# Patient Record
Sex: MALE | Race: BLACK OR AFRICAN AMERICAN | Employment: OTHER | URBAN - METROPOLITAN AREA
[De-identification: names, ages, dates, MRNs, and addresses within clinical notes are randomized per-mention and may not be internally consistent; named-entity substitution may affect disease eponyms.]

---

## 2017-01-03 ENCOUNTER — OFFICE VISIT (OUTPATIENT)
Dept: CARDIOTHORACIC SURGERY | Age: 62
End: 2017-01-03

## 2017-01-03 VITALS
WEIGHT: 168 LBS | DIASTOLIC BLOOD PRESSURE: 70 MMHG | HEIGHT: 67 IN | BODY MASS INDEX: 26.37 KG/M2 | SYSTOLIC BLOOD PRESSURE: 120 MMHG

## 2017-01-03 DIAGNOSIS — Z09 POSTOP CHECK: Primary | ICD-10-CM

## 2017-01-03 PROCEDURE — 99024 POSTOP FOLLOW-UP VISIT: CPT | Performed by: SURGERY

## 2017-01-11 PROBLEM — Z74.09 LIMITED MOBILITY: Status: ACTIVE | Noted: 2017-01-11

## 2017-01-17 ENCOUNTER — TELEPHONE (OUTPATIENT)
Dept: FAMILY MEDICINE CLINIC | Age: 62
End: 2017-01-17

## 2017-02-23 ENCOUNTER — HOSPITAL ENCOUNTER (OUTPATIENT)
Dept: SURGERY | Age: 62
Discharge: HOME | End: 2017-02-23
Attending: INTERNAL MEDICINE | Admitting: INTERNAL MEDICINE

## 2017-02-23 VITALS
SYSTOLIC BLOOD PRESSURE: 156 MMHG | WEIGHT: 178 LBS | RESPIRATION RATE: 12 BRPM | DIASTOLIC BLOOD PRESSURE: 63 MMHG | BODY MASS INDEX: 27.94 KG/M2 | HEIGHT: 67 IN | OXYGEN SATURATION: 98 % | HEART RATE: 84 BPM | TEMPERATURE: 98.6 F

## 2017-02-23 LAB
GLUCOSE BLD-MCNC: 104 MG/DL (ref 70–99)
PERFORMED ON: ABNORMAL

## 2017-02-23 RX ORDER — LIDOCAINE HYDROCHLORIDE 10 MG/ML
0.1 INJECTION, SOLUTION EPIDURAL; INFILTRATION; INTRACAUDAL; PERINEURAL ONCE
Status: DISCONTINUED | OUTPATIENT
Start: 2017-02-23 | End: 2017-02-24 | Stop reason: HOSPADM

## 2017-02-23 RX ORDER — SODIUM CHLORIDE, SODIUM LACTATE, POTASSIUM CHLORIDE, CALCIUM CHLORIDE 600; 310; 30; 20 MG/100ML; MG/100ML; MG/100ML; MG/100ML
INJECTION, SOLUTION INTRAVENOUS CONTINUOUS
Status: DISCONTINUED | OUTPATIENT
Start: 2017-02-23 | End: 2017-02-24 | Stop reason: HOSPADM

## 2017-02-23 RX ADMIN — SODIUM CHLORIDE, SODIUM LACTATE, POTASSIUM CHLORIDE, CALCIUM CHLORIDE: 600; 310; 30; 20 INJECTION, SOLUTION INTRAVENOUS at 09:55

## 2017-02-23 ASSESSMENT — PAIN DESCRIPTION - LOCATION: LOCATION: ABDOMEN

## 2017-02-23 ASSESSMENT — PAIN SCALES - GENERAL
PAINLEVEL_OUTOF10: 0
PAINLEVEL_OUTOF10: 0

## 2017-02-23 ASSESSMENT — PAIN - FUNCTIONAL ASSESSMENT: PAIN_FUNCTIONAL_ASSESSMENT: 0-10

## 2017-02-28 ENCOUNTER — OFFICE VISIT (OUTPATIENT)
Dept: CARDIOLOGY CLINIC | Age: 62
End: 2017-02-28

## 2017-02-28 VITALS
SYSTOLIC BLOOD PRESSURE: 134 MMHG | OXYGEN SATURATION: 99 % | DIASTOLIC BLOOD PRESSURE: 70 MMHG | BODY MASS INDEX: 26.53 KG/M2 | HEIGHT: 67 IN | HEART RATE: 66 BPM | WEIGHT: 169 LBS

## 2017-02-28 DIAGNOSIS — I10 ESSENTIAL HYPERTENSION: ICD-10-CM

## 2017-02-28 DIAGNOSIS — I20.8 CARDIAC SYNDROME X (HCC): ICD-10-CM

## 2017-02-28 DIAGNOSIS — I21.4 NSTEMI (NON-ST ELEVATED MYOCARDIAL INFARCTION) (HCC): ICD-10-CM

## 2017-02-28 DIAGNOSIS — I65.23 CAROTID STENOSIS, ASYMPTOMATIC, BILATERAL: ICD-10-CM

## 2017-02-28 DIAGNOSIS — E78.49 OTHER HYPERLIPIDEMIA: ICD-10-CM

## 2017-02-28 DIAGNOSIS — I73.9 PAD (PERIPHERAL ARTERY DISEASE) (HCC): ICD-10-CM

## 2017-02-28 DIAGNOSIS — I25.10 CORONARY ARTERY DISEASE INVOLVING NATIVE HEART WITHOUT ANGINA PECTORIS, UNSPECIFIED VESSEL OR LESION TYPE: Primary | ICD-10-CM

## 2017-02-28 DIAGNOSIS — Z72.0 TOBACCO ABUSE: ICD-10-CM

## 2017-02-28 PROCEDURE — 99213 OFFICE O/P EST LOW 20 MIN: CPT | Performed by: INTERNAL MEDICINE

## 2017-03-07 ENCOUNTER — TELEPHONE (OUTPATIENT)
Dept: CARDIOLOGY CLINIC | Age: 62
End: 2017-03-07

## 2017-03-16 ENCOUNTER — HOSPITAL ENCOUNTER (OUTPATIENT)
Dept: CARDIOLOGY | Facility: CLINIC | Age: 62
Discharge: HOME | End: 2017-03-16
Attending: INTERNAL MEDICINE | Admitting: INTERNAL MEDICINE

## 2017-03-17 ENCOUNTER — TELEPHONE (OUTPATIENT)
Dept: CARDIOLOGY CLINIC | Age: 62
End: 2017-03-17

## 2017-04-04 ENCOUNTER — NURSE ONLY (OUTPATIENT)
Dept: URGENT CARE | Age: 62
End: 2017-04-04

## 2017-04-04 ENCOUNTER — OFFICE VISIT (OUTPATIENT)
Dept: FAMILY MEDICINE CLINIC | Age: 62
End: 2017-04-04

## 2017-04-04 VITALS
DIASTOLIC BLOOD PRESSURE: 60 MMHG | OXYGEN SATURATION: 98 % | WEIGHT: 165 LBS | SYSTOLIC BLOOD PRESSURE: 122 MMHG | BODY MASS INDEX: 25.84 KG/M2 | HEART RATE: 65 BPM

## 2017-04-04 DIAGNOSIS — G89.29 CHRONIC MIDLINE LOW BACK PAIN WITHOUT SCIATICA: ICD-10-CM

## 2017-04-04 DIAGNOSIS — M54.50 CHRONIC MIDLINE LOW BACK PAIN WITHOUT SCIATICA: Primary | ICD-10-CM

## 2017-04-04 DIAGNOSIS — M25.562 CHRONIC PAIN OF BOTH KNEES: Primary | ICD-10-CM

## 2017-04-04 DIAGNOSIS — M25.562 CHRONIC PAIN OF BOTH KNEES: ICD-10-CM

## 2017-04-04 DIAGNOSIS — M25.561 CHRONIC PAIN OF BOTH KNEES: ICD-10-CM

## 2017-04-04 DIAGNOSIS — G89.29 CHRONIC PAIN OF BOTH KNEES: Primary | ICD-10-CM

## 2017-04-04 DIAGNOSIS — G89.29 CHRONIC PAIN OF BOTH KNEES: ICD-10-CM

## 2017-04-04 DIAGNOSIS — G89.29 CHRONIC MIDLINE LOW BACK PAIN WITHOUT SCIATICA: Primary | ICD-10-CM

## 2017-04-04 DIAGNOSIS — M54.50 CHRONIC MIDLINE LOW BACK PAIN WITHOUT SCIATICA: ICD-10-CM

## 2017-04-04 DIAGNOSIS — M25.561 CHRONIC PAIN OF BOTH KNEES: Primary | ICD-10-CM

## 2017-04-04 PROCEDURE — 73562 X-RAY EXAM OF KNEE 3: CPT | Performed by: RADIOLOGY

## 2017-04-04 PROCEDURE — 99213 OFFICE O/P EST LOW 20 MIN: CPT | Performed by: FAMILY MEDICINE

## 2017-04-10 DIAGNOSIS — I10 ESSENTIAL HYPERTENSION: ICD-10-CM

## 2017-04-10 RX ORDER — METOPROLOL SUCCINATE 50 MG/1
TABLET, EXTENDED RELEASE ORAL
Qty: 30 TABLET | Refills: 3 | Status: SHIPPED | OUTPATIENT
Start: 2017-04-10 | End: 2017-08-26 | Stop reason: SDUPTHER

## 2017-04-11 DIAGNOSIS — Z86.73 HISTORY OF STROKE: ICD-10-CM

## 2017-04-12 RX ORDER — ATORVASTATIN CALCIUM 40 MG/1
TABLET, FILM COATED ORAL
Qty: 30 TABLET | Refills: 3 | Status: SHIPPED | OUTPATIENT
Start: 2017-04-12 | End: 2017-09-09 | Stop reason: SDUPTHER

## 2017-06-01 DIAGNOSIS — F03.90 DEMENTIA WITHOUT BEHAVIORAL DISTURBANCE, UNSPECIFIED DEMENTIA TYPE: ICD-10-CM

## 2017-06-01 RX ORDER — MEMANTINE HYDROCHLORIDE 10 MG/1
10 TABLET ORAL 2 TIMES DAILY
Qty: 60 TABLET | Refills: 3 | OUTPATIENT
Start: 2017-06-01 | End: 2017-06-26

## 2017-06-22 ENCOUNTER — TELEPHONE (OUTPATIENT)
Dept: FAMILY MEDICINE CLINIC | Age: 62
End: 2017-06-22

## 2017-06-22 DIAGNOSIS — R07.9 CHEST PAIN, UNSPECIFIED TYPE: ICD-10-CM

## 2017-06-22 RX ORDER — OMEPRAZOLE 20 MG/1
20 CAPSULE, DELAYED RELEASE ORAL DAILY
Qty: 30 CAPSULE | Refills: 2 | Status: SHIPPED | OUTPATIENT
Start: 2017-06-22

## 2017-06-22 RX ORDER — TAMSULOSIN HYDROCHLORIDE 0.4 MG/1
0.4 CAPSULE ORAL DAILY
Qty: 30 CAPSULE | Refills: 5 | Status: SHIPPED | OUTPATIENT
Start: 2017-06-22 | End: 2017-12-26 | Stop reason: SDUPTHER

## 2017-06-22 RX ORDER — HYDROCODONE BITARTRATE AND ACETAMINOPHEN 5; 325 MG/1; MG/1
1 TABLET ORAL EVERY 8 HOURS PRN
Qty: 8 TABLET | Refills: 0 | OUTPATIENT
Start: 2017-06-22

## 2017-07-03 RX ORDER — ISOSORBIDE MONONITRATE 30 MG/1
30 TABLET, EXTENDED RELEASE ORAL DAILY
Qty: 30 TABLET | Refills: 3 | Status: SHIPPED | OUTPATIENT
Start: 2017-07-03 | End: 2017-11-01 | Stop reason: SDUPTHER

## 2017-07-25 DIAGNOSIS — F03.90 DEMENTIA WITHOUT BEHAVIORAL DISTURBANCE, UNSPECIFIED DEMENTIA TYPE: ICD-10-CM

## 2017-07-25 RX ORDER — MEMANTINE HYDROCHLORIDE 10 MG/1
TABLET ORAL
Qty: 60 TABLET | Refills: 0 | Status: SHIPPED | OUTPATIENT
Start: 2017-07-25 | End: 2017-08-27 | Stop reason: SDUPTHER

## 2017-08-23 ENCOUNTER — OFFICE VISIT (OUTPATIENT)
Dept: FAMILY MEDICINE CLINIC | Age: 62
End: 2017-08-23

## 2017-08-23 VITALS
WEIGHT: 162 LBS | SYSTOLIC BLOOD PRESSURE: 116 MMHG | DIASTOLIC BLOOD PRESSURE: 62 MMHG | HEART RATE: 94 BPM | BODY MASS INDEX: 25.37 KG/M2 | OXYGEN SATURATION: 98 %

## 2017-08-23 DIAGNOSIS — R06.02 SHORTNESS OF BREATH: ICD-10-CM

## 2017-08-23 DIAGNOSIS — I10 ESSENTIAL HYPERTENSION: ICD-10-CM

## 2017-08-23 DIAGNOSIS — E11.9 DIET-CONTROLLED DIABETES MELLITUS (HCC): Primary | ICD-10-CM

## 2017-08-23 DIAGNOSIS — E78.5 HYPERLIPIDEMIA, UNSPECIFIED: ICD-10-CM

## 2017-08-23 DIAGNOSIS — Z23 NEED FOR STREPTOCOCCUS PNEUMONIAE AND INFLUENZA VACCINATION: ICD-10-CM

## 2017-08-23 LAB
A/G RATIO: 1.5 (ref 1.1–2.2)
ALBUMIN SERPL-MCNC: 4.5 G/DL (ref 3.4–5)
ALP BLD-CCNC: 140 U/L (ref 40–129)
ALT SERPL-CCNC: 27 U/L (ref 10–40)
ANION GAP SERPL CALCULATED.3IONS-SCNC: 19 MMOL/L (ref 3–16)
AST SERPL-CCNC: 22 U/L (ref 15–37)
BILIRUB SERPL-MCNC: 0.4 MG/DL (ref 0–1)
BUN BLDV-MCNC: 20 MG/DL (ref 7–20)
CALCIUM SERPL-MCNC: 9.2 MG/DL (ref 8.3–10.6)
CHLORIDE BLD-SCNC: 93 MMOL/L (ref 99–110)
CO2: 31 MMOL/L (ref 21–32)
CREAT SERPL-MCNC: 4.1 MG/DL (ref 0.8–1.3)
GFR AFRICAN AMERICAN: 18
GFR NON-AFRICAN AMERICAN: 15
GLOBULIN: 3 G/DL
GLUCOSE BLD-MCNC: 215 MG/DL (ref 70–99)
HBA1C MFR BLD: 5.8 %
POTASSIUM SERPL-SCNC: 4.3 MMOL/L (ref 3.5–5.1)
SODIUM BLD-SCNC: 143 MMOL/L (ref 136–145)
TOTAL PROTEIN: 7.5 G/DL (ref 6.4–8.2)

## 2017-08-23 PROCEDURE — G0009 ADMIN PNEUMOCOCCAL VACCINE: HCPCS | Performed by: FAMILY MEDICINE

## 2017-08-23 PROCEDURE — 83036 HEMOGLOBIN GLYCOSYLATED A1C: CPT | Performed by: FAMILY MEDICINE

## 2017-08-23 PROCEDURE — 99214 OFFICE O/P EST MOD 30 MIN: CPT | Performed by: FAMILY MEDICINE

## 2017-08-23 PROCEDURE — 90670 PCV13 VACCINE IM: CPT | Performed by: FAMILY MEDICINE

## 2017-08-23 PROCEDURE — 82044 UR ALBUMIN SEMIQUANTITATIVE: CPT | Performed by: FAMILY MEDICINE

## 2017-08-23 RX ORDER — ALBUTEROL SULFATE 90 UG/1
2 AEROSOL, METERED RESPIRATORY (INHALATION) EVERY 6 HOURS PRN
Qty: 1 INHALER | Refills: 3 | Status: SHIPPED | OUTPATIENT
Start: 2017-08-23 | End: 2018-02-16 | Stop reason: ALTCHOICE

## 2017-08-23 RX ORDER — ENALAPRIL MALEATE 10 MG/1
10 TABLET ORAL DAILY
COMMUNITY
End: 2017-10-10 | Stop reason: SDUPTHER

## 2017-08-23 RX ORDER — HYDRALAZINE HYDROCHLORIDE 25 MG/1
25 TABLET, FILM COATED ORAL 3 TIMES DAILY
COMMUNITY
End: 2017-08-23 | Stop reason: CLARIF

## 2017-08-23 RX ORDER — HYDRALAZINE HYDROCHLORIDE 50 MG/1
TABLET, FILM COATED ORAL
Refills: 0 | COMMUNITY
Start: 2017-07-03 | End: 2017-10-10 | Stop reason: SDUPTHER

## 2017-08-23 ASSESSMENT — ENCOUNTER SYMPTOMS: SHORTNESS OF BREATH: 1

## 2017-08-24 ENCOUNTER — TELEPHONE (OUTPATIENT)
Dept: FAMILY MEDICINE CLINIC | Age: 62
End: 2017-08-24

## 2017-08-24 LAB
CREATININE URINE POCT: 100
MICROALBUMIN/CREAT 24H UR: 150 MG/G{CREAT}
MICROALBUMIN/CREAT UR-RTO: >300

## 2017-08-26 DIAGNOSIS — I10 ESSENTIAL HYPERTENSION: ICD-10-CM

## 2017-08-27 DIAGNOSIS — F03.90 DEMENTIA WITHOUT BEHAVIORAL DISTURBANCE, UNSPECIFIED DEMENTIA TYPE: ICD-10-CM

## 2017-08-28 RX ORDER — METOPROLOL SUCCINATE 50 MG/1
TABLET, EXTENDED RELEASE ORAL
Qty: 30 TABLET | Refills: 3 | Status: SHIPPED | OUTPATIENT
Start: 2017-08-28 | End: 2018-01-18 | Stop reason: SDUPTHER

## 2017-08-28 RX ORDER — MEMANTINE HYDROCHLORIDE 10 MG/1
TABLET ORAL
Qty: 60 TABLET | Refills: 0 | Status: SHIPPED | OUTPATIENT
Start: 2017-08-28 | End: 2017-09-24 | Stop reason: SDUPTHER

## 2017-09-05 ENCOUNTER — TELEPHONE (OUTPATIENT)
Dept: FAMILY MEDICINE CLINIC | Age: 62
End: 2017-09-05

## 2017-09-08 ENCOUNTER — OFFICE VISIT (OUTPATIENT)
Dept: FAMILY MEDICINE CLINIC | Age: 62
End: 2017-09-08

## 2017-09-08 VITALS
BODY MASS INDEX: 25.16 KG/M2 | HEIGHT: 68 IN | DIASTOLIC BLOOD PRESSURE: 60 MMHG | HEART RATE: 77 BPM | WEIGHT: 166 LBS | OXYGEN SATURATION: 99 % | SYSTOLIC BLOOD PRESSURE: 120 MMHG

## 2017-09-08 DIAGNOSIS — Z23 NEED FOR INFLUENZA VACCINATION: ICD-10-CM

## 2017-09-08 DIAGNOSIS — R06.02 SHORTNESS OF BREATH: Primary | ICD-10-CM

## 2017-09-08 PROCEDURE — 99213 OFFICE O/P EST LOW 20 MIN: CPT | Performed by: FAMILY MEDICINE

## 2017-09-08 ASSESSMENT — ENCOUNTER SYMPTOMS: SHORTNESS OF BREATH: 1

## 2017-09-09 DIAGNOSIS — Z86.73 HISTORY OF STROKE: ICD-10-CM

## 2017-09-11 RX ORDER — ATORVASTATIN CALCIUM 40 MG/1
TABLET, FILM COATED ORAL
Qty: 30 TABLET | Refills: 3 | Status: SHIPPED | OUTPATIENT
Start: 2017-09-11 | End: 2018-03-05 | Stop reason: SDUPTHER

## 2017-09-24 DIAGNOSIS — F03.90 DEMENTIA WITHOUT BEHAVIORAL DISTURBANCE, UNSPECIFIED DEMENTIA TYPE: ICD-10-CM

## 2017-09-25 RX ORDER — MEMANTINE HYDROCHLORIDE 10 MG/1
TABLET ORAL
Qty: 60 TABLET | Refills: 0 | Status: SHIPPED | OUTPATIENT
Start: 2017-09-25 | End: 2017-10-27 | Stop reason: SDUPTHER

## 2017-10-10 ENCOUNTER — TELEPHONE (OUTPATIENT)
Dept: FAMILY MEDICINE CLINIC | Age: 62
End: 2017-10-10

## 2017-10-10 RX ORDER — ENALAPRIL MALEATE 10 MG/1
10 TABLET ORAL DAILY
Qty: 30 TABLET | Refills: 3 | Status: SHIPPED | OUTPATIENT
Start: 2017-10-10 | End: 2018-02-08 | Stop reason: SDUPTHER

## 2017-10-10 RX ORDER — HYDRALAZINE HYDROCHLORIDE 50 MG/1
TABLET, FILM COATED ORAL
Qty: 90 TABLET | Refills: 3 | Status: SHIPPED | OUTPATIENT
Start: 2017-10-10 | End: 2018-07-10 | Stop reason: SDUPTHER

## 2017-10-10 NOTE — TELEPHONE ENCOUNTER
Pt requesting a script for hydrALAZINE (APRESOLINE) 50 MG tablet    enalapril (VASOTEC) 10 MG tablet    PT last ov 9/8/17  Future 3/9/18      [facility]

## 2017-10-27 DIAGNOSIS — F03.90 DEMENTIA WITHOUT BEHAVIORAL DISTURBANCE, UNSPECIFIED DEMENTIA TYPE: ICD-10-CM

## 2017-10-27 RX ORDER — MEMANTINE HYDROCHLORIDE 10 MG/1
TABLET ORAL
Qty: 60 TABLET | Refills: 3 | Status: SHIPPED | OUTPATIENT
Start: 2017-10-27 | End: 2018-03-16 | Stop reason: SDUPTHER

## 2017-11-02 RX ORDER — ISOSORBIDE MONONITRATE 30 MG/1
30 TABLET, EXTENDED RELEASE ORAL DAILY
Qty: 90 TABLET | Refills: 0 | Status: SHIPPED | OUTPATIENT
Start: 2017-11-02 | End: 2018-02-12 | Stop reason: SDUPTHER

## 2017-12-26 RX ORDER — TAMSULOSIN HYDROCHLORIDE 0.4 MG/1
0.4 CAPSULE ORAL DAILY
Qty: 30 CAPSULE | Refills: 5 | Status: SHIPPED | OUTPATIENT
Start: 2017-12-26 | End: 2018-06-04 | Stop reason: SDUPTHER

## 2018-01-18 DIAGNOSIS — I10 ESSENTIAL HYPERTENSION: ICD-10-CM

## 2018-01-18 RX ORDER — METOPROLOL SUCCINATE 50 MG/1
TABLET, EXTENDED RELEASE ORAL
Qty: 30 TABLET | Refills: 3 | Status: SHIPPED | OUTPATIENT
Start: 2018-01-18 | End: 2018-05-09 | Stop reason: SDUPTHER

## 2018-02-08 RX ORDER — ENALAPRIL MALEATE 10 MG/1
10 TABLET ORAL DAILY
Qty: 30 TABLET | Refills: 3 | Status: SHIPPED | OUTPATIENT
Start: 2018-02-08 | End: 2018-06-04 | Stop reason: SDUPTHER

## 2018-02-12 RX ORDER — ISOSORBIDE MONONITRATE 30 MG/1
30 TABLET, EXTENDED RELEASE ORAL DAILY
Qty: 90 TABLET | Refills: 0 | Status: SHIPPED | OUTPATIENT
Start: 2018-02-12 | End: 2018-05-18 | Stop reason: SDUPTHER

## 2018-02-16 ENCOUNTER — OFFICE VISIT (OUTPATIENT)
Dept: FAMILY MEDICINE CLINIC | Age: 63
End: 2018-02-16

## 2018-02-16 VITALS
SYSTOLIC BLOOD PRESSURE: 112 MMHG | HEIGHT: 68 IN | OXYGEN SATURATION: 99 % | DIASTOLIC BLOOD PRESSURE: 62 MMHG | HEART RATE: 80 BPM | BODY MASS INDEX: 25.01 KG/M2 | WEIGHT: 165 LBS

## 2018-02-16 DIAGNOSIS — Z23 NEED FOR PROPHYLACTIC VACCINATION AGAINST STREPTOCOCCUS PNEUMONIAE (PNEUMOCOCCUS): ICD-10-CM

## 2018-02-16 DIAGNOSIS — R73.03 PREDIABETES: ICD-10-CM

## 2018-02-16 DIAGNOSIS — Z00.00 ROUTINE GENERAL MEDICAL EXAMINATION AT A HEALTH CARE FACILITY: Primary | ICD-10-CM

## 2018-02-16 LAB
CHOLESTEROL, TOTAL: 113 MG/DL (ref 0–199)
HBA1C MFR BLD: 5.5 %
HDLC SERPL-MCNC: 24 MG/DL (ref 40–60)
LDL CHOLESTEROL CALCULATED: 52 MG/DL
TRIGL SERPL-MCNC: 185 MG/DL (ref 0–150)
VLDLC SERPL CALC-MCNC: 37 MG/DL

## 2018-02-16 PROCEDURE — G0009 ADMIN PNEUMOCOCCAL VACCINE: HCPCS | Performed by: FAMILY MEDICINE

## 2018-02-16 PROCEDURE — 83036 HEMOGLOBIN GLYCOSYLATED A1C: CPT | Performed by: FAMILY MEDICINE

## 2018-02-16 PROCEDURE — G8598 ASA/ANTIPLAT THER USED: HCPCS | Performed by: FAMILY MEDICINE

## 2018-02-16 PROCEDURE — 90732 PPSV23 VACC 2 YRS+ SUBQ/IM: CPT | Performed by: FAMILY MEDICINE

## 2018-02-16 PROCEDURE — G0438 PPPS, INITIAL VISIT: HCPCS | Performed by: FAMILY MEDICINE

## 2018-02-16 RX ORDER — CYCLOBENZAPRINE HCL 10 MG
10 TABLET ORAL 3 TIMES DAILY PRN
COMMUNITY
End: 2019-08-19 | Stop reason: SDUPTHER

## 2018-02-16 ASSESSMENT — LIFESTYLE VARIABLES: HOW OFTEN DO YOU HAVE A DRINK CONTAINING ALCOHOL: 0

## 2018-02-16 ASSESSMENT — ANXIETY QUESTIONNAIRES: GAD7 TOTAL SCORE: 0

## 2018-02-16 ASSESSMENT — PATIENT HEALTH QUESTIONNAIRE - PHQ9: SUM OF ALL RESPONSES TO PHQ QUESTIONS 1-9: 0

## 2018-02-16 NOTE — PROGRESS NOTES
Medicare Annual Wellness Visit  Name: Cathleen Runner Date: 2018   MRN: [id number] Sex: Male   Age: 58 y.o. Ethnicity: Non-/Non    : 1955 Race: Black      Pedro Mayer is here for Medicare AWV    Screenings for behavioral, psychosocial and functional/safety risks, and cognitive dysfunction are all negative except as indicated below. These results, as well as other patient data from the [address] form, are documented in Flowsheets linked to this Encounter. Allergies   Allergen Reactions    Pcn [Penicillins]        Prior to Visit Medications    Medication Sig Taking? Authorizing Provider   cyclobenzaprine (FLEXERIL) 10 MG tablet Take 10 mg by mouth 3 times daily as needed for Muscle spasms Yes Historical Provider, MD   enalapril (VASOTEC) 10 MG tablet TAKE 1 TABLET BY MOUTH DAILY Yes Reymundo Choe DO   metoprolol succinate (TOPROL XL) 50 MG extended release tablet TAKE 1 TABLET BY MOUTH DAILY Yes Reymundo Choe DO   atorvastatin (LIPITOR) 40 MG tablet TAKE 1 TABLET BY MOUTH DAILY AT BEDTIME Yes Reymundo Choe DO   Misc. Devices (WALL GRAB BAR) MISC Use when taking shower Yes Jesus Sterling MD   Misc. Devices (SUCTION GRAB BAR) MISC Tub seat with back.  Yes Reymundo Choe DO   nitroGLYCERIN (NITROSTAT) 0.4 MG SL tablet Place 1 tablet under the tongue every 5 minutes as needed for Chest pain Yes Natasha Giraldo MD   isosorbide mononitrate (IMDUR) 30 MG extended release tablet TAKE 1 TABLET BY MOUTH DAILY  Natasha Giraldo MD   tamsulosin (FLOMAX) 0.4 MG capsule TAKE 1 CAPSULE BY MOUTH DAILY  Reymundo Choe DO   memantine (NAMENDA) 10 MG tablet TAKE 1 TABLET BY MOUTH 2 TIMES DAILY  Reymundo Choe DO   hydrALAZINE (APRESOLINE) 50 MG tablet TAKE 1 TABLET BY MOUTH 3 TIMES A DAY WITH FOOD  Reymundo Choe DO   omeprazole (PRILOSEC) 20 MG delayed release capsule Take 1 capsule by mouth daily  Manny Mandel DO   Meals on Wheels MISC by Does not apply route Patient Patient Instructions/AVS.    A1c was 5.5. He is not a diabetic anymore.

## 2018-03-16 DIAGNOSIS — F03.90 DEMENTIA WITHOUT BEHAVIORAL DISTURBANCE, UNSPECIFIED DEMENTIA TYPE: ICD-10-CM

## 2018-03-16 RX ORDER — MEMANTINE HYDROCHLORIDE 10 MG/1
TABLET ORAL
Qty: 60 TABLET | Refills: 3 | Status: SHIPPED | OUTPATIENT
Start: 2018-03-16 | End: 2018-09-17 | Stop reason: SDUPTHER

## 2018-05-09 DIAGNOSIS — I10 ESSENTIAL HYPERTENSION: ICD-10-CM

## 2018-05-09 RX ORDER — METOPROLOL SUCCINATE 50 MG/1
TABLET, EXTENDED RELEASE ORAL
Qty: 30 TABLET | Refills: 3 | Status: SHIPPED | OUTPATIENT
Start: 2018-05-09 | End: 2018-09-17 | Stop reason: SDUPTHER

## 2018-05-18 RX ORDER — ISOSORBIDE MONONITRATE 30 MG/1
30 TABLET, EXTENDED RELEASE ORAL DAILY
Qty: 90 TABLET | Refills: 0 | Status: SHIPPED | OUTPATIENT
Start: 2018-05-18 | End: 2018-08-17 | Stop reason: SDUPTHER

## 2018-05-29 RX ORDER — ISOSORBIDE MONONITRATE 30 MG/1
30 TABLET, EXTENDED RELEASE ORAL DAILY
Qty: 90 TABLET | Refills: 0 | Status: SHIPPED | OUTPATIENT
Start: 2018-05-29 | End: 2018-08-21 | Stop reason: SDUPTHER

## 2018-06-04 RX ORDER — ENALAPRIL MALEATE 10 MG/1
10 TABLET ORAL DAILY
Qty: 30 TABLET | Refills: 3 | Status: SHIPPED | OUTPATIENT
Start: 2018-06-04 | End: 2018-12-01 | Stop reason: SDUPTHER

## 2018-06-04 RX ORDER — TAMSULOSIN HYDROCHLORIDE 0.4 MG/1
0.4 CAPSULE ORAL DAILY
Qty: 30 CAPSULE | Refills: 5 | Status: SHIPPED | OUTPATIENT
Start: 2018-06-04 | End: 2018-12-16 | Stop reason: SDUPTHER

## 2018-06-12 ENCOUNTER — OFFICE VISIT (OUTPATIENT)
Dept: CARDIOLOGY CLINIC | Age: 63
End: 2018-06-12

## 2018-06-12 VITALS
SYSTOLIC BLOOD PRESSURE: 121 MMHG | HEIGHT: 67 IN | BODY MASS INDEX: 25.36 KG/M2 | DIASTOLIC BLOOD PRESSURE: 76 MMHG | OXYGEN SATURATION: 99 % | HEART RATE: 71 BPM | WEIGHT: 161.6 LBS

## 2018-06-12 DIAGNOSIS — I73.9 PAD (PERIPHERAL ARTERY DISEASE) (HCC): ICD-10-CM

## 2018-06-12 DIAGNOSIS — I25.10 CORONARY ARTERY DISEASE INVOLVING NATIVE HEART WITHOUT ANGINA PECTORIS, UNSPECIFIED VESSEL OR LESION TYPE: Primary | ICD-10-CM

## 2018-06-12 DIAGNOSIS — I25.89 CARDIAC MICROVASCULAR DISEASE: ICD-10-CM

## 2018-06-12 DIAGNOSIS — E78.2 MIXED HYPERLIPIDEMIA: ICD-10-CM

## 2018-06-12 DIAGNOSIS — I10 ESSENTIAL HYPERTENSION: ICD-10-CM

## 2018-06-12 PROCEDURE — 3017F COLORECTAL CA SCREEN DOC REV: CPT | Performed by: INTERNAL MEDICINE

## 2018-06-12 PROCEDURE — G8598 ASA/ANTIPLAT THER USED: HCPCS | Performed by: INTERNAL MEDICINE

## 2018-06-12 PROCEDURE — G8427 DOCREV CUR MEDS BY ELIG CLIN: HCPCS | Performed by: INTERNAL MEDICINE

## 2018-06-12 PROCEDURE — 99213 OFFICE O/P EST LOW 20 MIN: CPT | Performed by: INTERNAL MEDICINE

## 2018-06-12 PROCEDURE — G8419 CALC BMI OUT NRM PARAM NOF/U: HCPCS | Performed by: INTERNAL MEDICINE

## 2018-06-12 PROCEDURE — 1036F TOBACCO NON-USER: CPT | Performed by: INTERNAL MEDICINE

## 2018-06-12 RX ORDER — ASPIRIN 81 MG/1
81 TABLET, CHEWABLE ORAL DAILY
COMMUNITY

## 2018-08-17 ENCOUNTER — OFFICE VISIT (OUTPATIENT)
Dept: FAMILY MEDICINE CLINIC | Age: 63
End: 2018-08-17

## 2018-08-17 VITALS
SYSTOLIC BLOOD PRESSURE: 126 MMHG | WEIGHT: 161 LBS | HEART RATE: 64 BPM | DIASTOLIC BLOOD PRESSURE: 78 MMHG | OXYGEN SATURATION: 98 % | BODY MASS INDEX: 25.22 KG/M2

## 2018-08-17 DIAGNOSIS — R19.7 DIARRHEA, UNSPECIFIED TYPE: Primary | ICD-10-CM

## 2018-08-17 PROCEDURE — G8427 DOCREV CUR MEDS BY ELIG CLIN: HCPCS | Performed by: FAMILY MEDICINE

## 2018-08-17 PROCEDURE — G8598 ASA/ANTIPLAT THER USED: HCPCS | Performed by: FAMILY MEDICINE

## 2018-08-17 PROCEDURE — 3017F COLORECTAL CA SCREEN DOC REV: CPT | Performed by: FAMILY MEDICINE

## 2018-08-17 PROCEDURE — 99213 OFFICE O/P EST LOW 20 MIN: CPT | Performed by: FAMILY MEDICINE

## 2018-08-17 PROCEDURE — G8419 CALC BMI OUT NRM PARAM NOF/U: HCPCS | Performed by: FAMILY MEDICINE

## 2018-08-17 PROCEDURE — 36415 COLL VENOUS BLD VENIPUNCTURE: CPT | Performed by: FAMILY MEDICINE

## 2018-08-17 PROCEDURE — 1036F TOBACCO NON-USER: CPT | Performed by: FAMILY MEDICINE

## 2018-08-17 ASSESSMENT — ENCOUNTER SYMPTOMS: DIARRHEA: 1

## 2018-08-17 NOTE — PROGRESS NOTES
Muscle spasms      omeprazole (PRILOSEC) 20 MG delayed release capsule Take 1 capsule by mouth daily 30 capsule 2    Misc. Devices (WALL GRAB BAR) MISC Use when taking shower 1 each 0    Misc. Devices (SUCTION GRAB BAR) MISC Tub seat with back. 1 each 0    Meals on Wheels MISC by Does not apply route Patient needs diabetic meals five days per week. 1 each 0    nitroGLYCERIN (NITROSTAT) 0.4 MG SL tablet Place 1 tablet under the tongue every 5 minutes as needed for Chest pain 25 tablet 3     No current facility-administered medications for this visit. Assessment:    1. Diarrhea, unspecified type        Plan:    1. Diarrhea, unspecified type  Unclear etiology but unlikely infectious. Check stool studies below to r/o infectious etiology. Check labs to monitor potassium. Try Imodium if labs normal. May consider Bentyl on next visit. - C DIFF TOXIN/ANTIGEN; Future  - GI Bacterial Pathogens By PCR; Future  - CBC Auto Differential  - Comprehensive Metabolic Panel  - PANCREATIC ELASTASE, FECAL; Future      Return in about 1 month (around 9/17/2018) for diarrhea, , Diabetes, Hypertension, Hyperlipidemia.

## 2018-08-18 LAB
A/G RATIO: 1.8 (ref 1.1–2.2)
ALBUMIN SERPL-MCNC: 4.4 G/DL (ref 3.4–5)
ALP BLD-CCNC: 129 U/L (ref 40–129)
ALT SERPL-CCNC: 30 U/L (ref 10–40)
ANION GAP SERPL CALCULATED.3IONS-SCNC: 16 MMOL/L (ref 3–16)
AST SERPL-CCNC: 27 U/L (ref 15–37)
BASOPHILS ABSOLUTE: 0.1 K/UL (ref 0–0.2)
BASOPHILS RELATIVE PERCENT: 1.9 %
BILIRUB SERPL-MCNC: 0.4 MG/DL (ref 0–1)
BUN BLDV-MCNC: 32 MG/DL (ref 7–20)
CALCIUM SERPL-MCNC: 8.4 MG/DL (ref 8.3–10.6)
CHLORIDE BLD-SCNC: 94 MMOL/L (ref 99–110)
CO2: 34 MMOL/L (ref 21–32)
CREAT SERPL-MCNC: 5 MG/DL (ref 0.8–1.3)
EOSINOPHILS ABSOLUTE: 0.1 K/UL (ref 0–0.6)
EOSINOPHILS RELATIVE PERCENT: 2.1 %
GFR AFRICAN AMERICAN: 14
GFR NON-AFRICAN AMERICAN: 12
GLOBULIN: 2.5 G/DL
GLUCOSE BLD-MCNC: 92 MG/DL (ref 70–99)
HCT VFR BLD CALC: 37.9 % (ref 40.5–52.5)
HEMOGLOBIN: 12.1 G/DL (ref 13.5–17.5)
LYMPHOCYTES ABSOLUTE: 0.9 K/UL (ref 1–5.1)
LYMPHOCYTES RELATIVE PERCENT: 22.4 %
MCH RBC QN AUTO: 27.8 PG (ref 26–34)
MCHC RBC AUTO-ENTMCNC: 31.9 G/DL (ref 31–36)
MCV RBC AUTO: 87.3 FL (ref 80–100)
MONOCYTES ABSOLUTE: 0.3 K/UL (ref 0–1.3)
MONOCYTES RELATIVE PERCENT: 8.5 %
NEUTROPHILS ABSOLUTE: 2.6 K/UL (ref 1.7–7.7)
NEUTROPHILS RELATIVE PERCENT: 65.1 %
PDW BLD-RTO: 16.8 % (ref 12.4–15.4)
PLATELET # BLD: 124 K/UL (ref 135–450)
PMV BLD AUTO: 10.1 FL (ref 5–10.5)
POTASSIUM SERPL-SCNC: 4.8 MMOL/L (ref 3.5–5.1)
RBC # BLD: 4.34 M/UL (ref 4.2–5.9)
SODIUM BLD-SCNC: 144 MMOL/L (ref 136–145)
TOTAL PROTEIN: 6.9 G/DL (ref 6.4–8.2)
WBC # BLD: 4.1 K/UL (ref 4–11)

## 2018-08-20 ENCOUNTER — HOSPITAL ENCOUNTER (OUTPATIENT)
Age: 63
Setting detail: SPECIMEN
Discharge: HOME | End: 2018-08-20
Payer: COMMERCIAL

## 2018-08-20 DIAGNOSIS — R19.7 DIARRHEA, UNSPECIFIED TYPE: ICD-10-CM

## 2018-08-20 LAB — C DIFFICILE TOXIN, EIA: NORMAL

## 2018-08-20 PROCEDURE — 87449 NOS EACH ORGANISM AG IA: CPT

## 2018-08-20 PROCEDURE — 87505 NFCT AGENT DETECTION GI: CPT

## 2018-08-20 PROCEDURE — 87324 CLOSTRIDIUM AG IA: CPT

## 2018-08-20 PROCEDURE — 83520 IMMUNOASSAY QUANT NOS NONAB: CPT

## 2018-08-20 PROCEDURE — 87046 STOOL CULTR AEROBIC BACT EA: CPT

## 2018-08-21 LAB — GI BACTERIAL PATHOGENS BY PCR: NORMAL

## 2018-08-21 RX ORDER — ISOSORBIDE MONONITRATE 30 MG/1
30 TABLET, EXTENDED RELEASE ORAL DAILY
Qty: 90 TABLET | Refills: 3 | Status: SHIPPED | OUTPATIENT
Start: 2018-08-21 | End: 2019-08-14 | Stop reason: ALTCHOICE

## 2018-08-23 LAB — PANCREATIC ELASTASE, FECAL: 125 UG/G

## 2018-09-14 ENCOUNTER — OFFICE VISIT (OUTPATIENT)
Dept: FAMILY MEDICINE CLINIC | Age: 63
End: 2018-09-14

## 2018-09-14 VITALS
OXYGEN SATURATION: 98 % | WEIGHT: 158 LBS | HEART RATE: 76 BPM | BODY MASS INDEX: 24.75 KG/M2 | DIASTOLIC BLOOD PRESSURE: 82 MMHG | SYSTOLIC BLOOD PRESSURE: 118 MMHG

## 2018-09-14 DIAGNOSIS — R19.7 DIARRHEA, UNSPECIFIED TYPE: ICD-10-CM

## 2018-09-14 DIAGNOSIS — I10 ESSENTIAL HYPERTENSION: ICD-10-CM

## 2018-09-14 DIAGNOSIS — Z23 NEED FOR INFLUENZA VACCINATION: ICD-10-CM

## 2018-09-14 DIAGNOSIS — E11.9 DIET-CONTROLLED DIABETES MELLITUS (HCC): Primary | ICD-10-CM

## 2018-09-14 DIAGNOSIS — D69.6 THROMBOCYTOPENIA (HCC): ICD-10-CM

## 2018-09-14 DIAGNOSIS — E78.2 MIXED HYPERLIPIDEMIA: ICD-10-CM

## 2018-09-14 LAB
BASOPHILS ABSOLUTE: 0.1 K/UL (ref 0–0.2)
BASOPHILS RELATIVE PERCENT: 1.1 %
EOSINOPHILS ABSOLUTE: 0.2 K/UL (ref 0–0.6)
EOSINOPHILS RELATIVE PERCENT: 2.2 %
HBA1C MFR BLD: 6.2 %
HCT VFR BLD CALC: 35.7 % (ref 40.5–52.5)
HEMOGLOBIN: 11.5 G/DL (ref 13.5–17.5)
LYMPHOCYTES ABSOLUTE: 1.3 K/UL (ref 1–5.1)
LYMPHOCYTES RELATIVE PERCENT: 18.5 %
MCH RBC QN AUTO: 27.8 PG (ref 26–34)
MCHC RBC AUTO-ENTMCNC: 32.2 G/DL (ref 31–36)
MCV RBC AUTO: 86.3 FL (ref 80–100)
MONOCYTES ABSOLUTE: 0.5 K/UL (ref 0–1.3)
MONOCYTES RELATIVE PERCENT: 7.3 %
NEUTROPHILS ABSOLUTE: 5.1 K/UL (ref 1.7–7.7)
NEUTROPHILS RELATIVE PERCENT: 70.9 %
PDW BLD-RTO: 16 % (ref 12.4–15.4)
PLATELET # BLD: 209 K/UL (ref 135–450)
PMV BLD AUTO: 9.4 FL (ref 5–10.5)
RBC # BLD: 4.14 M/UL (ref 4.2–5.9)
WBC # BLD: 7.2 K/UL (ref 4–11)

## 2018-09-14 PROCEDURE — G8598 ASA/ANTIPLAT THER USED: HCPCS | Performed by: FAMILY MEDICINE

## 2018-09-14 PROCEDURE — 3017F COLORECTAL CA SCREEN DOC REV: CPT | Performed by: FAMILY MEDICINE

## 2018-09-14 PROCEDURE — 99214 OFFICE O/P EST MOD 30 MIN: CPT | Performed by: FAMILY MEDICINE

## 2018-09-14 PROCEDURE — G8427 DOCREV CUR MEDS BY ELIG CLIN: HCPCS | Performed by: FAMILY MEDICINE

## 2018-09-14 PROCEDURE — 83036 HEMOGLOBIN GLYCOSYLATED A1C: CPT | Performed by: FAMILY MEDICINE

## 2018-09-14 PROCEDURE — 1036F TOBACCO NON-USER: CPT | Performed by: FAMILY MEDICINE

## 2018-09-14 PROCEDURE — G0008 ADMIN INFLUENZA VIRUS VAC: HCPCS | Performed by: FAMILY MEDICINE

## 2018-09-14 PROCEDURE — 3044F HG A1C LEVEL LT 7.0%: CPT | Performed by: FAMILY MEDICINE

## 2018-09-14 PROCEDURE — 2022F DILAT RTA XM EVC RTNOPTHY: CPT | Performed by: FAMILY MEDICINE

## 2018-09-14 PROCEDURE — 36415 COLL VENOUS BLD VENIPUNCTURE: CPT | Performed by: FAMILY MEDICINE

## 2018-09-14 PROCEDURE — 90686 IIV4 VACC NO PRSV 0.5 ML IM: CPT | Performed by: FAMILY MEDICINE

## 2018-09-14 PROCEDURE — G8420 CALC BMI NORM PARAMETERS: HCPCS | Performed by: FAMILY MEDICINE

## 2018-09-14 RX ORDER — NITROGLYCERIN 0.4 MG/1
0.4 TABLET SUBLINGUAL EVERY 5 MIN PRN
Qty: 25 TABLET | Refills: 3 | Status: SHIPPED | OUTPATIENT
Start: 2018-09-14

## 2018-09-14 ASSESSMENT — ENCOUNTER SYMPTOMS: DIARRHEA: 1

## 2018-09-14 NOTE — PROGRESS NOTES
back. 1 each 0    Meals on Wheels MISC by Does not apply route Patient needs diabetic meals five days per week. 1 each 0     No current facility-administered medications for this visit. Assessment:    1. Diet-controlled diabetes mellitus ([facility]    2. Essential hypertension    3. Mixed hyperlipidemia    4. Thrombocytopenia ([facility]    5. Diarrhea, unspecified type    6. Need for influenza vaccination        Plan:    1. Diet-controlled diabetes mellitus ([facility] [address]  Stable. A1c 6.2.    2. Essential hypertension  Stable. Continue current medications. 3. Mixed hyperlipidemia  Stable. Continue current medications. 4. Thrombocytopenia ([facility] [address]  Recheck. Continue aspirin given history of stroke. Avoid NSAID's.   - CBC Auto Differential    5. Diarrhea, unspecified type  Almost resolved. Infectious stool studies normal. Potassium was normal. Continue Imodium prn.     6. Need for influenza vaccination  - INFLUENZA, QUADV, 3 YRS AND OLDER, IM, PF, PREFILL SYR OR SDV, 0.5ML (FLUZONE QUADV, PF)      No Follow-up on file. Patient to return to clinic if symptoms worsen or fail to improve.

## 2018-09-17 DIAGNOSIS — I10 ESSENTIAL HYPERTENSION: ICD-10-CM

## 2018-09-17 DIAGNOSIS — F03.90 DEMENTIA WITHOUT BEHAVIORAL DISTURBANCE, UNSPECIFIED DEMENTIA TYPE: ICD-10-CM

## 2018-09-17 RX ORDER — MEMANTINE HYDROCHLORIDE 10 MG/1
TABLET ORAL
Qty: 60 TABLET | Refills: 3 | Status: SHIPPED | OUTPATIENT
Start: 2018-09-17 | End: 2018-11-13 | Stop reason: SDUPTHER

## 2018-09-17 RX ORDER — METOPROLOL SUCCINATE 50 MG/1
TABLET, EXTENDED RELEASE ORAL
Qty: 30 TABLET | Refills: 3 | Status: SHIPPED | OUTPATIENT
Start: 2018-09-17 | End: 2018-11-13 | Stop reason: SDUPTHER

## 2018-09-24 ENCOUNTER — TELEPHONE (OUTPATIENT)
Dept: FAMILY MEDICINE CLINIC | Age: 63
End: 2018-09-24

## 2018-09-24 DIAGNOSIS — Z86.73 HISTORY OF STROKE: Primary | ICD-10-CM

## 2018-11-13 DIAGNOSIS — F03.90 DEMENTIA WITHOUT BEHAVIORAL DISTURBANCE, UNSPECIFIED DEMENTIA TYPE: ICD-10-CM

## 2018-11-13 DIAGNOSIS — I10 ESSENTIAL HYPERTENSION: ICD-10-CM

## 2018-11-13 RX ORDER — METOPROLOL SUCCINATE 50 MG/1
TABLET, EXTENDED RELEASE ORAL
Qty: 30 TABLET | Refills: 0 | Status: SHIPPED | OUTPATIENT
Start: 2018-11-13 | End: 2019-01-09 | Stop reason: SDUPTHER

## 2018-11-13 RX ORDER — MEMANTINE HYDROCHLORIDE 10 MG/1
TABLET ORAL
Qty: 60 TABLET | Refills: 0 | Status: SHIPPED | OUTPATIENT
Start: 2018-11-13 | End: 2019-03-12 | Stop reason: SDUPTHER

## 2018-12-03 RX ORDER — ENALAPRIL MALEATE 10 MG/1
10 TABLET ORAL DAILY
Qty: 30 TABLET | Refills: 3 | Status: SHIPPED | OUTPATIENT
Start: 2018-12-03 | End: 2019-03-08 | Stop reason: SDUPTHER

## 2018-12-03 NOTE — TELEPHONE ENCOUNTER
Last Seen: 9/14/2018  Next Appointment: Visit date not found    Requested Prescriptions     Pending Prescriptions Disp Refills    enalapril (VASOTEC) 10 MG tablet [Pharmacy Med Name: ENALAPRIL MALEATE 10 MG TAB] 30 tablet 3     Sig: TAKE 1 TABLET BY MOUTH DAILY

## 2018-12-17 RX ORDER — TAMSULOSIN HYDROCHLORIDE 0.4 MG/1
0.4 CAPSULE ORAL DAILY
Qty: 30 CAPSULE | Refills: 5 | Status: SHIPPED | OUTPATIENT
Start: 2018-12-17 | End: 2019-07-26 | Stop reason: SDUPTHER

## 2018-12-17 NOTE — TELEPHONE ENCOUNTER
Last Seen: 9/14/2018  Next Appointment: Visit date not found    Requested Prescriptions     Pending Prescriptions Disp Refills    tamsulosin (FLOMAX) 0.4 MG capsule [Pharmacy Med Name: TAMSULOSIN HCL 0.4 MG CAPSULE] 30 capsule 5     Sig: TAKE 1 CAPSULE BY MOUTH DAILY

## 2019-02-18 ENCOUNTER — OFFICE VISIT (OUTPATIENT)
Dept: FAMILY MEDICINE CLINIC | Age: 64
End: 2019-02-18
Payer: COMMERCIAL

## 2019-02-18 VITALS
SYSTOLIC BLOOD PRESSURE: 122 MMHG | WEIGHT: 157 LBS | HEART RATE: 62 BPM | DIASTOLIC BLOOD PRESSURE: 64 MMHG | OXYGEN SATURATION: 98 % | BODY MASS INDEX: 24.59 KG/M2

## 2019-02-18 DIAGNOSIS — I10 ESSENTIAL HYPERTENSION: ICD-10-CM

## 2019-02-18 DIAGNOSIS — Z00.00 ROUTINE GENERAL MEDICAL EXAMINATION AT A HEALTH CARE FACILITY: Primary | ICD-10-CM

## 2019-02-18 DIAGNOSIS — E11.9 DIET-CONTROLLED DIABETES MELLITUS (HCC): ICD-10-CM

## 2019-02-18 DIAGNOSIS — E78.2 MIXED HYPERLIPIDEMIA: ICD-10-CM

## 2019-02-18 DIAGNOSIS — Z86.73 HISTORY OF STROKE: ICD-10-CM

## 2019-02-18 PROCEDURE — 2022F DILAT RTA XM EVC RTNOPTHY: CPT | Performed by: FAMILY MEDICINE

## 2019-02-18 PROCEDURE — G8420 CALC BMI NORM PARAMETERS: HCPCS | Performed by: FAMILY MEDICINE

## 2019-02-18 PROCEDURE — 1036F TOBACCO NON-USER: CPT | Performed by: FAMILY MEDICINE

## 2019-02-18 PROCEDURE — 99214 OFFICE O/P EST MOD 30 MIN: CPT | Performed by: FAMILY MEDICINE

## 2019-02-18 PROCEDURE — G8598 ASA/ANTIPLAT THER USED: HCPCS | Performed by: FAMILY MEDICINE

## 2019-02-18 PROCEDURE — G8427 DOCREV CUR MEDS BY ELIG CLIN: HCPCS | Performed by: FAMILY MEDICINE

## 2019-02-18 PROCEDURE — 3046F HEMOGLOBIN A1C LEVEL >9.0%: CPT | Performed by: FAMILY MEDICINE

## 2019-02-18 PROCEDURE — G0438 PPPS, INITIAL VISIT: HCPCS | Performed by: FAMILY MEDICINE

## 2019-02-18 PROCEDURE — 3017F COLORECTAL CA SCREEN DOC REV: CPT | Performed by: FAMILY MEDICINE

## 2019-02-18 PROCEDURE — G8482 FLU IMMUNIZE ORDER/ADMIN: HCPCS | Performed by: FAMILY MEDICINE

## 2019-02-18 RX ORDER — ATORVASTATIN CALCIUM 40 MG/1
TABLET, FILM COATED ORAL
Qty: 30 TABLET | Refills: 11 | Status: SHIPPED | OUTPATIENT
Start: 2019-02-18 | End: 2020-02-24

## 2019-02-18 RX ORDER — MULTIVIT-MIN/IRON/FOLIC ACID/K 18-600-40
CAPSULE ORAL DAILY
COMMUNITY

## 2019-02-18 ASSESSMENT — ANXIETY QUESTIONNAIRES: GAD7 TOTAL SCORE: 2

## 2019-02-18 ASSESSMENT — PATIENT HEALTH QUESTIONNAIRE - PHQ9: SUM OF ALL RESPONSES TO PHQ QUESTIONS 1-9: 3

## 2019-02-18 ASSESSMENT — LIFESTYLE VARIABLES: HOW OFTEN DO YOU HAVE A DRINK CONTAINING ALCOHOL: 0

## 2019-03-08 RX ORDER — ENALAPRIL MALEATE 10 MG/1
10 TABLET ORAL DAILY
Qty: 30 TABLET | Refills: 3 | Status: SHIPPED | OUTPATIENT
Start: 2019-03-08 | End: 2019-09-05 | Stop reason: SDUPTHER

## 2019-04-17 RX ORDER — HYDRALAZINE HYDROCHLORIDE 50 MG/1
TABLET, FILM COATED ORAL
Qty: 90 TABLET | Refills: 5 | Status: SHIPPED | OUTPATIENT
Start: 2019-04-17 | End: 2019-12-02 | Stop reason: SDUPTHER

## 2019-04-17 NOTE — TELEPHONE ENCOUNTER
.  Last office visit 2/18/19    Last written 7/10/18 #90 5 refills    Next office visit scheduled 8/19/19    Requested Prescriptions     Pending Prescriptions Disp Refills    hydrALAZINE (APRESOLINE) 50 MG tablet [Pharmacy Med Name: HYDRALAZINE 50 MG TABLET] 90 tablet 5     Sig: TAKE 1 TABLET BY MOUTH 3 TIMES A DAY WITH FOOD

## 2019-07-22 ENCOUNTER — TELEPHONE (OUTPATIENT)
Dept: FAMILY MEDICINE CLINIC | Age: 64
End: 2019-07-22

## 2019-07-26 RX ORDER — TAMSULOSIN HYDROCHLORIDE 0.4 MG/1
0.4 CAPSULE ORAL DAILY
Qty: 90 CAPSULE | Refills: 1 | Status: SHIPPED | OUTPATIENT
Start: 2019-07-26 | End: 2020-01-20

## 2019-07-26 NOTE — TELEPHONE ENCOUNTER
.  Last office visit 2/18/19    Last written 12/17/18 #30 5 refills    Next office visit scheduled 8/19/19    Requested Prescriptions     Pending Prescriptions Disp Refills    tamsulosin (FLOMAX) 0.4 MG capsule [Pharmacy Med Name: TAMSULOSIN HCL 0.4 MG CAPSULE] 90 capsule 1     Sig: TAKE 1 CAPSULE BY MOUTH DAILY

## 2019-07-30 DIAGNOSIS — I10 ESSENTIAL HYPERTENSION: ICD-10-CM

## 2019-07-30 RX ORDER — METOPROLOL SUCCINATE 50 MG/1
TABLET, EXTENDED RELEASE ORAL
Qty: 90 TABLET | Refills: 1 | Status: SHIPPED | OUTPATIENT
Start: 2019-07-30 | End: 2020-01-24

## 2019-08-14 ENCOUNTER — OFFICE VISIT (OUTPATIENT)
Dept: CARDIOLOGY CLINIC | Age: 64
End: 2019-08-14
Payer: MEDICARE

## 2019-08-14 VITALS
OXYGEN SATURATION: 99 % | HEIGHT: 67 IN | BODY MASS INDEX: 24.33 KG/M2 | DIASTOLIC BLOOD PRESSURE: 60 MMHG | WEIGHT: 155 LBS | HEART RATE: 71 BPM | SYSTOLIC BLOOD PRESSURE: 110 MMHG

## 2019-08-14 DIAGNOSIS — I10 ESSENTIAL HYPERTENSION: ICD-10-CM

## 2019-08-14 DIAGNOSIS — I25.89 CARDIAC MICROVASCULAR DISEASE: ICD-10-CM

## 2019-08-14 DIAGNOSIS — E78.2 MIXED HYPERLIPIDEMIA: ICD-10-CM

## 2019-08-14 DIAGNOSIS — I73.9 PAD (PERIPHERAL ARTERY DISEASE) (HCC): ICD-10-CM

## 2019-08-14 DIAGNOSIS — R01.1 MURMUR, CARDIAC: Primary | ICD-10-CM

## 2019-08-14 DIAGNOSIS — I25.10 CORONARY ARTERY DISEASE INVOLVING NATIVE CORONARY ARTERY OF NATIVE HEART WITHOUT ANGINA PECTORIS: ICD-10-CM

## 2019-08-14 PROCEDURE — 1036F TOBACCO NON-USER: CPT | Performed by: INTERNAL MEDICINE

## 2019-08-14 PROCEDURE — G8427 DOCREV CUR MEDS BY ELIG CLIN: HCPCS | Performed by: INTERNAL MEDICINE

## 2019-08-14 PROCEDURE — 3017F COLORECTAL CA SCREEN DOC REV: CPT | Performed by: INTERNAL MEDICINE

## 2019-08-14 PROCEDURE — G8598 ASA/ANTIPLAT THER USED: HCPCS | Performed by: INTERNAL MEDICINE

## 2019-08-14 PROCEDURE — G8420 CALC BMI NORM PARAMETERS: HCPCS | Performed by: INTERNAL MEDICINE

## 2019-08-14 PROCEDURE — 99214 OFFICE O/P EST MOD 30 MIN: CPT | Performed by: INTERNAL MEDICINE

## 2019-08-14 NOTE — LETTER
[facility]   Cardiovascular Evaluation    PATIENT: Sagar Singh  DATE: 2019  MRN: [id number]  CSN: [id number]  : 1955      Primary Care Doctor: Angi Avilez DO  Reason for evaluation:   1 Year Follow Up (no cardiac complaints)      Subjective:   History of present illness on initial date of evaluation:   Sagar Singh is a 59 y.o. patient who presents for cardiac follow up of CAD, and hypertension. Patient recently presented to the hospital on 2016 with chest pain. Underwent cardiac cath on 2016 which showed no CAD, microvascular disease. Imdur was added to his medication regimen. Echo on  showed an EF of 55% and normal LVEF. He had a stroke in  and another in . The last stroke occurred in [place], [place]. Records not yet available. Family member reports latest stroke was within in the last 5 years. Today he reports feeling good. He has been off the isosorbide with no problems. He is taking his other medications with no problem. Patient denies chest pain, sob, palpitations, dizziness or syncope. Patient Active Problem List   Diagnosis    Essential hypertension    Abscess    History of stroke    Chest pain    ESRD (end stage renal disease) on dialysis ([address]    CAD (coronary artery disease)    NSTEMI (non-ST elevated myocardial infarction) ([address]    PAD (peripheral artery disease) ([address]    S/P cardiac catheterization    Hypotension    Symptomatic anemia    Complication of vascular access for dialysis    Limited mobility         Past Medical History:   has a past medical history of CAD (coronary artery disease), Chronic kidney disease, CVA (cerebral vascular accident) ([address] Diabetes ([address] Hemodialysis patient ([address] Hyperlipidemia, and Hypertension. Surgical History:   has a past surgical history that includes Cardiac catheterization and Dialysis fistula creation.      Social History:

## 2019-08-14 NOTE — PATIENT INSTRUCTIONS
Plan:  1. Continue current medications, no changes  2. Echocardiogram to view size and strength of heart  3. We will call you with the results  4.  Follow up in 1 year

## 2019-08-19 ENCOUNTER — OFFICE VISIT (OUTPATIENT)
Dept: FAMILY MEDICINE CLINIC | Age: 64
End: 2019-08-19
Payer: MEDICARE

## 2019-08-19 VITALS — DIASTOLIC BLOOD PRESSURE: 78 MMHG | HEART RATE: 68 BPM | OXYGEN SATURATION: 98 % | SYSTOLIC BLOOD PRESSURE: 136 MMHG

## 2019-08-19 DIAGNOSIS — Z86.73 HISTORY OF STROKE: ICD-10-CM

## 2019-08-19 DIAGNOSIS — E78.2 MIXED HYPERLIPIDEMIA: ICD-10-CM

## 2019-08-19 DIAGNOSIS — R73.03 PRE-DIABETES: Primary | ICD-10-CM

## 2019-08-19 DIAGNOSIS — I10 ESSENTIAL HYPERTENSION: ICD-10-CM

## 2019-08-19 LAB
CREATININE URINE POCT: 200
HBA1C MFR BLD: 5 %
MICROALBUMIN/CREAT 24H UR: 150 MG/G{CREAT}
MICROALBUMIN/CREAT UR-RTO: >300

## 2019-08-19 PROCEDURE — G8598 ASA/ANTIPLAT THER USED: HCPCS | Performed by: FAMILY MEDICINE

## 2019-08-19 PROCEDURE — 3017F COLORECTAL CA SCREEN DOC REV: CPT | Performed by: FAMILY MEDICINE

## 2019-08-19 PROCEDURE — 83036 HEMOGLOBIN GLYCOSYLATED A1C: CPT | Performed by: FAMILY MEDICINE

## 2019-08-19 PROCEDURE — 82044 UR ALBUMIN SEMIQUANTITATIVE: CPT | Performed by: FAMILY MEDICINE

## 2019-08-19 PROCEDURE — 99214 OFFICE O/P EST MOD 30 MIN: CPT | Performed by: FAMILY MEDICINE

## 2019-08-19 PROCEDURE — 1036F TOBACCO NON-USER: CPT | Performed by: FAMILY MEDICINE

## 2019-08-19 PROCEDURE — G8420 CALC BMI NORM PARAMETERS: HCPCS | Performed by: FAMILY MEDICINE

## 2019-08-19 PROCEDURE — G8427 DOCREV CUR MEDS BY ELIG CLIN: HCPCS | Performed by: FAMILY MEDICINE

## 2019-08-19 RX ORDER — CYCLOBENZAPRINE HCL 10 MG
10 TABLET ORAL 3 TIMES DAILY PRN
Qty: 30 TABLET | Refills: 0 | Status: SHIPPED | OUTPATIENT
Start: 2019-08-19 | End: 2019-08-23

## 2019-08-19 RX ORDER — ENALAPRIL MALEATE 10 MG/1
10 TABLET ORAL DAILY
Qty: 30 TABLET | Refills: 3 | Status: CANCELLED | OUTPATIENT
Start: 2019-08-19

## 2019-08-23 ENCOUNTER — HOSPITAL ENCOUNTER (EMERGENCY)
Age: 64
Discharge: HOME | End: 2019-08-23
Attending: EMERGENCY MEDICINE
Payer: MEDICARE

## 2019-08-23 VITALS
BODY MASS INDEX: 24.28 KG/M2 | RESPIRATION RATE: 16 BRPM | SYSTOLIC BLOOD PRESSURE: 180 MMHG | WEIGHT: 155 LBS | TEMPERATURE: 97.8 F | HEART RATE: 60 BPM | DIASTOLIC BLOOD PRESSURE: 66 MMHG | OXYGEN SATURATION: 100 %

## 2019-08-23 DIAGNOSIS — M54.31 BILATERAL SCIATICA: Primary | ICD-10-CM

## 2019-08-23 DIAGNOSIS — M54.32 BILATERAL SCIATICA: Primary | ICD-10-CM

## 2019-08-23 DIAGNOSIS — M62.838 SPASM OF MUSCLE: ICD-10-CM

## 2019-08-23 PROCEDURE — 6370000000 HC RX 637 (ALT 250 FOR IP): Performed by: EMERGENCY MEDICINE

## 2019-08-23 PROCEDURE — 96372 THER/PROPH/DIAG INJ SC/IM: CPT

## 2019-08-23 PROCEDURE — 6360000002 HC RX W HCPCS: Performed by: EMERGENCY MEDICINE

## 2019-08-23 PROCEDURE — 99283 EMERGENCY DEPT VISIT LOW MDM: CPT

## 2019-08-23 RX ORDER — CYCLOBENZAPRINE HCL 10 MG
10 TABLET ORAL 3 TIMES DAILY PRN
Qty: 21 TABLET | Refills: 0 | Status: ON HOLD | OUTPATIENT
Start: 2019-08-23 | End: 2019-08-28 | Stop reason: HOSPADM

## 2019-08-23 RX ORDER — DEXAMETHASONE SODIUM PHOSPHATE 4 MG/ML
10 INJECTION, SOLUTION INTRA-ARTICULAR; INTRALESIONAL; INTRAMUSCULAR; INTRAVENOUS; SOFT TISSUE ONCE
Status: COMPLETED | OUTPATIENT
Start: 2019-08-23 | End: 2019-08-23

## 2019-08-23 RX ORDER — CYCLOBENZAPRINE HCL 10 MG
10 TABLET ORAL ONCE
Status: COMPLETED | OUTPATIENT
Start: 2019-08-23 | End: 2019-08-23

## 2019-08-23 RX ORDER — LIDOCAINE 4 G/G
1 PATCH TOPICAL DAILY
Status: DISCONTINUED | OUTPATIENT
Start: 2019-08-23 | End: 2019-08-23

## 2019-08-23 RX ORDER — OXYCODONE HYDROCHLORIDE AND ACETAMINOPHEN 5; 325 MG/1; MG/1
1 TABLET ORAL ONCE
Status: COMPLETED | OUTPATIENT
Start: 2019-08-23 | End: 2019-08-23

## 2019-08-23 RX ORDER — ACETAMINOPHEN 325 MG/1
650 TABLET ORAL ONCE
Status: DISCONTINUED | OUTPATIENT
Start: 2019-08-23 | End: 2019-08-23

## 2019-08-23 RX ORDER — LIDOCAINE 4 G/G
1 PATCH TOPICAL ONCE
Status: DISCONTINUED | OUTPATIENT
Start: 2019-08-23 | End: 2019-08-23 | Stop reason: HOSPADM

## 2019-08-23 RX ADMIN — DEXAMETHASONE SODIUM PHOSPHATE 10 MG: 4 INJECTION, SOLUTION INTRAMUSCULAR; INTRAVENOUS at 05:10

## 2019-08-23 RX ADMIN — OXYCODONE HYDROCHLORIDE AND ACETAMINOPHEN 1 TABLET: 5; 325 TABLET ORAL at 05:10

## 2019-08-23 RX ADMIN — CYCLOBENZAPRINE HYDROCHLORIDE 10 MG: 10 TABLET, FILM COATED ORAL at 05:10

## 2019-08-23 ASSESSMENT — ENCOUNTER SYMPTOMS
NAUSEA: 0
ABDOMINAL PAIN: 0
SHORTNESS OF BREATH: 0
BACK PAIN: 1
VOMITING: 0

## 2019-08-23 ASSESSMENT — PAIN SCALES - GENERAL
PAINLEVEL_OUTOF10: 7
PAINLEVEL_OUTOF10: 3
PAINLEVEL_OUTOF10: 0

## 2019-08-23 NOTE — ED PROVIDER NOTES
[facility]  ED  eMERGENCYdEPARTMENT eNCOUnter      Pt Name: Benigno Aguilera  MRN: [id number]  [place] 1955  Date of evaluation: 8/23/2019  Mary Still MD    [address] [place] [address]       Chief Complaint   Patient presents with    Back Pain     lower back pain that pt reports as chronic in nature, pt reports radiation into bilateral legs; pain worse in back since Sunday          HISTORY OF PRESENT ILLNESS   (Location/Symptom, Timing/Onset,Context/Setting, Quality, Duration, Modifying Factors, Severity)  Note limiting factors. Benigno Aguilera is a 59 y.o. male who presents to the emergency department for pain. Patient and wife report that he has chronic lower back pain history of sciatica for several years and will intermittently have a flareup. Patient reports this is his flareup he has been doing well and it just started to hurt more tonight. Describes sharp shooting pains to start lower lumbar and radiates down both legs. Has history of strokes and therefore has deficits however denies any new numbness tingling, bowel or urinary incontinence/retention, saddle anesthesia, falls, recent injuries, chest pain, shortness of breath, fever, nausea, vomiting or abdominal pain. HPI    Nursing Notes were reviewed. REVIEW OF SYSTEMS    (2-9 systems for level 4, 10 or more for level 5)     Review of Systems   Constitutional: Negative for chills, fatigue and fever. Respiratory: Negative for shortness of breath. Cardiovascular: Negative for chest pain. Gastrointestinal: Negative for abdominal pain, nausea and vomiting. Musculoskeletal: Positive for back pain. Except as noted above the remainder of the review of systems was reviewedand negative.        PAST MEDICAL HISTORY     Past Medical History:   Diagnosis Date    CAD (coronary artery disease)     Chronic kidney disease     monday wednesday friday    CVA (cerebral vascular accident) ([facility] [address] [facility]     3    Diabetes ([facility] [address] [facility]     pedis pulses are 2+ on the right side, and 2+ on the left side. Pulmonary/Chest: Effort normal. No respiratory distress. Musculoskeletal: Normal range of motion. Back:    Neurological: He is alert and oriented to person, place, and time. Skin: He is not diaphoretic. No pallor. Psychiatric: He has a normal mood and affect. His behavior is normal.   Nursing note and vitals reviewed. DIAGNOSTIC RESULTS     EKG: All EKG's are interpreted by the Emergency Department Physicianwho either signs or Co-signs this chart in the absence of a cardiologist.      RADIOLOGY:   Non-plain film images such as CT, Ultrasound and MRI are read by the radiologist. Plain radiographic images are visualized and preliminarily interpreted by the emergency physician with the below findings:      Interpretation per the Radiologist below, if available at the time of this note:    No orders to display         ED BEDSIDE ULTRASOUND:   Performed by ED Physician - none    LABS:  Labs Reviewed - No data to display    All other labs were within normal range ornot returned as of this dictation. EMERGENCY DEPARTMENT COURSE and DIFFERENTIAL DIAGNOSIS/MDM:   Vitals:    Vitals:    08/23/19 0458 08/23/19 0548   BP: (!) 188/108 (!) 180/66   Pulse: 64 60   Resp: 18 16   Temp: 97.8 °F (36.6 °C)    TempSrc: Oral    SpO2: 100% 100%   Weight: 155 lb (70.3 kg)          MDM    ED COURSE/MDM    -Melanie Craft is a 59 y.o. male with a history of hypertension, end-stage renal disease, CAD, PAD presents to ED for acute exacerbation of lower back pain. Denies numbness, tingling, urinary bowel abnormalities, recent falls.  -Positive straight leg tests to the right side at 30 degrees. -Based on my physical exam and patient's presentation appears to be an acute exacerbation of sciatica. States they ran out of Flexeril.  -Was given dexamethasone IM, Percocet and Flexeril in ED.   Assessment patient reports improvement in symptoms.  -Her upon arrival

## 2019-08-26 ENCOUNTER — APPOINTMENT (OUTPATIENT)
Dept: CT IMAGING | Age: 64
End: 2019-08-26
Payer: MEDICARE

## 2019-08-26 ENCOUNTER — HOSPITAL ENCOUNTER (OUTPATIENT)
Age: 64
Setting detail: OBSERVATION
Discharge: HOME | End: 2019-08-28
Attending: EMERGENCY MEDICINE | Admitting: INTERNAL MEDICINE
Payer: MEDICARE

## 2019-08-26 ENCOUNTER — APPOINTMENT (OUTPATIENT)
Dept: GENERAL RADIOLOGY | Age: 64
End: 2019-08-26
Payer: MEDICARE

## 2019-08-26 DIAGNOSIS — M54.31 SCIATICA OF RIGHT SIDE: Primary | ICD-10-CM

## 2019-08-26 PROBLEM — R41.0 DELIRIUM: Status: ACTIVE | Noted: 2019-08-26

## 2019-08-26 PROBLEM — F01.50 VASCULAR DEMENTIA WITHOUT BEHAVIORAL DISTURBANCE (HCC): Status: ACTIVE | Noted: 2019-08-26

## 2019-08-26 PROBLEM — D69.6 THROMBOCYTOPENIA (HCC): Status: ACTIVE | Noted: 2019-08-26

## 2019-08-26 PROBLEM — F01.50 VASCULAR DEMENTIA WITHOUT BEHAVIORAL DISTURBANCE (HCC): Chronic | Status: ACTIVE | Noted: 2019-08-26

## 2019-08-26 LAB
AMMONIA: 11 UMOL/L (ref 16–60)
AMORPHOUS: ABNORMAL /HPF
ANION GAP SERPL CALCULATED.3IONS-SCNC: 10 MMOL/L (ref 3–16)
BASOPHILS ABSOLUTE: 0 K/UL (ref 0–0.2)
BASOPHILS RELATIVE PERCENT: 0.8 %
BILIRUBIN URINE: NEGATIVE
BLOOD, URINE: ABNORMAL
BUN BLDV-MCNC: 25 MG/DL (ref 7–20)
CALCIUM SERPL-MCNC: 8.9 MG/DL (ref 8.3–10.6)
CHLORIDE BLD-SCNC: 95 MMOL/L (ref 99–110)
CLARITY: CLEAR
CO2: 33 MMOL/L (ref 21–32)
COLOR: YELLOW
CREAT SERPL-MCNC: 5.3 MG/DL (ref 0.8–1.3)
EKG ATRIAL RATE: 59 BPM
EKG DIAGNOSIS: NORMAL
EKG P AXIS: 53 DEGREES
EKG P-R INTERVAL: 158 MS
EKG Q-T INTERVAL: 456 MS
EKG QRS DURATION: 76 MS
EKG QTC CALCULATION (BAZETT): 451 MS
EKG R AXIS: 8 DEGREES
EKG T AXIS: 72 DEGREES
EKG VENTRICULAR RATE: 59 BPM
EOSINOPHILS ABSOLUTE: 0.1 K/UL (ref 0–0.6)
EOSINOPHILS RELATIVE PERCENT: 1.4 %
EPITHELIAL CELLS, UA: ABNORMAL /HPF
GFR AFRICAN AMERICAN: 13
GFR NON-AFRICAN AMERICAN: 11
GLUCOSE BLD-MCNC: 162 MG/DL (ref 70–99)
GLUCOSE URINE: NEGATIVE MG/DL
HCT VFR BLD CALC: 35.4 % (ref 40.5–52.5)
HEMOGLOBIN: 11.4 G/DL (ref 13.5–17.5)
KETONES, URINE: NEGATIVE MG/DL
LEUKOCYTE ESTERASE, URINE: NEGATIVE
LYMPHOCYTES ABSOLUTE: 0.5 K/UL (ref 1–5.1)
LYMPHOCYTES RELATIVE PERCENT: 10.9 %
MCH RBC QN AUTO: 27.7 PG (ref 26–34)
MCHC RBC AUTO-ENTMCNC: 32.3 G/DL (ref 31–36)
MCV RBC AUTO: 85.8 FL (ref 80–100)
MICROSCOPIC EXAMINATION: YES
MONOCYTES ABSOLUTE: 0.3 K/UL (ref 0–1.3)
MONOCYTES RELATIVE PERCENT: 6.5 %
NEUTROPHILS ABSOLUTE: 3.7 K/UL (ref 1.7–7.7)
NEUTROPHILS RELATIVE PERCENT: 80.4 %
NITRITE, URINE: NEGATIVE
PDW BLD-RTO: 15.2 % (ref 12.4–15.4)
PH UA: 8.5 (ref 5–8)
PLATELET # BLD: 119 K/UL (ref 135–450)
PLATELET SLIDE REVIEW: ABNORMAL
PMV BLD AUTO: 9.8 FL (ref 5–10.5)
POTASSIUM REFLEX MAGNESIUM: 5 MMOL/L (ref 3.5–5.1)
PROTEIN UA: 100 MG/DL
RBC # BLD: 4.12 M/UL (ref 4.2–5.9)
RBC UA: ABNORMAL /HPF (ref 0–2)
SLIDE REVIEW: ABNORMAL
SODIUM BLD-SCNC: 138 MMOL/L (ref 136–145)
SPECIFIC GRAVITY UA: 1.01 (ref 1–1.03)
TROPONIN: 0.04 NG/ML
TROPONIN: 0.04 NG/ML
URINE REFLEX TO CULTURE: ABNORMAL
URINE TYPE: ABNORMAL
UROBILINOGEN, URINE: 0.2 E.U./DL
WBC # BLD: 4.6 K/UL (ref 4–11)
WBC UA: ABNORMAL /HPF (ref 0–5)

## 2019-08-26 PROCEDURE — G0378 HOSPITAL OBSERVATION PER HR: HCPCS

## 2019-08-26 PROCEDURE — 6360000002 HC RX W HCPCS: Performed by: NURSE PRACTITIONER

## 2019-08-26 PROCEDURE — 93010 ELECTROCARDIOGRAM REPORT: CPT | Performed by: INTERNAL MEDICINE

## 2019-08-26 PROCEDURE — 84484 ASSAY OF TROPONIN QUANT: CPT

## 2019-08-26 PROCEDURE — 82140 ASSAY OF AMMONIA: CPT

## 2019-08-26 PROCEDURE — 96372 THER/PROPH/DIAG INJ SC/IM: CPT

## 2019-08-26 PROCEDURE — 85025 COMPLETE CBC W/AUTO DIFF WBC: CPT

## 2019-08-26 PROCEDURE — 6370000000 HC RX 637 (ALT 250 FOR IP): Performed by: NURSE PRACTITIONER

## 2019-08-26 PROCEDURE — 6370000000 HC RX 637 (ALT 250 FOR IP): Performed by: EMERGENCY MEDICINE

## 2019-08-26 PROCEDURE — 80048 BASIC METABOLIC PNL TOTAL CA: CPT

## 2019-08-26 PROCEDURE — 70450 CT HEAD/BRAIN W/O DYE: CPT

## 2019-08-26 PROCEDURE — 72100 X-RAY EXAM L-S SPINE 2/3 VWS: CPT

## 2019-08-26 PROCEDURE — 99285 EMERGENCY DEPT VISIT HI MDM: CPT

## 2019-08-26 PROCEDURE — 81001 URINALYSIS AUTO W/SCOPE: CPT

## 2019-08-26 PROCEDURE — 93005 ELECTROCARDIOGRAM TRACING: CPT | Performed by: EMERGENCY MEDICINE

## 2019-08-26 PROCEDURE — 6360000002 HC RX W HCPCS: Performed by: EMERGENCY MEDICINE

## 2019-08-26 RX ORDER — ENALAPRIL MALEATE 5 MG/1
10 TABLET ORAL DAILY
Status: DISCONTINUED | OUTPATIENT
Start: 2019-08-27 | End: 2019-08-28 | Stop reason: HOSPADM

## 2019-08-26 RX ORDER — HYDRALAZINE HYDROCHLORIDE 25 MG/1
50 TABLET, FILM COATED ORAL EVERY 8 HOURS SCHEDULED
Status: DISCONTINUED | OUTPATIENT
Start: 2019-08-26 | End: 2019-08-28 | Stop reason: HOSPADM

## 2019-08-26 RX ORDER — LIDOCAINE 4 G/G
1 PATCH TOPICAL DAILY
Status: DISCONTINUED | OUTPATIENT
Start: 2019-08-26 | End: 2019-08-28 | Stop reason: HOSPADM

## 2019-08-26 RX ORDER — ASPIRIN 81 MG/1
81 TABLET, CHEWABLE ORAL DAILY
Status: DISCONTINUED | OUTPATIENT
Start: 2019-08-27 | End: 2019-08-28 | Stop reason: HOSPADM

## 2019-08-26 RX ORDER — SODIUM CHLORIDE 0.9 % (FLUSH) 0.9 %
10 SYRINGE (ML) INJECTION PRN
Status: DISCONTINUED | OUTPATIENT
Start: 2019-08-26 | End: 2019-08-28 | Stop reason: HOSPADM

## 2019-08-26 RX ORDER — HALOPERIDOL 5 MG/ML
0.5 INJECTION INTRAMUSCULAR EVERY 30 MIN PRN
Status: ACTIVE | OUTPATIENT
Start: 2019-08-26 | End: 2019-08-27

## 2019-08-26 RX ORDER — NITROGLYCERIN 0.4 MG/1
0.4 TABLET SUBLINGUAL EVERY 5 MIN PRN
Status: DISCONTINUED | OUTPATIENT
Start: 2019-08-26 | End: 2019-08-28 | Stop reason: HOSPADM

## 2019-08-26 RX ORDER — MEMANTINE HYDROCHLORIDE 5 MG/1
10 TABLET ORAL 2 TIMES DAILY
Status: DISCONTINUED | OUTPATIENT
Start: 2019-08-26 | End: 2019-08-28 | Stop reason: HOSPADM

## 2019-08-26 RX ORDER — SODIUM CHLORIDE 0.9 % (FLUSH) 0.9 %
10 SYRINGE (ML) INJECTION EVERY 12 HOURS SCHEDULED
Status: DISCONTINUED | OUTPATIENT
Start: 2019-08-26 | End: 2019-08-28 | Stop reason: HOSPADM

## 2019-08-26 RX ORDER — ATORVASTATIN CALCIUM 40 MG/1
40 TABLET, FILM COATED ORAL NIGHTLY
Status: DISCONTINUED | OUTPATIENT
Start: 2019-08-26 | End: 2019-08-28 | Stop reason: HOSPADM

## 2019-08-26 RX ORDER — PANTOPRAZOLE SODIUM 40 MG/1
40 TABLET, DELAYED RELEASE ORAL
Status: DISCONTINUED | OUTPATIENT
Start: 2019-08-27 | End: 2019-08-28 | Stop reason: HOSPADM

## 2019-08-26 RX ORDER — KETOROLAC TROMETHAMINE 30 MG/ML
15 INJECTION, SOLUTION INTRAMUSCULAR; INTRAVENOUS ONCE
Status: COMPLETED | OUTPATIENT
Start: 2019-08-26 | End: 2019-08-26

## 2019-08-26 RX ORDER — ACETAMINOPHEN 500 MG
1000 TABLET ORAL EVERY 8 HOURS
Status: DISCONTINUED | OUTPATIENT
Start: 2019-08-26 | End: 2019-08-28 | Stop reason: HOSPADM

## 2019-08-26 RX ORDER — ONDANSETRON 2 MG/ML
4 INJECTION INTRAMUSCULAR; INTRAVENOUS EVERY 6 HOURS PRN
Status: DISCONTINUED | OUTPATIENT
Start: 2019-08-26 | End: 2019-08-28 | Stop reason: HOSPADM

## 2019-08-26 RX ORDER — TAMSULOSIN HYDROCHLORIDE 0.4 MG/1
0.4 CAPSULE ORAL DAILY
Status: DISCONTINUED | OUTPATIENT
Start: 2019-08-27 | End: 2019-08-28 | Stop reason: HOSPADM

## 2019-08-26 RX ORDER — METOPROLOL SUCCINATE 50 MG/1
50 TABLET, EXTENDED RELEASE ORAL DAILY
Status: DISCONTINUED | OUTPATIENT
Start: 2019-08-27 | End: 2019-08-28 | Stop reason: HOSPADM

## 2019-08-26 RX ORDER — HEPARIN SODIUM 5000 [USP'U]/ML
5000 INJECTION, SOLUTION INTRAVENOUS; SUBCUTANEOUS EVERY 8 HOURS SCHEDULED
Status: DISCONTINUED | OUTPATIENT
Start: 2019-08-26 | End: 2019-08-28 | Stop reason: HOSPADM

## 2019-08-26 RX ADMIN — MEMANTINE 10 MG: 5 TABLET ORAL at 22:39

## 2019-08-26 RX ADMIN — HEPARIN SODIUM 5000 UNITS: 5000 INJECTION INTRAVENOUS; SUBCUTANEOUS at 22:48

## 2019-08-26 RX ADMIN — Medication 2 MG: at 22:39

## 2019-08-26 RX ADMIN — KETOROLAC TROMETHAMINE 15 MG: 30 INJECTION, SOLUTION INTRAMUSCULAR at 18:01

## 2019-08-26 RX ADMIN — HYDRALAZINE HYDROCHLORIDE 50 MG: 25 TABLET, FILM COATED ORAL at 22:40

## 2019-08-26 RX ADMIN — ATORVASTATIN CALCIUM 40 MG: 40 TABLET, FILM COATED ORAL at 22:40

## 2019-08-26 ASSESSMENT — PAIN SCALES - GENERAL
PAINLEVEL_OUTOF10: 0
PAINLEVEL_OUTOF10: 8
PAINLEVEL_OUTOF10: 8
PAINLEVEL_OUTOF10: 0
PAINLEVEL_OUTOF10: 0

## 2019-08-26 ASSESSMENT — ENCOUNTER SYMPTOMS
NAUSEA: 0
ABDOMINAL PAIN: 0
PHOTOPHOBIA: 0
VOMITING: 0
DIARRHEA: 0
COUGH: 0
WHEEZING: 0
RHINORRHEA: 0
SHORTNESS OF BREATH: 0
BACK PAIN: 1

## 2019-08-26 NOTE — H&P
positives as noted in the HPI. All other systems reviewed and negative. PHYSICAL EXAM PERFORMED:    BP (!) 168/82   Pulse 68   Temp 98.1 °F (36.7 °C) (Oral)   Resp 18   Ht 5' 7\" (1.702 m)   Wt 155 lb (70.3 kg)   SpO2 95%   BMI 24.28 kg/m²     General appearance:  No apparent distress, appears stated age and cooperative. HEENT:  Normal cephalic, atraumatic without obvious deformity. Pupils equal, round, and reactive to light. Extra ocular muscles intact. Conjunctivae/corneas clear. Neck: Supple, with full range of motion. No jugular venous distention. Trachea midline. Respiratory:  Normal respiratory effort. dim to auscultation, bilaterally without Rales/Wheezes/Rhonchi. Cardiovascular:  Regular rate and rhythm with normal S1/S2 with murmur. Right forearm fistula, +thrill/bruit  Abdomen: Soft, non-tender, non-distended with normal bowel sounds. Musculoskeletal:  No clubbing, cyanosis or edema bilaterally. Full range of motion without deformity. Amputated right first toe. 4cm dark, indurated area-from a skin graft, \"long time\". Left sided weakness secondary to old CVA  Skin: Skin color, texture, turgor normal.    Neurologic:  Neurovascularly intact without any focal sensory/motor deficits. Cranial nerves: II-XII intact, grossly non-focal.  Psychiatric:  Alert and mostly oriented, difficulty with time, thought content appropriate, normal insight  Capillary Refill: Brisk,< 3 seconds   Peripheral Pulses: +2 palpable, equal bilaterally       Labs:     Recent Labs     08/26/19  1433   WBC 4.6   HGB 11.4*   HCT 35.4*   *     Recent Labs     08/26/19  1433      K 5.0   CL 95*   CO2 33*   BUN 25*   CREATININE 5.3*   CALCIUM 8.9     No results for input(s): AST, ALT, BILIDIR, BILITOT, ALKPHOS in the last 72 hours. No results for input(s): INR in the last 72 hours.   Recent Labs     08/26/19  1433 08/26/19  1707   TROPONINI 0.04* 0.04*       Urinalysis:      Lab Results   Component Value Date

## 2019-08-26 NOTE — ED PROVIDER NOTES
450 K/uL    MPV 9.6 5.0 - 10.5 fL    Neutrophils % 73.4 %    Lymphocytes % 15.5 %    Monocytes % 8.4 %    Eosinophils % 1.3 %    Basophils % 1.4 %    Neutrophils Absolute 3.2 1.7 - 7.7 K/uL    Lymphocytes Absolute 0.7 (L) 1.0 - 5.1 K/uL    Monocytes Absolute 0.4 0.0 - 1.3 K/uL    Eosinophils Absolute 0.1 0.0 - 0.6 K/uL    Basophils Absolute 0.1 0.0 - 0.2 K/uL   Basic metabolic panel   Result Value Ref Range    Sodium 143 136 - 145 mmol/L    Potassium 5.1 3.5 - 5.1 mmol/L    Chloride 101 99 - 110 mmol/L    CO2 32 21 - 32 mmol/L    Anion Gap 10 3 - 16    Glucose 113 (H) 70 - 99 mg/dL    BUN 37 (H) 7 - 20 mg/dL    CREATININE 6.6 (HH) 0.8 - 1.3 mg/dL    GFR Non-African American 9 (A) >60    GFR  10 (A) >60    Calcium 8.6 8.3 - 10.6 mg/dL   CBC   Result Value Ref Range    WBC 4.9 4.0 - 11.0 K/uL    RBC 3.88 (L) 4.20 - 5.90 M/uL    Hemoglobin 10.8 (L) 13.5 - 17.5 g/dL    Hematocrit 33.7 (L) 40.5 - 52.5 %    MCV 86.8 80.0 - 100.0 fL    MCH 27.9 26.0 - 34.0 pg    MCHC 32.1 31.0 - 36.0 g/dL    RDW 15.0 12.4 - 15.4 %    Platelets 891 (L) 762 - 450 K/uL    MPV 9.6 5.0 - 10.5 fL   Renal function panel   Result Value Ref Range    Sodium 139 136 - 145 mmol/L    Potassium 5.2 (H) 3.5 - 5.1 mmol/L    Chloride 99 99 - 110 mmol/L    CO2 27 21 - 32 mmol/L    Anion Gap 13 3 - 16    Glucose 107 (H) 70 - 99 mg/dL    BUN 52 (H) 7 - 20 mg/dL    CREATININE 7.2 (HH) 0.8 - 1.3 mg/dL    GFR Non-African American 8 (A) >60    GFR  9 (A) >60    Calcium 8.3 8.3 - 10.6 mg/dL    Phosphorus 3.5 2.5 - 4.9 mg/dL    Alb 3.5 3.4 - 5.0 g/dL   EKG 12 Lead   Result Value Ref Range    Ventricular Rate 59 BPM    Atrial Rate 59 BPM    P-R Interval 158 ms    QRS Duration 76 ms    Q-T Interval 456 ms    QTc Calculation (Bazett) 451 ms    P Axis 53 degrees    R Axis 8 degrees    T Axis 72 degrees    Diagnosis       Sinus bradycardiaOtherwise normal ECGWhen compared with ECG of 04-DEC-2016 20:31,No significant change was

## 2019-08-27 LAB
ANION GAP SERPL CALCULATED.3IONS-SCNC: 10 MMOL/L (ref 3–16)
BASOPHILS ABSOLUTE: 0.1 K/UL (ref 0–0.2)
BASOPHILS RELATIVE PERCENT: 1.4 %
BUN BLDV-MCNC: 37 MG/DL (ref 7–20)
CALCIUM SERPL-MCNC: 8.6 MG/DL (ref 8.3–10.6)
CHLORIDE BLD-SCNC: 101 MMOL/L (ref 99–110)
CO2: 32 MMOL/L (ref 21–32)
CREAT SERPL-MCNC: 6.6 MG/DL (ref 0.8–1.3)
EKG ATRIAL RATE: 58 BPM
EKG DIAGNOSIS: NORMAL
EKG P AXIS: 67 DEGREES
EKG P-R INTERVAL: 164 MS
EKG Q-T INTERVAL: 464 MS
EKG QRS DURATION: 72 MS
EKG QTC CALCULATION (BAZETT): 455 MS
EKG R AXIS: 3 DEGREES
EKG T AXIS: 67 DEGREES
EKG VENTRICULAR RATE: 58 BPM
EOSINOPHILS ABSOLUTE: 0.1 K/UL (ref 0–0.6)
EOSINOPHILS RELATIVE PERCENT: 1.3 %
GFR AFRICAN AMERICAN: 10
GFR NON-AFRICAN AMERICAN: 9
GLUCOSE BLD-MCNC: 113 MG/DL (ref 70–99)
HCT VFR BLD CALC: 33.7 % (ref 40.5–52.5)
HEMOGLOBIN: 11.1 G/DL (ref 13.5–17.5)
LYMPHOCYTES ABSOLUTE: 0.7 K/UL (ref 1–5.1)
LYMPHOCYTES RELATIVE PERCENT: 15.5 %
MCH RBC QN AUTO: 28.2 PG (ref 26–34)
MCHC RBC AUTO-ENTMCNC: 32.9 G/DL (ref 31–36)
MCV RBC AUTO: 85.8 FL (ref 80–100)
MONOCYTES ABSOLUTE: 0.4 K/UL (ref 0–1.3)
MONOCYTES RELATIVE PERCENT: 8.4 %
NEUTROPHILS ABSOLUTE: 3.2 K/UL (ref 1.7–7.7)
NEUTROPHILS RELATIVE PERCENT: 73.4 %
PDW BLD-RTO: 15 % (ref 12.4–15.4)
PLATELET # BLD: 122 K/UL (ref 135–450)
PMV BLD AUTO: 9.6 FL (ref 5–10.5)
POTASSIUM SERPL-SCNC: 5.1 MMOL/L (ref 3.5–5.1)
RBC # BLD: 3.92 M/UL (ref 4.2–5.9)
SODIUM BLD-SCNC: 143 MMOL/L (ref 136–145)
TSH SERPL DL<=0.05 MIU/L-ACNC: 2.51 UIU/ML (ref 0.27–4.2)
WBC # BLD: 4.3 K/UL (ref 4–11)

## 2019-08-27 PROCEDURE — 97110 THERAPEUTIC EXERCISES: CPT

## 2019-08-27 PROCEDURE — G0378 HOSPITAL OBSERVATION PER HR: HCPCS

## 2019-08-27 PROCEDURE — 96372 THER/PROPH/DIAG INJ SC/IM: CPT

## 2019-08-27 PROCEDURE — 6370000000 HC RX 637 (ALT 250 FOR IP): Performed by: EMERGENCY MEDICINE

## 2019-08-27 PROCEDURE — 36415 COLL VENOUS BLD VENIPUNCTURE: CPT

## 2019-08-27 PROCEDURE — 6370000000 HC RX 637 (ALT 250 FOR IP): Performed by: NURSE PRACTITIONER

## 2019-08-27 PROCEDURE — 84443 ASSAY THYROID STIM HORMONE: CPT

## 2019-08-27 PROCEDURE — 80048 BASIC METABOLIC PNL TOTAL CA: CPT

## 2019-08-27 PROCEDURE — 6360000002 HC RX W HCPCS: Performed by: NURSE PRACTITIONER

## 2019-08-27 PROCEDURE — 97116 GAIT TRAINING THERAPY: CPT

## 2019-08-27 PROCEDURE — 97162 PT EVAL MOD COMPLEX 30 MIN: CPT

## 2019-08-27 PROCEDURE — 93010 ELECTROCARDIOGRAM REPORT: CPT | Performed by: INTERNAL MEDICINE

## 2019-08-27 PROCEDURE — 97166 OT EVAL MOD COMPLEX 45 MIN: CPT

## 2019-08-27 PROCEDURE — 85025 COMPLETE CBC W/AUTO DIFF WBC: CPT

## 2019-08-27 RX ORDER — CALCIUM POLYCARBOPHIL 625 MG 625 MG/1
625 TABLET ORAL DAILY
Status: DISCONTINUED | OUTPATIENT
Start: 2019-08-27 | End: 2019-08-28 | Stop reason: HOSPADM

## 2019-08-27 RX ADMIN — VITAMIN D, TAB 1000IU (100/BT) 2000 UNITS: 25 TAB at 08:25

## 2019-08-27 RX ADMIN — HEPARIN SODIUM 5000 UNITS: 5000 INJECTION INTRAVENOUS; SUBCUTANEOUS at 06:30

## 2019-08-27 RX ADMIN — METOPROLOL SUCCINATE 50 MG: 50 TABLET, FILM COATED, EXTENDED RELEASE ORAL at 08:26

## 2019-08-27 RX ADMIN — CALCIUM POLYCARBOPHIL 625 MG TABLET 625 MG: at 08:26

## 2019-08-27 RX ADMIN — ATORVASTATIN CALCIUM 40 MG: 40 TABLET, FILM COATED ORAL at 20:32

## 2019-08-27 RX ADMIN — TAMSULOSIN HYDROCHLORIDE 0.4 MG: 0.4 CAPSULE ORAL at 08:26

## 2019-08-27 RX ADMIN — HYDRALAZINE HYDROCHLORIDE 50 MG: 25 TABLET, FILM COATED ORAL at 13:25

## 2019-08-27 RX ADMIN — PANTOPRAZOLE SODIUM 40 MG: 40 TABLET, DELAYED RELEASE ORAL at 06:30

## 2019-08-27 RX ADMIN — Medication 2 MG: at 17:42

## 2019-08-27 RX ADMIN — HEPARIN SODIUM 5000 UNITS: 5000 INJECTION INTRAVENOUS; SUBCUTANEOUS at 21:31

## 2019-08-27 RX ADMIN — HYDRALAZINE HYDROCHLORIDE 50 MG: 25 TABLET, FILM COATED ORAL at 21:31

## 2019-08-27 RX ADMIN — HEPARIN SODIUM 5000 UNITS: 5000 INJECTION INTRAVENOUS; SUBCUTANEOUS at 13:25

## 2019-08-27 RX ADMIN — ACETAMINOPHEN 1000 MG: 500 TABLET ORAL at 20:32

## 2019-08-27 RX ADMIN — ASPIRIN 81 MG 81 MG: 81 TABLET ORAL at 08:26

## 2019-08-27 RX ADMIN — HYDRALAZINE HYDROCHLORIDE 50 MG: 25 TABLET, FILM COATED ORAL at 06:30

## 2019-08-27 RX ADMIN — ENALAPRIL MALEATE 10 MG: 5 TABLET ORAL at 08:26

## 2019-08-27 RX ADMIN — MEMANTINE 10 MG: 5 TABLET ORAL at 08:26

## 2019-08-27 RX ADMIN — ACETAMINOPHEN 1000 MG: 500 TABLET ORAL at 13:25

## 2019-08-27 RX ADMIN — MEMANTINE 10 MG: 5 TABLET ORAL at 20:32

## 2019-08-27 RX ADMIN — ACETAMINOPHEN 1000 MG: 500 TABLET ORAL at 06:30

## 2019-08-27 ASSESSMENT — PAIN SCALES - GENERAL
PAINLEVEL_OUTOF10: 7
PAINLEVEL_OUTOF10: 0
PAINLEVEL_OUTOF10: 0
PAINLEVEL_OUTOF10: 5
PAINLEVEL_OUTOF10: 0

## 2019-08-27 NOTE — PROGRESS NOTES
Pain  General Comment  Comments: RN cleared pt for OT Eval; pt & daughter agreeable to OT  Patient Currently in Pain: Denies  Pain Assessment  Pain Assessment: 0-10  Pain Level: 0    Social/Functional History  Social/Functional History  Lives With: Spouse  Type of Home: Apartment  Home Layout: One level  Home Access: Elevator  Bathroom Shower/Tub: Tub/Shower unit  Bathroom Toilet: Handicap height  Bathroom Equipment: Grab bars in shower, Shower chair, Grab bars around toilet  Home Equipment: Cane, Lift chair, Alert Button, [employer]  Receives Help From: Family, Home health(HHA for shower 3 days /week)  ADL Assistance: Needs assistance  Bath: Moderate assistance  Dressing: Moderate assistance  Toileting: Needs assistance  Homemaking Responsibilities: No  Ambulation Assistance: Needs assistance(cane)  Transfer Assistance: Needs assistance(shower )  Active : No  Occupation: On disability  Type of occupation:   Leisure & Hobbies: \"rest & relax\"       Objective        Orientation  Overall Orientation Status: Impaired  Orientation Level: Oriented to person;Disoriented to time;Oriented to place;Oriented to situation  Observation/Palpation  Posture: Fair  Balance  Sitting Balance: Supervision  Standing Balance: Minimal assistance(to mod assist with cane)  Standing Balance  Time: 2 minutes x 2  Activity: increased difficulty with walking,   Functional Mobility  Functional - Mobility Device: Cane  Activity: To/from bathroom  Assist Level: Moderate assistance  Toilet Transfers  Toilet - Technique: Ambulating(min-->mod assist with cane)  Equipment Used: Grab bars  Toilet Transfer: Minimal assistance  ADL  LE Dressing: Maximum assistance(to doff socks & bozena socks & shoes)    RUE Tone: Normotonic    LUE Tone: Hypertonic  Coordination  Movements Are Fluid And Coordinated: No  Coordination and Movement description: Fine motor impairments;Gross motor impairments; Left UE     Bed mobility  Sit to Supine: Unable to assess(Left up in chair upon exiting, pt & daughter agreeable)  Transfers  Sit to stand: Minimal assistance  Stand to sit: Minimal assistance     Cognition  Overall Cognitive Status: Exceptions  Arousal/Alertness: Appropriate responses to stimuli  Following Commands:  Follows one step commands with increased time  Attention Span: Attends with cues to redirect  Memory: Decreased recall of precautions;Decreased short term memory  Safety Judgement: Decreased awareness of need for assistance;Decreased awareness of need for safety  Insights: Decreased awareness of deficits  Initiation: Requires cues for some  Sequencing: Requires cues for some       Type of ROM/Therapeutic Exercise: AROM BUE seated in chair:  Hand flex/ext: x 10   Reps  Elbow flex/ext:  x 10   Reps  Forearm sup/pron:  x  10  Reps  Shld flex/ext:  x  10  Reps      LUE General AROM: 3/4 AROM throughout  RUE AROM : WFL    Gross LUE Strength: (3-/5)  Gross RUE Strength: WFL     Plan   Plan  Times per week: 3-5x/ week   Current Treatment Recommendations: Strengthening, Balance Training, Functional Mobility Training, Safety Education & Training, Self-Care / ADL      [facility] Score        -[facility] Inpatient Daily Activity Raw Score: 13 (08/27/19 0913)  AM-[facility]AC Inpatient ADL T-Scale Score : 32.03 (08/27/19 0913)  ADL Inpatient CMS 0-100% Score: 63.03 (08/27/19 0913)  ADL Inpatient CMS G-Code Modifier : CL (08/27/19 0913)    Goals  Short term goals  Time Frame for Short term goals: 1 week  Short term goal 1: min assist consistently for bathroom mobility with LRAD (Least Restrictive Assistive Device) by 9-03-19  Short term goal 2: min assist standing ADL's   Short term goal 3: min assist with UE self care  Short term goal 4: mod assist with LE dressing  Short term goal 5: min assist for toileting hygiene  Patient Goals   Patient goals : go home when able       Therapy Time   Individual Concurrent Group Co-treatment   Time In 2464         Time Out 0908         Minutes 18

## 2019-08-27 NOTE — CONSULTS
stool  Musculoskeletal:  back pain, muscle weakness, gait problems,       joint pain or swelling. Genitourinary:  dysuria, poor urine flow, flank pain, blood in urine  Neurologic:  vertigo, TIA'S, syncope, seizures, focal weakness  Psychosocial:  insomnia, anxiety, or depression. Additional positive findings:                          All other remaining systems are negative.         PMH/PSH/SH/Family History:     Past Medical History:   Diagnosis Date    CAD (coronary artery disease)     Chronic kidney disease     monday wednesday friday    [facility]     3    Diabetes ([facility]     Hemodialysis patient ([facility]     Hyperlipidemia     Hypertension        Past Surgical History:   Procedure Laterality Date    CARDIAC CATHETERIZATION      DIALYSIS FISTULA CREATION         Social History     Socioeconomic History    Marital status:      Spouse name: Not on file    Number of children: Not on file    Years of education: Not on file    Highest education level: Not on file   Occupational History    Not on file   Social Needs    Financial resource strain: Not on file    Food insecurity:     Worry: Not on file     Inability: Not on file    Transportation needs:     Medical: Not on file     Non-medical: Not on file   Tobacco Use    Smoking status: Former Smoker    Smokeless tobacco: Never Used   Substance and Sexual Activity    Alcohol use: No     Alcohol/week: 0.0 standard drinks    Drug use: No    Sexual activity: Not Currently   Lifestyle    Physical activity:     Days per week: Not on file     Minutes per session: Not on file    Stress: Not on file   Relationships    Social connections:     Talks on phone: Not on file     Gets together: Not on file     Attends Anabaptism service: Not on file     Active member of club or organization: Not on file     Attends meetings of clubs or organizations: Not on file     Relationship status: Not on file    Intimate partner violence:

## 2019-08-27 NOTE — DISCHARGE INSTR - COC
of stroke Z86.73    Chest pain R07.9    ESRD (end stage renal disease) on dialysis ([facility]) N18.6, Z99.2    CAD (coronary artery disease) I25.10    NSTEMI (non-ST elevated myocardial infarction) ([facility]) I21.4    PAD (peripheral artery disease) ([facility]) I73.9    S/P cardiac catheterization Z98.890    Hypotension I95.9    Symptomatic anemia E67.7    Complication of vascular access for dialysis T82. 9XXA    Limited mobility Z74.09    Vascular dementia without behavioral disturbance F01.50    Thrombocytopenia ([facility]) D69.6    Delirium R41.0       Isolation/Infection:   Isolation          No Isolation            Nurse Assessment:  Last Vital Signs: /65   Pulse 58   Temp 97.6 °F (36.4 °C) (Oral)   Resp 16   Ht 5' 7\" (1.702 m)   Wt 155 lb (70.3 kg)   SpO2 99%   BMI 24.28 kg/m²     Last documented pain score (0-10 scale): Pain Level: 0  Last Weight:   Wt Readings from Last 1 Encounters:   08/26/19 155 lb (70.3 kg)     Mental Status:  disoriented    IV Access:  - Dialysis Catheter  - site  right, insertion date: AV FISTULA    Nursing Mobility/ADLs:  Walking   Dependent  Transfer  Assisted  Bathing  Dependent  Dressing  Assisted  Toileting  Dependent  Feeding  Assisted  Med Admin  Assisted  Med Delivery   none    Wound Care Documentation and Therapy:  Incision 12/05/16 Arm Right (Active)   Number of days: 995        Elimination:  Continence:   · Bowel: Yes  · Bladder: No  Urinary Catheter: None   Colostomy/Ileostomy/Ileal Conduit: No       Date of Last BM: 8/26/2019    Intake/Output Summary (Last 24 hours) at 8/27/2019 1530  Last data filed at 8/27/2019 1329  Gross per 24 hour   Intake 1002 ml   Output 400 ml   Net 602 ml     I/O last 3 completed shifts:   In: 200 [P.O.:1002]  Out: 400 [Urine:400]    Safety Concerns:     History of Falls (last 30 days) and demenita    Impairments/Disabilities:      difficulty walking/transporting    Nutrition Therapy:  Current Nutrition Therapy:   - Oral Diet:  Renal and Low

## 2019-08-27 NOTE — CARE COORDINATION
[facility]    Received referral for homecare services. Will follow patient for homecare services at discharge. Should Pt DC over weekend, fax face sheet, order, CONNER, and H&P to [facility].          Clayton Wagner LPN  [facility] Care Transition Nurse  [phone]

## 2019-08-27 NOTE — PROGRESS NOTES
unless otherwise specified  Short term goal 1: pt to move supine to and from sit modified indep  Short term goal 2: pt to transfer sit to and from stand SBA  Short term goal 3: pt to ambulate 80 ft with cane and SBA  Short term goal 4: pt to participate in LE Ex 10-12 reps to improve strength, endurance and mobility by 8/30  Patient Goals   Patient goals : \"to walk better and not fall\"     Therapy Time   Individual Concurrent Group Co-treatment   Time In 0825         Time Out 0900         Minutes 35         Timed Code Treatment Minutes: 25 Minutes     If pt discharges prior to next PT session this note will serve as discharge summary.   Milagro Cheng, [id number]

## 2019-08-28 VITALS
OXYGEN SATURATION: 98 % | HEIGHT: 67 IN | WEIGHT: 145.94 LBS | RESPIRATION RATE: 16 BRPM | TEMPERATURE: 97.9 F | HEART RATE: 94 BPM | DIASTOLIC BLOOD PRESSURE: 73 MMHG | SYSTOLIC BLOOD PRESSURE: 147 MMHG | BODY MASS INDEX: 22.91 KG/M2

## 2019-08-28 PROBLEM — R41.0 CONFUSION: Status: ACTIVE | Noted: 2019-08-28

## 2019-08-28 LAB
ALBUMIN SERPL-MCNC: 3.5 G/DL (ref 3.4–5)
ANION GAP SERPL CALCULATED.3IONS-SCNC: 13 MMOL/L (ref 3–16)
BUN BLDV-MCNC: 52 MG/DL (ref 7–20)
CALCIUM SERPL-MCNC: 8.3 MG/DL (ref 8.3–10.6)
CHLORIDE BLD-SCNC: 99 MMOL/L (ref 99–110)
CO2: 27 MMOL/L (ref 21–32)
CREAT SERPL-MCNC: 7.2 MG/DL (ref 0.8–1.3)
GFR AFRICAN AMERICAN: 9
GFR NON-AFRICAN AMERICAN: 8
GLUCOSE BLD-MCNC: 107 MG/DL (ref 70–99)
HCT VFR BLD CALC: 33.7 % (ref 40.5–52.5)
HEMOGLOBIN: 10.8 G/DL (ref 13.5–17.5)
MCH RBC QN AUTO: 27.9 PG (ref 26–34)
MCHC RBC AUTO-ENTMCNC: 32.1 G/DL (ref 31–36)
MCV RBC AUTO: 86.8 FL (ref 80–100)
PDW BLD-RTO: 15 % (ref 12.4–15.4)
PHOSPHORUS: 3.5 MG/DL (ref 2.5–4.9)
PLATELET # BLD: 125 K/UL (ref 135–450)
PMV BLD AUTO: 9.6 FL (ref 5–10.5)
POTASSIUM SERPL-SCNC: 5.2 MMOL/L (ref 3.5–5.1)
RBC # BLD: 3.88 M/UL (ref 4.2–5.9)
SODIUM BLD-SCNC: 139 MMOL/L (ref 136–145)
WBC # BLD: 4.9 K/UL (ref 4–11)

## 2019-08-28 PROCEDURE — 80069 RENAL FUNCTION PANEL: CPT

## 2019-08-28 PROCEDURE — 6370000000 HC RX 637 (ALT 250 FOR IP): Performed by: NURSE PRACTITIONER

## 2019-08-28 PROCEDURE — 6370000000 HC RX 637 (ALT 250 FOR IP): Performed by: EMERGENCY MEDICINE

## 2019-08-28 PROCEDURE — 6360000002 HC RX W HCPCS: Performed by: NURSE PRACTITIONER

## 2019-08-28 PROCEDURE — 85027 COMPLETE CBC AUTOMATED: CPT

## 2019-08-28 PROCEDURE — G0378 HOSPITAL OBSERVATION PER HR: HCPCS

## 2019-08-28 PROCEDURE — 36415 COLL VENOUS BLD VENIPUNCTURE: CPT

## 2019-08-28 PROCEDURE — 96372 THER/PROPH/DIAG INJ SC/IM: CPT

## 2019-08-28 PROCEDURE — 90935 HEMODIALYSIS ONE EVALUATION: CPT

## 2019-08-28 RX ADMIN — ENALAPRIL MALEATE 10 MG: 5 TABLET ORAL at 13:10

## 2019-08-28 RX ADMIN — VITAMIN D, TAB 1000IU (100/BT) 2000 UNITS: 25 TAB at 13:11

## 2019-08-28 RX ADMIN — METOPROLOL SUCCINATE 50 MG: 50 TABLET, FILM COATED, EXTENDED RELEASE ORAL at 13:09

## 2019-08-28 RX ADMIN — ACETAMINOPHEN 1000 MG: 500 TABLET ORAL at 05:30

## 2019-08-28 RX ADMIN — PANTOPRAZOLE SODIUM 40 MG: 40 TABLET, DELAYED RELEASE ORAL at 05:30

## 2019-08-28 RX ADMIN — MEMANTINE 10 MG: 5 TABLET ORAL at 13:11

## 2019-08-28 RX ADMIN — ASPIRIN 81 MG 81 MG: 81 TABLET ORAL at 13:09

## 2019-08-28 RX ADMIN — HYDRALAZINE HYDROCHLORIDE 50 MG: 25 TABLET, FILM COATED ORAL at 05:30

## 2019-08-28 RX ADMIN — CALCIUM POLYCARBOPHIL 625 MG TABLET 625 MG: at 13:10

## 2019-08-28 RX ADMIN — ACETAMINOPHEN 1000 MG: 500 TABLET ORAL at 13:09

## 2019-08-28 RX ADMIN — HEPARIN SODIUM 5000 UNITS: 5000 INJECTION INTRAVENOUS; SUBCUTANEOUS at 05:30

## 2019-08-28 RX ADMIN — TAMSULOSIN HYDROCHLORIDE 0.4 MG: 0.4 CAPSULE ORAL at 13:10

## 2019-08-28 ASSESSMENT — PAIN SCALES - GENERAL
PAINLEVEL_OUTOF10: 0

## 2019-08-28 NOTE — CARE COORDINATION
Per primary nurse, Jayce Garcia, patient's family member has concerns taking pt home d/t 5 steps in and out of apartment (then elevator is there to get to second floor). When CM entered room, Cary from [facility] home [facility] was present  -verifying information and explaining home care services. Lengthy conversation had with patient's family, Elizabeth Meredith from [facility] and this . Unfortunately, pt does not qualify for SNF per therapy recs and has not had a recent 3 midnight stay for Medicare to pay for short SNF stay. Family expressed concern over inconsistences in [place] transport services - ie: \"some will help me get him up the steps, some won't. Some barely speak english. I have asked them to CALL me before just dropping him outside the apartment\". Family has aide services in place on Tues and Thurse, but no additional help to assist pt in and out of house on dialysis days. This CM and Cary from [facility] encouraged family to contact transport company to express concerns. Family voiced understanding and states she would. Family also voiced understanding of Medicare guidelines and pt not qualifying for SNF. All questions answered to the best of CM and [facility] liaison ability. Family denies further needs at this time. Likely to discharge home with [facility] today after dialysis.     Rory Landers RN CM

## 2019-08-28 NOTE — PROGRESS NOTES
40 mg Oral Nightly    vitamin D  2,000 Units Oral Daily    enalapril  10 mg Oral Daily    hydrALAZINE  50 mg Oral 3 times per day    memantine  10 mg Oral BID    metoprolol succinate  50 mg Oral Daily    pantoprazole  40 mg Oral QAM AC    tamsulosin  0.4 mg Oral Daily    acetaminophen  1,000 mg Oral Q8H    melatonin ER  2 mg Oral QPM    sodium chloride flush  10 mL Intravenous 2 times per day    heparin (porcine)  5,000 Units Subcutaneous 3 times per day     Continuous Infusions:  PRN Meds:.nitroGLYCERIN, sodium chloride flush, magnesium hydroxide, ondansetron       Vitals :     Vitals:    08/28/19 1309   BP: (!) 147/73   Pulse: 94   Resp:    Temp:    SpO2:        I & O :       Intake/Output Summary (Last 24 hours) at 8/28/2019 1535  Last data filed at 8/28/2019 1205  Gross per 24 hour   Intake 912 ml   Output 1175 ml   Net -263 ml        Physical Examination :     General appearance: Anxious- no, distressed- no, in good spirits- no  Lethargic,minimally verbal- orientated to place, person, not time  HEENT: Lips- normal, teeth- ok , oral mucosa- moist  Neck : Mass- no, appears symmetrical, JVD- not visible  Respiratory: Respiratory effort-  normal, wheeze- no, crackles - no  Cardiovascular:  Ausculation- No M/R/G, Edema no  Abdomen: visible mass- no, distention- no, scar- no, tenderness- no                            hepatosplenomegaly-  no  Musculoskeletal:  clubbing no,cyanosis- no , digital ischemia- no                           muscle strength- grossly normal , tone - grossly normal  Skin: rashes- no , ulcers- no, induration- no, tightening - no  Psychiatric:  Judgement and insight- normal           AAO X 3     LABS:     Recent Labs     08/26/19  1433 08/27/19  0640 08/28/19  0805   WBC 4.6 4.3 4.9   HGB 11.4* 11.1* 10.8*   HCT 35.4* 33.7* 33.7*   * 122* 125*     Recent Labs     08/26/19  1433 08/27/19  0640 08/28/19  0805    143 139   K 5.0 5.1 5.2*   CL 95* 101 99   CO2 33* 32 27   BUN

## 2019-08-28 NOTE — DISCHARGE SUMMARY
Soft, non-tender, non-distended with normal bowel sounds. Musculoskeletal:  No clubbing, cyanosis or edema bilaterally. Full range of motion without deformity. Skin: Skin color, texture, turgor normal.  No rashes or lesions. Neurologic:  Neurovascularly intact without any focal sensory/motor deficits. Cranial nerves: II-XII intact, grossly non-focal.  Psychiatric:  Alert and oriented, thought content appropriate, normal insight  Capillary Refill: Brisk,< 3 seconds   Peripheral Pulses: +2 palpable, equal bilaterally       Labs: For convenience and continuity at follow-up the following most recent labs are provided:      CBC:    Lab Results   Component Value Date    WBC 4.9 08/28/2019    HGB 10.8 08/28/2019    HCT 33.7 08/28/2019     08/28/2019       Renal:    Lab Results   Component Value Date     08/28/2019    K 5.2 08/28/2019    K 5.0 08/26/2019    CL 99 08/28/2019    CO2 27 08/28/2019    BUN 52 08/28/2019    CREATININE 7.2 08/28/2019    CALCIUM 8.3 08/28/2019    PHOS 3.5 08/28/2019         Significant Diagnostic Studies    Radiology:   XR LUMBAR SPINE (2-3 VIEWS)   Final Result   1. Multilevel degenerative disc disease and facet joint arthropathy   2. No acute lumbar spine abnormality         CT Head WO Contrast   Final Result   No acute intracranial abnormality. Consults:     IP CONSULT TO HOSPITALIST  IP CONSULT TO NEPHROLOGY    Disposition:  home     Condition at Discharge: Stable    Discharge Instructions/Follow-up:  PCP in 2 weeks    Code Status:  Full Code     Activity: activity as tolerated    Diet: renal diet      Discharge Medications:     Current Discharge Medication List           Details   metoprolol succinate (TOPROL XL) 50 MG extended release tablet TAKE 1 TABLET BY MOUTH EVERY DAY  Qty: 90 tablet, Refills: 1    Comments: DX Code Needed  .   Associated Diagnoses: Essential hypertension      tamsulosin (FLOMAX) 0.4 MG capsule TAKE 1 CAPSULE BY MOUTH DAILY  Qty: 90

## 2019-08-29 ENCOUNTER — TELEPHONE (OUTPATIENT)
Dept: FAMILY MEDICINE CLINIC | Age: 64
End: 2019-08-29

## 2019-08-30 ENCOUNTER — HOSPITAL ENCOUNTER (OUTPATIENT)
Dept: CARDIOLOGY | Age: 64
Discharge: HOME | End: 2019-08-30
Payer: MEDICARE

## 2019-08-30 DIAGNOSIS — R01.1 MURMUR, CARDIAC: ICD-10-CM

## 2019-08-30 LAB
LV EF: 55 %
LVEF MODALITY: NORMAL

## 2019-08-30 PROCEDURE — 93306 TTE W/DOPPLER COMPLETE: CPT

## 2019-09-03 ENCOUNTER — TELEPHONE (OUTPATIENT)
Dept: FAMILY MEDICINE CLINIC | Age: 64
End: 2019-09-03

## 2019-09-05 ENCOUNTER — TELEPHONE (OUTPATIENT)
Dept: FAMILY MEDICINE CLINIC | Age: 64
End: 2019-09-05

## 2019-09-05 ENCOUNTER — OFFICE VISIT (OUTPATIENT)
Dept: FAMILY MEDICINE CLINIC | Age: 64
End: 2019-09-05
Payer: MEDICARE

## 2019-09-05 VITALS — HEART RATE: 86 BPM | DIASTOLIC BLOOD PRESSURE: 72 MMHG | SYSTOLIC BLOOD PRESSURE: 128 MMHG | OXYGEN SATURATION: 100 %

## 2019-09-05 DIAGNOSIS — F01.50 VASCULAR DEMENTIA WITHOUT BEHAVIORAL DISTURBANCE (HCC): ICD-10-CM

## 2019-09-05 DIAGNOSIS — Z86.73 HISTORY OF STROKE: ICD-10-CM

## 2019-09-05 DIAGNOSIS — N18.6 ESRD (END STAGE RENAL DISEASE) ON DIALYSIS (HCC): ICD-10-CM

## 2019-09-05 DIAGNOSIS — N18.5 CHRONIC KIDNEY DISEASE, STAGE V (HCC): ICD-10-CM

## 2019-09-05 DIAGNOSIS — R41.0 DELIRIUM: Primary | ICD-10-CM

## 2019-09-05 DIAGNOSIS — D69.6 THROMBOCYTOPENIA (HCC): ICD-10-CM

## 2019-09-05 DIAGNOSIS — Z99.2 ESRD (END STAGE RENAL DISEASE) ON DIALYSIS (HCC): ICD-10-CM

## 2019-09-05 DIAGNOSIS — E87.5 HYPERKALEMIA: ICD-10-CM

## 2019-09-05 PROCEDURE — 99495 TRANSJ CARE MGMT MOD F2F 14D: CPT | Performed by: FAMILY MEDICINE

## 2019-09-05 PROCEDURE — 1111F DSCHRG MED/CURRENT MED MERGE: CPT | Performed by: FAMILY MEDICINE

## 2019-09-05 RX ORDER — WHEELCHAIR
EACH MISCELLANEOUS
Qty: 1 EACH | Refills: 0 | Status: SHIPPED | OUTPATIENT
Start: 2019-09-05 | End: 2019-11-07

## 2019-09-05 RX ORDER — ENALAPRIL MALEATE 10 MG/1
10 TABLET ORAL DAILY
Qty: 30 TABLET | Refills: 5 | Status: SHIPPED | OUTPATIENT
Start: 2019-09-05

## 2019-09-05 RX ORDER — CUSHION
EACH MISCELLANEOUS
Qty: 1 EACH | Refills: 0 | Status: SHIPPED | OUTPATIENT
Start: 2019-09-05 | End: 2019-11-07

## 2019-09-05 NOTE — PROGRESS NOTES
per week. memantine (NAMENDA) 10 MG tablet  TAKE 1 TABLET BY MOUTH TWICE A DAY             metoprolol succinate (TOPROL XL) 50 MG extended release tablet  TAKE 1 TABLET BY MOUTH EVERY DAY             Misc. Devices (SUCTION GRAB BAR) MISC  Tub seat with back. Misc. Devices (WALL GRAB BAR) MISC  Use when taking shower             Misc. Devices [facility]) MISC  Use as directed. Misc. Devices [facility]) MISC  Use as directed. nitroGLYCERIN (NITROSTAT) 0.4 MG SL tablet  Place 1 tablet under the tongue every 5 minutes as needed for Chest pain             omeprazole (PRILOSEC) 20 MG delayed release capsule  Take 1 capsule by mouth daily             tamsulosin (FLOMAX) 0.4 MG capsule  TAKE 1 CAPSULE BY MOUTH DAILY                   Medications marked \"taking\" at this time  Outpatient Medications Marked as Taking for the 9/5/19 encounter (Office Visit) with Colon Money, DO   Medication Sig Dispense Refill    enalapril (VASOTEC) 10 MG tablet Take 1 tablet by mouth daily 30 tablet 5    ISOSORBIDE PO Take 30 mg by mouth daily      Misc. Devices [facility]) MISC Use as directed. 1 each 0    Misc. Devices [facility]) MISC Use as directed.  1 each 0    metoprolol succinate (TOPROL XL) 50 MG extended release tablet TAKE 1 TABLET BY MOUTH EVERY DAY 90 tablet 1    tamsulosin (FLOMAX) 0.4 MG capsule TAKE 1 CAPSULE BY MOUTH DAILY 90 capsule 1    memantine (NAMENDA) 10 MG tablet TAKE 1 TABLET BY MOUTH TWICE A DAY 60 tablet 5    hydrALAZINE (APRESOLINE) 50 MG tablet TAKE 1 TABLET BY MOUTH 3 TIMES A DAY WITH FOOD 90 tablet 5    Cholecalciferol (VITAMIN D) 2000 units CAPS capsule Take by mouth daily      atorvastatin (LIPITOR) 40 MG tablet TAKE 1 TABLET BY MOUTH DAILY AT BEDTIME 30 tablet 11    nitroGLYCERIN (NITROSTAT) 0.4 MG SL tablet Place 1 tablet under the tongue every 5 minutes as needed for Chest pain 25 tablet 3    aspirin 81 MG chewable tablet Take

## 2019-09-13 ENCOUNTER — TELEPHONE (OUTPATIENT)
Dept: CARDIOLOGY CLINIC | Age: 64
End: 2019-09-13

## 2019-09-16 RX ORDER — ISOSORBIDE MONONITRATE 30 MG/1
30 TABLET, EXTENDED RELEASE ORAL DAILY
Qty: 90 TABLET | Refills: 3 | Status: SHIPPED | OUTPATIENT
Start: 2019-09-16 | End: 2019-11-07

## 2019-09-19 ENCOUNTER — TELEPHONE (OUTPATIENT)
Dept: CARDIOLOGY CLINIC | Age: 64
End: 2019-09-19

## 2019-10-01 ENCOUNTER — TELEPHONE (OUTPATIENT)
Dept: FAMILY MEDICINE CLINIC | Age: 64
End: 2019-10-01

## 2019-11-07 ENCOUNTER — INITIAL CONSULT (OUTPATIENT)
Dept: NEUROLOGY | Age: 64
End: 2019-11-07
Payer: MEDICARE

## 2019-11-07 VITALS
HEIGHT: 67 IN | DIASTOLIC BLOOD PRESSURE: 64 MMHG | OXYGEN SATURATION: 98 % | HEART RATE: 66 BPM | BODY MASS INDEX: 23.07 KG/M2 | WEIGHT: 147 LBS | SYSTOLIC BLOOD PRESSURE: 176 MMHG

## 2019-11-07 DIAGNOSIS — F01.50 VASCULAR DEMENTIA OF ACUTE ONSET, WITHOUT BEHAVIORAL DISTURBANCE (HCC): Primary | ICD-10-CM

## 2019-11-07 DIAGNOSIS — Z99.2 ESRD (END STAGE RENAL DISEASE) ON DIALYSIS (HCC): ICD-10-CM

## 2019-11-07 DIAGNOSIS — Z86.73 REMOTE HISTORY OF STROKE: ICD-10-CM

## 2019-11-07 DIAGNOSIS — N18.6 ESRD (END STAGE RENAL DISEASE) ON DIALYSIS (HCC): ICD-10-CM

## 2019-11-07 DIAGNOSIS — I65.23 BILATERAL CAROTID ARTERY STENOSIS: ICD-10-CM

## 2019-11-07 DIAGNOSIS — I10 HTN (HYPERTENSION), BENIGN: ICD-10-CM

## 2019-11-07 DIAGNOSIS — E78.5 DYSLIPIDEMIA: ICD-10-CM

## 2019-11-07 PROCEDURE — G8427 DOCREV CUR MEDS BY ELIG CLIN: HCPCS | Performed by: PSYCHIATRY & NEUROLOGY

## 2019-11-07 PROCEDURE — G8484 FLU IMMUNIZE NO ADMIN: HCPCS | Performed by: PSYCHIATRY & NEUROLOGY

## 2019-11-07 PROCEDURE — 3017F COLORECTAL CA SCREEN DOC REV: CPT | Performed by: PSYCHIATRY & NEUROLOGY

## 2019-11-07 PROCEDURE — G8420 CALC BMI NORM PARAMETERS: HCPCS | Performed by: PSYCHIATRY & NEUROLOGY

## 2019-11-07 PROCEDURE — 99204 OFFICE O/P NEW MOD 45 MIN: CPT | Performed by: PSYCHIATRY & NEUROLOGY

## 2019-11-07 PROCEDURE — 1036F TOBACCO NON-USER: CPT | Performed by: PSYCHIATRY & NEUROLOGY

## 2019-11-07 PROCEDURE — G8598 ASA/ANTIPLAT THER USED: HCPCS | Performed by: PSYCHIATRY & NEUROLOGY

## 2019-11-14 ENCOUNTER — HOSPITAL ENCOUNTER (OUTPATIENT)
Age: 64
Discharge: HOME | End: 2019-11-14
Payer: MEDICARE

## 2019-11-14 ENCOUNTER — HOSPITAL ENCOUNTER (OUTPATIENT)
Dept: VASCULAR LAB | Age: 64
Discharge: HOME | End: 2019-11-14
Payer: MEDICARE

## 2019-11-14 DIAGNOSIS — Z86.73 REMOTE HISTORY OF STROKE: ICD-10-CM

## 2019-11-14 DIAGNOSIS — I65.23 BILATERAL CAROTID ARTERY STENOSIS: ICD-10-CM

## 2019-11-14 DIAGNOSIS — F01.50 VASCULAR DEMENTIA OF ACUTE ONSET, WITHOUT BEHAVIORAL DISTURBANCE (HCC): ICD-10-CM

## 2019-11-14 LAB
FOLATE: 8.24 NG/ML (ref 4.78–24.2)
VITAMIN B-12: 622 PG/ML (ref 211–911)

## 2019-11-14 PROCEDURE — 82746 ASSAY OF FOLIC ACID SERUM: CPT

## 2019-11-14 PROCEDURE — 36415 COLL VENOUS BLD VENIPUNCTURE: CPT

## 2019-11-14 PROCEDURE — 82607 VITAMIN B-12: CPT

## 2019-11-14 PROCEDURE — 93880 EXTRACRANIAL BILAT STUDY: CPT

## 2019-12-02 RX ORDER — HYDRALAZINE HYDROCHLORIDE 50 MG/1
TABLET, FILM COATED ORAL
Qty: 270 TABLET | Refills: 1 | Status: SHIPPED | OUTPATIENT
Start: 2019-12-02

## 2019-12-04 ENCOUNTER — TELEPHONE (OUTPATIENT)
Dept: ADMINISTRATIVE | Age: 64
End: 2019-12-04

## 2019-12-30 ENCOUNTER — OFFICE VISIT (OUTPATIENT)
Dept: FAMILY MEDICINE CLINIC | Age: 64
End: 2019-12-30
Payer: MEDICARE

## 2019-12-30 VITALS — OXYGEN SATURATION: 91 % | HEART RATE: 75 BPM | DIASTOLIC BLOOD PRESSURE: 82 MMHG | SYSTOLIC BLOOD PRESSURE: 130 MMHG

## 2019-12-30 DIAGNOSIS — R41.0 CONFUSION: ICD-10-CM

## 2019-12-30 DIAGNOSIS — R30.0 DYSURIA: Primary | ICD-10-CM

## 2019-12-30 LAB
A/G RATIO: 1.6 (ref 1.1–2.2)
ALBUMIN SERPL-MCNC: 4.1 G/DL (ref 3.4–5)
ALP BLD-CCNC: 101 U/L (ref 40–129)
ALT SERPL-CCNC: 16 U/L (ref 10–40)
ANION GAP SERPL CALCULATED.3IONS-SCNC: 17 MMOL/L (ref 3–16)
AST SERPL-CCNC: 23 U/L (ref 15–37)
BASOPHILS ABSOLUTE: 0.1 K/UL (ref 0–0.2)
BASOPHILS RELATIVE PERCENT: 0.7 %
BILIRUB SERPL-MCNC: 0.5 MG/DL (ref 0–1)
BILIRUBIN, POC: ABNORMAL
BLOOD URINE, POC: ABNORMAL
BUN BLDV-MCNC: 31 MG/DL (ref 7–20)
CALCIUM SERPL-MCNC: 9.4 MG/DL (ref 8.3–10.6)
CHLORIDE BLD-SCNC: 95 MMOL/L (ref 99–110)
CLARITY, POC: CLEAR
CO2: 30 MMOL/L (ref 21–32)
COLOR, POC: YELLOW
CREAT SERPL-MCNC: 6.1 MG/DL (ref 0.8–1.3)
EOSINOPHILS ABSOLUTE: 0.1 K/UL (ref 0–0.6)
EOSINOPHILS RELATIVE PERCENT: 1 %
GFR AFRICAN AMERICAN: 11
GFR NON-AFRICAN AMERICAN: 9
GLOBULIN: 2.5 G/DL
GLUCOSE BLD-MCNC: 152 MG/DL (ref 70–99)
GLUCOSE URINE, POC: ABNORMAL
HCT VFR BLD CALC: 35 % (ref 40.5–52.5)
HEMOGLOBIN: 11.3 G/DL (ref 13.5–17.5)
KETONES, POC: 15
LEUKOCYTE EST, POC: ABNORMAL
LYMPHOCYTES ABSOLUTE: 0.7 K/UL (ref 1–5.1)
LYMPHOCYTES RELATIVE PERCENT: 7.3 %
MCH RBC QN AUTO: 28.7 PG (ref 26–34)
MCHC RBC AUTO-ENTMCNC: 32.2 G/DL (ref 31–36)
MCV RBC AUTO: 89.3 FL (ref 80–100)
MONOCYTES ABSOLUTE: 0.5 K/UL (ref 0–1.3)
MONOCYTES RELATIVE PERCENT: 5.3 %
NEUTROPHILS ABSOLUTE: 8.6 K/UL (ref 1.7–7.7)
NEUTROPHILS RELATIVE PERCENT: 85.7 %
NITRITE, POC: ABNORMAL
PDW BLD-RTO: 15.3 % (ref 12.4–15.4)
PH, POC: 6.5
PLATELET # BLD: 139 K/UL (ref 135–450)
PMV BLD AUTO: 10.2 FL (ref 5–10.5)
POTASSIUM SERPL-SCNC: 4.7 MMOL/L (ref 3.5–5.1)
PROTEIN, POC: 300
RBC # BLD: 3.92 M/UL (ref 4.2–5.9)
SODIUM BLD-SCNC: 142 MMOL/L (ref 136–145)
SPECIFIC GRAVITY, POC: 1.02
TOTAL PROTEIN: 6.6 G/DL (ref 6.4–8.2)
UROBILINOGEN, POC: 1
WBC # BLD: 10.1 K/UL (ref 4–11)

## 2019-12-30 PROCEDURE — G8420 CALC BMI NORM PARAMETERS: HCPCS | Performed by: FAMILY MEDICINE

## 2019-12-30 PROCEDURE — G8598 ASA/ANTIPLAT THER USED: HCPCS | Performed by: FAMILY MEDICINE

## 2019-12-30 PROCEDURE — 81002 URINALYSIS NONAUTO W/O SCOPE: CPT | Performed by: FAMILY MEDICINE

## 2019-12-30 PROCEDURE — 1036F TOBACCO NON-USER: CPT | Performed by: FAMILY MEDICINE

## 2019-12-30 PROCEDURE — G8484 FLU IMMUNIZE NO ADMIN: HCPCS | Performed by: FAMILY MEDICINE

## 2019-12-30 PROCEDURE — 3017F COLORECTAL CA SCREEN DOC REV: CPT | Performed by: FAMILY MEDICINE

## 2019-12-30 PROCEDURE — G8427 DOCREV CUR MEDS BY ELIG CLIN: HCPCS | Performed by: FAMILY MEDICINE

## 2019-12-30 PROCEDURE — 99214 OFFICE O/P EST MOD 30 MIN: CPT | Performed by: FAMILY MEDICINE

## 2019-12-30 RX ORDER — SULFAMETHOXAZOLE AND TRIMETHOPRIM 400; 80 MG/1; MG/1
1 TABLET ORAL DAILY
Qty: 5 TABLET | Refills: 0 | Status: SHIPPED | OUTPATIENT
Start: 2019-12-30 | End: 2020-01-02 | Stop reason: SDUPTHER

## 2019-12-31 ENCOUNTER — TELEPHONE (OUTPATIENT)
Dept: FAMILY MEDICINE CLINIC | Age: 64
End: 2019-12-31

## 2020-01-02 LAB
ORGANISM: ABNORMAL
URINE CULTURE, ROUTINE: ABNORMAL

## 2020-01-02 RX ORDER — SULFAMETHOXAZOLE AND TRIMETHOPRIM 400; 80 MG/1; MG/1
1 TABLET ORAL DAILY
Qty: 3 TABLET | Refills: 0 | Status: SHIPPED | OUTPATIENT
Start: 2020-01-02 | End: 2020-01-05

## 2020-01-09 ENCOUNTER — OFFICE VISIT (OUTPATIENT)
Dept: FAMILY MEDICINE CLINIC | Age: 65
End: 2020-01-09
Payer: MEDICARE

## 2020-01-09 VITALS
OXYGEN SATURATION: 98 % | WEIGHT: 145 LBS | SYSTOLIC BLOOD PRESSURE: 136 MMHG | BODY MASS INDEX: 22.71 KG/M2 | HEART RATE: 56 BPM | DIASTOLIC BLOOD PRESSURE: 78 MMHG

## 2020-01-09 LAB
BILIRUBIN, POC: ABNORMAL
BLOOD URINE, POC: ABNORMAL
CLARITY, POC: CLEAR
COLOR, POC: YELLOW
GLUCOSE URINE, POC: ABNORMAL
KETONES, POC: ABNORMAL
LEUKOCYTE EST, POC: ABNORMAL
NITRITE, POC: ABNORMAL
PH, POC: 8.5
PROTEIN, POC: 100
SPECIFIC GRAVITY, POC: 1.01
UROBILINOGEN, POC: 1

## 2020-01-09 PROCEDURE — 99213 OFFICE O/P EST LOW 20 MIN: CPT | Performed by: FAMILY MEDICINE

## 2020-01-09 PROCEDURE — 1036F TOBACCO NON-USER: CPT | Performed by: FAMILY MEDICINE

## 2020-01-09 PROCEDURE — 3017F COLORECTAL CA SCREEN DOC REV: CPT | Performed by: FAMILY MEDICINE

## 2020-01-09 PROCEDURE — G8427 DOCREV CUR MEDS BY ELIG CLIN: HCPCS | Performed by: FAMILY MEDICINE

## 2020-01-09 PROCEDURE — G8420 CALC BMI NORM PARAMETERS: HCPCS | Performed by: FAMILY MEDICINE

## 2020-01-09 PROCEDURE — G8482 FLU IMMUNIZE ORDER/ADMIN: HCPCS | Performed by: FAMILY MEDICINE

## 2020-01-09 PROCEDURE — G0008 ADMIN INFLUENZA VIRUS VAC: HCPCS | Performed by: FAMILY MEDICINE

## 2020-01-09 PROCEDURE — 81002 URINALYSIS NONAUTO W/O SCOPE: CPT | Performed by: FAMILY MEDICINE

## 2020-01-09 PROCEDURE — 90686 IIV4 VACC NO PRSV 0.5 ML IM: CPT | Performed by: FAMILY MEDICINE

## 2020-01-09 RX ORDER — CIPROFLOXACIN 500 MG/1
500 TABLET, FILM COATED ORAL DAILY
Qty: 3 TABLET | Refills: 0 | Status: SHIPPED | OUTPATIENT
Start: 2020-01-09 | End: 2020-02-13

## 2020-01-09 ASSESSMENT — PATIENT HEALTH QUESTIONNAIRE - PHQ9
SUM OF ALL RESPONSES TO PHQ QUESTIONS 1-9: 1
2. FEELING DOWN, DEPRESSED OR HOPELESS: 1
SUM OF ALL RESPONSES TO PHQ9 QUESTIONS 1 & 2: 1
1. LITTLE INTEREST OR PLEASURE IN DOING THINGS: 0
SUM OF ALL RESPONSES TO PHQ QUESTIONS 1-9: 1

## 2020-01-09 NOTE — PROGRESS NOTES
Susana Llamas is a 59 y.o. male    Chief Complaint   Patient presents with    Urinary Tract Infection     1 week check       HPI:    HPI    Patient is here for follow-up of a UTI and confusion. He was diagnosed with a Klebsiella pneumonia last week. He took the Bactrim and is reporting improvement in symptoms although they have not completely resolved. He still feels a little confused. He does have a little urinary frequency but it is improved. No nausea or vomiting. ROS:    Review of Systems   Psychiatric/Behavioral: Negative for confusion. /78   Pulse 56   Wt 145 lb (65.8 kg)   SpO2 98%   BMI 22.71 kg/m²     Physical Exam:    Physical Exam  Constitutional:       General: He is not in acute distress. Appearance: Normal appearance. He is not ill-appearing. Neurological:      Mental Status: He is alert. Psychiatric:         Mood and Affect: Mood normal.         Behavior: Behavior normal.         Thought Content:  Thought content normal.         Current Outpatient Medications   Medication Sig Dispense Refill    ciprofloxacin (CIPRO) 500 MG tablet Take 1 tablet by mouth daily After dialysis days 3 tablet 0    hydrALAZINE (APRESOLINE) 50 MG tablet TAKE 1 TABLET BY MOUTH 3 TIMES A DAY WITH FOOD 270 tablet 1    enalapril (VASOTEC) 10 MG tablet Take 1 tablet by mouth daily 30 tablet 5    metoprolol succinate (TOPROL XL) 50 MG extended release tablet TAKE 1 TABLET BY MOUTH EVERY DAY 90 tablet 1    tamsulosin (FLOMAX) 0.4 MG capsule TAKE 1 CAPSULE BY MOUTH DAILY 90 capsule 1    memantine (NAMENDA) 10 MG tablet TAKE 1 TABLET BY MOUTH TWICE A DAY 60 tablet 5    Cholecalciferol (VITAMIN D) 2000 units CAPS capsule Take by mouth daily      atorvastatin (LIPITOR) 40 MG tablet TAKE 1 TABLET BY MOUTH DAILY AT BEDTIME 30 tablet 11    nitroGLYCERIN (NITROSTAT) 0.4 MG SL tablet Place 1 tablet under the tongue every 5 minutes as needed for Chest pain 25 tablet 3    aspirin 81 MG chewable tablet Take 81 mg by mouth daily      omeprazole (PRILOSEC) 20 MG delayed release capsule Take 1 capsule by mouth daily 30 capsule 2    Meals on Wheels MISC by Does not apply route Patient needs diabetic meals five days per week. 1 each 0     No current facility-administered medications for this visit. Assessment:    1. Urinary tract infection with hematuria, site unspecified    2. Dysuria    3. Confusion    4. Need for influenza vaccination        Plan:    1. Urinary tract infection with hematuria, site unspecified  There is a small amount of leukocytes present in the urine. I think he needs a couple more days of an antibiotic. I will go with Cipro this time. He was instructed to use it after dialysis. - ciprofloxacin (CIPRO) 500 MG tablet; Take 1 tablet by mouth daily After dialysis days  Dispense: 3 tablet; Refill: 0    2. Dysuria  - POCT Urinalysis no Micro    3. Confusion  Resolved. Related to UTI. 4. Need for influenza vaccination  - INFLUENZA, QUADV, 3 YRS AND OLDER, IM PF, PREFILL SYR OR SDV, 0.5ML (AFLURIA QUADV, PF)    Patient to return to clinic if symptoms worsen or fail to improve.

## 2020-01-20 RX ORDER — TAMSULOSIN HYDROCHLORIDE 0.4 MG/1
0.4 CAPSULE ORAL DAILY
Qty: 30 CAPSULE | Refills: 5 | Status: SHIPPED | OUTPATIENT
Start: 2020-01-20

## 2020-01-20 NOTE — TELEPHONE ENCOUNTER
.  Last office visit 1/9/2020     Last written 7/26/19 #90 1 refill    Next office visit scheduled 2/24/2020    Requested Prescriptions     Pending Prescriptions Disp Refills    tamsulosin (FLOMAX) 0.4 MG capsule [Pharmacy Med Name: TAMSULOSIN HCL 0.4 MG CAPSULE] 30 capsule 5     Sig: TAKE 1 CAPSULE BY MOUTH DAILY

## 2020-01-21 ENCOUNTER — CARE COORDINATION (OUTPATIENT)
Dept: CARE COORDINATION | Age: 65
End: 2020-01-21

## 2020-01-21 ENCOUNTER — TELEPHONE (OUTPATIENT)
Dept: FAMILY MEDICINE CLINIC | Age: 65
End: 2020-01-21

## 2020-01-21 NOTE — CARE COORDINATION
PCP had received a request from pt's wife for an order for the patient to be admitted to [facility]. PCP asked for ACM to outreach to wife and clarify what she is needing. Outreached to pt's wife Cl Nelson. She is wanting David Gallagher to go to [facility] for long term care. Instructed [place] that an order from the PCP is usually not needed for LTC, but will outreach to [facility] to see what information they are needing. Understanding voiced by Cl Omar and she request that ACM let her know what they say. [address] [facility] and left a message for Mulu Saleh, the facility admissions coordinator. Notified Monica that I left message for Mulu Saleh and will let her know when I get a return call.

## 2020-01-21 NOTE — CARE COORDINATION
Pt's last three office visits, current medication list, demographics, and letter from Dr Roxana Giles faxed to [facility] at [phone].     Received through secure e-mail the below fax confirmation:    ----- Original Job Details -----  Product Name: [employer] P [id number]  User: Guest  Date: Jan/21/2020 5:08:03 PM (-0500 GMT) Scanned Pages: 18    ----- Destination -----  [id number] Success    ----- Additional Details -----  Job    Date/Time                      Type  Line                           Identification  Duration  Pages  Result     [id number]  Jan/21/2020 5:03:01 PM         Send  Analog                         [id number]     04:59     18     Success

## 2020-01-21 NOTE — CARE COORDINATION
Received a call back from Anamaria Ayers, admissions coordinator at [facility]. He states that they need H andP within the last 6 months, current medication list, and a physician order. Asked what was needed on the order and he states it can be a letter or hand written at the bottom of the h and p, but needs to be signed by provider. Asked what the order needs to say and he states it needs to say: Please admit Mr Kezia Rust to [facility]. Will let PCP know.

## 2020-01-21 NOTE — TELEPHONE ENCOUNTER
Patient's wife requesting order to admit patient to [place]. Please see attached letter.

## 2020-01-24 RX ORDER — METOPROLOL SUCCINATE 50 MG/1
TABLET, EXTENDED RELEASE ORAL
Qty: 30 TABLET | Refills: 5 | Status: SHIPPED | OUTPATIENT
Start: 2020-01-24

## 2020-01-24 NOTE — TELEPHONE ENCOUNTER
.  Last office visit 1/9/2020     Last written 7/30/19 #90 1 refill    Next office visit scheduled 2/24/2020    Requested Prescriptions     Pending Prescriptions Disp Refills    metoprolol succinate (TOPROL XL) 50 MG extended release tablet [Pharmacy Med Name: METOPROLOL SUCC ER 50 MG TAB] 30 tablet 5     Sig: TAKE 1 TABLET BY MOUTH EVERY DAY

## 2020-02-13 ENCOUNTER — OFFICE VISIT (OUTPATIENT)
Dept: NEUROLOGY | Age: 65
End: 2020-02-13
Payer: MEDICARE

## 2020-02-13 VITALS
HEIGHT: 67 IN | HEART RATE: 68 BPM | WEIGHT: 147 LBS | SYSTOLIC BLOOD PRESSURE: 157 MMHG | BODY MASS INDEX: 23.07 KG/M2 | DIASTOLIC BLOOD PRESSURE: 73 MMHG | OXYGEN SATURATION: 98 %

## 2020-02-13 PROCEDURE — G8427 DOCREV CUR MEDS BY ELIG CLIN: HCPCS | Performed by: PSYCHIATRY & NEUROLOGY

## 2020-02-13 PROCEDURE — G8420 CALC BMI NORM PARAMETERS: HCPCS | Performed by: PSYCHIATRY & NEUROLOGY

## 2020-02-13 PROCEDURE — 1036F TOBACCO NON-USER: CPT | Performed by: PSYCHIATRY & NEUROLOGY

## 2020-02-13 PROCEDURE — 99213 OFFICE O/P EST LOW 20 MIN: CPT | Performed by: PSYCHIATRY & NEUROLOGY

## 2020-02-13 PROCEDURE — G8482 FLU IMMUNIZE ORDER/ADMIN: HCPCS | Performed by: PSYCHIATRY & NEUROLOGY

## 2020-02-13 PROCEDURE — 3017F COLORECTAL CA SCREEN DOC REV: CPT | Performed by: PSYCHIATRY & NEUROLOGY

## 2020-02-13 NOTE — PROGRESS NOTES
The patient came today for follow up regarding: Test results and vascular dementia    The patient came today with his wife. Since his last visit, he had his carotid Doppler which showed no severe ICA stenosis. Blood test showed normal B12. He just moved into a new nursing home about 2 weeks ago. So far he is adjusting to the change. He continues to have daily episode of short-term memory loss. He needs prompt frequently and daily. Degree can be moderate to severe. Can be worse with multitasking. He has difficulty remembering names or days of the week. Wife report no hallucination or psychosis. She fell once in the last 3 months but no significant LOC or injury. He walks with his cane. The same left-sided weakness from remote stroke. He takes aspirin daily. Blood pressure is controlled medication. He denies any dysphagia or dysarthria. No significant insomnia or parasomnia. No severe depression or suicidal ideation or thoughts. No other new symptoms today. Other view of system was unremarkable. Family History   Problem Relation Age of Onset    Heart Disease Mother     Diabetes Mother     Dementia Mother     Diabetes Father     Heart Disease Father     Stroke Brother        Past Medical History:   Diagnosis Date    CAD (coronary artery disease)     Chronic kidney disease     monday wednesday friday    [facility]     3    Diabetes [facility]     Hemodialysis patient ([facility]     Hyperlipidemia     Hypertension      Prior to Visit Medications    Medication Sig Taking?  Authorizing Provider   metoprolol succinate (TOPROL XL) 50 MG extended release tablet TAKE 1 TABLET BY MOUTH EVERY DAY Yes Reymundo Choe DO   tamsulosin (FLOMAX) 0.4 MG capsule TAKE 1 CAPSULE BY MOUTH DAILY Yes Reymundo Choe DO   hydrALAZINE (APRESOLINE) 50 MG tablet TAKE 1 TABLET BY MOUTH 3 TIMES A DAY WITH FOOD Yes Reymundo Choe DO   enalapril (VASOTEC) 10 MG tablet Take 1 tablet by mouth daily Yes Reymundo Choe DO   memantine (NAMENDA) 10 MG tablet TAKE 1 TABLET BY MOUTH TWICE A DAY Yes Reymundo Choe DO   Cholecalciferol (VITAMIN D) 2000 units CAPS capsule Take by mouth daily Yes Historical Provider, MD   atorvastatin (LIPITOR) 40 MG tablet TAKE 1 TABLET BY MOUTH DAILY AT BEDTIME Yes Reymundo Choe DO   nitroGLYCERIN (NITROSTAT) 0.4 MG SL tablet Place 1 tablet under the tongue every 5 minutes as needed for Chest pain Yes Reymundo Choe DO   aspirin 81 MG chewable tablet Take 81 mg by mouth daily Yes Historical Provider, MD   omeprazole (PRILOSEC) 20 MG delayed release capsule Take 1 capsule by mouth daily Yes [address], DO   Meals on Wheels MISC by Does not apply route Patient needs diabetic meals five days per week. Yes [address] [place] [address] Drive, DO     Allergies   Allergen Reactions    Pcn [Penicillins]      Social History     Tobacco Use    Smoking status: Former Smoker    Smokeless tobacco: Never Used   Substance Use Topics    Alcohol use: No     Alcohol/week: 0.0 standard drinks     Past Surgical History:   Procedure Laterality Date    CARDIAC CATHETERIZATION      DIALYSIS FISTULA CREATION             Exam:   Constitutional:   Vitals:    02/13/20 1135   BP: (!) 173/80   Pulse: 68   SpO2: 98%   Weight: 147 lb (66.7 kg)   Height: 5' 7\" (1.702 m)       General appearance:  Normal development and appear in no acute distress. Eye: No icterus. Neck: supple  Cardiovascular:   No lower leg edema with good pulsation. Mental Status:   AAO times two  Fluent speech  Good attention  Intact remote memory  Intact poor fund of knowledge  No change  Cranial Nerves:   II: Pupils: equal, round, reactive to light  III,IV,VI: Extra Ocular Movements are intact.  No nystagmus  V: Facial sensation is intact   VII: Facial strength and movements: intact and symmetric  VIII: Hearing: Intact  IX: Palate elevation is symmetric  XI: Shoulder shrug is intact  XII: Tongue movements are normal  Musculoskeletal:

## 2020-02-24 RX ORDER — ATORVASTATIN CALCIUM 40 MG/1
TABLET, FILM COATED ORAL
Qty: 90 TABLET | Refills: 3 | Status: SHIPPED | OUTPATIENT
Start: 2020-02-24

## 2021-08-02 ENCOUNTER — APPOINTMENT (OUTPATIENT)
Dept: GENERAL RADIOLOGY | Age: 66
End: 2021-08-02
Payer: MEDICARE

## 2021-08-02 ENCOUNTER — APPOINTMENT (OUTPATIENT)
Dept: CT IMAGING | Age: 66
End: 2021-08-02
Payer: MEDICARE

## 2021-08-02 ENCOUNTER — HOSPITAL ENCOUNTER (EMERGENCY)
Age: 66
Discharge: HOME | End: 2021-08-03
Attending: EMERGENCY MEDICINE
Payer: MEDICARE

## 2021-08-02 DIAGNOSIS — R41.0 CONFUSION: Primary | ICD-10-CM

## 2021-08-02 LAB
A/G RATIO: 1.2 (ref 1.1–2.2)
ALBUMIN SERPL-MCNC: 4.4 G/DL (ref 3.4–5)
ALP BLD-CCNC: 176 U/L (ref 40–129)
ALT SERPL-CCNC: 21 U/L (ref 10–40)
ANION GAP SERPL CALCULATED.3IONS-SCNC: 9 MMOL/L (ref 3–16)
AST SERPL-CCNC: 47 U/L (ref 15–37)
BASOPHILS ABSOLUTE: 0.1 K/UL (ref 0–0.2)
BASOPHILS RELATIVE PERCENT: 1.1 %
BILIRUB SERPL-MCNC: 0.5 MG/DL (ref 0–1)
BUN BLDV-MCNC: 29 MG/DL (ref 7–20)
CALCIUM SERPL-MCNC: 8.9 MG/DL (ref 8.3–10.6)
CHLORIDE BLD-SCNC: 96 MMOL/L (ref 99–110)
CO2: 32 MMOL/L (ref 21–32)
CREAT SERPL-MCNC: 5.5 MG/DL (ref 0.8–1.3)
EOSINOPHILS ABSOLUTE: 0.1 K/UL (ref 0–0.6)
EOSINOPHILS RELATIVE PERCENT: 2.4 %
GFR AFRICAN AMERICAN: 13
GFR NON-AFRICAN AMERICAN: 10
GLOBULIN: 3.6 G/DL
GLUCOSE BLD-MCNC: 123 MG/DL (ref 70–99)
HCT VFR BLD CALC: 36.9 % (ref 40.5–52.5)
HEMOGLOBIN: 12.2 G/DL (ref 13.5–17.5)
LYMPHOCYTES ABSOLUTE: 1.1 K/UL (ref 1–5.1)
LYMPHOCYTES RELATIVE PERCENT: 18.3 %
MCH RBC QN AUTO: 29.3 PG (ref 26–34)
MCHC RBC AUTO-ENTMCNC: 33.1 G/DL (ref 31–36)
MCV RBC AUTO: 88.6 FL (ref 80–100)
MONOCYTES ABSOLUTE: 0.5 K/UL (ref 0–1.3)
MONOCYTES RELATIVE PERCENT: 7.5 %
NEUTROPHILS ABSOLUTE: 4.3 K/UL (ref 1.7–7.7)
NEUTROPHILS RELATIVE PERCENT: 70.7 %
PDW BLD-RTO: 16.3 % (ref 12.4–15.4)
PLATELET # BLD: 147 K/UL (ref 135–450)
PMV BLD AUTO: 9.3 FL (ref 5–10.5)
POTASSIUM REFLEX MAGNESIUM: 7.2 MMOL/L (ref 3.5–5.1)
RBC # BLD: 4.16 M/UL (ref 4.2–5.9)
SODIUM BLD-SCNC: 137 MMOL/L (ref 136–145)
TOTAL PROTEIN: 8 G/DL (ref 6.4–8.2)
WBC # BLD: 6.1 K/UL (ref 4–11)

## 2021-08-02 PROCEDURE — 36415 COLL VENOUS BLD VENIPUNCTURE: CPT

## 2021-08-02 PROCEDURE — 84132 ASSAY OF SERUM POTASSIUM: CPT

## 2021-08-02 PROCEDURE — 85025 COMPLETE CBC W/AUTO DIFF WBC: CPT

## 2021-08-02 PROCEDURE — 71045 X-RAY EXAM CHEST 1 VIEW: CPT

## 2021-08-02 PROCEDURE — 93005 ELECTROCARDIOGRAM TRACING: CPT | Performed by: STUDENT IN AN ORGANIZED HEALTH CARE EDUCATION/TRAINING PROGRAM

## 2021-08-02 PROCEDURE — 80053 COMPREHEN METABOLIC PANEL: CPT

## 2021-08-02 PROCEDURE — 70450 CT HEAD/BRAIN W/O DYE: CPT

## 2021-08-02 PROCEDURE — 72170 X-RAY EXAM OF PELVIS: CPT

## 2021-08-02 PROCEDURE — 99284 EMERGENCY DEPT VISIT MOD MDM: CPT

## 2021-08-03 VITALS
HEART RATE: 91 BPM | BODY MASS INDEX: 29.03 KG/M2 | RESPIRATION RATE: 18 BRPM | WEIGHT: 185 LBS | OXYGEN SATURATION: 99 % | TEMPERATURE: 98.7 F | SYSTOLIC BLOOD PRESSURE: 147 MMHG | DIASTOLIC BLOOD PRESSURE: 70 MMHG | HEIGHT: 67 IN

## 2021-08-03 LAB
EKG ATRIAL RATE: 67 BPM
EKG DIAGNOSIS: NORMAL
EKG P AXIS: 55 DEGREES
EKG P-R INTERVAL: 152 MS
EKG Q-T INTERVAL: 428 MS
EKG QRS DURATION: 70 MS
EKG QTC CALCULATION (BAZETT): 452 MS
EKG R AXIS: -7 DEGREES
EKG T AXIS: 52 DEGREES
EKG VENTRICULAR RATE: 67 BPM
POTASSIUM SERPL-SCNC: 5.5 MMOL/L (ref 3.5–5.1)

## 2021-08-03 ASSESSMENT — ENCOUNTER SYMPTOMS
DIARRHEA: 0
CHEST TIGHTNESS: 0
COUGH: 0
ABDOMINAL DISTENTION: 0
ABDOMINAL PAIN: 0
VOMITING: 0
BACK PAIN: 0
SHORTNESS OF BREATH: 0
SORE THROAT: 0
CONSTIPATION: 0
NAUSEA: 0

## 2021-08-03 NOTE — ED PROVIDER NOTES
ED Attending Attestation Note     Date of evaluation: 8/2/2021    This patient was seen by the resident. I have seen and examined the patient, agree with the workup, evaluation, management and diagnosis. The care plan has been discussed. I have reviewed the ECG and concur with the resident's interpretation. I was present for any procedures performed in the resident's  note and have made edits to the note where appropriate. My assessment reveals 77 y.o. male with history of ESRD on hemodialysis, CAD, PAD, stroke, vascular dementia presenting to the emergency department today for potential altered mental status. His care facility notes that he seems slightly more confused than baseline and is mentioning a fall. They are uncertain if he is falling. On my examination he appears well, is pleasantly interactive, notes some generalized soreness but has no localized tenderness. No C, T, or L-spine tenderness. Pelvis stable hips without tenderness to logroll of legs. No other bony tenderness or deformities. No evidence of head trauma, pupils equally round and reactive to light, extraocular movements intact. No abdominal tenderness.          Janneth Miramontes MD  08/02/21 [id number]

## 2021-08-03 NOTE — ED TRIAGE NOTES
[facility] sent the patient in for AMS. Angelica is AXOX3. Not alert t  date, year or season. He knows where he is, he knows who he is, he knows Araceli Steele is president. Doesn't know year, month or season.   Patient pleasant and denies problems or complaints

## 2021-08-03 NOTE — ED PROVIDER NOTES
98.7 °F (37.1 °C), Pulse: 91, Resp: 18, SpO2: 99 %   Physical Exam  Vitals reviewed. Constitutional:       General: He is not in acute distress. Appearance: Normal appearance. He is not ill-appearing or toxic-appearing. HENT:      Head: Normocephalic and atraumatic. Mouth/Throat:      Mouth: Mucous membranes are moist.   Eyes:      Extraocular Movements: Extraocular movements intact. Pupils: Pupils are equal, round, and reactive to light. Cardiovascular:      Rate and Rhythm: Normal rate and regular rhythm. Pulses: Normal pulses. Heart sounds: Normal heart sounds. Pulmonary:      Effort: Pulmonary effort is normal.      Breath sounds: Normal breath sounds. Abdominal:      Palpations: Abdomen is soft. Tenderness: There is no abdominal tenderness. Musculoskeletal:      Cervical back: Normal range of motion. No rigidity. Skin:     General: Skin is warm and dry. Neurological:      General: No focal deficit present. Mental Status: He is alert and oriented to person, place, and time. Cranial Nerves: No cranial nerve deficit. Sensory: No sensory deficit. Motor: No weakness. Coordination: Coordination normal.      Gait: Gait normal.      Deep Tendon Reflexes: Reflexes normal.   Psychiatric:         Cognition and Memory: Memory is impaired. DiagnosticResults     EKG   Interpreted in conjunction with emergencydepartment physician Janneth Miramontes MD  Rhythm: normal sinus   Rate: normal  Axis: normal  Ectopy: none  Conduction: normal  ST Segments: normal  T Waves:normal  Q Waves: none  Clinical Impression: no acute changes  Comparison:  Unchanged from ECG dated 8/26/19    RADIOLOGY:  XR CHEST PORTABLE   Final Result      No acute pulmonary disease. XR PELVIS (1-2 VIEWS)   Final Result   Impression:    No acute osseous injury. Moderate to severe bilateral hip osteoarthritis. CT Head WO Contrast   Final Result      1.   No acute intracranial process. 2.  Chronic infarcts in the left occipital lobe, right frontal lobe, bilateral cerebrum, right basal ganglia, left thalamus. LABS:   Results for orders placed or performed during the hospital encounter of 08/02/21   CBC Auto Differential   Result Value Ref Range    WBC 6.1 4.0 - 11.0 K/uL    RBC 4.16 (L) 4.20 - 5.90 M/uL    Hemoglobin 12.2 (L) 13.5 - 17.5 g/dL    Hematocrit 36.9 (L) 40.5 - 52.5 %    MCV 88.6 80.0 - 100.0 fL    MCH 29.3 26.0 - 34.0 pg    MCHC 33.1 31.0 - 36.0 g/dL    RDW 16.3 (H) 12.4 - 15.4 %    Platelets 172 613 - 070 K/uL    MPV 9.3 5.0 - 10.5 fL    Neutrophils % 70.7 %    Lymphocytes % 18.3 %    Monocytes % 7.5 %    Eosinophils % 2.4 %    Basophils % 1.1 %    Neutrophils Absolute 4.3 1.7 - 7.7 K/uL    Lymphocytes Absolute 1.1 1.0 - 5.1 K/uL    Monocytes Absolute 0.5 0.0 - 1.3 K/uL    Eosinophils Absolute 0.1 0.0 - 0.6 K/uL    Basophils Absolute 0.1 0.0 - 0.2 K/uL   Comprehensive Metabolic Panel w/ Reflex to MG   Result Value Ref Range    Sodium 137 136 - 145 mmol/L    Potassium reflex Magnesium 7.2 (HH) 3.5 - 5.1 mmol/L    Chloride 96 (L) 99 - 110 mmol/L    CO2 32 21 - 32 mmol/L    Anion Gap 9 3 - 16    Glucose 123 (H) 70 - 99 mg/dL    BUN 29 (H) 7 - 20 mg/dL    CREATININE 5.5 (HH) 0.8 - 1.3 mg/dL    GFR Non-African American 10 (A) >60    GFR  13 (A) >60    Calcium 8.9 8.3 - 10.6 mg/dL    Total Protein 8.0 6.4 - 8.2 g/dL    Albumin 4.4 3.4 - 5.0 g/dL    Albumin/Globulin Ratio 1.2 1.1 - 2.2    Total Bilirubin 0.5 0.0 - 1.0 mg/dL    Alkaline Phosphatase 176 (H) 40 - 129 U/L    ALT 21 10 - 40 U/L    AST 47 (H) 15 - 37 U/L    Globulin 3.6 g/dL   Potassium (Lab)   Result Value Ref Range    Potassium 5.5 (H) 3.5 - 5.1 mmol/L       RECENT VITALS:  BP: (!) 147/70, Temp: 98.7 °F (37.1 °C), Pulse: 91,Resp: 18, SpO2: 99 %     ED Course     Nursing Notes, Past Medical Hx, Past Surgical Hx, Social Hx, Allergies, and Family Hx were reviewed.     The patient was Impression     1.  Confusion        Disposition     PATIENT REFERRED TO:  Jm Sarmiento, DO  [address]  [address] [address],[address]    Call today  to discuss your ER visit, and arrange a follow-up appointment      DISCHARGE MEDICATIONS:  Discharge Medication List as of 8/3/2021  1:31 AM          DISPOSITION         Romario Kern MD  Resident  08/03/21 [id number]

## 2021-08-04 ENCOUNTER — TELEPHONE (OUTPATIENT)
Dept: FAMILY MEDICINE CLINIC | Age: 66
End: 2021-08-04

## 2021-08-04 NOTE — TELEPHONE ENCOUNTER
Called patient to check on him from his ER visit. States he is doing better. No appt needed with PCP since he stays at a facility already.

## 2021-10-15 ENCOUNTER — CLINICAL DOCUMENTATION (OUTPATIENT)
Dept: OTHER | Age: 66
End: 2021-10-15

## 2022-09-12 ENCOUNTER — HOSPITAL ENCOUNTER (EMERGENCY)
Age: 67
Discharge: HOME | End: 2022-09-12
Attending: EMERGENCY MEDICINE
Payer: COMMERCIAL

## 2022-09-12 VITALS
RESPIRATION RATE: 17 BRPM | WEIGHT: 177.2 LBS | HEART RATE: 87 BPM | SYSTOLIC BLOOD PRESSURE: 148 MMHG | TEMPERATURE: 97.7 F | HEIGHT: 67 IN | OXYGEN SATURATION: 100 % | BODY MASS INDEX: 27.81 KG/M2 | DIASTOLIC BLOOD PRESSURE: 77 MMHG

## 2022-09-12 DIAGNOSIS — N18.6 ESRD (END STAGE RENAL DISEASE) ON DIALYSIS (HCC): Primary | ICD-10-CM

## 2022-09-12 DIAGNOSIS — F01.50 VASCULAR DEMENTIA WITHOUT BEHAVIORAL DISTURBANCE (HCC): ICD-10-CM

## 2022-09-12 DIAGNOSIS — Z99.2 ESRD (END STAGE RENAL DISEASE) ON DIALYSIS (HCC): Primary | ICD-10-CM

## 2022-09-12 LAB
GLUCOSE BLD-MCNC: 94 MG/DL (ref 70–99)
PERFORMED ON: NORMAL

## 2022-09-12 PROCEDURE — 51798 US URINE CAPACITY MEASURE: CPT

## 2022-09-12 PROCEDURE — 99284 EMERGENCY DEPT VISIT MOD MDM: CPT

## 2022-09-12 RX ORDER — MIDODRINE HYDROCHLORIDE 5 MG/1
5 TABLET ORAL 2 TIMES DAILY
COMMUNITY

## 2022-09-12 RX ORDER — MIRTAZAPINE 15 MG/1
7.5 TABLET, FILM COATED ORAL NIGHTLY
COMMUNITY

## 2022-09-12 RX ORDER — ONDANSETRON 4 MG/1
4 TABLET, FILM COATED ORAL EVERY 8 HOURS PRN
COMMUNITY

## 2022-09-12 RX ORDER — ISOSORBIDE DINITRATE 20 MG/1
30 TABLET ORAL DAILY
COMMUNITY

## 2022-09-12 ASSESSMENT — PAIN SCALES - GENERAL: PAINLEVEL_OUTOF10: 9

## 2022-09-12 ASSESSMENT — ENCOUNTER SYMPTOMS
SHORTNESS OF BREATH: 0
EYES NEGATIVE: 1
COUGH: 0
CONSTIPATION: 0
VOMITING: 0
DIARRHEA: 0
ABDOMINAL PAIN: 0
NAUSEA: 0

## 2022-09-12 ASSESSMENT — PAIN DESCRIPTION - LOCATION: LOCATION: PENIS

## 2022-09-12 ASSESSMENT — PAIN DESCRIPTION - FREQUENCY: FREQUENCY: CONTINUOUS

## 2022-09-12 ASSESSMENT — PAIN DESCRIPTION - PAIN TYPE: TYPE: CHRONIC PAIN;ACUTE PAIN

## 2022-09-12 ASSESSMENT — PAIN DESCRIPTION - DESCRIPTORS: DESCRIPTORS: ACHING

## 2022-09-12 ASSESSMENT — PAIN - FUNCTIONAL ASSESSMENT: PAIN_FUNCTIONAL_ASSESSMENT: 0-10

## 2022-09-12 NOTE — ED PROVIDER NOTES
[address] [facility] ENCOUNTER          PHYSICIAN ASSISTANT NOTE       Date of evaluation: 9/12/2022    Chief Complaint     Urinary Retention (Pt reports having urinary retention while at dialysis. States he has had this before but doesn't know why it happened. Currently pain 9/10. Received 1hr and 22min of dialysis treatment today)    History of Present Illness     Aquiles Regan is a 79 y.o. male who presents to the emergency department with a chief complaint of urinary retention. The patient has an extensive past medical history including CAD, CVA x3, type 2 diabetes who is CKD (dialysis Monday Wednesday Friday), who according to dialysis center completed 1 hour and 22 minutes of dialysis and was sent here for evaluation. I was unable to get in touch with the dialysis center. The patient at this point in time states that his arms and legs are hurting but that this is normal for him. He denies any other complaints at this time such as pain in his penis for which he told the nurses previously that he had or abdominal pain. He denies any shortness of breath or chest pain. History is very limited secondary to patient's advanced dementia. Review of Systems     Review of Systems   Constitutional:  Negative for chills, diaphoresis and fever. HENT: Negative. Eyes: Negative. Respiratory:  Negative for cough and shortness of breath. Cardiovascular:  Negative for chest pain and palpitations. Gastrointestinal:  Negative for abdominal pain, constipation, diarrhea, nausea and vomiting. Genitourinary:  Negative for frequency and urgency. Musculoskeletal:  Positive for arthralgias. Negative for neck pain and neck stiffness. Skin: Negative. Neurological:  Negative for dizziness and headaches. Hematological:  Negative for adenopathy. Psychiatric/Behavioral: Negative. Negative for confusion. The patient is not nervous/anxious.     All other systems reviewed and are negative. Past Medical, Surgical, Family, and Social History     He has a past medical history of CAD (coronary artery disease), Chronic kidney disease, CVA (cerebral vascular accident) ([facility] Diabetes ([facility] Hemodialysis patient ([facility] Hyperlipidemia, and Hypertension. He has a past surgical history that includes Cardiac catheterization and Dialysis fistula creation. His family history includes Dementia in his mother; Diabetes in his father and mother; Heart Disease in his father and mother; Stroke in his brother. He reports that he has quit smoking. He has never used smokeless tobacco. He reports that he does not drink alcohol and does not use drugs. Medications     Previous Medications    ASPIRIN 81 MG CHEWABLE TABLET    Take 81 mg by mouth daily    ATORVASTATIN (LIPITOR) 40 MG TABLET    TAKE 1 TABLET BY MOUTH EVERYDAY AT BEDTIME    CHOLECALCIFEROL (VITAMIN D) 2000 UNITS CAPS CAPSULE    Take by mouth daily    ENALAPRIL (VASOTEC) 10 MG TABLET    Take 1 tablet by mouth daily    HYDRALAZINE (APRESOLINE) 50 MG TABLET    TAKE 1 TABLET BY MOUTH 3 TIMES A DAY WITH FOOD    ISOSORBIDE DINITRATE (ISORDIL) 20 MG TABLET    Take 30 mg by mouth Daily    MEALS ON WHEELS MISC    by Does not apply route Patient needs diabetic meals five days per week.     MEMANTINE (NAMENDA) 10 MG TABLET    TAKE 1 TABLET BY MOUTH TWICE A DAY    METOPROLOL SUCCINATE (TOPROL XL) 50 MG EXTENDED RELEASE TABLET    TAKE 1 TABLET BY MOUTH EVERY DAY    MIDODRINE (PROAMATINE) 5 MG TABLET    Take 5 mg by mouth in the morning and at bedtime    MIRTAZAPINE (REMERON) 15 MG TABLET    Take 7.5 mg by mouth nightly    NITROGLYCERIN (NITROSTAT) 0.4 MG SL TABLET    Place 1 tablet under the tongue every 5 minutes as needed for Chest pain    OMEPRAZOLE (PRILOSEC) 20 MG DELAYED RELEASE CAPSULE    Take 1 capsule by mouth daily    ONDANSETRON (ZOFRAN) 4 MG TABLET    Take 4 mg by mouth every 8 hours as needed for Nausea or Vomiting    TAMSULOSIN (FLOMAX) 0.4 MG CAPSULE    TAKE 1 CAPSULE BY MOUTH DAILY       Allergies     He is allergic to pcn [penicillins]. Physical Exam     INITIAL VITALS: BP: (!) 146/64, Temp: 97.7 °F (36.5 °C), Heart Rate: 78, Resp: 18, SpO2: 100 %  Physical Exam  Vitals and nursing note reviewed. Exam conducted with a chaperone present (staff RN). Constitutional:       General: He is not in acute distress. Appearance: Normal appearance. He is well-developed. He is not diaphoretic. HENT:      Head: Normocephalic and atraumatic. Eyes:      Conjunctiva/sclera: Conjunctivae normal.      Pupils: Pupils are equal, round, and reactive to light. Cardiovascular:      Rate and Rhythm: Normal rate and regular rhythm. Heart sounds: Normal heart sounds. No murmur heard. Pulmonary:      Effort: Pulmonary effort is normal. No respiratory distress. Breath sounds: Normal breath sounds. No wheezing, rhonchi or rales. Chest:      Chest wall: No tenderness. Abdominal:      General: Bowel sounds are normal. There is no distension. Palpations: Abdomen is soft. Tenderness: There is no abdominal tenderness. There is no right CVA tenderness, left CVA tenderness, guarding or rebound. Genitourinary:     Pubic Area: No rash. Penis: Circumcised. No tenderness, discharge or swelling. Testes: Normal.         Right: Tenderness or swelling not present. Left: Tenderness or swelling not present. Musculoskeletal:         General: No tenderness. Normal range of motion. Cervical back: Normal range of motion and neck supple. Comments: No focal point tenderness. AV fistula in place without any erythema, warmth. There is audible bruit and palpable thrill. Lymphadenopathy:      Cervical: No cervical adenopathy. Skin:     General: Skin is warm and dry. Capillary Refill: Capillary refill takes less than 2 seconds. Coloration: Skin is not pale. Findings: No rash.    Neurological:      General: No focal deficit present. Mental Status: He is alert and oriented to person, place, and time. GCS: GCS eye subscore is 4. GCS verbal subscore is 5. GCS motor subscore is 6. Cranial Nerves: No cranial nerve deficit. Sensory: No sensory deficit. Motor: No weakness. Coordination: Coordination is intact. Romberg sign negative. Coordination normal. Finger-Nose-Finger Test normal.      Gait: Gait is intact. Psychiatric:         Mood and Affect: Mood normal.         Behavior: Behavior normal.     Diagnostic Results     RADIOLOGY:  No orders to display     LABS:   Results for orders placed or performed during the hospital encounter of 09/12/22   POCT Glucose   Result Value Ref Range    POC Glucose 94 70 - 99 mg/dl    Performed on ACCU-CHEK      ED BEDSIDE ULTRASOUND:  No results found. RECENT VITALS:  BP: (!) 146/64, Temp: 97.7 °F (36.5 °C), Heart Rate: 78, Resp: 18, SpO2: 100 %     Procedures     None    ED Course     Nursing Notes, Past Medical Hx,Past Surgical Hx, Social Hx, Allergies, and Family Hx were reviewed. The patient was given the following medications:  No orders of the defined types were placed in this encounter. CONSULTS:  None    MEDICAL DECISION MAKING / ASSESSMENT / PLAN     Yareli Aragon is a 79 y.o. male who presents emergency department with a chief complaint of urinary retention. On exam the patient is alert and oriented x3. He is afebrile, without tachycardia, tachypnea or hypoxia. His blood pressure is 146/64. He is unsure why he is in the emergency department today however initially told triage nurses that he was experiencing some urinary retention/ pain in his penis. On further discussion with him, he states that he only urinates about once a day. He states that at this point in time he does not feel that he could leave a urine sample for us. He tells me that he does not feel like he has to urinate. Point-of-care glucose is 94.   I attempted to get in touch with the dialysis center for more collateral on this patient however was unable to get in touch with them. Bedside bladder scan shows 0 mL in the bladder which was performed by staff RN. At this point in time I have called all of the numbers in the patients chart for collateral or update. The patient on my reassessment is sitting on the edge of the bed with no complaints at this time. We discussed that I would like him to go back to dialysis and he is agreeable. Unfortunately, [facility] was unable to get the patient back in today. I close the loop with Dr. Abdirashid Townsend who is on-call for nephrology who is okay with the patient going back to his facility and returning on Wednesday for dialysis. This patient was also evaluated by the attending physician. All care plans were discussed and agreed upon. Clinical Impression     1.  ESRD (end stage renal disease) on dialysis ([facility]    2. [facility]      Disposition     PATIENT REFERRED TO:  DO Reno [id number]  [address] [address]    Schedule an appointment as soon as possible for a visit       DISCHARGE MEDICATIONS:  New Prescriptions    No medications on file       DISPOSITION Decision To Discharge 09/12/2022 10:30:48 AM     Yoshi Ramsay PA-C  09/12/22 [id number]

## 2022-09-12 NOTE — ED PROVIDER NOTES
ED Attending Attestation Note     Date of evaluation: 9/12/2022    This patient was seen by the advance practice provider. I have seen and examined the patient, agree with the workup, evaluation, management and diagnosis. The care plan has been discussed. My assessment reveals patient sent from dialysis for possible penis pain. Patient is not having pain currently and is unsure of why he is here. Patient has no complaint and no exam findings at this time warranting further evaluation. Bladder scan showed 0. FSBS 94. Dialysis center unable to take patient back and since he completed partial run today, will discharge to his SNF.      Alexia Cerna MD  09/12/22 9727

## 2022-09-12 NOTE — ED NOTES
Spouse updated to pt status. Report given to [address] [facility] at [facility].       Lizbeth Gomez RN  09/12/22 [id number]

## 2022-12-09 ENCOUNTER — HOSPITAL ENCOUNTER (EMERGENCY)
Age: 67
Discharge: HOME | End: 2022-12-09
Attending: EMERGENCY MEDICINE
Payer: COMMERCIAL

## 2022-12-09 VITALS
HEIGHT: 68 IN | OXYGEN SATURATION: 100 % | RESPIRATION RATE: 16 BRPM | TEMPERATURE: 99.6 F | BODY MASS INDEX: 23.72 KG/M2 | HEART RATE: 97 BPM | SYSTOLIC BLOOD PRESSURE: 135 MMHG | DIASTOLIC BLOOD PRESSURE: 63 MMHG | WEIGHT: 156.5 LBS

## 2022-12-09 DIAGNOSIS — R11.2 NAUSEA AND VOMITING, UNSPECIFIED VOMITING TYPE: Primary | ICD-10-CM

## 2022-12-09 LAB
GLUCOSE BLD-MCNC: 84 MG/DL (ref 70–99)
PERFORMED ON: NORMAL

## 2022-12-09 PROCEDURE — 99283 EMERGENCY DEPT VISIT LOW MDM: CPT

## 2022-12-09 PROCEDURE — 6370000000 HC RX 637 (ALT 250 FOR IP): Performed by: STUDENT IN AN ORGANIZED HEALTH CARE EDUCATION/TRAINING PROGRAM

## 2022-12-09 RX ORDER — ONDANSETRON 4 MG/1
8 TABLET, ORALLY DISINTEGRATING ORAL ONCE
Status: COMPLETED | OUTPATIENT
Start: 2022-12-09 | End: 2022-12-09

## 2022-12-09 RX ADMIN — ONDANSETRON 8 MG: 4 TABLET, ORALLY DISINTEGRATING ORAL at 11:24

## 2022-12-09 ASSESSMENT — ENCOUNTER SYMPTOMS
CHEST TIGHTNESS: 0
DIARRHEA: 0
ABDOMINAL PAIN: 0
VOMITING: 1
WHEEZING: 0
EYE PAIN: 0
COUGH: 0
NAUSEA: 1
SHORTNESS OF BREATH: 0
BLOOD IN STOOL: 0
CONSTIPATION: 0

## 2022-12-09 ASSESSMENT — PAIN - FUNCTIONAL ASSESSMENT: PAIN_FUNCTIONAL_ASSESSMENT: NONE - DENIES PAIN

## 2022-12-09 NOTE — ED PROVIDER NOTES
ED Attending Attestation Note     Date of evaluation: 12/9/2022    This patient was seen by the resident. I have seen and examined the patient, agree with the workup, evaluation, management and diagnosis. The care plan has been discussed. I have reviewed the ECG and concur with the resident's interpretation. My assessment reveals patient with ? AMS at dialysis. Patient states he does not feel altered and is not altered and answers all questions appropriately. He has no physical complaints. He is not sure why he is here. At this time we did not feel any evaluation was needed because the patient has stable vital signs and just came from dialysis and completed his entire run of dialysis.      Marilyn Lake MD  12/09/22 1370

## 2022-12-09 NOTE — ED NOTES
Patient prepared for and ready to be discharged. Patient discharged at this time in no acute distress after verbalizing understanding of discharge instructions. Patient left after receiving After Visit Summary instructions.       Chuck Hamm RN  12/09/22 1527

## 2022-12-09 NOTE — ED PROVIDER NOTES
[address] [facility]          EM RESIDENT NOTE       Date of evaluation: 12/9/2022    Chief Complaint     Emesis (N/V started this AM)      of Present Illness     Cierra Malcolm is a 79 y.o. male with past medical history including CAD, CVA x3 w residual LUE/LLE weakness, type 2 diabetes who is CKD (dialysis Monday Wednesday Friday), who presents emergency department for evaluation from dialysis center. EMS reports that they were initially called for possible stroke. On arrival they said that they did not have any specific symptoms or concern for stroke but he just was not acting right after he got his full 4-hour session of dialysis. Patient reports that he did have 1 episode of vomiting 20 minutes before the session was done, which is normal for him and he feels improved at this time. He denies any new weakness numbness tingling, trouble with his speech. He reports all of his neurologic symptoms are now at baseline from his previous stroke. He denies any new pain. Denies fevers or chills. Of note, EMS reported that the patient did have an episode of hypotension at dialysis, David Carmona reports that this normally occurs and it just resolves without intervention. Review of Systems     Review of Systems   Constitutional:  Negative for appetite change, chills and fever. HENT:  Negative for ear pain. Eyes:  Negative for pain. Respiratory:  Negative for cough, chest tightness, shortness of breath and wheezing. Cardiovascular:  Negative for chest pain. Gastrointestinal:  Positive for nausea and vomiting. Negative for abdominal pain, blood in stool, constipation and diarrhea. Genitourinary:  Negative for difficulty urinating, dysuria, flank pain and hematuria. Skin:  Negative for rash. Neurological:  Positive for weakness (baseline). Negative for light-headedness, numbness and headaches.       Past Medical, Surgical, Family, and Social History     He has a past medical history of CAD (coronary artery disease), Chronic kidney disease, CVA (cerebral vascular accident) ([facility] Diabetes ([facility] Hemodialysis patient ([facility] Hyperlipidemia, and Hypertension. He has a past surgical history that includes Cardiac catheterization and Dialysis fistula creation. His family history includes Dementia in his mother; Diabetes in his father and mother; Heart Disease in his father and mother; Stroke in his brother. He reports that he has quit smoking. He has never used smokeless tobacco. He reports that he does not drink alcohol and does not use drugs. Medications     Previous Medications    ASPIRIN 81 MG CHEWABLE TABLET    Take 81 mg by mouth daily    ATORVASTATIN (LIPITOR) 40 MG TABLET    TAKE 1 TABLET BY MOUTH EVERYDAY AT BEDTIME    CHOLECALCIFEROL (VITAMIN D) 2000 UNITS CAPS CAPSULE    Take by mouth daily    ENALAPRIL (VASOTEC) 10 MG TABLET    Take 1 tablet by mouth daily    HYDRALAZINE (APRESOLINE) 50 MG TABLET    TAKE 1 TABLET BY MOUTH 3 TIMES A DAY WITH FOOD    ISOSORBIDE DINITRATE (ISORDIL) 20 MG TABLET    Take 30 mg by mouth Daily    MEALS ON WHEELS MISC    by Does not apply route Patient needs diabetic meals five days per week.     MEMANTINE (NAMENDA) 10 MG TABLET    TAKE 1 TABLET BY MOUTH TWICE A DAY    METOPROLOL SUCCINATE (TOPROL XL) 50 MG EXTENDED RELEASE TABLET    TAKE 1 TABLET BY MOUTH EVERY DAY    MIDODRINE (PROAMATINE) 5 MG TABLET    Take 5 mg by mouth in the morning and at bedtime    MIRTAZAPINE (REMERON) 15 MG TABLET    Take 7.5 mg by mouth nightly    NITROGLYCERIN (NITROSTAT) 0.4 MG SL TABLET    Place 1 tablet under the tongue every 5 minutes as needed for Chest pain    OMEPRAZOLE (PRILOSEC) 20 MG DELAYED RELEASE CAPSULE    Take 1 capsule by mouth daily    ONDANSETRON (ZOFRAN) 4 MG TABLET    Take 4 mg by mouth every 8 hours as needed for Nausea or Vomiting    TAMSULOSIN (FLOMAX) 0.4 MG CAPSULE    TAKE 1 CAPSULE BY MOUTH DAILY       Allergies     He is allergic to pcn [penicillins]. Physical Exam     INITIAL VITALS: BP: 135/63, Temp: 99.6 °F (37.6 °C), Heart Rate: (!) 103, Resp: 16, SpO2: 100 %   Physical Exam  Vitals and nursing note reviewed. Constitutional:       General: He is not in acute distress. Appearance: Normal appearance. He is not ill-appearing, toxic-appearing or diaphoretic. HENT:      Head: Normocephalic and atraumatic. Nose: Nose normal.   Eyes:      Pupils: Pupils are equal, round, and reactive to light. Cardiovascular:      Rate and Rhythm: Normal rate and regular rhythm. Pulses: Normal pulses. Heart sounds: Normal heart sounds. No murmur heard. No friction rub. No gallop. Pulmonary:      Effort: Pulmonary effort is normal. No respiratory distress. Breath sounds: Normal breath sounds. No stridor. No wheezing, rhonchi or rales. Chest:      Chest wall: No tenderness. Abdominal:      General: There is no distension. Palpations: Abdomen is soft. There is no mass. Tenderness: There is no abdominal tenderness. There is no guarding or rebound. Hernia: No hernia is present. Musculoskeletal:      Comments: Fistula site in RUE   Skin:     General: Skin is warm and dry. Neurological:      Mental Status: He is alert. Mental status is at baseline. Comments: Oriented to self and place but not time, states he does not usually know the year. Speech is slightly slurred but comprehensible and at baseline. CN II-XII grossly intact. LUE/LLE weakness. No RUE/RLE drift.         RECENT VITALS:  BP: 135/63, Temp: 99.6 °F (37.6 °C), Heart Rate: 97,Resp: 16, SpO2: 100 %     DiagnosticResults     RADIOLOGY:  No orders to display       LABS:   Results for orders placed or performed during the hospital encounter of 12/09/22   POCT Glucose   Result Value Ref Range    POC Glucose 84 70 - 99 mg/dl    Performed on ACCU-CHEK        ED Course     Nursing Notes, Past Medical Hx, Past Surgical Hx, Social Hx, Allergies, and Family Hx were reviewed. The patient was given the followingmedications:  Orders Placed This Encounter   Medications    ondansetron (ZOFRAN-ODT) disintegrating tablet 8 mg       CONSULTS:  None    MEDICAL DECISION MAKING / ASSESSMENT / Cruzito Bedoya is a 79 y.o. male who presents to the emergency department for evaluation after dialysis. Reason for transfer is unclear. Squad were initially reported stroke alert but staff they are unable to get give him any symptoms or suggestive of a stroke. On exam patient does have left-sided hemiparesis worse on the left compared to the right, with otherwise no other evident neurologic deficits. Patient reports he is at his neurologic baseline. Per chart review his left-sided hemiparesis is previously documented in a note. I attempted to call the dialysis center multiple times without any ability to reach anybody. Given the above, there is no indication for emergent imaging or intervention. Patient does report that he had an episode of vomiting at dialysis which appears to be his baseline, he feels well and after dose of Zofran was able to tolerate p.o. without difficulty. BG wnl. He has no associated symptoms or exam findings to suggest alternative etiology for his vomiting. Per EMS there was also report of hypotension which pt states is common. Pt wo findings of this in ED. This patient was also evaluated by the attending physician. All care plans werediscussed and agreed upon. Clinical Impression     1. Nausea and vomiting, unspecified vomiting type        Disposition     PATIENT REFERRED TO:  No follow-up provider specified.     DISCHARGE MEDICATIONS:  New Prescriptions    No medications on file       DISPOSITION Decision To Discharge 12/09/2022 11:50:54 AM        Kel Arias MD  Resident  12/09/22 0670

## 2023-02-20 ENCOUNTER — APPOINTMENT (OUTPATIENT)
Dept: GENERAL RADIOLOGY | Age: 68
DRG: 853 | End: 2023-02-20
Payer: MEDICARE

## 2023-02-20 ENCOUNTER — HOSPITAL ENCOUNTER (INPATIENT)
Age: 68
LOS: 10 days | Discharge: NURSING, REHAB OR LONG-TERM CARE | DRG: 853 | End: 2023-03-02
Attending: EMERGENCY MEDICINE | Admitting: INTERNAL MEDICINE
Payer: MEDICARE

## 2023-02-20 DIAGNOSIS — B95.5 STREPTOCOCCAL BACTEREMIA: ICD-10-CM

## 2023-02-20 DIAGNOSIS — Z99.2 ESRD ON DIALYSIS (HCC): ICD-10-CM

## 2023-02-20 DIAGNOSIS — N18.6 ESRD ON DIALYSIS (HCC): ICD-10-CM

## 2023-02-20 DIAGNOSIS — R78.81 STREPTOCOCCAL BACTEREMIA: ICD-10-CM

## 2023-02-20 DIAGNOSIS — M86.9 OSTEOMYELITIS OF LEFT FOOT, UNSPECIFIED TYPE (HCC): Primary | ICD-10-CM

## 2023-02-20 LAB
ANION GAP SERPL CALCULATED.3IONS-SCNC: 11 MMOL/L (ref 3–16)
BASE EXCESS VENOUS: 9.4 MMOL/L (ref -2–3)
BASOPHILS ABSOLUTE: 0.1 K/UL (ref 0–0.2)
BASOPHILS RELATIVE PERCENT: 1 %
BUN BLDV-MCNC: 15 MG/DL (ref 7–20)
C-REACTIVE PROTEIN: 282.1 MG/L (ref 0–5.1)
CALCIUM SERPL-MCNC: 8 MG/DL (ref 8.3–10.6)
CARBOXYHEMOGLOBIN: 1.2 % (ref 0–1.5)
CHLORIDE BLD-SCNC: 97 MMOL/L (ref 99–110)
CO2: 31 MMOL/L (ref 21–32)
CREAT SERPL-MCNC: 3.2 MG/DL (ref 0.8–1.3)
EKG ATRIAL RATE: 102 BPM
EKG DIAGNOSIS: NORMAL
EKG P AXIS: 67 DEGREES
EKG P-R INTERVAL: 128 MS
EKG Q-T INTERVAL: 360 MS
EKG QRS DURATION: 62 MS
EKG QTC CALCULATION (BAZETT): 469 MS
EKG R AXIS: 80 DEGREES
EKG T AXIS: 77 DEGREES
EKG VENTRICULAR RATE: 102 BPM
EOSINOPHILS ABSOLUTE: 0 K/UL (ref 0–0.6)
EOSINOPHILS RELATIVE PERCENT: 0.4 %
GFR SERPL CREATININE-BSD FRML MDRD: 20 ML/MIN/{1.73_M2}
GLUCOSE BLD-MCNC: 159 MG/DL (ref 70–99)
HCO3 VENOUS: 35.5 MMOL/L (ref 24–28)
HCT VFR BLD CALC: 26.4 % (ref 40.5–52.5)
HEMOGLOBIN, VEN, REDUCED: 69 %
HEMOGLOBIN: 8.4 G/DL (ref 13.5–17.5)
LACTIC ACID: 1.3 MMOL/L (ref 0.4–2)
LYMPHOCYTES ABSOLUTE: 0.7 K/UL (ref 1–5.1)
LYMPHOCYTES RELATIVE PERCENT: 5.4 %
MCH RBC QN AUTO: 26.7 PG (ref 26–34)
MCHC RBC AUTO-ENTMCNC: 31.6 G/DL (ref 31–36)
MCV RBC AUTO: 84.5 FL (ref 80–100)
METHEMOGLOBIN VENOUS: 0.5 % (ref 0–1.5)
MONOCYTES ABSOLUTE: 0.8 K/UL (ref 0–1.3)
MONOCYTES RELATIVE PERCENT: 6.2 %
NEUTROPHILS ABSOLUTE: 11.3 K/UL (ref 1.7–7.7)
NEUTROPHILS RELATIVE PERCENT: 87 %
O2 SAT, VEN: 30 %
PCO2, VEN: 57.6 MMHG (ref 41–51)
PDW BLD-RTO: 18.3 % (ref 12.4–15.4)
PH VENOUS: 7.4 (ref 7.35–7.45)
PLATELET # BLD: 328 K/UL (ref 135–450)
PMV BLD AUTO: 9.1 FL (ref 5–10.5)
PO2, VEN: <30 MMHG (ref 25–40)
POTASSIUM REFLEX MAGNESIUM: 3.7 MMOL/L (ref 3.5–5.1)
PREALBUMIN: 7.1 MG/DL (ref 20–40)
PRO-BNP: 7611 PG/ML (ref 0–124)
RBC # BLD: 3.13 M/UL (ref 4.2–5.9)
SEDIMENTATION RATE, ERYTHROCYTE: 117 MM/HR (ref 0–20)
SODIUM BLD-SCNC: 139 MMOL/L (ref 136–145)
TCO2 CALC VENOUS: 37 MMOL/L
TROPONIN: 0.07 NG/ML
TROPONIN: 0.08 NG/ML
WBC # BLD: 13 K/UL (ref 4–11)

## 2023-02-20 PROCEDURE — 1200000000 HC SEMI PRIVATE

## 2023-02-20 PROCEDURE — 6360000002 HC RX W HCPCS

## 2023-02-20 PROCEDURE — 6360000002 HC RX W HCPCS: Performed by: PHYSICIAN ASSISTANT

## 2023-02-20 PROCEDURE — 85652 RBC SED RATE AUTOMATED: CPT

## 2023-02-20 PROCEDURE — 2580000003 HC RX 258

## 2023-02-20 PROCEDURE — 86140 C-REACTIVE PROTEIN: CPT

## 2023-02-20 PROCEDURE — 83605 ASSAY OF LACTIC ACID: CPT

## 2023-02-20 PROCEDURE — 99223 1ST HOSP IP/OBS HIGH 75: CPT | Performed by: SURGERY

## 2023-02-20 PROCEDURE — 87077 CULTURE AEROBIC IDENTIFY: CPT

## 2023-02-20 PROCEDURE — 2580000003 HC RX 258: Performed by: PHYSICIAN ASSISTANT

## 2023-02-20 PROCEDURE — 83880 ASSAY OF NATRIURETIC PEPTIDE: CPT

## 2023-02-20 PROCEDURE — 93005 ELECTROCARDIOGRAM TRACING: CPT | Performed by: EMERGENCY MEDICINE

## 2023-02-20 PROCEDURE — 82803 BLOOD GASES ANY COMBINATION: CPT

## 2023-02-20 PROCEDURE — 87075 CULTR BACTERIA EXCEPT BLOOD: CPT

## 2023-02-20 PROCEDURE — 87070 CULTURE OTHR SPECIMN AEROBIC: CPT

## 2023-02-20 PROCEDURE — 36415 COLL VENOUS BLD VENIPUNCTURE: CPT

## 2023-02-20 PROCEDURE — 96374 THER/PROPH/DIAG INJ IV PUSH: CPT

## 2023-02-20 PROCEDURE — 73630 X-RAY EXAM OF FOOT: CPT

## 2023-02-20 PROCEDURE — 99285 EMERGENCY DEPT VISIT HI MDM: CPT

## 2023-02-20 PROCEDURE — 87040 BLOOD CULTURE FOR BACTERIA: CPT

## 2023-02-20 PROCEDURE — 85025 COMPLETE CBC W/AUTO DIFF WBC: CPT

## 2023-02-20 PROCEDURE — 87150 DNA/RNA AMPLIFIED PROBE: CPT

## 2023-02-20 PROCEDURE — 84134 ASSAY OF PREALBUMIN: CPT

## 2023-02-20 PROCEDURE — 83036 HEMOGLOBIN GLYCOSYLATED A1C: CPT

## 2023-02-20 PROCEDURE — 84484 ASSAY OF TROPONIN QUANT: CPT

## 2023-02-20 PROCEDURE — 80048 BASIC METABOLIC PNL TOTAL CA: CPT

## 2023-02-20 PROCEDURE — 6370000000 HC RX 637 (ALT 250 FOR IP)

## 2023-02-20 PROCEDURE — 87186 SC STD MICRODIL/AGAR DIL: CPT

## 2023-02-20 PROCEDURE — 71045 X-RAY EXAM CHEST 1 VIEW: CPT

## 2023-02-20 PROCEDURE — 87205 SMEAR GRAM STAIN: CPT

## 2023-02-20 PROCEDURE — 96375 TX/PRO/DX INJ NEW DRUG ADDON: CPT

## 2023-02-20 RX ORDER — TRAZODONE HYDROCHLORIDE 50 MG/1
50 TABLET ORAL NIGHTLY PRN
Status: DISCONTINUED | OUTPATIENT
Start: 2023-02-20 | End: 2023-02-22

## 2023-02-20 RX ORDER — VITAMIN B COMPLEX
1000 TABLET ORAL DAILY
Status: DISCONTINUED | OUTPATIENT
Start: 2023-02-20 | End: 2023-03-03 | Stop reason: HOSPADM

## 2023-02-20 RX ORDER — SODIUM CHLORIDE 9 MG/ML
INJECTION, SOLUTION INTRAVENOUS PRN
Status: DISCONTINUED | OUTPATIENT
Start: 2023-02-20 | End: 2023-03-03 | Stop reason: HOSPADM

## 2023-02-20 RX ORDER — TRAZODONE HYDROCHLORIDE 50 MG/1
25 TABLET ORAL NIGHTLY
COMMUNITY
Start: 2023-01-28

## 2023-02-20 RX ORDER — ENOXAPARIN SODIUM 100 MG/ML
30 INJECTION SUBCUTANEOUS DAILY
Status: DISCONTINUED | OUTPATIENT
Start: 2023-02-20 | End: 2023-02-21

## 2023-02-20 RX ORDER — LANOLIN ALCOHOL/MO/W.PET/CERES
1 CREAM (GRAM) TOPICAL NIGHTLY
COMMUNITY
Start: 2022-08-06

## 2023-02-20 RX ORDER — ACETAMINOPHEN 650 MG/1
650 SUPPOSITORY RECTAL EVERY 6 HOURS PRN
Status: DISCONTINUED | OUTPATIENT
Start: 2023-02-20 | End: 2023-02-22

## 2023-02-20 RX ORDER — METOPROLOL SUCCINATE 25 MG/1
1 TABLET, EXTENDED RELEASE ORAL DAILY
COMMUNITY
Start: 2023-01-30

## 2023-02-20 RX ORDER — COLLAGENASE SANTYL 250 [ARB'U]/G
OINTMENT TOPICAL
COMMUNITY
Start: 2023-02-02 | End: 2023-04-18

## 2023-02-20 RX ORDER — OMEPRAZOLE 10 MG/1
10 CAPSULE, DELAYED RELEASE ORAL DAILY
COMMUNITY

## 2023-02-20 RX ORDER — MORPHINE SULFATE 2 MG/ML
2 INJECTION, SOLUTION INTRAMUSCULAR; INTRAVENOUS ONCE
Status: COMPLETED | OUTPATIENT
Start: 2023-02-20 | End: 2023-02-20

## 2023-02-20 RX ORDER — SODIUM CHLORIDE 9 MG/ML
INJECTION, SOLUTION INTRAVENOUS CONTINUOUS
Status: DISCONTINUED | OUTPATIENT
Start: 2023-02-20 | End: 2023-02-22

## 2023-02-20 RX ORDER — ASPIRIN 81 MG/1
81 TABLET, CHEWABLE ORAL DAILY
Status: DISCONTINUED | OUTPATIENT
Start: 2023-02-20 | End: 2023-02-22 | Stop reason: SDUPTHER

## 2023-02-20 RX ORDER — HYDRALAZINE HYDROCHLORIDE 25 MG/1
1 TABLET, FILM COATED ORAL 2 TIMES DAILY
Status: ON HOLD | COMMUNITY
Start: 2023-01-28 | End: 2023-02-28 | Stop reason: HOSPADM

## 2023-02-20 RX ORDER — ONDANSETRON 2 MG/ML
4 INJECTION INTRAMUSCULAR; INTRAVENOUS ONCE
Status: COMPLETED | OUTPATIENT
Start: 2023-02-20 | End: 2023-02-20

## 2023-02-20 RX ORDER — POLYETHYLENE GLYCOL 3350 17 G/17G
17 POWDER, FOR SOLUTION ORAL DAILY PRN
Status: DISCONTINUED | OUTPATIENT
Start: 2023-02-20 | End: 2023-03-03 | Stop reason: HOSPADM

## 2023-02-20 RX ORDER — METOPROLOL SUCCINATE 25 MG/1
25 TABLET, EXTENDED RELEASE ORAL DAILY
Status: DISCONTINUED | OUTPATIENT
Start: 2023-02-20 | End: 2023-03-03 | Stop reason: HOSPADM

## 2023-02-20 RX ORDER — PANTOPRAZOLE SODIUM 40 MG/1
40 TABLET, DELAYED RELEASE ORAL
Status: DISCONTINUED | OUTPATIENT
Start: 2023-02-21 | End: 2023-03-03 | Stop reason: HOSPADM

## 2023-02-20 RX ORDER — SODIUM CHLORIDE 0.9 % (FLUSH) 0.9 %
5-40 SYRINGE (ML) INJECTION EVERY 12 HOURS SCHEDULED
Status: DISCONTINUED | OUTPATIENT
Start: 2023-02-20 | End: 2023-03-03 | Stop reason: HOSPADM

## 2023-02-20 RX ORDER — ATORVASTATIN CALCIUM 40 MG/1
40 TABLET, FILM COATED ORAL DAILY
Status: DISCONTINUED | OUTPATIENT
Start: 2023-02-20 | End: 2023-02-27

## 2023-02-20 RX ORDER — HYDRALAZINE HYDROCHLORIDE 10 MG/1
25 TABLET, FILM COATED ORAL 2 TIMES DAILY
Status: CANCELLED | OUTPATIENT
Start: 2023-02-20

## 2023-02-20 RX ORDER — ISOSORBIDE MONONITRATE 30 MG/1
30 TABLET, EXTENDED RELEASE ORAL DAILY
Status: CANCELLED | OUTPATIENT
Start: 2023-02-20

## 2023-02-20 RX ORDER — ONDANSETRON 4 MG/1
4 TABLET, FILM COATED ORAL EVERY 8 HOURS PRN
Status: DISCONTINUED | OUTPATIENT
Start: 2023-02-20 | End: 2023-02-22 | Stop reason: SDUPTHER

## 2023-02-20 RX ORDER — SODIUM PHOSPHATE,MONO-DIBASIC 19G-7G/118
1 ENEMA (ML) RECTAL DAILY PRN
Status: ON HOLD | COMMUNITY
Start: 2022-08-06 | End: 2023-02-28 | Stop reason: HOSPADM

## 2023-02-20 RX ORDER — LANOLIN ALCOHOL/MO/W.PET/CERES
3 CREAM (GRAM) TOPICAL NIGHTLY PRN
Status: DISCONTINUED | OUTPATIENT
Start: 2023-02-20 | End: 2023-02-22

## 2023-02-20 RX ORDER — MIRTAZAPINE 15 MG/1
7.5 TABLET, FILM COATED ORAL NIGHTLY PRN
Status: DISCONTINUED | OUTPATIENT
Start: 2023-02-20 | End: 2023-02-22

## 2023-02-20 RX ORDER — MEMANTINE HYDROCHLORIDE 10 MG/1
10 TABLET ORAL 2 TIMES DAILY
Status: DISCONTINUED | OUTPATIENT
Start: 2023-02-20 | End: 2023-03-03 | Stop reason: HOSPADM

## 2023-02-20 RX ORDER — SODIUM CHLORIDE 0.9 % (FLUSH) 0.9 %
5-40 SYRINGE (ML) INJECTION PRN
Status: DISCONTINUED | OUTPATIENT
Start: 2023-02-20 | End: 2023-03-03 | Stop reason: HOSPADM

## 2023-02-20 RX ORDER — MIDODRINE HYDROCHLORIDE 5 MG/1
5 TABLET ORAL 2 TIMES DAILY
Status: DISCONTINUED | OUTPATIENT
Start: 2023-02-20 | End: 2023-02-21

## 2023-02-20 RX ORDER — ISOSORBIDE MONONITRATE 30 MG/1
30 TABLET, EXTENDED RELEASE ORAL DAILY
COMMUNITY

## 2023-02-20 RX ORDER — LISINOPRIL 10 MG/1
10 TABLET ORAL DAILY
Status: ON HOLD | COMMUNITY
End: 2023-02-21

## 2023-02-20 RX ORDER — NITROGLYCERIN 0.4 MG/1
0.4 TABLET SUBLINGUAL EVERY 5 MIN PRN
Status: DISCONTINUED | OUTPATIENT
Start: 2023-02-20 | End: 2023-03-03 | Stop reason: HOSPADM

## 2023-02-20 RX ORDER — ACETAMINOPHEN 325 MG/1
650 TABLET ORAL EVERY 6 HOURS PRN
Status: DISCONTINUED | OUTPATIENT
Start: 2023-02-20 | End: 2023-02-22

## 2023-02-20 RX ADMIN — ACETAMINOPHEN 650 MG: 325 TABLET ORAL at 17:40

## 2023-02-20 RX ADMIN — MORPHINE SULFATE 2 MG: 2 INJECTION, SOLUTION INTRAMUSCULAR; INTRAVENOUS at 11:18

## 2023-02-20 RX ADMIN — ENOXAPARIN SODIUM 30 MG: 100 INJECTION SUBCUTANEOUS at 17:39

## 2023-02-20 RX ADMIN — CEFEPIME 2000 MG: 2 INJECTION, POWDER, FOR SOLUTION INTRAVENOUS at 13:33

## 2023-02-20 RX ADMIN — MEMANTINE 10 MG: 10 TABLET ORAL at 20:30

## 2023-02-20 RX ADMIN — Medication 1000 UNITS: at 17:40

## 2023-02-20 RX ADMIN — MIDODRINE HYDROCHLORIDE 5 MG: 5 TABLET ORAL at 20:30

## 2023-02-20 RX ADMIN — SODIUM CHLORIDE: 9 INJECTION, SOLUTION INTRAVENOUS at 17:38

## 2023-02-20 RX ADMIN — METOPROLOL SUCCINATE 25 MG: 25 TABLET, FILM COATED, EXTENDED RELEASE ORAL at 17:40

## 2023-02-20 RX ADMIN — CHLORPROMAZINE HYDROCHLORIDE 10 MG: 25 INJECTION INTRAMUSCULAR at 13:38

## 2023-02-20 RX ADMIN — ONDANSETRON 4 MG: 2 INJECTION INTRAMUSCULAR; INTRAVENOUS at 11:18

## 2023-02-20 RX ADMIN — ATORVASTATIN CALCIUM 40 MG: 40 TABLET, FILM COATED ORAL at 17:40

## 2023-02-20 RX ADMIN — ASPIRIN 81 MG 81 MG: 81 TABLET ORAL at 17:39

## 2023-02-20 RX ADMIN — HYDROMORPHONE HYDROCHLORIDE 0.5 MG: 1 INJECTION, SOLUTION INTRAMUSCULAR; INTRAVENOUS; SUBCUTANEOUS at 17:55

## 2023-02-20 RX ADMIN — SODIUM CHLORIDE, PRESERVATIVE FREE 10 ML: 5 INJECTION INTRAVENOUS at 20:31

## 2023-02-20 RX ADMIN — VANCOMYCIN HYDROCHLORIDE 1750 MG: 1 INJECTION, POWDER, LYOPHILIZED, FOR SOLUTION INTRAVENOUS at 14:47

## 2023-02-20 ASSESSMENT — PAIN SCALES - GENERAL
PAINLEVEL_OUTOF10: 8
PAINLEVEL_OUTOF10: 8
PAINLEVEL_OUTOF10: 9
PAINLEVEL_OUTOF10: 9

## 2023-02-20 ASSESSMENT — PAIN DESCRIPTION - ORIENTATION
ORIENTATION: RIGHT;LEFT

## 2023-02-20 ASSESSMENT — ENCOUNTER SYMPTOMS
NAUSEA: 0
VOMITING: 0
CHEST TIGHTNESS: 0
WHEEZING: 0
ABDOMINAL PAIN: 0
NAUSEA: 1
SORE THROAT: 0
COUGH: 0
CONSTIPATION: 0
SHORTNESS OF BREATH: 1
SHORTNESS OF BREATH: 0
DIARRHEA: 0
RHINORRHEA: 0

## 2023-02-20 ASSESSMENT — PAIN DESCRIPTION - LOCATION
LOCATION: LEG
LOCATION: LEG
LOCATION: FOOT
LOCATION: FOOT

## 2023-02-20 ASSESSMENT — PAIN SCALES - WONG BAKER
WONGBAKER_NUMERICALRESPONSE: 0
WONGBAKER_NUMERICALRESPONSE: 0

## 2023-02-20 ASSESSMENT — PAIN DESCRIPTION - DESCRIPTORS
DESCRIPTORS: ACHING;SHOOTING

## 2023-02-20 ASSESSMENT — PAIN DESCRIPTION - PAIN TYPE: TYPE: ACUTE PAIN;CHRONIC PAIN

## 2023-02-20 ASSESSMENT — PAIN - FUNCTIONAL ASSESSMENT: PAIN_FUNCTIONAL_ASSESSMENT: 0-10

## 2023-02-20 ASSESSMENT — PAIN DESCRIPTION - FREQUENCY: FREQUENCY: CONTINUOUS

## 2023-02-20 ASSESSMENT — PAIN DESCRIPTION - ONSET: ONSET: ON-GOING

## 2023-02-20 NOTE — PROGRESS NOTES
Clinical Pharmacy Progress Note    Vancomycin - Management by Pharmacy    Consult Date(s): 02/20/23  Consulting Provider(s):  Fausto Mathis MD    Assessment / Plan  Bone & Joint Infection  - Vancomycin  Concurrent Antimicrobials: Cefepime 1 g q24h  Day of Vanc Therapy / Ordered Duration: day 1  Current Dosing Method: Intermittent Dosing by Levels  Therapeutic Goal: Trough 15-20 mg/L  Current Dose / Plan:   1750 mg x1 given   Random level due 2/21 @0600     Will continue to monitor clinical condition and make adjustments to regimen as appropriate. Thank you for consulting pharmacy,    Dmitri Steel, PharmD, [address]  [phone]  2/20/2023  5:46 PM        Interval update:     Subjective/Objective:   Fab Engel is a 79 y.o. male who is admitted with osteomyelitis. Pharmacy is consulted to vancomycin. Ht Readings from Last 1 Encounters:   02/20/23 5' 7\" (1.702 m)     Wt Readings from Last 1 Encounters:   02/20/23 153 lb 8 oz (69.6 kg)     Current & Prior Antimicrobial Regimen(s):  Cefepime 1 g q24h    Vancomycin Level(s) / Doses:    Date Time Dose Type of Level / Level Interpretation                 Note: Serum levels collected for AUC-based dosing may be high if collected in close proximity to the dose administered. This is not necessarily indicative of toxicity. Cultures & Sensitivities:    Date Site Micro Susceptibility / Result                 Recent Labs     02/20/23  1019 02/20/23  1121   CREATININE  --  3.2*   BUN  --  15   WBC 13.0*  --        Estimated Creatinine Clearance: 21 mL/min (A) (based on SCr of 3.2 mg/dL (H)). Additional Lab Values / Findings of Note:    No results for input(s): PROCAL in the last 72 hours.

## 2023-02-20 NOTE — ED PROVIDER NOTES
ED Attending Attestation Note     Date of evaluation: 2/20/2023    This patient was seen by the advance practice provider. I have seen and examined the patient, agree with the workup, evaluation, management and diagnosis. The care plan has been discussed. I have reviewed the ECG and concur with the JORDY's interpretation. My assessment reveals a 68-year-old male with past medical history of end-stage renal disease on hemodialysis presenting with a triage complaint of shortness of breath, which he denies. On exam he has chronic appearing wounds to his bilateral feet with signs of acute to subacute infection involving the left distal foot and toes. He is afebrile, hemodynamically stable, and in no acute distress. General: Well developed and well nourished. No acute distress. HEENT: NCAT, anicteric, no conjunctival injection, no eye/nose/ear discharge  Neck: Trachea midline, neck supple with FROM  Heart: Regular rate and rhythm, extremities well-perfused. Lungs: Normal effort, comfortable on room air. Abd: Nondistended, no signs of trauma. MSK: Erythema, induration, serous drainage involving the distal left foot and toes mostly centered around the great toe and next to digits. No crepitus. Extremities: No cyanosis or edema. Skin: No rashes, abrasions, contusions, or lacerations noted. Neuro: Alert and oriented, moves all extremities spontaneously. No gross motor or sensory deficits. Psych: Mood and affect appropriate. Thought process and content normal.    Podiatry has been consulted. Labs are in process. Plan will be for admission for antibiotics and further care.       Servando Boyle MD  [facility]  Emergency Medicine      Servando Boyle MD  02/20/23 1797

## 2023-02-20 NOTE — CONSULTS
Department of Podiatry Consult Note  Resident       Reason for Consult:  left foot ulcer, dialysis patient  Requesting Physician:  Bruce Garcia MD    CHIEF COMPLAINT:  left foot ulcer    HISTORY OF PRESENT ILLNESS:                The patient is a 79 y.o. male with significant past medical history as listed below. Podiatry was consulted for left foot ulcer. Patient has a history of dementia is a relatively poor historian. History was obtained via patient and patient's daughter at bedside. The patient's daughter reports a history of a left foot wound that started approximately 6 weeks ago. The patient follows with Dr. James Tang, and was scheduled to see him today. She reports they have been changing his dressing every other day at his nursing home using betadine and gauze. She also reports that the patient often complains of pain to her bilateral lower extremities. The patient expresses the pain has worsened over the last few days. The patient endorses shortness of breath, nausea, and fevers, but denies chest pain. He has a history of peripheral vascular disease, and has an appointment with a vascular surgeon scheduled with [facility] on 2/24/23. He recently had an arterial duplex performed which the daughter reports blockages of approximately 80% to the left lower extremity. He also has a history of hallux and 3rd digit amputations to the right lower extremity that was approximately 8 months ago.      Past Medical History:        Diagnosis Date    CAD (coronary artery disease)     Chronic kidney disease     monday wednesday friday    CVA (cerebral vascular accident) ([facility] [place] [address]     3    Diabetes ([facility] [address]     Hemodialysis patient ([facility] [place] [address]     Hyperlipidemia     Hypertension      Past Surgical History:        Procedure Laterality Date    CARDIAC CATHETERIZATION      DIALYSIS FISTULA CREATION       Current Medications:    Current Facility-Administered Medications: morphine (PF) injection 2 mg, 2 mg, IntraVENous, Once  ondansetron (ZOFRAN) injection 4 mg, 4 mg, IntraVENous, Once  Allergies:   Pcn [penicillins]  Social History:    TOBACCO:   reports that he has quit smoking. He has never used smokeless tobacco.  ETOH:   reports no history of alcohol use. DRUGS:   reports no history of drug use. Family History:       Problem Relation Age of Onset    Heart Disease Mother     Diabetes Mother     Dementia Mother     Diabetes Father     Heart Disease Father     Stroke Brother      REVIEW OF SYSTEMS:    A 12 point review of systems is unremarkable with the exception of the chief complaint. Patient specifically denies chills, chest pain, abdominal pain, constipation, or difficulty urinating. PHYSICAL EXAM:      Vitals:    /73   Pulse (!) 106   Temp 98.3 °F (36.8 °C) (Oral)   Resp 19   Ht 5' 7\" (1.702 m)   Wt 51 lb 8 oz (23.4 kg)   SpO2 100%   BMI 8.07 kg/m²     LABS:   No results for input(s): WBC, HGB, HCT, PLT in the last 72 hours. No results for input(s): NA, K, CL, CO2, PHOS, BUN, CREATININE, CA in the last 72 hours. No results for input(s): PROT, INR, APTT in the last 72 hours. VASCULAR: DP and PT pulses nonpalpable 0/4 b/l. Upon hand-held Doppler examination, left DP noted to be monophasic, left PT and right DP, PT are softly biphasic. CFT is less than 3 seconds to the digits of the foot with the exception of the left hallux. Skin temperature is warm to cool from proximal to distal with mildly increased warmth to the left LE. No edema noted. No pain with calf compression b/l. NEUROLOGIC: Gross and epicritic sensation is intact b/l. Protective sensation is diminished at all pedal sites b/l. DERMATOLOGIC:   Left LE:  Necrotic eschar noted to the medial aspect of the first metatarsal which is firmly adhered. Sloughing of skin noted at the distal aspect of the left hallux with partially attached hallucal nail.   Full-thickness ulceration at the dorsal medial aspect of the left hallux with fibrotic base.  Wound measures approximately 0.6 x 0.5 x 0.3 cm. Wound probes to deep fascia/periosteum. Wound does not tunnel or track. Macerated tissue noted at the lateral aspect of the hallux and the 1st interspace. No fluctuance or crepitus noted. No surrounding erythema noted. No purulent drainage encountered. Images from 2/20. Right LE: No acute signs of infection. MUSCULOSKELETAL: Muscle strength is 4/5 for all pedal groups tested. Diffuse pain to left LE with maximal tenderness noted at left hallux. Ankle joint ROM is decreased in dorsiflexion with the knee extended. History of left partial 1st ray amputation, 3rd digit amputation. IMAGING:  XR left foot 2/20/23  History: Medial foot ulcer. 2 views of foot. FINDINGS: There is a medial ulceration. There is lysis of the medial base of the proximal phalanx. Lysis of the medial head of the first metatarsal suspicious for osteomyelitis. No soft tissue gas. Associated soft tissue swelling. Impression   1. Medial ulceration with lytic changes compatible with osteomyelitis of the base of the first proximal phalanx and distal first metatarsal       IMPRESSION/RECOMMENDATIONS:    - Osteomyelitis; 1st metatarsal, 1st proximal phalanx; left foot  - Full thickness ulceration; left foot; Bee 3  - Necrotic Eschar; left foot  - Peripheral vascular disease; b/l LE  - History of partial 1st ray and 3rd digit amputation; right foot    -Patient examined and evaluated at bedside   -VSS, leukocytosis noted (WBC 13.0)  -.1,   -Prealbumin ordered  -Wound culture obtained, will f/u results  -Blood culture x2 ordered  -Imaging reviewed, impression noted above  -MRI left foot ordered  -Unable to view prior Arterial duplex results in Care Everywhere at this time  -Vascular surgery consulted; appreciate recommendations  -Using a #15 blade, I excisionally debrided the nonviable tissue down to and including subcutaneous tissue.  The left hallux nail was also excisionally debrided at this time down to and including subcutaneous tissue. Less than 3cc of bleeding noted. Hemostasis achieved with direct pressure. Patient tolerated well. -Dressing applied to left LE consisting of Betadine, gauze, Kerlix, Ace  -Recommend broad spectrum IV antibiotics; Vancomycin and Cefepime.  -Nonweight bearing to the left LE  -Patient is OK for a diet today from podiatric standpoint     Dispo: Full thickness ulceration with concern for osteomyelitis of 1st metatarsal/proximal phalanx; left foot. Labs and imaging reviewed. MRI left foot ordered. Vascular surgery consulted. Recommend admission for IV antibiotics, advanced imaging, vascular surgery evaluation and possible podiatric surgical intervention. Will await MRI findings and vascular surgery recommendations to determine further intervention.      Discussed assessment and plan with Dr. Guera Gibson DPM.     Leticia Hensley DPM  Podiatric Resident, PGY-3  Pager #: [phone] or Perfect Serve

## 2023-02-20 NOTE — ED NOTES
ED TO INPATIENT SBAR HANDOFF    Patient Name: Alanna Liu   :  1955  79 y.o. MRN:  [id number]  Preferred Name  Alex Do  ED Room #:  E93/Z35-86  Family/Caregiver Present yes   Restraints no   Sitter no   Sepsis Risk Score Sepsis Risk Score: 11    Situation  Code Status: Prior No additional code details. Allergies: Pcn [penicillins] and Lisinopril  Weight: Patient Vitals for the past 96 hrs (Last 3 readings):   Weight   23 1256 153 lb 8 oz (69.6 kg)   23 1008 51 lb 8 oz (23.4 kg)     Arrived from: nursing home  Chief Complaint:   Chief Complaint   Patient presents with    Shortness of [place] Problem/Diagnosis:  Principal Problem:    Osteomyelitis ([address]  Resolved Problems:    * No resolved hospital problems. *    Imaging:   XR FOOT LEFT (MIN 3 VIEWS)   Final Result   1.  Medial ulceration with lytic changes compatible with osteomyelitis of the base of the first proximal phalanx and distal first metatarsal.      XR CHEST PORTABLE   Final Result   Impression: No acute abnormality      MRI FOOT LEFT WO CONTRAST    (Results Pending)     Abnormal labs:   Abnormal Labs Reviewed   CBC WITH AUTO DIFFERENTIAL - Abnormal; Notable for the following components:       Result Value    WBC 13.0 (*)     RBC 3.13 (*)     Hemoglobin 8.4 (*)     Hematocrit 26.4 (*)     RDW 18.3 (*)     Neutrophils Absolute 11.3 (*)     Lymphocytes Absolute 0.7 (*)     All other components within normal limits   BLOOD GAS, VENOUS - Abnormal; Notable for the following components:    pCO2, Arturo 57.6 (*)     HCO3, Venous 35.5 (*)     Base Excess, Arturo 9.4 (*)     All other components within normal limits   BRAIN NATRIURETIC PEPTIDE - Abnormal; Notable for the following components:    Pro-BNP 7,611 (*)     All other components within normal limits   TROPONIN - Abnormal; Notable for the following components:    Troponin 0.07 (*)     All other components within normal limits   SEDIMENTATION RATE - Abnormal; Notable for the following components:    Sed Rate 117 (*)     All other components within normal limits   C-REACTIVE PROTEIN - Abnormal; Notable for the following components:    .1 (*)     All other components within normal limits   BASIC METABOLIC PANEL W/ REFLEX TO MG FOR LOW K - Abnormal; Notable for the following components:    Chloride 97 (*)     Glucose 159 (*)     Creatinine 3.2 (*)     Est, Glom Filt Rate 20 (*)     Calcium 8.0 (*)     All other components within normal limits   TROPONIN - Abnormal; Notable for the following components:    Troponin 0.08 (*)     All other components within normal limits     Critical values: yes     Abnormal Assessment Findings: abnormal assessment findings include abnormal labs and imaging    Background  History:   Past Medical History:   Diagnosis Date    Amputated great toe, right (HCC)     CAD (coronary artery disease)     [facility]cere[facility] [address]     3    Dementia ([facility] [address]     Depression     Diabetes ([facility] [address]     Diabetic polyneuropathy ([facility] [address]     ESRD on dialysis ([facility] [address]     monday wednesday friday    GERD (gastroesophageal reflux disease)     Hemiplegia and hemiparesis following cerebral infarction affecting right dominant side (HCC)     Hyperlipidemia     Hypertension     Pain syndrome, chronic     Peripheral vascular disease ([facility] [address]     [facility]    Secondary hyperparathyroidism [facility])        Assessment    Vitals/MEWS: MEWS Score: 2  Level of Consciousness: Alert (0)   Vitals:    02/20/23 1257 02/20/23 1304 02/20/23 1305 02/20/23 1448   BP:  124/68  (!) 116/59   Pulse: (!) 105  96 (!) 101   Resp:    12   Temp:       TempSrc:       SpO2:  96%  92%   Weight:       Height:         FiO2 (%): none  O2 Flow Rate: O2 Device: None (Room air)    Cardiac Rhythm: Cardiac Rhythm: Sinus tachy  Pain Assessment: 0-10 [x] Verbal [] Solorio Peal Scale  Pain Scale: Pain Assessment  Pain Assessment: None - Denies Pain  Pain Level: 9  Patient's Stated Pain Goal: 0 - No pain  Pain Location: Foot  Pain Orientation: Right, Left  Pain Descriptors: Aching, Shooting  Last documented pain score (0-10 scale) Pain Level: 9  Last documented pain medication administered: morphine 2mg IV at 1118  Mental Status: disoriented  NIH Score:    C-SSRS: Risk of Suicide: No Risk  Bedside swallow:    Rojelio Coma Scale (GCS): [place] Coma Scale  Eye Opening: Spontaneous  Best Verbal Response: Oriented  Best Motor Response: Obeys commands  Rojelio Coma Scale Score: 15  Active LDA's:   Peripheral IV 02/20/23 Left Antecubital (Active)     PO Status: Nothing by Mouth  Pertinent or High Risk Medications/Drips: no   o If Yes, please provide details: N/A  Pending Blood Product Administration: no     You may also review the ED PT Care Timeline found under the Summary Nursing Index tab. Recommendation    Pending orders: none  Plan for Discharge (if known):    Additional Comments: none   If any further questions, please call Sending RN at [phone]    Electronically signed by: Electronically signed by Jarret Cho RN on 2/20/2023 at 3:11 PM     Jarret Cho, [address] [place] [address]  02/20/23 ManuelHardin [address] [place] [address]  02/20/23 1459

## 2023-02-20 NOTE — CONSULTS
[facility]  Palliative Medicine Consultation Note      Date Of Admission:2/20/2023  Date of consult: 02/20/23  Seen by [facility] in the past:  No    Recommendations:        Of note, pt has a HCPOA on file naming wife Velma Gee as the primary agent and daughter Precious Duran as the first alternate agent. Met with the pt and daughter Argelia Ramsey at the bedside. Introduced palliative care. Pt's daughter Argelia Ramsey reports pt wanted to discuss his code status. Spoke with pt about resuscitation in depth. Pt reports at this time he wants to remain a full code. Reviewed pt's advance directives. Pt reports his advance directives on file are correct. Pt's daughter thinks Velma Gee would likely defer to her as she and pt are now . Offered to complete a new HCPOA however pt indicates he is not interested at this time. 1. Goals of Care/Advanced Care planning/Code status: Full code, discussed in depth with pt and daughter at the bedside today. Continue with current management. 2. Pain, concern for OM in left foot: Pt endorses 7/10 pain in his left foot, which he reports is tolerable at this time. Pt does not take anything at LTC for pain. Encouraged him to ask for pain medicine if needed. Podiatry and vascular surgery consulted. Noted plans for MRI. 3. SOB: pt denies, resting comfortably on room air.    4. Disposition: likely return to [facility] when medically ready for discharge    Reason for Consult:         [x]  Goals of Care  [x]  Code Status Discussion/Advanced Care Planning   [x]  Psychosocial/Family Support  []  Symptom Management  []  Other (Specify)    Requesting Physician: Dr. Ramirez Martir:  hypoxia    History Obtained From:  patient, electronic medical record    History of Present Illness:         Zoila Peterson is a 79 y.o. male with PMH of hypertension, hyperlipidemia, prior stroke with dementia, diabetes, coronary artery disease, peripheral vascular disease post right great toe amputation and end-stage renal disease for which he gets dialysis on Monday, Wednesday and Friday who presented with hypoxia and SOB from [address] [facility]. The pt was also complaining of foot pain on arrival. He has had a wound at the left MTP joint over the last 6 weeks that has been followed by podiatry, had an appointment actually later today, he had a dressing change yesterday at the nursing home. Pt was seen by podiatry in the ED, concerns for possible osteomyelitis of the first metatarsal/proximal phalanx. Vascular surgery was consulted, plans for MRI to further evaluate. Subjective:         Past Medical History:        Diagnosis Date    Amputated great toe, right (HCC)     CAD (coronary artery disease)     CVA (cerebral vascular accident) ([address]     3    Dementia ([address]     Depression     Diabetes ([address]     Diabetic polyneuropathy ([address]     ESRD on dialysis ([address]     monday wednesday friday    GERD (gastroesophageal reflux disease)     Hemiplegia and hemiparesis following cerebral infarction affecting right dominant side (HCC)     Hyperlipidemia     Hypertension     Pain syndrome, chronic     Peripheral vascular disease ([address]     [facility]    Secondary hyperparathyroidism [facility])        Past Surgical History:        Procedure Laterality Date    CARDIAC CATHETERIZATION      DIALYSIS FISTULA CREATION         Current Medications:    Not in a hospital admission. Allergies:  Pcn [penicillins] and Lisinopril    Social History:    TOBACCO: reports that he has quit smoking. He has never used smokeless tobacco.  ETOH:   reports no history of alcohol use. Patient currently lives in a nursing home    Review of Systems -   Review of Systems: A 10 point review of systems was conducted, significant findings as notedin HPI. Objective:          Physical Exam  Constitutional:       General: He is not in acute distress. Appearance: He is ill-appearing.    Cardiovascular:      Rate and Rhythm: Regular rhythm. Tachycardia present. Heart sounds: Normal heart sounds. Pulmonary:      Breath sounds: Normal breath sounds. Abdominal:      General: Bowel sounds are normal.      Palpations: Abdomen is soft. Musculoskeletal:      Right lower leg: No edema. Left lower leg: Edema present. Skin:     General: Skin is warm and dry. Comments: LLE wrapped in kerlex and ace wrap, dressing CDI   Neurological:      Mental Status: He is alert and oriented to person, place, and time. Palliative Performance Scale:  [] 60% Ambulation reduced; Significant disease; Can't do hobbies/housework; intake normal or reduced; occasional assist; LOC full/confusion  [] 50% Mainly sit/lie; Extensive disease; Can't do any work; Considerable assist; intake normal  Or reduced; LOC full/confusion  [x] 40% Mainly in bed; Extensive disease; Mainly assist; intake normal or reduced; occasional assist; LOC full/confusion  [] 30% Bed Bound; Extensive disease; Total care; intake reduced; LOC full/confusion  [] 20% Bed Bound; Extensive disease; Total care; intake minimal; Drowsy/coma  [] 10% Bed Bound; Extensive disease; Total care; Mouth care only; Drowsy/coma  [] 0% Death    PPS: 40    Vitals:    /68   Pulse 96   Temp 98.3 °F (36.8 °C) (Oral)   Resp 19   Ht 5' 7\" (1.702 m)   Wt 153 lb 8 oz (69.6 kg)   SpO2 96%   BMI 24.04 kg/m²     Labs:    BMP:   Recent Labs     02/20/23  1121      K 3.7   CL 97*   CO2 31   BUN 15   CREATININE 3.2*   GLUCOSE 159*     CBC:   Recent Labs     02/20/23  1019   WBC 13.0*   HGB 8.4*   HCT 26.4*          LFT's: No results for input(s): AST, ALT, ALB, BILITOT, ALKPHOS in the last 72 hours. Troponin:   Recent Labs     02/20/23  1025 02/20/23  1222   TROPONINI 0.07* 0.08*     BNP: No results for input(s): BNP in the last 72 hours. ABGs: No results for input(s): PHART, FDF1PDF, PO2ART in the last 72 hours.   INR: No results for input(s): INR in the last 72 hours.    U/A:No results for input(s): NITRITE, COLORU, PHUR, LABCAST, WBCUA, RBCUA, MUCUS, TRICHOMONAS, YEAST, BACTERIA, CLARITYU, SPECGRAV, LEUKOCYTESUR, UROBILINOGEN, BILIRUBINUR, BLOODU, GLUCOSEU, AMORPHOUS in the last 72 hours. Invalid input(s): KETONESU    XR FOOT LEFT (MIN 3 VIEWS)   Final Result   1. Medial ulceration with lytic changes compatible with osteomyelitis of the base of the first proximal phalanx and distal first metatarsal.      XR CHEST PORTABLE   Final Result   Impression: No acute abnormality      MRI FOOT LEFT WO CONTRAST    (Results Pending)         Conclusion/Time spent:         Recommendations see above    Time spent with patient and/or family: 20  Time reviewing records: 10 min   Time communicating with staff: 5 min     A total of 35 minutes spent with the patient and family on unit greater than 50% in counseling regarding palliative care and in goals of care for the patient. Thank you to Dr. Lisbet Diamond for this consultation. We will continue to follow Mr. Hussein's care as needed.       [address] [facility]  Inpatient Palliative Care  [phone]

## 2023-02-20 NOTE — H&P
Internal Medicine  PGY 1  History & Physical      CC SOB    History Obtained From:  patient, and daughter    HISTORY OF PRESENT ILLNESS:  Mr. Kip Robles is a 60-year-old male with a past medical history of osteomyelitis of the right foot status post mutation of right great toe and third toe, ESRD on hemodialysis Monday Wednesday Friday, coronary artery disease, PAD, HTN, HLD, vascular dementia secondary to strokes in 1999 and 2001 that presented with shortness of breath during dialysis. Patient is poor historian due to dementia, per the daughter patient was at dialysis and desaturating to the 80s shortness of breath. Upon admission patient was satting well and no longer has shortness of breath. Patient also complains of left foot pain patient has chronic wound on the left great toe that has been seen by Dr. Shawnee Hansen for the past 6 weeks with frequent dressing changes. Last dressing change was yesterday at his living facility, patient lives at [facility] where he moved last week per the daughter. His follow-up patient had an appointment with Dr. Lulu Guan, his podiatrist today. Patient also follows with vascular surgery at [facility].  Patient had an appointment scheduled on February 24 due to recent arterial duplex showing blockages of 80% of the left lower extremity. Patient was oriented to self and knows that Kenyon Duckworth is president. Patient is not oriented to place or time. Per daughter patient usually knows where he is. Per a hospital note from 2021 patient was not oriented to date year or season at that time. In the ED patient's electrolytes were within normal limits. ESR CRP and troponins were all elevated in the setting of ESRD. Creatinine was below baseline from 5-3. White blood cell elevated at 13. Anemia with hemoglobin at 8 from 12/1/2021. Chest x-ray did not show any acute abnormalities.    X-ray left foot shows medial ulceration with lytic changes compatible for osteomyelitis of the base of the first proximal phalanx and distal first metatarsal.      Past Medical History:        Diagnosis Date    Amputated great toe, right (HCC)     CAD (coronary artery disease)     CVA (cerebral vascular accident) ([facility]     3    Dementia ([facility]     Depression     Diabetes ([facility] [address]     Diabetic polyneuropathy ([facility] [address]     ESRD on dialysis ([facility] [address]     monday wednesday friday    GERD (gastroesophageal reflux disease)     Hemiplegia and hemiparesis following cerebral infarction affecting right dominant side (HCC)     Hyperlipidemia     Hypertension     Pain syndrome, chronic     Peripheral vascular disease ([facility] [address]     [facility]    Secondary hyperparathyroidism [facility])        Past Surgical History:        Procedure Laterality Date    CARDIAC CATHETERIZATION      DIALYSIS FISTULA CREATION         Medications Priorto Admission:    Not in a hospital admission. Allergies:  Pcn [penicillins] and Lisinopril    Social History:   TOBACCO:   reports that he has quit smoking. He has never used smokeless tobacco.  ETOH:   reports no history of alcohol use. DRUGS : No  Patient to [facility] her symptoms last week and per daughter    Family History:       Problem Relation Age of Onset    Heart Disease Mother     Diabetes Mother     Dementia Mother     Diabetes Father     Heart Disease Father     Stroke Brother        Review of Systems   Constitutional:  Positive for activity change and fatigue. Negative for fever. HENT:  Negative for mouth sores and sore throat. Eyes:  Negative for visual disturbance. Respiratory:  Negative for chest tightness, shortness of breath and wheezing. Cardiovascular:  Negative for chest pain and leg swelling. Endorsed brief episode of chest pain (10 seconds) while I was examining the pt. Resolved on it's own.  Pt. Does have PRN Nitroglycerine ordered from home for intermittent chest pain   Gastrointestinal:  Negative for abdominal pain, constipation, diarrhea and nausea. Endorses a b/m this AM   Genitourinary:  Negative for difficulty urinating and dysuria. Endorses 1 episode of urination a day at most   Musculoskeletal:  Positive for arthralgias and myalgias. Endorses Pain in both feet   Neurological:  Positive for weakness and numbness. Negative for dizziness, speech difficulty and headaches. +3 strength in upper extremities and  strength  Endorses Numbness and Tingling in feet bilaterally   Psychiatric/Behavioral:  Positive for confusion. Negative for agitation and behavioral problems. Alert to self and that naldo is president. Not oriented to place (thought he was at Dialysis clinic) and not oriented to Date (\"I do not know what month it is)     ROS: A 10 point review of systems was conducted, significant findings as noted in HPI. Physical Exam  Constitutional:       Appearance: He is ill-appearing and toxic-appearing. He is not diaphoretic. Comments: Appears cachectic and ill   HENT:      Head: Normocephalic and atraumatic. Right Ear: External ear normal.      Left Ear: External ear normal.      Nose: Nose normal.      Mouth/Throat:      Mouth: Mucous membranes are dry. Eyes:      General:         Right eye: No discharge. Left eye: No discharge. Extraocular Movements: Extraocular movements intact. Cardiovascular:      Rate and Rhythm: Normal rate and regular rhythm. Pulses: Normal pulses. Heart sounds: Murmur heard. Pulmonary:      Effort: Pulmonary effort is normal.      Breath sounds: Normal breath sounds. No wheezing. Abdominal:      General: Abdomen is flat. Bowel sounds are normal. There is no distension. Palpations: Abdomen is soft. There is no mass. Tenderness: There is no abdominal tenderness. There is no guarding. Musculoskeletal:      Right lower leg: Edema present. Left lower leg: Edema present. Comments: +1 bilateral l/e edema  Ulcer on L Great toe. I did not observe due to being wrapped by Podiatry  L Great toe and 3rd digit amputation \"several years ago\"   Neurological:      General: No focal deficit present. Comments: Alert to self and that naldo is president. Not oriented to place (thought he was at Dialysis clinic) and not oriented to Date (\"I do not know what month it is)   Psychiatric:         Mood and Affect: Mood normal.         Behavior: Behavior normal.     Physical exam:       Vitals:    02/20/23 1305   BP:    Pulse: 96   Resp:    Temp:    SpO2:        DATA:    Labs:  CBC:   Recent Labs     02/20/23  1019   WBC 13.0*   HGB 8.4*   HCT 26.4*          BMP:   Recent Labs     02/20/23  1121      K 3.7   CL 97*   CO2 31   BUN 15   CREATININE 3.2*   GLUCOSE 159*     LFT's: No results for input(s): AST, ALT, ALB, BILITOT, ALKPHOS in the last 72 hours. Troponin:   Recent Labs     02/20/23  1025 02/20/23  1222   TROPONINI 0.07* 0.08*     BNP:No results for input(s): BNP in the last 72 hours. ABGs: No results for input(s): PHART, HNX7WKW, PO2ART in the last 72 hours. INR: No results for input(s): INR in the last 72 hours. U/A:No results for input(s): NITRITE, COLORU, PHUR, LABCAST, WBCUA, RBCUA, MUCUS, TRICHOMONAS, YEAST, BACTERIA, CLARITYU, SPECGRAV, LEUKOCYTESUR, UROBILINOGEN, BILIRUBINUR, BLOODU, GLUCOSEU, AMORPHOUS in the last 72 hours. Invalid input(s): KETONESU    XR FOOT LEFT (MIN 3 VIEWS)   Final Result   1.  Medial ulceration with lytic changes compatible with osteomyelitis of the base of the first proximal phalanx and distal first metatarsal.      XR CHEST PORTABLE   Final Result   Impression: No acute abnormality      MRI FOOT LEFT WO CONTRAST    (Results Pending)           ASSESSMENT AND PLAN:  Mr. Breann Garcia is a 80-year-old male with a past medical history of osteomyelitis of the right foot status post mutation of right great toe and third toe, ESRD on hemodialysis Monday Wednesday Friday, coronary artery disease, PAD, HTN, HLD, vascular dementia secondary to strokes in 1999 and 2001 that presented with shortness of breath during dialysis. Osteomyelitis left foot. X-ray left foot shows medial ulceration with lytic changes compatible for osteomyelitis of the base of the first proximal phalanx and distal first metatarsal.  ,   Leukocytosis, white blood cells 13  Podiatry consulted, appreciate recs  Status post debridement ED  NPO @ Midnight  MRI left foot  Blood cultures x2 ordered  HbA1C ordered  Prealbumin ordered  Vascular surgery consulted, appreciate recs  IV Vancomycin & Cefepime  Pharmacy Consulted for Vanc  PT/OT    Peripheral vascular disease   follows with vascular surgery at [facility].  Appointment scheduled for February 24  80% stenosis of left lower extremity. Vascular surgery consulted, appreciate recs    Shortness of breath and new murmur  F/u Echocardiogram full    ESRD (HD MWF)  Cr baseline of 5  Nephrology Consulted (Dr. Maty Ramirez)  Daily BMP and CBC  Cont. Home Midodrine    Chest Pain  Pt. Has hx of Chest pain. Has PRN sub-lingual nitro ordered from home. Had 10 second, self-resolving, mild episode of sub-sternal chest pain while I was in the room with him  EKG on admission does not show obvious ischemic changed  PRN Nitroglycerine from home  Continuous Tele    Chronic Problems:  HTN - Cont home Metoprolol.  Held Isosorbide and Hydralazine  HLD - Cont Home Med  Vascular Dementia - Palliative Care consulted  Insomnia - PRN Melatonin and Trazadone and Mirtazapine    Code Status: Full  FEN: No diet orders on file  PPX: Lovenox & Protonix  DISPO: [facility]  ELOS:3  Barriers to Discharge: Osteomyelitis    Jay Morales MD, PGY-1  02/20/23  1:17 PM       Will discuss with attending physician Dr. Brooklyn Bowie

## 2023-02-20 NOTE — NURSE NAVIGATOR
4 Eyes Admission Assessment     I agree as the admission nurse that 2 RN's have performed a thorough Head to Toe Skin Assessment on the patient. ALL assessment sites listed below have been assessed on admission. Areas assessed by both nurses: Keila Mena RN and JANIYA Donato  [x]   Head, Face, and Ears   [x]   Shoulders, Back, and Chest  [x]   Arms, Elbows, and Hands   [x]   Coccyx, Sacrum, and Ischum  [x]   Legs, Feet, and Heels        Does the Patient have Skin Breakdown? No         Nirmal Prevention initiated:  Yes   Wound Care Orders initiated:  LLE wound. Pt is follows by podiatry.       [id number] nurse consulted for Pressure Injury (Stage 3,4, Unstageable, DTI, NWPT, and Complex wounds):  No      Nurse 1 eSignature: Electronically signed by Keila Mena RN on 2/20/23 at 6:44 PM EST    **SHARE this note so that the co-signing nurse is able to place an eSignature**    Nurse 2 eSignature: Electronically signed by Maryan Gaming RN on 2/21/23 at 6:47 PM EST

## 2023-02-20 NOTE — CONSULTS
Vascular Surgery   Resident Consult Note    Reason for Consult:  PVD; left foot wound, anticipate surgery. Arterial duplex performed at [facility] recently per kunal; unable to be viewed in care everywhere. Requesting Physician:  Army Aura DPM    History obtained from: Patient, patients daughter and chart     History of Present Illness :   Uri Tamez is a 79 y.o. male with a PMHx of dementia, CAD, CVA, DM, ESRD on dialysis, GERD, Hemiplegia and hemiparesis 2/2 cerebral infarct, HLD, HTN, PVD, and hx of pedal amputations who presented to the hospital for shortness of breath after dialysis this AM. Patient does have a left foot ulceration that started about 6 weeks ago, and has been doing daily dressing changes with betadine and gauze. Patient says that the wound has gotten bigger every week and has been draining sanguinous fluid. Patient additionally has a history of PVD with an appointment with a vascular surgeon at [facility] scheduled on 2/24/23 due to some bad arterial duplex findings. Patient does endorse some shortness of breath, nausea, and fevers but denies any chest pain.      Risk Factors: Age, Gender, Family History, Smoking, HTN, Hyperlipidemia, Diabetes    Past Medical History:        Diagnosis Date    Amputated great toe, right (HCC)     CAD (coronary artery disease)     CVA (cerebral vascular accident) ([address]     3    Dementia ([address]     Depression     Diabetes ([address]     Diabetic polyneuropathy ([address]     ESRD on dialysis ([address]     monday wednesday friday    GERD (gastroesophageal reflux disease)     Hemiplegia and hemiparesis following cerebral infarction affecting right dominant side (HCC)     Hyperlipidemia     Hypertension     Pain syndrome, chronic     Peripheral vascular disease ([address]     [facility]    Secondary hyperparathyroidism [facility])        Past Surgical History:           Procedure Laterality Date    CARDIAC CATHETERIZATION      DIALYSIS FISTULA CREATION         Allergies: Pcn [penicillins] and Lisinopril    Medications:   Home Meds  No current facility-administered medications on file prior to encounter. Current Outpatient Medications on File Prior to Encounter   Medication Sig Dispense Refill    sodium phosphate (FLEET) 7-19 GM/118ML enema Place 1 enema rectally daily as needed      isosorbide mononitrate (IMDUR) 30 MG extended release tablet Take 30 mg by mouth daily      lisinopril (PRINIVIL;ZESTRIL) 10 MG tablet Take 10 mg by mouth daily (Patient not taking: Reported on 2/20/2023)      melatonin 3 MG TABS tablet Take 1 tablet by mouth nightly      omeprazole (PRILOSEC) 10 MG delayed release capsule Take 10 mg by mouth daily      vitamin D 25 MCG (1000 UT) CAPS Take 1 capsule by mouth daily      SANTYL 250 UNIT/GM ointment Apply topically As directed      mineral oil-hydrophilic petrolatum (AQUAPHOR) ointment Apply topically As directed      hydrALAZINE (APRESOLINE) 25 MG tablet Take 1 tablet by mouth in the morning and at bedtime      traZODone (DESYREL) 50 MG tablet Take 1 tablet by mouth at bedtime      metoprolol succinate (TOPROL XL) 25 MG extended release tablet Take 1 tablet by mouth daily      midodrine (PROAMATINE) 5 MG tablet Take 5 mg by mouth in the morning and at bedtime      mirtazapine (REMERON) 15 MG tablet Take 7.5 mg by mouth nightly      ondansetron (ZOFRAN) 4 MG tablet Take 4 mg by mouth every 8 hours as needed for Nausea or Vomiting      atorvastatin (LIPITOR) 40 MG tablet TAKE 1 TABLET BY MOUTH EVERYDAY AT BEDTIME 90 tablet 3    memantine (NAMENDA) 10 MG tablet TAKE 1 TABLET BY MOUTH TWICE A DAY 60 tablet 5    nitroGLYCERIN (NITROSTAT) 0.4 MG SL tablet Place 1 tablet under the tongue every 5 minutes as needed for Chest pain 25 tablet 3    aspirin 81 MG chewable tablet Take 81 mg by mouth daily      Meals on Wheels MISC by Does not apply route Patient needs diabetic meals five days per week.  1 each 0     Current Meds  cefepime (MAXIPIME) 2,000 mg in sodium chloride 0.9 % 50 mL IVPB (mini-bag), Once  chlorproMAZINE (THORAZINE) 10 mg in sodium chloride 0.9 % 50 mL IVPB, Once        Family History:   Family History   Problem Relation Age of Onset    Heart Disease Mother     Diabetes Mother     Dementia Mother     Diabetes Father     Heart Disease Father     Stroke Brother        Social History:   TOBACCO:   reports that he has quit smoking. He has never used smokeless tobacco.  ETOH:   reports no history of alcohol use. DRUGS:   reports no history of drug use. ROS: Pertinent positive and negative findings as documented in the HPI, otherwise all other systems were reviewed and were negative. Physical exam:   Vitals:    02/20/23 1008   BP: 126/73   Pulse: (!) 106   Resp: 19   Temp: 98.3 °F (36.8 °C)   TempSrc: Oral   SpO2: 100%   Weight: 51 lb 8 oz (23.4 kg)   Height: 5' 7\" (1.702 m)     General appearance: Cachectic and ill-appearing   Eyes: no scleral icterus, EOM grossly intact  Neck: trachea midline, no JVD, neck supple  Chest/Lungs: normal effort with no accessory muscle use on RA  Cardiovascular: RRR  Abdomen: Soft, non-tender, non-distended, no rebound, guarding, or rigidity. Skin: warm and dry, no rashes  Extremities: +1 pitting edema noted to b/l LE. Dressing noted to left LE. Hallux and 4th digit amputation noted to right foot. Genitourinary: Grossly normal  Neuro: A&Ox3, no focal deficits, sensation intact    Pulses    F P PT DP   R + + -/+ -/+   L + + -/+ -/+    +, -/+ ,-/-     Labs:    CBC:   Recent Labs     02/20/23  1019   WBC 13.0*   HGB 8.4*   HCT 26.4*   MCV 84.5        BMP:   Recent Labs     02/20/23  1121      K 3.7   CL 97*   CO2 31   BUN 15   CREATININE 3.2*     PT/INR: No results for input(s): PROTIME, INR in the last 72 hours. APTT: No results for input(s): APTT in the last 72 hours.   Liver Profile:  Lab Results   Component Value Date/Time    AST 47 08/02/2021 10:15 PM    ALT 21 08/02/2021 10:15 PM    BILITOT 0.5 08/02/2021 10:15 PM    ALKPHOS 176 08/02/2021 10:15 PM     Lab Results   Component Value Date/Time    CHOL 113 02/16/2018 03:56 PM    HDL 24 02/16/2018 03:56 PM    TRIG 185 02/16/2018 03:56 PM     UA:   Lab Results   Component Value Date/Time    NITRITE neg 01/09/2020 03:27 PM    COLORU yellow 01/09/2020 03:27 PM    COLORU Yellow 08/26/2019 04:58 PM    PHUR 8.5 01/09/2020 03:27 PM    PHUR 8.5 08/26/2019 04:58 PM    WBCUA 3-5 08/26/2019 04:58 PM    RBCUA 10-20 08/26/2019 04:58 PM    CLARITYU clear 01/09/2020 03:27 PM    CLARITYU Clear 08/26/2019 04:58 PM    SPECGRAV 1.015 01/09/2020 03:27 PM    SPECGRAV 1.015 08/26/2019 04:58 PM    LEUKOCYTESUR small 01/09/2020 03:27 PM    LEUKOCYTESUR Negative 08/26/2019 04:58 PM    UROBILINOGEN 0.2 08/26/2019 04:58 PM    BILIRUBINUR small 01/09/2020 03:27 PM    BLOODU neg 01/09/2020 03:27 PM    BLOODU TRACE-INTACT 08/26/2019 04:58 PM    GLUCOSEU neg 01/09/2020 03:27 PM    GLUCOSEU Negative 08/26/2019 04:58 PM    AMORPHOUS Rare 08/26/2019 04:58 PM     Assessment/Plan: This is a 79 y.o. male with a PMHx of dementia, CAD, CVA, DM, ESRD on dialysis, GERD, Hemiplegia and hemiparesis 2/2 cerebral infarct, HLD, HTN, PVD, and hx of pedal amputations who presented to the hospital for shortness of breath after dialysis this AM. Vascular surgery for consulted for PVD.     - Unable to access said arterial duplex on Care Everywhere, called [facility] and they have no record from patient recently. - VL Arterial duplex ordered, will follow up on results   - Will tentatively plan for pre-op and consent tomorrow for left LE arteriogram on  2/22/23 pending arterial duplex results. Patient seen and examined with senior resident and discussed with attending Dr. Trupti Boland.      Gopi Hurtado, VIKASHM  Podiatric Resident, PGY-2  Contact via [employer]

## 2023-02-20 NOTE — CONSULTS
Consult received. Labs and notes were reviewed. Case was discussed with the staff. Full note to follow.     Thanks  Nephrology  Jonathan Chen [address]  Office: [phone]  Cell: [phone]  Fax: [phone]

## 2023-02-20 NOTE — ED TRIAGE NOTES
Pt presents from dialysis, pt was ending treatment when he became sob. Pt o2 sat currently 100% on room air. Pt has short term memory loss, when asked how he felt prior to dialysis patient states he can not remember. Pt c/o bilateral feet pain, denies chest pain.

## 2023-02-20 NOTE — NURSE NAVIGATOR
Patient admitted to 6S bed 2426. He is A&O to self. Pt C/O feet pain of 8/10 . Vitals taken and Temp 101.7,  and /75. Md made aware. Prn Tylenol and dilaudid given. 0.9 NS started at 75. Skin assessment done with Ward Cardenas. LLE wound and amputation of right great toe and 3rd toe noted. Pt was oriented to room and use of call light. Bed is in low position and bed alarm is on. Daughter at bedside. Will continue to monitor.

## 2023-02-20 NOTE — ED PROVIDER NOTES
[address] [facility] [address] ENCOUNTER          PHYSICIAN ASSISTANT NOTE       Date of evaluation: 2/20/2023    Chief Complaint     Shortness of Breath      History of Present Illness     Damien Jeffries is a 79 y.o. male, with a history of hypertension, hyperlipidemia, prior stroke with dementia, diabetes, coronary artery disease, peripheral vascular disease post right great toe amputation and end-stage renal disease for which he gets dialysis on Monday, Wednesday and Friday. The patient resides at [facility] and presents from dialysis with reports of an oxygen level of 82% with complaints of shortness of breath. The patient however is complaining only of pain in his feet, a constant cramping discomfort that he rates a 9 out of 10 in severity. He is a relatively poor historian. His daughter arrives later and states over the last week he has seemed to have worse pain in his left foot causing decreased ability to bear weight and transfer. He has had a wound at the left MTP joint over the last 6 weeks that has been followed by podiatry, had an appointment actually later today, he had a dressing change yesterday at the nursing home. He is also followed by vascular surgery at [facility] however records are not available for review at this time. Patient denies chest pain and leg swelling. No cough or other URI type symptoms. Does report mild nausea over the last 2 days as well as hiccups, no vomiting or abdominal pain. No dizziness or lightheadedness. He otherwise has no complaints. ASSESSMENT / PLAN  (MEDICAL DECISION MAKING)     INITIAL VITALS: BP: 126/73, Temp: 98.3 °F (36.8 °C), Heart Rate: (!) 106, Resp: 19, SpO2: 100 %      Medical Decision Making  Damien Jeffries is a 79 y.o. male with multiple comorbidities including hypertension, hyperlipidemia, coronary artery disease, end-stage renal disease on hemodialysis and peripheral vascular disease with left foot ulcer.   He presents from the dialysis center with report of a low pulse ox of 82% prior to arrival however the patient has had a normal pulse ox on room air during his stay. It is possible that they are reading may have been due to a poor pleth at the time. Patient denies feeling short of breath. His main complaint is pain in the feet, mainly in the left foot where he has a ulcer at the MTP joint with some bleeding noted and maceration between the first and second toes. Distal pulses are dopplered. He also reports nausea and has frequent hiccups. Vital signs are stable, his exam is otherwise nonacute. The patient was given morphine with Zofran with good pain control as well as later Thorazine for hiccups. Patient was noted to have an elevated troponin at 0.07 and 0.08 with no acute findings on EKG and no symptoms to correlate with acute coronary syndrome. This is likely related to his renal disease. Podiatry was consulted regarding the patient's foot ulcer. Given findings consistent with osteomyelitis on x-ray with elevated inflammatory markers and leukocytosis, patient was ordered vancomycin and cefepime will be admitted for additional management to include MRI. The resident debrided the ulcer in the ED and wrapped it. Patient will be admitted to the hospitalist for comanagement. He is in stable condition upon waiting transport to the floor. Amount and/or Complexity of Data Reviewed  Independent Historian: caregiver     Details: Collateral history was obtained per the patient's daughter who is employed as a nurse here at the hospital.  External Data Reviewed: labs, ECG and notes. Details: Recent labs reviewed from the dialysis center paperwork showed a white blood cell count of 5.1, platelets 917,393 and hematocrit 32.6 on 2/1/2023, Hemoglobin 2 days ago resulted 8.6. Renal panel 2 weeks ago resulted a sodium of 136, potassium 4.9, chloride 98, bicarb 27, BUN 20 creatinine 6.18, glucose 150.   Recent troponin in 2019 was 0.04. No BNP for comparison  Labs: ordered. Details: Labs include a CBC with a white blood cell count of 13,000, up from 5000 almost 3 weeks ago. Anemia is stable with an H&H of 8.4 and 26.4. CBC is otherwise unremarkable. Troponin is 0.07 and 0.08, up from 0.04 over 3 years ago. BNP is 7600 with no prior to compare. BMP results a glucose of 159, it is otherwise stable. CRP is elevated at 282, sed rate is elevated at 117. VBG results a PCO2 of 57.6, HCO3 35.5, it is otherwise unremarkable. Radiology: ordered. Details: Chest x-ray shows no acute abnormality. X-ray of the left foot shows medial ulceration with lytic changes compatible with osteomyelitis at the base of the first proximal phalanx and distal first metatarsal.  ECG/medicine tests: ordered. Decision-making details documented in ED Course. Discussion of management or test interpretation with external provider(s): Podiatry and hospitalist.    Risk  Prescription drug management. Decision regarding hospitalization. This patient was also evaluated by the attending physician. All care plans were discussed and agreed upon. Clinical Impression     1. Osteomyelitis of left foot, unspecified type ([address]    2. ESRD on dialysis [facility])        Disposition       DISPOSITION Admitted 02/20/2023 01:13:48 PM        Diagnostic Results and Other Data         EKG   Interpreted in conjunction with emergency department physician Abel Hopkins MD  Rhythm: normal sinus   Rate: 100-110  Axis: normal  Ectopy: premature ventricular contractions (infrequent)  Conduction: normal  ST Segments: no acute change  T Waves: no acute change  Q Waves:none  Clinical Impression: no acute changes  Comparison:  8/2021    RECENT VITALS:  BP: 124/68, Temp: 98.3 °F (36.8 °C), Heart Rate: 96, Resp: 19, SpO2: 96 %       ED Course     Nursing Notes, Past Medical Hx,Past Surgical Hx, Social Hx, Allergies, and Family Hx were reviewed.          The patient was given the following medications:  Orders Placed This Encounter   Medications    morphine (PF) injection 2 mg    ondansetron (ZOFRAN) injection 4 mg    cefepime (MAXIPIME) 2,000 mg in sodium chloride 0.9 % 50 mL IVPB (mini-bag)     Order Specific Question:   Antimicrobial Indications     Answer:   Bone and Joint Infection    chlorproMAZINE (THORAZINE) 10 mg in sodium chloride 0.9 % 50 mL IVPB    vancomycin (VANCOCIN) 1,750 mg in sodium chloride 0.9 % 500 mL IVPB     Order Specific Question:   Antimicrobial Indications     Answer:   Bone and Joint Infection       CONSULTS:  IP CONSULT TO PODIATRY  IP CONSULT TO HOSPITALIST  IP CONSULT TO VASCULAR SURGERY    Review of Systems     Review of Systems   Constitutional:  Negative for chills and fever. HENT:  Negative for congestion, rhinorrhea and sore throat. Respiratory:  Positive for shortness of breath. Negative for cough. Cardiovascular:  Negative for chest pain, palpitations and leg swelling. Gastrointestinal:  Positive for nausea. Negative for abdominal pain, constipation, diarrhea and vomiting. Genitourinary:         Makes little urine   Musculoskeletal:  Positive for arthralgias (left greater than right foot) and gait problem. Skin:  Positive for wound (left foot). Negative for rash. Neurological:  Negative for dizziness, light-headedness and headaches. All other systems reviewed and are negative. Past Medical, Surgical, Family, and Social History     He has a past medical history of Amputated great toe, right ([address] CAD (coronary artery disease), CVA (cerebral vascular accident) ([address] Dementia ([address] Depression, Diabetes ([address] Diabetic polyneuropathy ([address] ESRD on dialysis ([address] GERD (gastroesophageal reflux disease), Hemiplegia and hemiparesis following cerebral infarction affecting right dominant side ([address] Hyperlipidemia, Hypertension, Pain syndrome, chronic, Peripheral vascular disease ([address] and Secondary hyperparathyroidism ([address]   He has a past surgical history that includes Cardiac catheterization and Dialysis fistula creation. His family history includes Dementia in his mother; Diabetes in his father and mother; Heart Disease in his father and mother; Stroke in his brother. He reports that he has quit smoking. He has never used smokeless tobacco. He reports that he does not drink alcohol and does not use drugs. Medications     Previous Medications    ASPIRIN 81 MG CHEWABLE TABLET    Take 81 mg by mouth daily    ATORVASTATIN (LIPITOR) 40 MG TABLET    TAKE 1 TABLET BY MOUTH EVERYDAY AT BEDTIME    HYDRALAZINE (APRESOLINE) 25 MG TABLET    Take 1 tablet by mouth in the morning and at bedtime    ISOSORBIDE MONONITRATE (IMDUR) 30 MG EXTENDED RELEASE TABLET    Take 30 mg by mouth daily    LISINOPRIL (PRINIVIL;ZESTRIL) 10 MG TABLET    Take 10 mg by mouth daily    MEALS ON WHEELS MISC    by Does not apply route Patient needs diabetic meals five days per week.     MELATONIN 3 MG TABS TABLET    Take 1 tablet by mouth nightly    MEMANTINE (NAMENDA) 10 MG TABLET    TAKE 1 TABLET BY MOUTH TWICE A DAY    METOPROLOL SUCCINATE (TOPROL XL) 25 MG EXTENDED RELEASE TABLET    Take 1 tablet by mouth daily    MIDODRINE (PROAMATINE) 5 MG TABLET    Take 5 mg by mouth in the morning and at bedtime    MINERAL OIL-HYDROPHILIC PETROLATUM (AQUAPHOR) OINTMENT    Apply topically As directed    MIRTAZAPINE (REMERON) 15 MG TABLET    Take 7.5 mg by mouth nightly    NITROGLYCERIN (NITROSTAT) 0.4 MG SL TABLET    Place 1 tablet under the tongue every 5 minutes as needed for Chest pain    OMEPRAZOLE (PRILOSEC) 10 MG DELAYED RELEASE CAPSULE    Take 10 mg by mouth daily    ONDANSETRON (ZOFRAN) 4 MG TABLET    Take 4 mg by mouth every 8 hours as needed for Nausea or Vomiting    SANTYL 250 UNIT/GM OINTMENT    Apply topically As directed    SODIUM PHOSPHATE (FLEET) 7-19 GM/118ML ENEMA    Place 1 enema rectally daily as needed    TRAZODONE (DESYREL) 50 MG TABLET    Take 1 tablet by mouth at bedtime    VITAMIN D 25 MCG (1000 UT) CAPS    Take 1 capsule by mouth daily       Allergies     He is allergic to pcn [penicillins] and lisinopril. Physical Exam     INITIAL VITALS: BP: 126/73, Temp: 98.3 °F (36.8 °C), Heart Rate: (!) 106, Resp: 19, SpO2: 100 %  Physical Exam  Vitals reviewed. Constitutional:       General: He is in acute distress (The patient is somewhat restless with his legs complaining of pain in the feet. ). Appearance: He is well-developed and normal weight. HENT:      Head: Normocephalic and atraumatic. Mouth/Throat:      Mouth: Mucous membranes are moist.   Neck:      Trachea: Phonation normal.   Cardiovascular:      Rate and Rhythm: Regular rhythm. Tachycardia present. Heart sounds: No murmur heard. No friction rub. No gallop. Pulmonary:      Effort: Pulmonary effort is normal. No respiratory distress. Breath sounds: No wheezing, rhonchi or rales. Comments: Frequent hiccups  Abdominal:      General: There is no distension. Palpations: Abdomen is soft. Tenderness: There is no abdominal tenderness. Musculoskeletal:      Cervical back: Normal range of motion and neck supple. Right lower leg: No edema. Left lower leg: Swelling (mild at left great toe/MTP joint) and bony tenderness (left great toe/MTP joint) present. No edema. Comments: Distal pulses are dopplerable. There is an approximate 2 cm in diameter ulcer with overlying eschar noted at the left medial MTP joint with moist macerated tissue between the first and second toes . Right foot shows evidence of prior amputation, it is otherwise unremarkable without ulcer or signs of infection. Skin:     General: Skin is warm and dry. Findings: No petechiae or rash. Neurological:      Mental Status: He is alert. Mental status is at baseline.    Psychiatric:         Mood and Affect: Mood and affect normal.         Behavior: Behavior normal.                           Ahren CHRISTINE [place]  02/20/23 1441

## 2023-02-21 ENCOUNTER — APPOINTMENT (OUTPATIENT)
Dept: MRI IMAGING | Age: 68
DRG: 853 | End: 2023-02-21
Payer: MEDICARE

## 2023-02-21 ENCOUNTER — APPOINTMENT (OUTPATIENT)
Dept: VASCULAR LAB | Age: 68
DRG: 853 | End: 2023-02-21
Payer: MEDICARE

## 2023-02-21 LAB
ALBUMIN SERPL-MCNC: 2.5 G/DL (ref 3.4–5)
ANION GAP SERPL CALCULATED.3IONS-SCNC: 9 MMOL/L (ref 3–16)
BASOPHILS ABSOLUTE: 0.1 K/UL (ref 0–0.2)
BASOPHILS RELATIVE PERCENT: 0.9 %
BUN BLDV-MCNC: 21 MG/DL (ref 7–20)
CALCIUM SERPL-MCNC: 8.4 MG/DL (ref 8.3–10.6)
CHLORIDE BLD-SCNC: 98 MMOL/L (ref 99–110)
CO2: 31 MMOL/L (ref 21–32)
CREAT SERPL-MCNC: 4.4 MG/DL (ref 0.8–1.3)
EOSINOPHILS ABSOLUTE: 0.1 K/UL (ref 0–0.6)
EOSINOPHILS RELATIVE PERCENT: 0.6 %
ESTIMATED AVERAGE GLUCOSE: 125.5 MG/DL
FERRITIN: 7179 NG/ML (ref 30–400)
GFR SERPL CREATININE-BSD FRML MDRD: 14 ML/MIN/{1.73_M2}
GLUCOSE BLD-MCNC: 159 MG/DL (ref 70–99)
HBA1C MFR BLD: 6 %
HCT VFR BLD CALC: 24.1 % (ref 40.5–52.5)
HEMOGLOBIN: 7.7 G/DL (ref 13.5–17.5)
LV EF: 58 %
LVEF MODALITY: NORMAL
LYMPHOCYTES ABSOLUTE: 0.8 K/UL (ref 1–5.1)
LYMPHOCYTES RELATIVE PERCENT: 5.6 %
MAGNESIUM: 1.4 MG/DL (ref 1.8–2.4)
MCH RBC QN AUTO: 27.1 PG (ref 26–34)
MCHC RBC AUTO-ENTMCNC: 31.8 G/DL (ref 31–36)
MCV RBC AUTO: 85.1 FL (ref 80–100)
MONOCYTES ABSOLUTE: 0.6 K/UL (ref 0–1.3)
MONOCYTES RELATIVE PERCENT: 4.4 %
NEUTROPHILS ABSOLUTE: 12.6 K/UL (ref 1.7–7.7)
NEUTROPHILS RELATIVE PERCENT: 88.5 %
PARATHYROID HORMONE INTACT: 80.2 PG/ML (ref 14–72)
PDW BLD-RTO: 17.4 % (ref 12.4–15.4)
PHOSPHORUS: 2.7 MG/DL (ref 2.5–4.9)
PLATELET # BLD: 283 K/UL (ref 135–450)
PMV BLD AUTO: 8.1 FL (ref 5–10.5)
POTASSIUM SERPL-SCNC: 4.2 MMOL/L (ref 3.5–5.1)
RBC # BLD: 2.84 M/UL (ref 4.2–5.9)
REPORT: NORMAL
SODIUM BLD-SCNC: 138 MMOL/L (ref 136–145)
VANCOMYCIN RANDOM: 21.9 UG/ML
WBC # BLD: 14.2 K/UL (ref 4–11)

## 2023-02-21 PROCEDURE — 83735 ASSAY OF MAGNESIUM: CPT

## 2023-02-21 PROCEDURE — 99222 1ST HOSP IP/OBS MODERATE 55: CPT | Performed by: INTERNAL MEDICINE

## 2023-02-21 PROCEDURE — 6370000000 HC RX 637 (ALT 250 FOR IP)

## 2023-02-21 PROCEDURE — 6360000002 HC RX W HCPCS

## 2023-02-21 PROCEDURE — 80069 RENAL FUNCTION PANEL: CPT

## 2023-02-21 PROCEDURE — 2580000003 HC RX 258

## 2023-02-21 PROCEDURE — 83540 ASSAY OF IRON: CPT

## 2023-02-21 PROCEDURE — 36415 COLL VENOUS BLD VENIPUNCTURE: CPT

## 2023-02-21 PROCEDURE — 82728 ASSAY OF FERRITIN: CPT

## 2023-02-21 PROCEDURE — 83550 IRON BINDING TEST: CPT

## 2023-02-21 PROCEDURE — 93880 EXTRACRANIAL BILAT STUDY: CPT

## 2023-02-21 PROCEDURE — 80202 ASSAY OF VANCOMYCIN: CPT

## 2023-02-21 PROCEDURE — 85025 COMPLETE CBC W/AUTO DIFF WBC: CPT

## 2023-02-21 PROCEDURE — 1200000000 HC SEMI PRIVATE

## 2023-02-21 PROCEDURE — 93306 TTE W/DOPPLER COMPLETE: CPT

## 2023-02-21 PROCEDURE — 83970 ASSAY OF PARATHORMONE: CPT

## 2023-02-21 PROCEDURE — 87040 BLOOD CULTURE FOR BACTERIA: CPT

## 2023-02-21 PROCEDURE — 82306 VITAMIN D 25 HYDROXY: CPT

## 2023-02-21 PROCEDURE — 6360000002 HC RX W HCPCS: Performed by: STUDENT IN AN ORGANIZED HEALTH CARE EDUCATION/TRAINING PROGRAM

## 2023-02-21 PROCEDURE — 93925 LOWER EXTREMITY STUDY: CPT

## 2023-02-21 PROCEDURE — 73718 MRI LOWER EXTREMITY W/O DYE: CPT

## 2023-02-21 RX ORDER — MAGNESIUM SULFATE IN WATER 40 MG/ML
2000 INJECTION, SOLUTION INTRAVENOUS ONCE
Status: COMPLETED | OUTPATIENT
Start: 2023-02-21 | End: 2023-02-21

## 2023-02-21 RX ORDER — TAMSULOSIN HYDROCHLORIDE 0.4 MG/1
0.4 CAPSULE ORAL DAILY
COMMUNITY

## 2023-02-21 RX ORDER — HEPARIN SODIUM 5000 [USP'U]/ML
5000 INJECTION, SOLUTION INTRAVENOUS; SUBCUTANEOUS EVERY 8 HOURS SCHEDULED
Status: DISCONTINUED | OUTPATIENT
Start: 2023-02-21 | End: 2023-02-22 | Stop reason: SDUPTHER

## 2023-02-21 RX ORDER — ENALAPRIL MALEATE 10 MG/1
10 TABLET ORAL DAILY
COMMUNITY
End: 2023-03-28

## 2023-02-21 RX ORDER — CLINDAMYCIN PHOSPHATE 900 MG/50ML
900 INJECTION INTRAVENOUS EVERY 8 HOURS
Status: DISCONTINUED | OUTPATIENT
Start: 2023-02-21 | End: 2023-02-21

## 2023-02-21 RX ADMIN — HEPARIN SODIUM 5000 UNITS: 5000 INJECTION INTRAVENOUS; SUBCUTANEOUS at 21:34

## 2023-02-21 RX ADMIN — SODIUM CHLORIDE, PRESERVATIVE FREE 10 ML: 5 INJECTION INTRAVENOUS at 08:15

## 2023-02-21 RX ADMIN — MEMANTINE 10 MG: 10 TABLET ORAL at 21:24

## 2023-02-21 RX ADMIN — SODIUM CHLORIDE: 9 INJECTION, SOLUTION INTRAVENOUS at 06:57

## 2023-02-21 RX ADMIN — ACETAMINOPHEN 650 MG: 325 TABLET ORAL at 21:24

## 2023-02-21 RX ADMIN — HEPARIN SODIUM 5000 UNITS: 5000 INJECTION INTRAVENOUS; SUBCUTANEOUS at 17:00

## 2023-02-21 RX ADMIN — SODIUM CHLORIDE, PRESERVATIVE FREE 10 ML: 5 INJECTION INTRAVENOUS at 21:34

## 2023-02-21 RX ADMIN — PANTOPRAZOLE SODIUM 40 MG: 40 TABLET, DELAYED RELEASE ORAL at 06:37

## 2023-02-21 RX ADMIN — MAGNESIUM SULFATE HEPTAHYDRATE 2000 MG: 40 INJECTION, SOLUTION INTRAVENOUS at 14:44

## 2023-02-21 RX ADMIN — CEFEPIME HYDROCHLORIDE 1000 MG: 1 INJECTION, POWDER, FOR SOLUTION INTRAMUSCULAR; INTRAVENOUS at 14:36

## 2023-02-21 ASSESSMENT — PAIN SCALES - GENERAL
PAINLEVEL_OUTOF10: 0
PAINLEVEL_OUTOF10: 3

## 2023-02-21 ASSESSMENT — PAIN DESCRIPTION - FREQUENCY: FREQUENCY: INTERMITTENT

## 2023-02-21 ASSESSMENT — PAIN DESCRIPTION - DIRECTION: RADIATING_TOWARDS: NO

## 2023-02-21 ASSESSMENT — PAIN DESCRIPTION - ONSET: ONSET: GRADUAL

## 2023-02-21 ASSESSMENT — PAIN DESCRIPTION - PAIN TYPE: TYPE: CHRONIC PAIN

## 2023-02-21 ASSESSMENT — PAIN DESCRIPTION - ORIENTATION: ORIENTATION: LEFT

## 2023-02-21 ASSESSMENT — PAIN DESCRIPTION - DESCRIPTORS: DESCRIPTORS: ACHING

## 2023-02-21 ASSESSMENT — PAIN - FUNCTIONAL ASSESSMENT: PAIN_FUNCTIONAL_ASSESSMENT: PREVENTS OR INTERFERES SOME ACTIVE ACTIVITIES AND ADLS

## 2023-02-21 ASSESSMENT — PAIN DESCRIPTION - LOCATION: LOCATION: LEG

## 2023-02-21 NOTE — CARE COORDINATION
Case Management Assessment  Initial Evaluation    Date/Time of Evaluation: 2/21/2023 4:08 PM  Assessment Completed by: Deysi Daniel RN    If patient is discharged prior to next notation, then this note serves as note for discharge by case management. Patient Name: Daron Schwab                   YOB: 1955  Diagnosis: Osteomyelitis ([facility] [M86.9]  ESRD on dialysis ([facility] [N18.6, Z99.2]  Osteomyelitis of left foot, unspecified type ([facility] [M86.9]                   Date / Time: 2/20/2023 10:01 AM    Patient Admission Status: Inpatient   Readmission Risk (Low < 19, Mod (19-27), High > 27): Readmission Risk Score: 22.3    Current PCP: No primary care provider on file. PCP verified by CM? No    Chart Reviewed: Yes      History Provided by: Medical Record, Child/Family  Patient Orientation: Alert and Oriented, Person    Patient Cognition: Short Term Memory Deficit    Hospitalization in the last 30 days (Readmission):  No    If yes, Readmission Assessment in CM Navigator will be completed. Advance Directives:      Code Status: Full Code   Patient's Primary Decision Maker is: Legal Next of Kin    Primary Decision Maker: Monica Hussein - Spouse - [phone]    Secondary Decision Maker: Brenda Hussein - Child - [phone]    Discharge Planning:    Patient lives with: Alone Type of Home: Long-Term Care ([address] [place] [address])  Primary Care Giver:  (facility)  Patient Support Systems include: Children, Family Members   Current Financial resources: Medicare  Current community resources: ECF/Home Care  Current services prior to admission: Extended Care Placement, None (O/P HD  ESRD)            Current DME:              Type of Home Care services:  None    ADLS  Prior functional level:  (facility)  Current functional level: Mobility    PT AM-PAC:   /24  OT AM-PAC:   /24    Family can provide assistance at DC:  Yes  Would you like Case Management to discuss the discharge plan with any other family members/significant others, and if so, who? Yes  Plans to Return to Present Housing: Yes  Other Identified Issues/Barriers to RETURNING to current housing: NA   Potential Assistance needed at discharge: N/A            Potential DME:    Patient expects to discharge to: Long-term care  Plan for transportation at discharge: Family    Financial    Payor: Alirezamike Jamie / Plan: MEDICARE PART A AND B / Product Type: *No Product type* /     Does insurance require precert for SNF: No    Potential assistance Purchasing Medications: No  Meds-to-Beds request:        [facility]/pharmacy #[id number]- [place] [address]   [address] [facility] [address]  Phone: [phone] Fax: [phone]      Notes:    Factors facilitating achievement of predicted outcomes: Family support    Barriers to discharge: Confusion and Medical clearance  ,  OR today      Additional Case Management Notes:     CM  following for  d/c planning  and  needs  ;    Per  Medical record  pt was admitted from [facility] ,  with  O/P HD  ( ESRD)  at  [facility]      CM  placed call to  pt  dgtr  :  Shirley Dempsey  [phone], there  was  no answer ,  Not able to  leave  VM as  Voice Mailbox   was  full and  not able to leave  message. CM  spoke with  Alma Aleman [phone]  in admission to confirm  facility and  LOC  . He  is  LTC  there  and  able to return  once medically cleared  ,  SW will follow up with  family as well. SW to follow up with  family in the  AM .     CM  rounded  at  bedside this am  and  pt  was  off Unit for  procedure at the time:  CM /SW contact  info  place d on  Pt  board. Admitted  with Osteomyelitis: Podiatry : Vascular Surgery following:   MRI  Dopplers  > OR     Post  OP PTOT      SW/CM  will cont to follow and  assist with  dc planning  .     Will need  EMS transport back to [address] for Transition of Care is related to the following treatment goals of Osteomyelitis ([facility] [M86.9]  ESRD on dialysis ([facility] [N18.6, Z99.2]  Osteomyelitis of left foot, unspecified type ([facility] [M09.0]    IF APPLICABLE: The Patient and/or patient representative Jeannine Hansen and his family were provided with a choice of provider and agrees with the discharge plan. Freedom of choice list with basic dialogue that supports the patient's individualized plan of care/goals and shares the quality data associated with the providers was provided to: Patient Representative   Patient Representative Name: Mel Fenton  [phone]    The Patient and/or Patient Representative Agree with the Discharge Plan?  Yes    Margo Barrera RN  Case Management Department  Ph: [phone]

## 2023-02-21 NOTE — CONSULTS
Infectious Diseases   Consult Note      Reason for Consult: strep bacteremia, left foot OM    Requesting Physician: Dr. Nirmal Banegas    Date of Admission: 2/20/2023  Subjective:   CHIEF COMPLAINT: sob on HD    HPI:  72-year-old male with past medical history of osteomyelitis of the right foot status post amputation of the right great and third toes, ESRD on HD M WF, CAD, PAD, HTN, HLD, vascular dementia secondary to strokes in 1999 and 2001 that presented with shortness of breath during dialysis. Patient is a poor historian due to dementia so most of the history is obtained from her daughter and upon reviewing the H&P as well. The patient also complained of left foot pain and was noted to have a chronic wound in the left great toe. He does have an outpatient podiatrist and vascular surgeon whom he sees. In the ED, ESR and CRP were noted to be elevated at 117/282.1 respectively. Subsequent blood cultures x2 on admission have since grown strep species. He was evaluated by vascular surgery here and arterial duplex of the bilateral lower extremities is pending. There is concern of osteomyelitis of the first metatarsal/proximal phalanx of the left foot and the podiatrist is recommending partial amputation of the first ray, timing to be determined pending vascular surgery's recommendations. He is currently on IV vancomycin and clindamycin and cefepime. A wound swab of the area is showing strep pyogenes, Staph aureus and Haemophilus parainfluenzae. Current abx: vancomycin, cefepime and clindamycin.           Past Surgical History:       Diagnosis Date    Amputated great toe, right (HCC)     CAD (coronary artery disease)     CVA (cerebral vascular accident) ([facility]     3    Dementia ([facility] [address]     Depression     Diabetes ([facility] [address]     Diabetic polyneuropathy ([facility] [address]     ESRD on dialysis ([facility] [address]     monday wednesday friday    GERD (gastroesophageal reflux disease)     Hemiplegia and hemiparesis following cerebral infarction affecting right dominant side ([facility] [address]     Hyperlipidemia     Hypertension     Pain syndrome, chronic     Peripheral vascular disease ([facility] [address]     [facility]    Secondary hyperparathyroidism [facility])          Procedure Laterality Date    CARDIAC CATHETERIZATION      DIALYSIS FISTULA CREATION         Social History:    TOBACCO:   reports that he has quit smoking. He has never used smokeless tobacco.  ETOH:   reports no history of alcohol use. There is no history of illicit drug use or other significant epidemiologic exposures. Family History:       Problem Relation Age of Onset    Heart Disease Mother     Diabetes Mother     Dementia Mother     Diabetes Father     Heart Disease Father     Stroke Brother      There is no family history of autoimmune diseases or significant infectious diseases.     Current Medications:    Current Facility-Administered Medications: magnesium sulfate 2000 mg in 50 mL IVPB premix, 2,000 mg, IntraVENous, Once  clindamycin (CLEOCIN) 900 mg in dextrose 5 % 50 mL IVPB, 900 mg, IntraVENous, Q8H  cefepime (MAXIPIME) 1,000 mg in sodium chloride 0.9 % 50 mL IVPB (mini-bag), 1,000 mg, IntraVENous, Q24H  heparin (porcine) injection 5,000 Units, 5,000 Units, SubCUTAneous, 3 times per day  aspirin chewable tablet 81 mg, 81 mg, Oral, Daily  atorvastatin (LIPITOR) tablet 40 mg, 40 mg, Oral, Daily  melatonin tablet 3 mg, 3 mg, Oral, Nightly PRN  memantine (NAMENDA) tablet 10 mg, 10 mg, Oral, BID  [Held by provider] metoprolol succinate (TOPROL XL) extended release tablet 25 mg, 25 mg, Oral, Daily  [Held by provider] mirtazapine (REMERON) tablet 7.5 mg, 7.5 mg, Oral, Nightly PRN  nitroGLYCERIN (NITROSTAT) SL tablet 0.4 mg, 0.4 mg, SubLINGual, Q5 Min PRN  pantoprazole (PROTONIX) tablet 40 mg, 40 mg, Oral, QAM AC  ondansetron (ZOFRAN) tablet 4 mg, 4 mg, Oral, Q8H PRN  [Held by provider] traZODone (DESYREL) tablet 50 mg, 50 mg, Oral, Nightly PRN  Vitamin D (CHOLECALCIFEROL) tablet 1,000 Units, 1,000 Units, Oral, Daily  sodium chloride flush 0.9 % injection 5-40 mL, 5-40 mL, IntraVENous, 2 times per day  sodium chloride flush 0.9 % injection 5-40 mL, 5-40 mL, IntraVENous, PRN  0.9 % sodium chloride infusion, , IntraVENous, PRN  polyethylene glycol (GLYCOLAX) packet 17 g, 17 g, Oral, Daily PRN  acetaminophen (TYLENOL) tablet 650 mg, 650 mg, Oral, Q6H PRN **OR** acetaminophen (TYLENOL) suppository 650 mg, 650 mg, Rectal, Q6H PRN  perflutren lipid microspheres (DEFINITY) injection 1.5 mL, 1.5 mL, IntraVENous, ONCE PRN  0.9 % sodium chloride infusion, , IntraVENous, Continuous  vancomycin (VANCOCIN) intermittent dosing (placeholder), , Other, RX Placeholder  HYDROmorphone (DILAUDID) injection 0.5 mg, 0.5 mg, IntraVENous, Q3H PRN    Allergies   Allergen Reactions    Pcn [Penicillins] Hives and Other (See Comments)     Unknown reaction; patient has tolerated cephalexin in 09/2016 per chart review as of 9/12/22.     Lisinopril Other (See Comments)     Hiccups-pt family does not want pt receiving medication        REVIEW OF SYSTEMS:    CONSTITUTIONAL:  negative for fevers, chills, diaphoresis, activity change  EYES:  negative for blurred vision, eye discharge, visual disturbance and icterus  HEENT:  negative for hearing loss, tinnitus, ear drainage, sinus pressure, nasal congestion, epistaxis and snoring  RESPIRATORY:  shortness of breath, no hemoptysis  CARDIOVASCULAR:  negative for chest pain, palpitations, exertional chest pressure/discomfort, edema, syncope  GASTROINTESTINAL:  negative for nausea, vomiting, diarrhea, constipation, blood in stool and abdominal pain  GENITOURINARY:  negative for frequency, dysuria, urinary incontinence, decreased urine volume, and hematuria  HEMATOLOGIC/LYMPHATIC:  negative for easy bruising, bleeding and lymphadenopathy  ALLERGIC/IMMUNOLOGIC:  negative for recurrent infections, angioedema, anaphylaxis and drug reactions  ENDOCRINE:  negative for weight changes and diabetic symptoms including polyuria, polydipsia and polyphagia  MUSCULOSKELETAL:  weakness and numbness arms and legs. NEUROLOGICAL:  negative for headaches, slurred speech, unilateral weakness  PSYCHIATRIC/BEHAVIORAL: confusion 2/2 dementia      Objective:   PHYSICAL EXAM:      VITALS:  /66   Pulse 87   Temp 99.5 °F (37.5 °C) (Oral)   Resp 16   Ht 5' 7\" (1.702 m)   Wt 151 lb 10.8 oz (68.8 kg)   SpO2 96%   BMI 23.76 kg/m²      24HR INTAKE/OUTPUT:    Intake/Output Summary (Last 24 hours) at 2/21/2023 1621  Last data filed at 2/20/2023 2036  Gross per 24 hour   Intake 100 ml   Output --   Net 100 ml     CONSTITUTIONAL:  Awake, alert, cachectic   HEENT: NCAT, PERRL, EOMI. Sclera white, conjunctive full. OP with moist mucosal membranes, no thrush, tongue protrudes midline  NECK:  Supple, symmetrical, trachea midline, no adenopathy  LUNGS:  no increased work of breathing and clear to auscultation. No accessory muscle use  CARDIOVASCULAR: S1 and S2  ABDOMEN:  normal bowel sounds, non-tender, non-distended, no hepatosplenomegaly  LYMPHADENOPATHY:  no axillary or supraclavicular adenopathy. No cervical adnenopathy  PSYCHIATRIC: Oriented to person only. No obvious depression or anxiety. MUSCULOSKELETAL: right arm AVF with thrill, Bilateral lower extremity edema, there is an ulcer on the left great toe     SKIN:  normal skin color, texture, turgor and no redness, warmth, or swelling. No palpable nodules or stigmata of embolic phenomenon  NEUROLOGIC: Oriented to self only, not to place or time.  Strength in LEs diminished about 4/5  ACCESS: PIV    DATA:    Old records have been reviewed    CBC:  Recent Labs     02/20/23  1019 02/21/23  0501   WBC 13.0* 14.2*   RBC 3.13* 2.84*   HGB 8.4* 7.7*   HCT 26.4* 24.1*    283   MCV 84.5 85.1   MCH 26.7 27.1   MCHC 31.6 31.8   RDW 18.3* 17.4*      BMP:  Recent Labs     02/20/23  1121 02/21/23  0501    138   K 3.7 4.2   CL 97* 98*   CO2 31 31   BUN 15 21* CREATININE 3.2* 4.4*   CALCIUM 8.0* 8.4   GLUCOSE 159* 159*        Cultures:  Blood cultures x2 collected on 2/21: pending  Blood cultures x2 collected on 2/20: Positive with growth of strep species, pending identification and sensitivities  Superficial wound swab of the left great toe 2/20 with strep pyogenes, Staph aureus and haemophilus parainfluenza, sensitivities pending    Radiology Review:  All pertinent images / reports were reviewed as a part of this visit. MRI left foot without contrast 2/21: Acute osteomyelitis of the first distal phalanx. Assessment:     Patient Active Problem List   Diagnosis    Abscess    Remote history of stroke    Chest pain    ESRD (end stage renal disease) on dialysis ([address]    CAD (coronary artery disease)    NSTEMI (non-ST elevated myocardial infarction) ([address]    PAD (peripheral artery disease) ([address]    S/P cardiac catheterization    Hypotension    Symptomatic anemia    Complication of vascular access for dialysis    Limited mobility    Vascular dementia without behavioral disturbance (HCC)    Thrombocytopenia ([address]    Delirium    Confusion    Vascular dementia of acute onset, without behavioral disturbance (HCC)    HTN (hypertension), benign    Dyslipidemia    Bilateral carotid artery stenosis    Osteomyelitis ([address]     Plan:   80-year-old male with past medical history of osteomyelitis of the right foot status post amputation of the right great and third toes, ESRD on HD M WF, CAD, PAD, HTN, HLD, vascular dementia secondary to strokes in 1999 and 2001 that presented with shortness of breath during dialysis. Left first distal phalanx osteomyelitis:  - Patient has been evaluated by podiatry and is pending bilateral arterial duplex but will need at least a partial amputation of the first distal phalanx.  - We will follow for operative management whether that this be definitive or not.   - Patient is currently also bacteremic with strep species, anticipate that this is the source. - Currently on IV vancomycin, will continue. D/c clindamycin and cefepime as superficial wound cx in chronic wounds likely reflect colonization. If there is bone bx done on amputation and cx showing growth, will change abx according to OR cx. Strep species bacteremia:  - Blood cultures x2 sets on 2/20 with growth of strep species, pending identification and speciation  - Pending this and without knowing the sensitivities, will okay to continue with vancomycin  - Anticipate further changes with identification  - TTE pending  Repeat blood cultures as of 2/21 also pending  - will need to hold off on PICC placement pending clearance of bcx. ESRD on HD:  - continue vancomycin to be dosed per levels       Medical Decision Making: The following items were considered in medical decision making:  Discussion of patient care with other providers  Reviewed clinical lab tests  Reviewed radiology tests  Reviewed other diagnostic tests/interventions  Independent review of radiologic images  Microbiology cultures and other micro tests reviewed      Risk of Complications/Morbidity: High   Illness(es)/ Infection present that pose threat to bodily function. There is potential for severe exacerbation of infection/side effects of treatment. Therapy requires intensive monitoring for antimicrobial agent toxicity    Discussed with pt RN.    [address] Nico Mejia MD

## 2023-02-21 NOTE — CONSULTS
Clinical Pharmacy Progress Note  Medication History      Asked to verify home medications by Dr. Roe.     List of of current medications patient is taking is in process. Home Medication list in EPIC updated to reflect changes noted below.     Source of information:   Called [facility] ([phone]) to obtain med list - still no med list sent.  Called [facility] ([phone]) - they appear to fill meds for [place].  List updated using fill info from [place] until med list can be obtained from facility.     Changes made to home medication list:   Medications removed (no longer taking):  Lisinopril     Medications added:   Enalapril  Tamsulosin     Medication doses / instructions adjusted:   Trazodone     If list is obtained from [facility], will update med list and this note at that time.     Please call with questions--  Thanks--  Elizabeth Nix, PharmD, BCPS, [facility]  [id number] ([facility])   2/21/2023 3:11 PM      Current Outpatient Medications   Medication Instructions    aspirin 81 mg, Oral, DAILY    atorvastatin (LIPITOR) 40 MG tablet TAKE 1 TABLET BY MOUTH EVERYDAY AT BEDTIME    enalapril (VASOTEC) 10 mg, Oral, DAILY    hydrALAZINE (APRESOLINE) 25 MG tablet 1 tablet, Oral, 2 times daily    isosorbide mononitrate (IMDUR) 30 mg, Oral, DAILY    melatonin 3 MG TABS tablet 1 tablet, Oral, NIGHTLY    memantine (NAMENDA) 10 MG tablet TAKE 1 TABLET BY MOUTH TWICE A DAY    metoprolol succinate (TOPROL XL) 25 MG extended release tablet 1 tablet, Oral, DAILY    midodrine (PROAMATINE) 5 mg, Oral, 2 times daily    mineral oil-hydrophilic petrolatum (AQUAPHOR) ointment Topical, As directed    mirtazapine (REMERON) 7.5 mg, Oral, NIGHTLY    nitroGLYCERIN (NITROSTAT) 0.4 mg, SubLINGual, EVERY 5 MIN PRN    omeprazole (PRILOSEC) 10 mg, Oral, DAILY    ondansetron (ZOFRAN) 4 mg, Oral, EVERY 8 HOURS PRN    SANTYL 250 UNIT/GM ointment Topical, As directed    sodium phosphate (FLEET) 7-19 GM/118ML  enema 1 enema, Rectal, DAILY PRN    tamsulosin (FLOMAX) 0.4 mg, Oral, DAILY    traZODone (DESYREL) 25 mg, Oral, Nightly    vitamin D 25 MCG (1000 UT) CAPS 1 capsule, Oral, DAILY

## 2023-02-21 NOTE — PROGRESS NOTES
Internal Medicine Progress Note    Patient Name: Donnie Little   Patient : 1955   Date: 2023   Admit Date: 2023     CC: Shortness of Breath       Subjective     Interval History:     Tmax:  101.7 F (, )  BCx x 2: grew Streptococcus. Already on cefepime. Saturating well on RA  HD planned for tomorrow ()  Tentative LLE angiogram tomorrow ()    Patient seen and examined at bedside. Endorses moderate left foot pain, mild nausea. Denies headache, lightheadedness, dizziness, chest pain/pressure/tightness, abdominal pain, vomiting. ROS:  As per interval history above. Objective     Vital Signs:  Patient Vitals for the past 8 hrs:   BP Temp Temp src Pulse Resp SpO2   23 0510 120/66 97.8 °F (36.6 °C) Oral 80 16 93 %       Physical Exam:  Constitutional: Pleasant, ill-appearing, non-diaphoretic, elderly male in NAD. HEENT:  NCAT. No scleral icterus or conjunctival injection. No rhinorrhea or epistaxis. Cardiovascular:  No apparent JVD. RRR. Radial pulses 2+. Capillary refill < 2 sec. Pulmonary:  Able to speak in full sentences without frequent pauses. CTAB. No perioral cyanosis. Abdomen: Non-distended. Normoactive bowel sounds. Soft, no TTP. Musculoskeletal:  Cachectic. Bilateral LE trace pitting edema, extending proximally to tibial tuberosities. Left foot wrapped with dressing. Skin:  [place], dry. Neurological:  Alert, awake, and oriented to self. Not oriented to place, time, and situation. Psychological:  Cooperative. Normal speech and behavior.         Intake/Output Summary (Last 24 hours) at 2023 09  Last data filed at 2023  Gross per 24 hour   Intake 100 ml   Output --   Net 100 ml        Medications:   magnesium sulfate  2,000 mg IntraVENous Once    clindamycin (CLEOCIN) IV  900 mg IntraVENous Q8H    cefepime  1,000 mg IntraVENous Q24H    heparin (porcine)  5,000 Units SubCUTAneous 3 times per day    aspirin  81 mg Oral Daily atorvastatin  40 mg Oral Daily    [Held by provider] memantine  10 mg Oral BID    metoprolol succinate  25 mg Oral Daily    [Held by provider] midodrine  5 mg Oral BID    pantoprazole  40 mg Oral QAM AC    Vitamin D  1,000 Units Oral Daily    sodium chloride flush  5-40 mL IntraVENous 2 times per day    vancomycin (VANCOCIN) intermittent dosing (placeholder)   Other RX Placeholder       sodium chloride      sodium chloride 75 mL/hr at 02/21/23 0657        Labs:  CBC:   Recent Labs     02/20/23  1019 02/21/23  0501   WBC 13.0* 14.2*   HGB 8.4* 7.7*   HCT 26.4* 24.1*    283   MCV 84.5 85.1     Renal:    Recent Labs     02/20/23  1121 02/21/23  0501    138   K 3.7 4.2   CL 97* 98*   CO2 31 31   BUN 15 21*   CREATININE 3.2* 4.4*   GLUCOSE 159* 159*   CALCIUM 8.0* 8.4   MG  --  1.40*   PHOS  --  2.7   ANIONGAP 11 9     Hepatic:   Recent Labs     02/21/23  0501   LABALBU 2.5*     Pro-BNP:   Recent Labs     02/20/23  1025   PROBNP 7,611*       Lipids: No results for input(s): CHOL, TRIG, HDL, LDLCALC, VLDL in the last 72 hours. ABGs:  No results for input(s): PHART, GIG2OYU, PO2ART, AOM0QJI, BEART, THGBART, G6FHHIXM, DVZ7PFC in the last 72 hours. VBGs:   Recent Labs     02/20/23  1025   PHVEN 7.398   FSH7YWJ 57.6*   PO2VEN <30.0       INR: No results for input(s): INR in the last 72 hours. aPTT: No results for input(s): APTT in the last 72 hours. Procalcitonin: No results for input(s): PROCAL in the last 72 hours. Recent Labs     02/20/23  1121   .1*     ESR: No results for input(s): ESR in the last 72 hours. Radiology:  MRI FOOT LEFT WO CONTRAST   Final Result   Acute osteomyelitis of the first distal phalanx. XR FOOT LEFT (MIN 3 VIEWS)   Final Result   1.  Medial ulceration with lytic changes compatible with osteomyelitis of the base of the first proximal phalanx and distal first metatarsal.      XR CHEST PORTABLE   Final Result   Impression: No acute abnormality      VL DUP LOWER EXTREMITY ARTERIES BILATERAL    (Results Pending)       Assessment & Plan   75-year-old  male who presented to the ED from his SNF on 2/20/23 with dyspnea occurring during hemodialysis. Sepsis with Group A streptococcus bacteremia - possibly 2/2 from osteomyelitis; however, need to r/o HD catheter as source  2/20: Growth on BCx x2 and on PCR, prealbumin low at 7.1, ESR elevated at 117, CRP elevated at 282. 1. Continue cefepime (2/20-)  ID consulted, recs appreciated  CBC w/diff daily    Acute osteomyelitis of left first distal phalanx  S/p amputation of right hallux and 4th digit 2/2 osteomyelitis  Podiatry and vascular surgery consulted  NPO at midnight for tentative LLE angiogram tomorrow (2/22)  VL arterial duplex - pending  Continue vancomycin (dosed by pharmacy) and cefepime (2/20-)  PT/OT eval and tx    Peripheral vascular disease, 80% stenosis LLE  Follows with vascular surgery at [facility].  Appointment scheduled for 2/24/23. Vascular surgery consulted    Dyspnea with new systolic murmur  5/67:  ProBNP 7611. Echocardiogram pending to r/o new cardiac pathology      Chronic Medical Conditions:  Chest pain, intermittent  Nitroglycerin used at home    Oliguric ESRD on HD (M,W,F)  Hyperparathyroidism of ESRD  Normocytic anemia of ESRD  Nephrology consulted  BMP, Mg QD - replete to keep K > 4, Mg > 2  Avoid NSAIDs/nephrotoxins as patient still makes urine  Strict I/Os  Daily weights  Keep MAP > 65 mmHg  Iron studies pending  Procrit with dialysis    Pulmonary hypertension  Hypertension  CAD (LAD 15%, L Cx 15%, RCA 15-20% in 07/2016)  Continue ASA 81 mg QD  Hold home meds metoprolol succinate 25 mg QD, isosorbide mononitrate 30 mg QD, hydralazine 50 mg TID as patient is normotensive    HLD  Lipid panel (2/16/18): Total 113, HDL 24, LDL 52, , VLDL 37.   Continue home atorvastatin 40 mg QD  Lipid panel - pending    Vascular dementia  Hemiplegia   Palliative care consulted  Continue memantine 10 mg BID    Insomnia  Melatonin, trazadone, mirtazapine PRN - hold pending med rec    GERD  Protonix QD      DVT PPx:  Heparin SQ, Protonix  Diet:  ADULT DIET; Dysphagia - Soft and Bite Sized; 3 carb choices (45 gm/meal); Low Sodium (2 gm); Low Potassium (Less than 3000 mg/day); Low Phosphorus (Less than 1000 mg)  Diet NPO  ADULT ORAL NUTRITION SUPPLEMENT; Breakfast, Lunch, Dinner; Wound Healing Oral Supplement  ADULT ORAL NUTRITION SUPPLEMENT; Breakfast, Lunch, Dinner; Wound Healing Oral Supplement   Code status:  Full Code     ELOS:  3 days  Barriers to discharge:  Disposition  - Preadmission: From [facility].  - Current:  GMF  - Upon discharge:  TBD. Will discuss with attending physician Dr. Félix Shearer MD.     Ata Rosas.  Cleve Davis MD  Internal Medicine, PGY-1

## 2023-02-21 NOTE — PROGRESS NOTES
PRE-OP NOTE  Department of Surgery      Chief Complaint or Reason for Surgery: AVG dysfunction    Procedure: AVG thrombectomy  Expected time: 02/22 at 1330    Plan  1. Diet: NPO at midnight  2. IVF: Continue NS 75 cc/hr  3. Antibiotics: Continue Vancomycin  4. Labs to be drawn: CBC with auto-differential, renal panel, magnesium, Type and Screen, INR  5. Anesthesia: To see patient  6. Consent: Obtained and in chart by POA  7. Pulmonary: CXR: reviewed CXR from 02/20   8. Cardiac: Reviewed echo and EKG; LVEF 55-60% and sinus tachycardia  9. DVT ppx: Please give SQH.  Do not hold day of surgery      Kelvin Barker DO  02/21/23  4:51 PM

## 2023-02-21 NOTE — PROGRESS NOTES
Clinical Pharmacy Progress Note    Vancomycin - Management by Pharmacy    Consult Date(s): 2/20/23  Consulting Provider(s): Dr. Raquel Zimmerman / Plan  1)  LLE osteomyelitis with abscess - Vancomycin  Concurrent Antimicrobials: Cefepime, Clindamycin  Day of Vanc Therapy:  #2  Current Dosing Method: Intermittent Dosing by Levels  Therapeutic Goal: Trough ~ 15 mg/L  Current Dose / Plan:   Pt is ESRD on HD Mon-Wed-Fri - will dose Vancomycin intermittently based on levels. Received 1750mg IV x1 as loading dose last evening. Random level this AM = 21.9 mcg/mL. HD is not currently planned for today. Will not give any Vancomycin today. Will check random level in AM tomorrow prior to HD. Will continue to monitor clinical condition and make adjustments to regimen as appropriate. Please call with questions--  Thanks--  Oliver Castro, PharmD, BCPS, BCGP  [id number] ([facility])   2/21/2023 9:18 AM      Interval update:  Vascular surgery is tentatively planning for LLE arteriogram tomorrow pending arterial duplex results. Blood cultures with Group A Strep on PCR. Subjective/Objective:   Ranjan Gallagher is a 79 y.o. male with a PMHx significant for ESRD on HD Mon-Wed-Fri, CAD, CVA, dementia, depression, DM, GERD, HTN, HLD, and PVD who is admitted with LLE osteomyelitis of 1st metatarsal, 1st proximal phalanx, and 1st distal phalanx with abscess. Pharmacy is consulted to dose Vancomycin.     Ht Readings from Last 1 Encounters:   02/20/23 5' 7\" (1.702 m)     Wt Readings from Last 1 Encounters:   02/21/23 151 lb 10.8 oz (68.8 kg)     Current & Prior Antimicrobial Regimen(s):  Cefepime (2/20-current)  Clindamycin (2/20-current)  Vancomycin - Pharmacy to dose  Intermittent dosing (2/20-current)    Vancomycin Level(s) / Doses:    Date Vancomycin Level Vancomycin Dose   2/20  1750mg x1   2/21 21.9 mcg/mL --          Cultures & Sensitivities:    Date Site Micro Susceptibility / Result   2/20 Blood x2 Group A Strep on PCR, final ID pending            Recent Labs     02/20/23  1019 02/20/23  1121 02/21/23  0501   CREATININE  --  3.2* 4.4*   BUN  --  15 21*   WBC 13.0*  --  14.2*     CrCl is not calculated 2/2 ESRD on HD

## 2023-02-21 NOTE — PROGRESS NOTES
Returned from MRI to the foot via stretcher. Lying in bed with both eyes open. Denied any pain or acute distress.

## 2023-02-21 NOTE — CONSULTS
[facility]    [facility]              [phone]                       Plan :     - HD scheduled for tomorrow  - Procrit ordered with dialysis  - D/C midodrine  - Hold metoprolol due to BP  - Ordered PTH, vit D, Renal panel in morning  - Ordered iron studies     Assessment :     Pt is a 78 yo male with pmhx of osteomyelitis s/p amputation, ESRD on HD, CAD, PAD, HTN, HLD, and vascular dementia who we were consulted for hemodialysis continuation. ESRD on HD MWF  Last session on Monday (2/20). Right arm fistula. Creatinine of 4.4 today which is lower than baseline (5-6). Electrolytes within normal limits. Already on lipitor. Will continue HD as scheduled tomorrow as no urgent need for hemodialysis today. - HD scheduled for tomorrow to remain on schedule  - Procrit ordered since Hgb down to 7.7  - Ordered PTH, vit D, morning renal panel    Suspected Chronic Normocytic Anemia   Unclear what baseline Hb is; as such cannot comment on chronicity  Hgb of 7.7 today, down from 8.4 yesterday. Normocytic, but elevated RDW (17.4). - Ordered iron studies  - Procrit to be given with dialysis; goal of 10-11    Hypotension  Patient currently on metoprolol and midodrine. Home hydralazine, imdur, and lisinopril held. Episode of hypotension at admission, but given good blood pressure control at this point, will hold metoprolol and discontinue midodrine.  - D/C midodrine  - Hold metoprolol  - continue holding home hydralazine, imdur, and lisinopril    Left foot osteomyelitis  Group A Strep Bacteremia  Sepsis  Continue medical management from primary team. Currently on cefepime and clindamycin. Dose of vancomycin yesterday. Planned for angiogram tomorrow (2/22). Podiatry will decide on intervention based on results. Tentative plan for partial first ray amputation.     [facility] would like to thank Erika Kim MD   for opportunity to serve this patient      Please call with questions at- 24 Hrs Answering service [phone] or  7 am- 5 pm via Perfect serve or cell phone      CC/reason for consult :     Hemodialysis continuation     HPI :     Miguel Angel Brown is a 79 y.o. male with history of osteomyelitis, ESRD on HD, CAD, PAD, HTN, HLD, and vascular dementia who presented to the hospital on 2/20/2023 with shortness of breath during his hemodialysis session. Per daughter, patient had desatted to the [de-identified] during the session. On admission, patient has been improving in respiratory status with normal saturations, but remains admitted due to acute concerns of osteomyelitis of the left great toe, sepsis (BC confirmed 2x for Group A strep), and significant arterial disease (recent arterial duplex showing 80% blockage of the left lower extremity). Chest X-ray was negative for acute concerns. Currently on cefepime (started on 2/20). Single dose of vancomycin given yesterday, with plans to start clindamycin today. Nephrology was consulted for continuation of dialysis. Last Echo in 2019 with 55% EF. Repeat Echo ordered during this admission. Interval History:   Pt mildly hypotensive on admission but now with normal blood pressure. Temperature of 101.7 once yesterday, but now afebrile. Continues to sat well. Got vascular lower extremities arterial duplex this morning. ROS:     Seen with- Dr. Cordelia Shepard MD; Dr. Serafin Sen MD    positives in bold   Constitutional:  fever, chills, weakness, weight change, fatigue  Skin:  rash, pruritus, hair loss, bruising, dry skin, petechiae, left great toe wound   Head, Face, Neck   headaches, swelling,  cervical adenopathy  Respiratory: shortness of breath, cough, or wheezing  Cardiovascular: chest pain, palpitations, dizzy, edema  Gastrointestinal: nausea, vomiting, diarrhea, constipation,belly pain    Yellow skin, blood in stool  Musculoskeletal:  back pain, muscle weakness, gait problems,       joint pain or swelling.   Genitourinary:  dysuria, poor urine flow, flank pain, blood in urine  Neurologic:  vertigo, TIA'S, syncope, seizures, focal weakness  Psychosocial:  insomnia, anxiety, or depression. Additional positive findings:                          All other remaining systems are negative.       PMH/PSH/SH/Family History:     Past Medical History:   Diagnosis Date    Amputated great toe, right (HCC)     CAD (coronary artery disease)     CVA (cerebral vascular accident) ([address]     3    Dementia (HCC)     Depression     Diabetes ([address]     Diabetic polyneuropathy ([address]     ESRD on dialysis ([address]     monday wednesday friday    GERD (gastroesophageal reflux disease)     Hemiplegia and hemiparesis following cerebral infarction affecting right dominant side (HCC)     Hyperlipidemia     Hypertension     Pain syndrome, chronic     Peripheral vascular disease ([address]     [facility]    Secondary hyperparathyroidism [facility])        Past Surgical History:   Procedure Laterality Date    CARDIAC CATHETERIZATION      DIALYSIS FISTULA CREATION         Social History     Socioeconomic History    Marital status:      Spouse name: Not on file    Number of children: Not on file    Years of education: Not on file    Highest education level: Not on file   Occupational History    Not on file   Tobacco Use    Smoking status: Former    Smokeless tobacco: Never   Vaping Use    Vaping Use: Never used   Substance and Sexual Activity    Alcohol use: No     Alcohol/week: 0.0 standard drinks    Drug use: No    Sexual activity: Not Currently   Other Topics Concern    Not on file   Social History Narrative    Not on file     Social Determinants of Health     Financial Resource Strain: Not on file   Food Insecurity: Not on file   Transportation Needs: Not on file   Physical Activity: Not on file   Stress: Not on file   Social Connections: Not on file   Intimate Partner Violence: Not on file   Housing Stability: Not on file           Problem Relation Age of Onset    Heart Disease Mother     Diabetes Mother     Dementia Mother     Diabetes Father     Heart Disease Father     Stroke Brother           Medication:     Scheduled Meds:   magnesium sulfate  2,000 mg IntraVENous Once    clindamycin (CLEOCIN) IV  900 mg IntraVENous Q8H    cefepime  1,000 mg IntraVENous Q24H    heparin (porcine)  5,000 Units SubCUTAneous 3 times per day    aspirin  81 mg Oral Daily    atorvastatin  40 mg Oral Daily    [Held by provider] memantine  10 mg Oral BID    metoprolol succinate  25 mg Oral Daily    [Held by provider] midodrine  5 mg Oral BID    pantoprazole  40 mg Oral QAM AC    Vitamin D  1,000 Units Oral Daily    sodium chloride flush  5-40 mL IntraVENous 2 times per day    vancomycin (VANCOCIN) intermittent dosing (placeholder)   Other RX Placeholder     Continuous Infusions:   sodium chloride      sodium chloride 75 mL/hr at 02/21/23 0657     PRN Meds:.melatonin, [Held by provider] mirtazapine, nitroGLYCERIN, ondansetron, [Held by provider] traZODone, sodium chloride flush, sodium chloride, polyethylene glycol, acetaminophen **OR** acetaminophen, perflutren lipid microspheres, HYDROmorphone    Vitals :     Vitals:    02/21/23 1038   BP: 121/66   Pulse: 87   Resp: 16   Temp: 99.5 °F (37.5 °C)   SpO2: 96%       I & O :       Intake/Output Summary (Last 24 hours) at 2/21/2023 1049  Last data filed at 2/20/2023 2036  Gross per 24 hour   Intake 100 ml   Output --   Net 100 ml        Physical Examination :     General appearance: Anxious- no, distressed- no, appears cachetic and ill  HEENT: Lips- normal, teeth- ok , oral mucosa- moist  Neck : Mass- no, appears symmetrical, JVD- not visible  Respiratory: Respiratory effort- normal effort, wheeze- no, crackles - no  Cardiovascular:  Ausculation- No M/R/G, Edema - limited to none  Abdomen: visible mass- no, distention- no, scar- no, tenderness- no                            hepatosplenomegaly-  no  Musculoskeletal:  clubbing no,cyanosis- no , digital ischemia- no muscle strength- grossly normal , tone - grossly normal.  Skin: rashes- no , ulcers- yes, on left great toe but not visualized due to dressing, induration- no, tightening - no  Psychiatric:  Judgement and insight- normal           AAO X 3     LABS:     Recent Labs     02/20/23  1019 02/21/23  0501   WBC 13.0* 14.2*   HGB 8.4* 7.7*   HCT 26.4* 24.1*    283     Recent Labs     02/20/23  1121 02/21/23  0501    138   K 3.7 4.2   CL 97* 98*   CO2 31 31   BUN 15 21*   CREATININE 3.2* 4.4*   GLUCOSE 159* 159*   MG  --  1.40*   PHOS  --  2.7          Patient discuss with Dr. Tg Dawson  Internal Medicine, MS4      Medical Student Note edited and addended where appropriate. Baldev Armas MD  PGY-3 Internal Medicine  02/21/23     Patient was seen and examined and the case was discussed with the resident. He acted as my scribe. I agree with the assessment and plan.

## 2023-02-21 NOTE — PROGRESS NOTES
Physical Therapy/Occupational Therapy    Orders received and chart reviewed. Pt currently off floor. Will f/u later this date as schedule allows.     [address] [facility], [address] [place], [place], [address] [facility]

## 2023-02-21 NOTE — PROGRESS NOTES
Podiatric Surgery Daily Progress Note  Shaw Craig      Subjective :   Patient seen and examined this am at the bedside. Patient denies any acute overnight events. Reports feeling better overall this AM. States that his nausea and shortness of breath is improving. He reports that he still has pain to his left foot. Patient denies calf pain, thigh pain, chest pain. Review of Systems: A 12 point review of symptoms is unremarkable with the exception of the chief complaint. Patient specifically denies  vomiting, chills, chest pain, abdominal pain, constipation or difficulty urinating. Objective     /66   Pulse 82   Temp 98.5 °F (36.9 °C) (Oral)   Resp 16   Ht 5' 7\" (1.702 m)   Wt 151 lb 10.8 oz (68.8 kg)   SpO2 95%   BMI 23.76 kg/m²     I/O:  Intake/Output Summary (Last 24 hours) at 2/21/2023 9590  Last data filed at 2/20/2023 2036  Gross per 24 hour   Intake 100 ml   Output --   Net 100 ml              Wt Readings from Last 3 Encounters:   02/21/23 151 lb 10.8 oz (68.8 kg)   02/20/23 153 lb 7 oz (69.6 kg)   12/09/22 156 lb 8 oz (71 kg)       LABS:    Recent Labs     02/20/23  1019 02/21/23  0501   WBC 13.0* 14.2*   HGB 8.4* 7.7*   HCT 26.4* 24.1*    283        Recent Labs     02/21/23  0501      K 4.2   CL 98*   CO2 31   PHOS 2.7   BUN 21*   CREATININE 4.4*      No results for input(s): PROT, INR, APTT in the last 72 hours. LOWER EXTREMITY EXAMINATION    Dressing to left LE intact. No strikethrough noted to the external dressing. Betadine discoloration noted to the internal layers of the dressing. VASCULAR: DP and PT pulses nonpalpable 0/4 b/l. Upon hand-held Doppler examination, left DP noted to be monophasic, left PT and right DP, PT are softly biphasic. CFT is less than 3 seconds to the digits of the foot with the exception of the left hallux. Skin temperature is warm to cool from proximal to distal with mildly increased warmth to the left LE. No edema noted.  No pain with calf compression b/l. NEUROLOGIC: Gross and epicritic sensation is intact b/l. Protective sensation is diminished at all pedal sites b/l. DERMATOLOGIC:   Left LE:  Necrotic eschar noted to the medial aspect of the first metatarsal which is firmly adhered. Full-thickness ulceration at the dorsal medial aspect of the left hallux with fibrotic and necrotic base. Wound measures approximately 0.6 x 0.5 x 0.3 cm. Wound probes to deep fascia/periosteum. Wound does not tunnel or track. Mild macerated tissue noted at the lateral aspect of the hallux and the 1st interspace, improving since admission. Mild malodor noted. No fluctuance or crepitus noted. No surrounding erythema noted. No purulent drainage encountered. Images from 2/21. Right LE: No acute signs of infection. MUSCULOSKELETAL: Muscle strength is 4/5 for all pedal groups tested. Diffuse pain to left LE with maximal tenderness noted at left hallux, plantar 1st MPJ. Ankle joint ROM is decreased in dorsiflexion with the knee extended. History of left partial 1st ray amputation, 3rd digit amputation. IMAGING:  XR Left Foot 2/20/23  Narrative   History: Medial foot ulcer. 2 views of foot. FINDINGS: There is a medial ulceration. There is lysis of the medial base of the proximal phalanx. Lysis of the medial head of the first metatarsal suspicious for osteomyelitis. No soft tissue gas. Associated soft tissue swelling. Impression   1. Medial ulceration with lytic changes compatible with osteomyelitis of the base of the first proximal phalanx and distal first metatarsal.     MRI Left foot 2/21/23  Narrative   Patient: Delmer Gallagher  Time Out: 07:20   Exam(s): MRI LEFT FOOT Without Contrast        EXAM:     MR Left Lower Extremity Without Intravenous Contrast, Foot       CLINICAL HISTORY:     concern for OM at first metatarsal head, base of proximal    phalanx.        TECHNIQUE:     Multiplanar magnetic resonance images of the left foot without    intravenous contrast.       COMPARISON:     No relevant prior studies available. FINDINGS:   Images are degraded by motion artifact. Ulcer along the medial aspect of the first toe. Underlying    abscess. Subcutaneous soft tissue edema and intramuscular edema    as can be seen in setting of diabetic neuropathic changes. No    soft tissue gas identified. Marrow signal abnormality within the    first distal phalanx consistent with acute osteomyelitis. No    septic arthritis. No acute fracture. Electronically signed by Becky Govea MD on 02-21-23 at 0720       Impression   Acute osteomyelitis of the first distal phalanx. ASSESSMENT/PLAN  - Bacteremia  - Osteomyelitis; 1st metatarsal, 1st proximal phalanx, 1st distal phalanx; left foot  - Abscess; left foot  - Full thickness ulceration; left foot; Bee 3  - Necrotic Eschar; left foot  - Peripheral vascular disease; b/l LE  - History of partial 1st ray and 3rd digit amputation; right foot    -Patient examined and evaluated at bedside   -Febrile overnight at 101.7 F, otherwise VSS, leukocytosis noted (WBC 14.2)  -.1,   -Prealbumin 7.1; ordered dietary nutritional supplements  -HbA1c pending  -Wound culture pending  -Blood culture x2- Group A Strep  -XR, MRI reviewed, impressions noted above  -Arterial Duplex b/l LE pending  -Vascular surgery following; appreciate recommendations  -Dressing applied to left LE consisting of Betadine, gauze, Kerlix, Ace  -Continue broad spectrum IV antibiotics per primary  -Nonweight bearing to the left LE  -Patient would benefit from surgical intervention consisting of partial 1st ray amputation of left lower extremity. Timing pending on vascular surgery recommendations; will discuss with vascular surgery team.     DISPO: Full thickness ulceration with concern for osteomyelitis of 1st metatarsal/proximal phalanx/distal phalanx; left foot.  Labs and imaging reviewed. Continue broad spectrum IV antibiotics. Vascular surgery following. Patient would benefit from surgical intervention consisting of partial 1st ray amputation, timing to be determined pending vascular surgery recommendation.      Discussed assessment and plan with Dr. Pedrito Delong DPM.    Saundra Ceron DPM  Podiatric Resident, PGY-3  Pager #: [phone] or Perfect Serve

## 2023-02-21 NOTE — PROGRESS NOTES
Department of Pharmacy    Notification received from laboratory of positive blood culture results.     Organism(s) detected: Streptococcus, Group A    Recommendation changing empiric antibiotics to: coverage provided by cefepime    Douglas Escalera PharmD 2/21/2023, 7:27 AM

## 2023-02-21 NOTE — PROGRESS NOTES
Pharmacist Review and Automatic Dose Adjustment of Prophylactic Enoxaparin    Enoxaparin 30mg subcut daily is ordered for VTE prophylaxis. As pt is ESRD on HD, Enoxaparin has been changed to Heparin 5000 units SQ Y8D per [facility] policy (see below).     Please call with questions--  Thanks--  Harrison Dockery, PharmD, BCPS, BCGP  [id number] ([facility])   2/21/2023 9:14 AM         Ht Readings from Last 1 Encounters:   02/20/23 5' 7\" (1.702 m)     Wt Readings from Last 1 Encounters:   02/21/23 151 lb 10.8 oz (68.8 kg)

## 2023-02-21 NOTE — PROGRESS NOTES
Vascular Surgery   Daily Progress Note  Patient: Nery Watson      CC: PVD; left foot wound,    SUBJECTIVE:   Afebrile and HDS. No acute events overnight. ROS:   A 14 point review of systems was conducted, significant findings as noted above. All other systems negative. OBJECTIVE:    PHYSICAL EXAM:    Vitals:    02/20/23 1825 02/20/23 1912 02/20/23 2244 02/21/23 0025   BP:  (!) 91/54 103/65 118/66   Pulse:  94 85 82   Resp: 16 16 16 16   Temp:  100.2 °F (37.9 °C) 99.2 °F (37.3 °C) 98.5 °F (36.9 °C)   TempSrc:  Oral Oral Oral   SpO2:  97% 93% 95%   Weight:    151 lb 10.8 oz (68.8 kg)   Height:           General appearance: Cachectic and ill-appearing   Eyes: no scleral icterus, EOM grossly intact  Neck: trachea midline, no JVD, neck supple  Chest/Lungs: normal effort with no accessory muscle use on RA  Cardiovascular: RRR  Abdomen: Soft, non-tender, non-distended, no rebound, guarding, or rigidity. Skin: warm and dry, no rashes  Extremities: +1 pitting edema noted to b/l LE. Dressing noted to left LE. Hallux and 4th digit amputation noted to right foot. Genitourinary: Grossly normal  Neuro: A&Ox3, no focal deficits, sensation intact     Pulses     F P PT DP   R + + -/+ -/+   L + + -/+ -/+    +, -/+ ,-/-     LABS:   Recent Labs     02/20/23  1019 02/21/23  0501   WBC 13.0* 14.2*   HGB 8.4* 7.7*   HCT 26.4* 24.1*   MCV 84.5 85.1    283        Recent Labs     02/20/23  1121 02/21/23  0501    138   K 3.7 4.2   CL 97* 98*   CO2 31 31   PHOS  --  2.7   BUN 15 21*   CREATININE 3.2* 4.4*      No results for input(s): AST, ALT, ALB, BILIDIR, BILITOT, ALKPHOS in the last 72 hours. No results for input(s): LIPASE, AMYLASE in the last 72 hours. No results for input(s): PROT, INR, APTT in the last 72 hours.      Recent Labs     02/20/23  1025 02/20/23  1222   TROPONINI 0.07* 0.08*     ASSESSMENT & PLAN:   This is a 79 y.o. male with a PMHx of dementia, CAD, CVA, DM, ESRD on dialysis, GERD, Hemiplegia and hemiparesis 2/2 cerebral infarct, HLD, HTN, PVD, and hx of pedal amputations who presented to the hospital for shortness of breath after dialysis this AM. Vascular surgery for consulted for PVD. - VL Arterial duplex ordered, will follow up on results   - Will tentatively plan for pre-op and consent today for left LE arteriogram on 2/22/23 pending arterial duplex results.    - ok for diet today from vascular standpoint, NPO at midnight    Mati Penn DPM  Podiatric Resident, PGY-2  Contact via [address] [place] [address]

## 2023-02-21 NOTE — PLAN OF CARE
Problem: Skin/Tissue Integrity  Goal: Absence of new skin breakdown  2/20/2023 1948 by Aubree Chew RN  Outcome: Progressing     Problem: Respiratory - Adult  Goal: Achieves optimal ventilation and oxygenation  Recent Flowsheet Documentation  Taken 2/20/2023 1912 by Aubree Chew RN  Achieves optimal ventilation and oxygenation: Assess for changes in respiratory status     Problem: Discharge Planning  Goal: Discharge to home or other facility with appropriate resources  2/20/2023 1948 by Aubree Chew RN  Outcome: Progressing     Problem: Pain  Goal: Verbalizes/displays adequate comfort level or baseline comfort level  2/20/2023 1948 by Aubree Chew RN  Outcome: Progressing  Flowsheets (Taken 2/20/2023 1912)  Verbalizes/displays adequate comfort level or baseline comfort level: Assess pain using appropriate pain scale

## 2023-02-22 ENCOUNTER — APPOINTMENT (OUTPATIENT)
Dept: INTERVENTIONAL RADIOLOGY/VASCULAR | Age: 68
DRG: 853 | End: 2023-02-22
Payer: MEDICARE

## 2023-02-22 PROBLEM — R78.81 STREPTOCOCCAL BACTEREMIA: Status: ACTIVE | Noted: 2023-02-22

## 2023-02-22 PROBLEM — B95.5 STREPTOCOCCAL BACTEREMIA: Status: ACTIVE | Noted: 2023-02-22

## 2023-02-22 LAB
ABO/RH: NORMAL
ALBUMIN SERPL-MCNC: 2.3 G/DL (ref 3.4–5)
ANION GAP SERPL CALCULATED.3IONS-SCNC: 11 MMOL/L (ref 3–16)
ANTIBODY SCREEN: NORMAL
ATYPICAL LYMPHOCYTE RELATIVE PERCENT: 2 % (ref 0–6)
BANDED NEUTROPHILS RELATIVE PERCENT: 1 % (ref 0–7)
BASOPHILS ABSOLUTE: 0 K/UL (ref 0–0.2)
BASOPHILS RELATIVE PERCENT: 0 %
BUN BLDV-MCNC: 32 MG/DL (ref 7–20)
CALCIUM SERPL-MCNC: 8.6 MG/DL (ref 8.3–10.6)
CHLORIDE BLD-SCNC: 98 MMOL/L (ref 99–110)
CHOLESTEROL, TOTAL: 74 MG/DL (ref 0–199)
CO2: 28 MMOL/L (ref 21–32)
CREAT SERPL-MCNC: 5.8 MG/DL (ref 0.8–1.3)
EOSINOPHILS ABSOLUTE: 0 K/UL (ref 0–0.6)
EOSINOPHILS RELATIVE PERCENT: 0 %
GFR SERPL CREATININE-BSD FRML MDRD: 10 ML/MIN/{1.73_M2}
GLUCOSE BLD-MCNC: 143 MG/DL (ref 70–99)
HAV IGM SER IA-ACNC: NORMAL
HBV SURFACE AB TITR SER: 27.84 MIU/ML
HCT VFR BLD CALC: 25.9 % (ref 40.5–52.5)
HDLC SERPL-MCNC: 10 MG/DL (ref 40–60)
HEMOGLOBIN: 8 G/DL (ref 13.5–17.5)
HEPATITIS B CORE IGM ANTIBODY: NORMAL
HEPATITIS B SURFACE ANTIGEN INTERPRETATION: NORMAL
HEPATITIS C ANTIBODY INTERPRETATION: NORMAL
INR BLD: 1.34 (ref 0.87–1.14)
IRON SATURATION: 39 % (ref 20–50)
IRON: 36 UG/DL (ref 59–158)
LDL CHOLESTEROL CALCULATED: 34 MG/DL
LYMPHOCYTES ABSOLUTE: 1.9 K/UL (ref 1–5.1)
LYMPHOCYTES RELATIVE PERCENT: 13 %
MACROCYTES: ABNORMAL
MAGNESIUM: 2 MG/DL (ref 1.8–2.4)
MCH RBC QN AUTO: 26.9 PG (ref 26–34)
MCHC RBC AUTO-ENTMCNC: 30.8 G/DL (ref 31–36)
MCV RBC AUTO: 87.5 FL (ref 80–100)
METAMYELOCYTES RELATIVE PERCENT: 1 %
MONOCYTES ABSOLUTE: 0 K/UL (ref 0–1.3)
MONOCYTES RELATIVE PERCENT: 0 %
NEUTROPHILS ABSOLUTE: 10.5 K/UL (ref 1.7–7.7)
NEUTROPHILS RELATIVE PERCENT: 83 %
OVALOCYTES: ABNORMAL
PDW BLD-RTO: 17.8 % (ref 12.4–15.4)
PHOSPHORUS: 3.2 MG/DL (ref 2.5–4.9)
PLATELET # BLD: 329 K/UL (ref 135–450)
PMV BLD AUTO: 8.2 FL (ref 5–10.5)
POTASSIUM SERPL-SCNC: 4.3 MMOL/L (ref 3.5–5.1)
PROTHROMBIN TIME: 16.5 SEC (ref 11.7–14.5)
RBC # BLD: 2.96 M/UL (ref 4.2–5.9)
SCHISTOCYTES: ABNORMAL
SODIUM BLD-SCNC: 137 MMOL/L (ref 136–145)
TOTAL IRON BINDING CAPACITY: 92 UG/DL (ref 260–445)
TRIGL SERPL-MCNC: 148 MG/DL (ref 0–150)
VANCOMYCIN RANDOM: 16.9 UG/ML
VITAMIN D 25-HYDROXY: 55.7 NG/ML
VLDLC SERPL CALC-MCNC: 30 MG/DL
WBC # BLD: 12.4 K/UL (ref 4–11)

## 2023-02-22 PROCEDURE — 37228 HC TIB PER TERRITORY PLASTY: CPT

## 2023-02-22 PROCEDURE — 75710 ARTERY X-RAYS ARM/LEG: CPT

## 2023-02-22 PROCEDURE — 97535 SELF CARE MNGMENT TRAINING: CPT

## 2023-02-22 PROCEDURE — 2580000003 HC RX 258

## 2023-02-22 PROCEDURE — 80074 ACUTE HEPATITIS PANEL: CPT

## 2023-02-22 PROCEDURE — 6370000000 HC RX 637 (ALT 250 FOR IP)

## 2023-02-22 PROCEDURE — 97166 OT EVAL MOD COMPLEX 45 MIN: CPT

## 2023-02-22 PROCEDURE — 6360000002 HC RX W HCPCS: Performed by: STUDENT IN AN ORGANIZED HEALTH CARE EDUCATION/TRAINING PROGRAM

## 2023-02-22 PROCEDURE — 37228 PR REVSC OPN/PRQ TIB/PERO W/ANGIOPLASTY UNI: CPT | Performed by: SURGERY

## 2023-02-22 PROCEDURE — 80202 ASSAY OF VANCOMYCIN: CPT

## 2023-02-22 PROCEDURE — 37232 PR REVSC OPN/PRQ TIB/PERO W/ANGIOPLASTY UNI EA VSL: CPT | Performed by: SURGERY

## 2023-02-22 PROCEDURE — 97162 PT EVAL MOD COMPLEX 30 MIN: CPT

## 2023-02-22 PROCEDURE — 99152 MOD SED SAME PHYS/QHP 5/>YRS: CPT

## 2023-02-22 PROCEDURE — C1725 CATH, TRANSLUMIN NON-LASER: HCPCS

## 2023-02-22 PROCEDURE — 6360000002 HC RX W HCPCS

## 2023-02-22 PROCEDURE — 97530 THERAPEUTIC ACTIVITIES: CPT

## 2023-02-22 PROCEDURE — 85610 PROTHROMBIN TIME: CPT

## 2023-02-22 PROCEDURE — 2709999900 HC NON-CHARGEABLE SUPPLY

## 2023-02-22 PROCEDURE — 83735 ASSAY OF MAGNESIUM: CPT

## 2023-02-22 PROCEDURE — 75625 CONTRAST EXAM ABDOMINL AORTA: CPT | Performed by: SURGERY

## 2023-02-22 PROCEDURE — 85025 COMPLETE CBC W/AUTO DIFF WBC: CPT

## 2023-02-22 PROCEDURE — 80069 RENAL FUNCTION PANEL: CPT

## 2023-02-22 PROCEDURE — 86923 COMPATIBILITY TEST ELECTRIC: CPT

## 2023-02-22 PROCEDURE — 75774 ARTERY X-RAY EACH VESSEL: CPT

## 2023-02-22 PROCEDURE — 37232 HC TIB PER TERR ADDL PLASTY: CPT

## 2023-02-22 PROCEDURE — P9016 RBC LEUKOCYTES REDUCED: HCPCS

## 2023-02-22 PROCEDURE — 75710 ARTERY X-RAYS ARM/LEG: CPT | Performed by: SURGERY

## 2023-02-22 PROCEDURE — 80061 LIPID PANEL: CPT

## 2023-02-22 PROCEDURE — 99153 MOD SED SAME PHYS/QHP EA: CPT

## 2023-02-22 PROCEDURE — 86850 RBC ANTIBODY SCREEN: CPT

## 2023-02-22 PROCEDURE — C1760 CLOSURE DEV, VASC: HCPCS

## 2023-02-22 PROCEDURE — 6370000000 HC RX 637 (ALT 250 FOR IP): Performed by: STUDENT IN AN ORGANIZED HEALTH CARE EDUCATION/TRAINING PROGRAM

## 2023-02-22 PROCEDURE — C1769 GUIDE WIRE: HCPCS

## 2023-02-22 PROCEDURE — 86900 BLOOD TYPING SEROLOGIC ABO: CPT

## 2023-02-22 PROCEDURE — 86901 BLOOD TYPING SEROLOGIC RH(D): CPT

## 2023-02-22 PROCEDURE — C1887 CATHETER, GUIDING: HCPCS

## 2023-02-22 PROCEDURE — 86706 HEP B SURFACE ANTIBODY: CPT

## 2023-02-22 PROCEDURE — 2580000003 HC RX 258: Performed by: STUDENT IN AN ORGANIZED HEALTH CARE EDUCATION/TRAINING PROGRAM

## 2023-02-22 PROCEDURE — 1200000000 HC SEMI PRIVATE

## 2023-02-22 PROCEDURE — 6360000004 HC RX CONTRAST MEDICATION: Performed by: SURGERY

## 2023-02-22 PROCEDURE — C1894 INTRO/SHEATH, NON-LASER: HCPCS

## 2023-02-22 PROCEDURE — 047Q3Z1 DILATION OF LEFT ANTERIOR TIBIAL ARTERY USING DRUG-COATED BALLOON, PERCUTANEOUS APPROACH: ICD-10-PCS | Performed by: SURGERY

## 2023-02-22 PROCEDURE — 047U3Z1 DILATION OF LEFT PERONEAL ARTERY USING DRUG-COATED BALLOON, PERCUTANEOUS APPROACH: ICD-10-PCS | Performed by: SURGERY

## 2023-02-22 PROCEDURE — 36415 COLL VENOUS BLD VENIPUNCTURE: CPT

## 2023-02-22 PROCEDURE — 99232 SBSQ HOSP IP/OBS MODERATE 35: CPT | Performed by: INTERNAL MEDICINE

## 2023-02-22 RX ORDER — SODIUM CHLORIDE 0.9 % (FLUSH) 0.9 %
5-40 SYRINGE (ML) INJECTION PRN
Status: DISCONTINUED | OUTPATIENT
Start: 2023-02-22 | End: 2023-03-03 | Stop reason: HOSPADM

## 2023-02-22 RX ORDER — ONDANSETRON 2 MG/ML
4 INJECTION INTRAMUSCULAR; INTRAVENOUS EVERY 6 HOURS PRN
Status: DISCONTINUED | OUTPATIENT
Start: 2023-02-22 | End: 2023-03-03 | Stop reason: HOSPADM

## 2023-02-22 RX ORDER — IODIXANOL 320 MG/ML
250 INJECTION, SOLUTION INTRAVASCULAR
Status: COMPLETED | OUTPATIENT
Start: 2023-02-22 | End: 2023-02-22

## 2023-02-22 RX ORDER — HEPARIN SODIUM 5000 [USP'U]/ML
5000 INJECTION, SOLUTION INTRAVENOUS; SUBCUTANEOUS EVERY 8 HOURS SCHEDULED
Status: DISCONTINUED | OUTPATIENT
Start: 2023-02-22 | End: 2023-02-24

## 2023-02-22 RX ORDER — PROCHLORPERAZINE EDISYLATE 5 MG/ML
10 INJECTION INTRAMUSCULAR; INTRAVENOUS ONCE
Status: COMPLETED | OUTPATIENT
Start: 2023-02-22 | End: 2023-02-22

## 2023-02-22 RX ORDER — MIRTAZAPINE 15 MG/1
7.5 TABLET, FILM COATED ORAL NIGHTLY
Status: DISCONTINUED | OUTPATIENT
Start: 2023-02-22 | End: 2023-03-03 | Stop reason: HOSPADM

## 2023-02-22 RX ORDER — ONDANSETRON 4 MG/1
4 TABLET, ORALLY DISINTEGRATING ORAL EVERY 8 HOURS PRN
Status: DISCONTINUED | OUTPATIENT
Start: 2023-02-22 | End: 2023-03-03 | Stop reason: HOSPADM

## 2023-02-22 RX ORDER — OXYCODONE HYDROCHLORIDE 5 MG/1
5 TABLET ORAL EVERY 4 HOURS PRN
Status: DISCONTINUED | OUTPATIENT
Start: 2023-02-22 | End: 2023-02-24

## 2023-02-22 RX ORDER — LANOLIN ALCOHOL/MO/W.PET/CERES
3 CREAM (GRAM) TOPICAL NIGHTLY
Status: DISCONTINUED | OUTPATIENT
Start: 2023-02-22 | End: 2023-03-03 | Stop reason: HOSPADM

## 2023-02-22 RX ORDER — SODIUM CHLORIDE 9 MG/ML
INJECTION, SOLUTION INTRAVENOUS PRN
Status: DISCONTINUED | OUTPATIENT
Start: 2023-02-22 | End: 2023-03-03 | Stop reason: HOSPADM

## 2023-02-22 RX ORDER — 0.9 % SODIUM CHLORIDE 0.9 %
100 INTRAVENOUS SOLUTION INTRAVENOUS PRN
Status: DISCONTINUED | OUTPATIENT
Start: 2023-02-22 | End: 2023-03-03 | Stop reason: HOSPADM

## 2023-02-22 RX ORDER — ACETAMINOPHEN 500 MG
1000 TABLET ORAL EVERY 8 HOURS PRN
Status: DISCONTINUED | OUTPATIENT
Start: 2023-02-22 | End: 2023-03-03 | Stop reason: HOSPADM

## 2023-02-22 RX ORDER — TRAZODONE HYDROCHLORIDE 50 MG/1
25 TABLET ORAL NIGHTLY
Status: DISCONTINUED | OUTPATIENT
Start: 2023-02-22 | End: 2023-03-03 | Stop reason: HOSPADM

## 2023-02-22 RX ORDER — SODIUM CHLORIDE 0.9 % (FLUSH) 0.9 %
5-40 SYRINGE (ML) INJECTION EVERY 12 HOURS SCHEDULED
Status: DISCONTINUED | OUTPATIENT
Start: 2023-02-22 | End: 2023-03-03 | Stop reason: HOSPADM

## 2023-02-22 RX ORDER — ASPIRIN 81 MG/1
81 TABLET ORAL DAILY
Status: DISCONTINUED | OUTPATIENT
Start: 2023-02-22 | End: 2023-03-03 | Stop reason: HOSPADM

## 2023-02-22 RX ADMIN — IODIXANOL 250 ML: 320 INJECTION, SOLUTION INTRAVASCULAR at 14:25

## 2023-02-22 RX ADMIN — SODIUM CHLORIDE: 9 INJECTION, SOLUTION INTRAVENOUS at 01:03

## 2023-02-22 RX ADMIN — PANTOPRAZOLE SODIUM 40 MG: 40 TABLET, DELAYED RELEASE ORAL at 05:33

## 2023-02-22 RX ADMIN — SODIUM CHLORIDE, PRESERVATIVE FREE 10 ML: 5 INJECTION INTRAVENOUS at 10:31

## 2023-02-22 RX ADMIN — MIRTAZAPINE 7.5 MG: 15 TABLET, FILM COATED ORAL at 20:28

## 2023-02-22 RX ADMIN — ASPIRIN 81 MG: 81 TABLET, COATED ORAL at 16:37

## 2023-02-22 RX ADMIN — HEPARIN SODIUM 5000 UNITS: 5000 INJECTION INTRAVENOUS; SUBCUTANEOUS at 05:33

## 2023-02-22 RX ADMIN — SODIUM CHLORIDE: 9 INJECTION, SOLUTION INTRAVENOUS at 18:54

## 2023-02-22 RX ADMIN — HEPARIN SODIUM 5000 UNITS: 5000 INJECTION INTRAVENOUS; SUBCUTANEOUS at 20:28

## 2023-02-22 RX ADMIN — ATORVASTATIN CALCIUM 40 MG: 40 TABLET, FILM COATED ORAL at 10:31

## 2023-02-22 RX ADMIN — Medication 3 MG: at 20:28

## 2023-02-22 RX ADMIN — HEPARIN SODIUM 5000 UNITS: 5000 INJECTION INTRAVENOUS; SUBCUTANEOUS at 16:37

## 2023-02-22 RX ADMIN — HYDROMORPHONE HYDROCHLORIDE 0.5 MG: 1 INJECTION, SOLUTION INTRAMUSCULAR; INTRAVENOUS; SUBCUTANEOUS at 20:28

## 2023-02-22 RX ADMIN — PROCHLORPERAZINE EDISYLATE 10 MG: 5 INJECTION, SOLUTION INTRAMUSCULAR; INTRAVENOUS at 12:18

## 2023-02-22 RX ADMIN — Medication 1000 UNITS: at 10:31

## 2023-02-22 RX ADMIN — HYDROMORPHONE HYDROCHLORIDE 0.5 MG: 1 INJECTION, SOLUTION INTRAMUSCULAR; INTRAVENOUS; SUBCUTANEOUS at 10:23

## 2023-02-22 RX ADMIN — VANCOMYCIN HYDROCHLORIDE 750 MG: 10 INJECTION, POWDER, LYOPHILIZED, FOR SOLUTION INTRAVENOUS at 19:43

## 2023-02-22 RX ADMIN — MEMANTINE 10 MG: 10 TABLET ORAL at 20:28

## 2023-02-22 RX ADMIN — MEMANTINE 10 MG: 10 TABLET ORAL at 10:31

## 2023-02-22 RX ADMIN — TRAZODONE HYDROCHLORIDE 25 MG: 50 TABLET ORAL at 20:28

## 2023-02-22 ASSESSMENT — PAIN SCALES - GENERAL
PAINLEVEL_OUTOF10: 7
PAINLEVEL_OUTOF10: 0
PAINLEVEL_OUTOF10: 10
PAINLEVEL_OUTOF10: 0

## 2023-02-22 ASSESSMENT — PAIN DESCRIPTION - ORIENTATION: ORIENTATION: RIGHT;LEFT

## 2023-02-22 ASSESSMENT — PAIN DESCRIPTION - ONSET: ONSET: GRADUAL

## 2023-02-22 ASSESSMENT — PAIN DESCRIPTION - DIRECTION: RADIATING_TOWARDS: NO

## 2023-02-22 ASSESSMENT — PAIN - FUNCTIONAL ASSESSMENT
PAIN_FUNCTIONAL_ASSESSMENT: ACTIVITIES ARE NOT PREVENTED
PAIN_FUNCTIONAL_ASSESSMENT: PREVENTS OR INTERFERES SOME ACTIVE ACTIVITIES AND ADLS

## 2023-02-22 ASSESSMENT — PAIN DESCRIPTION - FREQUENCY: FREQUENCY: INTERMITTENT

## 2023-02-22 ASSESSMENT — PAIN DESCRIPTION - LOCATION
LOCATION: FOOT
LOCATION: ARM

## 2023-02-22 ASSESSMENT — PAIN SCALES - WONG BAKER: WONGBAKER_NUMERICALRESPONSE: 0

## 2023-02-22 ASSESSMENT — PAIN DESCRIPTION - PAIN TYPE: TYPE: ACUTE PAIN;CHRONIC PAIN

## 2023-02-22 ASSESSMENT — PAIN DESCRIPTION - DESCRIPTORS: DESCRIPTORS: ACHING

## 2023-02-22 NOTE — PROGRESS NOTES
Vascular Surgery   Daily Progress Note  Patient: Donnie Little      CC: PVD; left foot wound    SUBJECTIVE:   Patient was seen resting comfortably in bed this AM. Afebrile and HDS. No acute events overnight. ROS:   A 14 point review of systems was conducted, significant findings as noted above. All other systems negative. OBJECTIVE:    PHYSICAL EXAM:    Vitals:    02/21/23 1945 02/22/23 0021 02/22/23 0444 02/22/23 0742   BP: (!) 140/51 123/73 (!) 149/67 (!) 87/25   Pulse: 90 82 70 75   Resp: 16 16 17 18   Temp: 98.8 °F (37.1 °C) 99.1 °F (37.3 °C) 98.5 °F (36.9 °C) 98.2 °F (36.8 °C)   TempSrc: Oral Axillary Oral Oral   SpO2: 97% 93% 95% 97%   Weight:   147 lb 11.3 oz (67 kg)    Height:           General appearance: Cachectic and ill-appearing   Eyes: no scleral icterus, EOM grossly intact  Neck: trachea midline, no JVD, neck supple  Chest/Lungs: normal effort with no accessory muscle use on RA  Cardiovascular: RRR  Abdomen: Soft, non-tender, non-distended, no rebound, guarding, or rigidity. Skin: warm and dry, no rashes  Extremities: +1 pitting edema noted to b/l LE. Dressing noted to left LE. Hallux and 4th digit amputation noted to right foot. Genitourinary: Grossly normal  Neuro: A&Ox3, no focal deficits, sensation intact     Pulses     F P PT DP   R + + -/+ -/+   L + + -/+ -/+    +, -/+ ,-/-     LABS:   Recent Labs     02/20/23  1019 02/21/23  0501   WBC 13.0* 14.2*   HGB 8.4* 7.7*   HCT 26.4* 24.1*   MCV 84.5 85.1    283          Recent Labs     02/20/23  1121 02/21/23  0501    138   K 3.7 4.2   CL 97* 98*   CO2 31 31   PHOS  --  2.7   BUN 15 21*   CREATININE 3.2* 4.4*        No results for input(s): AST, ALT, ALB, BILIDIR, BILITOT, ALKPHOS in the last 72 hours. No results for input(s): LIPASE, AMYLASE in the last 72 hours. No results for input(s): PROT, INR, APTT in the last 72 hours.      Recent Labs     02/20/23  1025 02/20/23  1222   TROPONINI 0.07* 0.08*       ASSESSMENT & PLAN: This is a 79 y.o. male with a PMHx of dementia, CAD, CVA, DM, ESRD on dialysis, GERD, Hemiplegia and hemiparesis 2/2 cerebral infarct, HLD, HTN, PVD, and hx of pedal amputations who presented to the hospital for shortness of breath after dialysis this AM. Vascular surgery for consulted for PVD.      - VL arterial duplex reviewed  - Right mid-SFA stenosis >50%  with nonocclussive popliteal and tibial disease   - Left popliteal and TP trunk occlusions with associated PT and AT artery occlusions  - Continue NPO  - Plan for left LE arteriogram on today  - Medical management per primary     Shay Van DPM  Podiatric Resident, PGY-2  Contact via Perfect Serve

## 2023-02-22 NOTE — PROGRESS NOTES
Occupational Therapy  Facility/Department: [facility] [id number] [facility] Initial Assessment/ Treatment    Name: Ritchie Varner  : 1955  MRN: [id number]  Date of Service: 2023    Discharge Recommendations:     OT Equipment Recommendations  Equipment Needed: No  Other: defer   Ritchie Varner scored a 12/24 on the AM-PAC ADL Inpatient form. Current research shows that an AM-PAC score of 17 or less is typically not associated with a discharge to the patient's home setting. Based on the patient's AM-PAC score and their current ADL deficits, it is recommended that the patient have 3-5 sessions per week of Occupational Therapy at d/c to increase the patient's independence. Please see assessment section for further patient specific details. If patient discharges prior to next session this note will serve as a discharge summary. Please see below for the latest assessment towards goals. Patient Diagnosis(es): The primary encounter diagnosis was Osteomyelitis of left foot, unspecified type ([address] A diagnosis of ESRD on dialysis [facility]) was also pertinent to this visit. Past Medical History:  has a past medical history of Amputated great toe, right ([address] CAD (coronary artery disease), CVA (cerebral vascular accident) ([address] Dementia ([address] Depression, Diabetes ([address] Diabetic polyneuropathy ([address] ESRD on dialysis ([address] GERD (gastroesophageal reflux disease), Hemiplegia and hemiparesis following cerebral infarction affecting right dominant side ([address] Hyperlipidemia, Hypertension, Pain syndrome, chronic, Peripheral vascular disease ([address] and Secondary hyperparathyroidism ([address] Past Surgical History:  has a past surgical history that includes Cardiac catheterization and Dialysis fistula creation. Treatment Diagnosis: decreased ADLs and transfers secondary to OM      Assessment   Performance deficits / Impairments: Decreased functional mobility ; Decreased endurance;Decreased coordination;Decreased ADL status; Decreased balance;Decreased strength;Decreased high-level IADLs  Assessment: Prior to admission pt was living in his nursing home, was previously independent with toileting and staff was assisting with all other ADLs. Pt was mainly using his wc for mobilitiy. Now presents significantly below his baseline, requiring mod+max A for transfers. Demonstrated increased fatigue after stand pivot and reported increased pain in L LE. Pt would benefit from continued OT while in acute care, continue OT POC. Treatment Diagnosis: decreased ADLs and transfers secondary to OM  Prognosis: Fair  Decision Making: Medium Complexity  REQUIRES OT FOLLOW-UP: Yes  Activity Tolerance  Activity Tolerance: Patient limited by fatigue;Patient Tolerated treatment well;Patient limited by pain  Activity Tolerance Comments: Pt reported increased L foot pain after returning to bed at end of session. Per exwife pt had already received pain Rx. Plan   Occupational Therapy Plan  Times Per Week: 2-5  Times Per Day: Once a day  Current Treatment Recommendations: Strengthening, Balance training, Functional mobility training, Endurance training, Cognitive reorientation, Self-Care / ADL, Coordination training, Safety education & training, Patient/Caregiver education & training     Restrictions  Restrictions/Precautions  Restrictions/Precautions: Fall Risk (high fall risk)  Position Activity Restriction  Other position/activity restrictions: NWB L LE, strict I&O, up with assistance, NPO    Subjective   General  Chart Reviewed: Yes  Additional Pertinent Hx: Pt admitted to ED from his nursing home originally with c/o SOB after dialysis. Pt found with L 1st metatarsal OM, podiatry consulted and recommended amputation planned for 2/24/23. PMHx includes: amputated R great toe, CVA, dementia, depression, diabetes, ESRD, GERD, hyperlipidemia, HTN, PVD.   Family / Caregiver Present: Yes (wife)  Referring Practitioner: Rere Alejandro MD  Diagnosis: Osteomyelitis  Subjective  Subjective: Pt semi supine in bed upon arrival, agreeable to OT eval and treat. Social/Functional History  Social/Functional History  Type of Home: Facility ([facility] x 2 weeks, previously at [facility])  Home Layout: One level  Bathroom Shower/Tub: Walk-in shower  Bathroom Toilet: Handicap height  Bathroom Equipment: Shower chair, Grab bars in shower, Grab bars around toilet  Home Equipment: [employer]  Has the patient had two or more falls in the past year or any fall with injury in the past year? :  (per ex-wife, Julia Arguelles few falls when he was at [facility], not sure exactly when\")  Receives Help From:  (staff)  ADL Assistance: Needs assistance  Toileting:  (per ex-wife, patient was previously walking to bathroom and toileting self until recently with increased left foot pain)  Homemaking Assistance: Needs assistance  Ambulation Assistance:  (limited by left foot pain, previously ambulatory for short distances < 10ft with RW although unclear how long patient has used w/c for primary mobility needs)  Transfer Assistance: Needs assistance (previously was (I) for squat pivot transfers)  Active : No  Additional Comments: patient is a poor historian; ex-wife present during evaluation to provide information       Objective              Safety Devices  Type of Devices: Call light within reach; Bed alarm in place;Gait belt;Left in bed;Nurse notified           ADL  Feeding: NPO  Feeding Skilled Clinical Factors: NPO prior to procedure later today  Grooming: Minimal assistance  Grooming Skilled Clinical Factors: washing face seated in bedside chair, assist for completeness of task     Activity Tolerance  Activity Tolerance: Patient limited by fatigue;Patient limited by pain  Activity Tolerance Comments: attempted to leave up in chair, patient tolerated < 10 minutes  Bed mobility  Supine to Sit: Maximum assistance  Sit to Supine: Maximum assistance;2 Person assistance  Scooting: Maximal assistance;2 Person assistance  Transfers  Sit to stand: Moderate assistance;2 Person assistance;Maximum assistance  Stand to sit: Maximum assistance;2 Person assistance  Transfer Comments: Pt performed sit to stand transfer from bed to [address] with max A x 1 for transfer, mod A x 2nd for keeping L LE up to adhere to NWB precautions. Pt then performed sit to stand from bedside chair to RW with max A x 1, mod A x 2nd for keeping L LE up and for transfer. After therapists completed evaluation and pt was resting comfortably/ safely in bed, approximately 3 mins later exwife came to the door asking therapists to return to put pt back to bed- pt had slid himself to edge of bed and reporting he was not comfortable. Therapists needed to scoot pt back into bedside chair prior to transfer, assist x 2 for sit pivot chair to bed, assist to keep L LE NWB. Pt was returned to supine with assist x 2 due to being close to edge of bed. Vision  Vision: Within Functional Limits  Hearing  Hearing: Within functional limits  Cognition  Overall Cognitive Status: Exceptions  Following Commands: Follows one step commands with increased time; Follows one step commands with repetition  Memory: Decreased short term memory;Decreased recall of recent events;Decreased recall of precautions  Safety Judgement: Decreased awareness of need for assistance;Decreased awareness of need for safety  Problem Solving: Decreased awareness of errors  Insights: Decreased awareness of deficits  Initiation: Requires cues for all  Sequencing: Requires cues for all  Orientation  Overall Orientation Status: Impaired  Orientation Level: Oriented to person (able to state in hospital with cues, unable to state which, unable to state correct month, day, or year; states \"here because of my left foot\")                  Education Given To: Patient; Family  Education Provided: Role of Therapy;Plan of Care  Education Method: Verbal;Demonstration  Barriers to Learning: Cognition  Education Outcome: Verbalized understanding                 Treatment included functional transfer training, ADLs, and patient education. AM-PAC Score        AM-PAC Inpatient Daily Activity Raw Score: 12 (02/22/23 1207)  AM-PAC Inpatient ADL T-Scale Score : 30.6 (02/22/23 1207)  ADL Inpatient CMS 0-100% Score: 66.57 (02/22/23 1207)  ADL Inpatient CMS G-Code Modifier : CL (02/22/23 1207)    Tinneti Score       Goals  Short Term Goals  Time Frame for Short Term Goals: by dc  Short Term Goal 1: Pt will complete toilet transfer with mod A  Short Term Goal 2: Pt will complete LB dressing with mod A  Short Term Goal 3: Pt will complete UB dressing with setup  Patient Goals   Patient goals : per exwife- for pt to do more therapy once going back to the nursing home, be able to get stronger       Therapy Time   Individual Concurrent Group Co-treatment   Time In 0950         Time Out 1030         Minutes 40         Timed Code Treatment Minutes: 25 Minutes (+15 min eval)     Radha HULL  [address] [facility], OTR/L [id number]

## 2023-02-22 NOTE — BRIEF OP NOTE
Brief Postoperative Note      Patient: Leif Tang  YOB: 1955  MRN: [id number]    Date of Procedure:  2/22/2023    Pre-Op Diagnosis: Osteomyelitis of left foot, unspecified type ([facility] [M86.9]    Post-Op Diagnosis: Same       Procedure:  1. Ultrasound guided access right common femoral artery  2. Abdominal aortogram and left lower extremity arteriogram  3. Balloon angioplasty left anterior tibial artery 2.5mm x 220mm Adin  4. Balloon angioplasty left peroneal artery and tibioperoneal trunk 2.0 x 220mm Adin    Surgeon:  Waldo Rowell MD     Assistant:  Alexander Mooney DPM    Monitored sedation    Estimated Blood Loss (mL): Minimal    Complications: None    Specimens:   * No specimens in log *    Implants:  * No surgical log found *      Drains: * No LDAs found *    Findings: Two vessel runoff by severely diseased AT and peroneal arteries, responding well to balloon angioplasty.      Electronically signed by Waldo Rowell MD on 2/22/2023 at 2:39 PM

## 2023-02-22 NOTE — PROGRESS NOTES
ID Follow-up NOTE    CC:   Patient shortness of breath on HD  Antibiotics: Vancomycin  Admit Date: 2/20/2023  Hospital Day: 3    Subjective:     Patient oriented only to self, afebrile overnight. Objective:     Patient Vitals for the past 8 hrs:   BP Temp Temp src Pulse Resp SpO2   02/22/23 1647 (!) 104/59 97.5 °F (36.4 °C) -- 84 18 97 %   02/22/23 1617 (!) 118/54 97.5 °F (36.4 °C) Axillary 83 18 95 %   02/22/23 1547 (!) 106/54 97.6 °F (36.4 °C) Axillary 81 18 97 %   02/22/23 1532 (!) 106/52 97.5 °F (36.4 °C) Axillary 79 18 98 %   02/22/23 1517 (!) 100/50 97.8 °F (36.6 °C) Axillary 83 18 95 %   02/22/23 1502 122/62 97.7 °F (36.5 °C) Axillary 81 18 96 %   02/22/23 1447 (!) 107/56 97.8 °F (36.6 °C) Axillary 79 18 97 %   02/22/23 1040 (!) 102/48 98.2 °F (36.8 °C) Oral 94 18 92 %   02/22/23 1023 -- -- -- -- 18 --     I/O last 3 completed shifts: In: 340 [P.O.:240; I.V.:100]  Out: -   No intake/output data recorded. EXAM:  GENERAL: No apparent distress, cachectic. HEENT: Membranes moist, no oral lesion  NECK:  Supple, no lymphadenopathy  LUNGS: Clear b/l, no rales, no dullness  CARDIAC: RRR, no murmur appreciated  ABD:  + BS, soft / NT  EXT:  right arm AVF with thrill, Bilateral lower extremity edema, there is an ulcer on the left great toe as per photos     NEURO: No focal neurologic findings, oriented to self.   In the lower extremities diminished about 4 out of 5  PSYCH: Orientation, sensorium, mood normal  LINES:  Peripheral iv       Data Review:  Lab Results   Component Value Date    WBC 12.4 (H) 02/22/2023    HGB 8.0 (L) 02/22/2023    HCT 25.9 (L) 02/22/2023    MCV 87.5 02/22/2023     02/22/2023     Lab Results   Component Value Date    CREATININE 5.8 (HH) 02/22/2023    BUN 32 (H) 02/22/2023     02/22/2023    K 4.3 02/22/2023    CL 98 (L) 02/22/2023    CO2 28 02/22/2023       Hepatic Function Panel:   Lab Results   Component Value Date/Time    ALKPHOS 176 08/02/2021 10:15 PM    ALT 21 08/02/2021 10:15 PM    AST 47 08/02/2021 10:15 PM    PROT 8.0 08/02/2021 10:15 PM    BILITOT 0.5 08/02/2021 10:15 PM    LABALBU 2.3 02/22/2023 08:48 AM       MICRO:  Blood cultures x2 collected on 2/21: neg to date  Blood cultures x2 collected on 2/20: Positive with growth of strep pyogenes. Superficial wound swab of the left great toe 2/20 with strep pyogenes, MRSA and haemophilus parainfluenza, sensitivities pending       IMAGING:  MRI left foot without contrast 2/21: Acute osteomyelitis of the first distal phalanx.     Scheduled Meds:   epoetin ramesh-epbx  10,000 Units IntraVENous Once per day on Mon Wed Fri    vancomycin  750 mg IntraVENous Once    mirtazapine  7.5 mg Oral Nightly    traZODone  25 mg Oral Nightly    melatonin  3 mg Oral Nightly    sodium chloride flush  5-40 mL IntraVENous 2 times per day    heparin (porcine)  5,000 Units SubCUTAneous 3 times per day    aspirin  81 mg Oral Daily    atorvastatin  40 mg Oral Daily    memantine  10 mg Oral BID    [Held by provider] metoprolol succinate  25 mg Oral Daily    pantoprazole  40 mg Oral QAM AC    Vitamin D  1,000 Units Oral Daily    sodium chloride flush  5-40 mL IntraVENous 2 times per day    vancomycin (VANCOCIN) intermittent dosing (placeholder)   Other RX Placeholder       Continuous Infusions:   sodium chloride      sodium chloride      sodium chloride 75 mL/hr at 02/22/23 0103       PRN Meds:  sodium chloride, sodium chloride flush, sodium chloride, ondansetron **OR** ondansetron, nitroGLYCERIN, sodium chloride flush, sodium chloride, polyethylene glycol, acetaminophen **OR** acetaminophen, perflutren lipid microspheres, HYDROmorphone      Assessment:     Patient Active Problem List   Diagnosis    Abscess    Remote history of stroke    Chest pain    ESRD (end stage renal disease) on dialysis ([facility]    CAD (coronary artery disease)    NSTEMI (non-ST elevated myocardial infarction) ([facility]    PAD (peripheral artery disease) ([facility]    S/P cardiac catheterization    Hypotension    Symptomatic anemia    Complication of vascular access for dialysis    Limited mobility    Vascular dementia without behavioral disturbance (HCC)    Thrombocytopenia (HCC)    Delirium    Confusion    Vascular dementia of acute onset, without behavioral disturbance (HCC)    HTN (hypertension), benign    Dyslipidemia    Bilateral carotid artery stenosis    Osteomyelitis ([facility]             Plan:   63-year-old male with past medical history of osteomyelitis of the right foot status post amputation of the right great and third toes, ESRD on HD M [facility], CAD, PAD, HTN, HLD, vascular dementia secondary to strokes in 1999 and 2001 that presented with shortness of breath during dialysis. Left first distal phalanx osteomyelitis:  - Patient has been evaluated by podiatry and is pending LLE angiogram today but will need at least a partial amputation of the first distal phalanx. May need vascular intervention after angiogram results. - We will follow for operative management whether that this be definitive or not. This is tentatively planned for 2/24.  - Patient is currently also bacteremic with strep pyogenes, anticipate that this is the source. - Currently on IV vancomycin, will continue. D/dolores clindamycin and cefepime as superficial wound cx in chronic wounds likely reflect colonization.   - If there is bone bx done on amputation and cx showing growth, will change abx according to OR cx. keeping vanco as there was MRSA in wound cx and this may be causative agent of OM. Strep species bacteremia:  - Blood cultures x2 sets on 2/20 with growth of strep pyogenes  - TTE 2/21 without evidence of IE.   - Repeat blood cultures as of 2/21 also pending but neg to date. - will need to hold off on PICC placement pending clearance of bcx and final abx selection. Would like to use abx that can dose with HD ideally.     - continue vanco for now pending operative management as did have MRSA isolated in wound cx and may be causative pathogen of OM. ESRD on HD:  - continue vancomycin to be dosed per levels    Medical Decision Making: The following items were considered in medical decision making:  Discussion of patient care with other providers  Reviewed clinical lab tests  Reviewed radiology tests  Reviewed other diagnostic tests/interventions  Independent review of radiologic images  Microbiology cultures and other micro tests reviewed      Discussed with pt, RN.    [address] Nico Franck Mejia MD

## 2023-02-22 NOTE — PROGRESS NOTES
Clinical Pharmacy Progress Note    Vancomycin - Management by Pharmacy    Consult Date(s): 2/20/23  Consulting Provider(s): Dr. Fallon Acosta / Plan  1)  LLE osteomyelitis with abscess - Vancomycin  Concurrent Antimicrobials: n/a  Day of Vanc Therapy:  #3  Current Dosing Method: Intermittent Dosing by Levels  Therapeutic Goal: Trough ~ 15 mg/L  Current Dose / Plan:   Pt is ESRD on HD Mon-Wed-Fri - will dose Vancomycin intermittently based on levels. Random level this AM = 16.9 mcg/mL. Spoke with HD nurse - patient will NOT have HD today. Planning for HD tomorrow. Will give Vanc 750mg IV x1 this evening to ensure levels remains > 15. Shouldn't need any Vanc after HD tomorrow. Will check next random level prior to HD on Friday 2/24. Will continue to monitor clinical condition and make adjustments to regimen as appropriate. Wound culture is growing Group A Strep, Staph aureus, and Haemophilus parainfluenzae - may consider adding Ceftriaxone 2g IV q24h for H.parainfluenzae. Per ID, wound likely chronic / colonized - defer abx decisions to them. Please call with questions--  Thanks--  Kesha Price, PharmD, BCPS, BCGP  [id number] (\[facility]\"")   2/22/2023 9:18 AM      Interval update:  Vascular surgery planning LLE arteriogram today. Subjective/Objective:   Leif Tang is a 79 y.o. male with a PMHx significant for ESRD on HD Mon-Wed-Fri, CAD, CVA, dementia, depression, DM, GERD, HTN, HLD, and PVD who is admitted with LLE osteomyelitis of 1st metatarsal, 1st proximal phalanx, and 1st distal phalanx with abscess. Pharmacy is consulted to dose Vancomycin.     Ht Readings from Last 1 Encounters:   02/20/23 5' 7\" (1.702 m)     Wt Readings from Last 1 Encounters:   02/22/23 147 lb 11.3 oz (67 kg)     Current & Prior Antimicrobial Regimen(s):  (2/20-2/21)   (2/20-2/21)  Vancomycin - Pharmacy to dose  Intermittent dosing (2/20-current)    Vancomycin Level(s) / Doses:    Date Vancomycin Level Vancomycin Dose   2/20  1750mg x1   2/21 21.9 mcg/mL --   2/22 16.9 mcg/mL      Cultures & Sensitivities:    Date Site Micro Susceptibility / Result   2/20 Blood x2 Group A Strep  Reliably sensitive to b-lactams, others   2/20 Foot wound Group A Strep  Staph aureus  H. influenzae Reliably sensitive to b-lactams, others  In process  Reliably sensitive to CTX, Amp, Bactrim     Recent Labs     02/20/23  1019 02/20/23  1121 02/21/23  0501   CREATININE  --  3.2* 4.4*   BUN  --  15 21*   WBC 13.0*  --  14.2*       CrCl is not calculated 2/2 ESRD on HD

## 2023-02-22 NOTE — PROGRESS NOTES
Vascular Surgery   Daily Progress Note  Patient: Uri Hazard      CC: PVD; left foot wound,    SUBJECTIVE:   Patient was seen resting comfortably in bed this AM. Afebrile and HDS. No acute events overnight. ROS:   A 14 point review of systems was conducted, significant findings as noted above. All other systems negative. OBJECTIVE:    PHYSICAL EXAM:    Vitals:    02/21/23 1038 02/21/23 1945 02/22/23 0021 02/22/23 0444   BP: 121/66 (!) 140/51 123/73 (!) 149/67   Pulse: 87 90 82 70   Resp: 16 16 16 17   Temp: 99.5 °F (37.5 °C) 98.8 °F (37.1 °C) 99.1 °F (37.3 °C) 98.5 °F (36.9 °C)   TempSrc: Oral Oral Axillary Oral   SpO2: 96% 97% 93% 95%   Weight:    147 lb 11.3 oz (67 kg)   Height:           General appearance: Cachectic and ill-appearing   Eyes: no scleral icterus, EOM grossly intact  Neck: trachea midline, no JVD, neck supple  Chest/Lungs: normal effort with no accessory muscle use on RA  Cardiovascular: RRR  Abdomen: Soft, non-tender, non-distended, no rebound, guarding, or rigidity. Skin: warm and dry, no rashes  Extremities: +1 pitting edema noted to b/l LE. Dressing noted to left LE. Hallux and 4th digit amputation noted to right foot. Genitourinary: Grossly normal  Neuro: A&Ox3, no focal deficits, sensation intact     Pulses     F P PT DP   R + + -/+ -/+   L + + -/+ -/+    +, -/+ ,-/-     LABS:   Recent Labs     02/20/23  1019 02/21/23  0501   WBC 13.0* 14.2*   HGB 8.4* 7.7*   HCT 26.4* 24.1*   MCV 84.5 85.1    283        Recent Labs     02/20/23  1121 02/21/23  0501    138   K 3.7 4.2   CL 97* 98*   CO2 31 31   PHOS  --  2.7   BUN 15 21*   CREATININE 3.2* 4.4*      No results for input(s): AST, ALT, ALB, BILIDIR, BILITOT, ALKPHOS in the last 72 hours. No results for input(s): LIPASE, AMYLASE in the last 72 hours. No results for input(s): PROT, INR, APTT in the last 72 hours.      Recent Labs     02/20/23  1025 02/20/23  1222   TROPONINI 0.07* 0.08*     ASSESSMENT & PLAN:   This is a 79 y.o. male with a PMHx of dementia, CAD, CVA, DM, ESRD on dialysis, GERD, Hemiplegia and hemiparesis 2/2 cerebral infarct, HLD, HTN, PVD, and hx of pedal amputations who presented to the hospital for shortness of breath after dialysis this AM. Vascular surgery for consulted for PVD.      - VL arterial duplex reviewed,   - Right mid-SFA stenosis >50%  with nonocclussive popliteal and tibial disease   - Left popliteal and TP trunk occlusions with associated PT and AT artery occlusions  - Continue NPO  - Plan for left LE arteriogram on today    Mati Penn DPM  Podiatric Resident, PGY-2  Contact via [address] [place] [address]

## 2023-02-22 NOTE — PROGRESS NOTES
Physician Progress Note      Paul SAMAYOA #:                  [id number]  :                       1955  ADMIT DATE:       2023 10:01 AM  DISCH DATE:  RESPONDING  PROVIDER #:        Jose Luis Hernandez MD          QUERY TEXT:    Pt admitted with \"Osteomyelitis; 1st metatarsal, 1st proximal phalanx, 1st   distal phalanx; left foot- Abscess; left foot\". Pt noted to have elevated   Temp., HR, WBC, CRP and sed. rate. If possible, please document in the   progress notes and discharge summary if you are evaluating and /or treating   any of the following: The medical record reflects the following:  Risk Factors: diabetic polyneuropathy, osteomyelitis and abscess of the left   foot  Clinical Indicators: - WBC=13.0, Temp. 101.7F, AL=761, Sed. jztg=232,   IYE=418.1, Blood cultures x2- (+) Gram positive  cocci in chains and /or pairs  Treatment: CBC, CMP, Blood cultures, Podiatry/Vascular surgery/Nephrology   consult, Meds- IV Cefepime, IV Cleocin, IV  Vancomycin, V/S and I/O monitoring per unit protocol  Options provided:  -- Sepsis due to osteomyelitis and abscess of the left foot, present on   admission  -- osteomyelitis and abscess of the left foot without Sepsis  -- Sepsis was ruled out  -- Other - I will add my own diagnosis  -- Disagree - Not applicable / Not valid  -- Disagree - Clinically unable to determine / Unknown  -- Refer to Clinical Documentation Reviewer    PROVIDER RESPONSE TEXT:    This patient has sepsis osteomyelitis and abscess of the left foot which was   present on admission.     Query created by: Maverick Reyes on 2023 9:46 AM      Electronically signed by:  Jose Luis Hernandez MD 2023 6:38 PM

## 2023-02-22 NOTE — PROGRESS NOTES
Physical Therapy  Facility/Department: [address] [place] [address]  Physical Therapy Initial Assessment    Name: Nery Watson  : 1955  MRN: [id number]  Date of Service: 2023    Discharge Recommendations:  Nery Watson scored a 7/24 on the AM-PAC short mobility form. Current research shows that an AM-PAC score of 17 or less is typically not associated with a discharge to the patient's home setting. Based on the patient's AM-PAC score and their current functional mobility deficits, it is recommended that the patient have 3-5 sessions per week of Physical Therapy at d/c to increase the patient's independence. Please see assessment section for further patient specific details. If patient discharges prior to next session this note will serve as a discharge summary. Please see below for the latest assessment towards goals. PT Equipment Recommendations  Equipment Needed: No  Other: plan to continue to assess pending progress and likely defer recommendations to the next level of care      Patient Diagnosis(es): The primary encounter diagnosis was Osteomyelitis of left foot, unspecified type ([address] A diagnosis of ESRD on dialysis [facility]) was also pertinent to this visit. Past Medical History:  has a past medical history of Amputated great toe, right ([address] CAD (coronary artery disease), CVA (cerebral vascular accident) ([address] Dementia ([address] Depression, Diabetes ([address] Diabetic polyneuropathy ([address] ESRD on dialysis ([address] GERD (gastroesophageal reflux disease), Hemiplegia and hemiparesis following cerebral infarction affecting right dominant side ([address] Hyperlipidemia, Hypertension, Pain syndrome, chronic, Peripheral vascular disease ([address] and Secondary hyperparathyroidism ([address] Past Surgical History:  has a past surgical history that includes Cardiac catheterization and Dialysis fistula creation.     Assessment   Body Structures, Functions, Activity Limitations Requiring Skilled Therapeutic Intervention: Decreased functional mobility ; Decreased ADL status; Decreased ROM; Decreased strength;Decreased safe awareness;Decreased cognition;Decreased endurance;Decreased sensation;Decreased balance; Increased pain  Assessment: Patient demonstrates impaired functional mobility, now requiring (A) of 2 for OOB mobility attempts. Patient limited by NWB left LE and cognition, h/o dementia, as well as (B) UE/LE strength and ROM deficits and balance deficits. Patient unable to safely ambulate while NWB left LE. Patient will continue to benefit from additional skilled PT intervention to facilitate safe mobility and to optimize (I) to promote return to prior level of function. Treatment Diagnosis: impaired functional mobility  Therapy Prognosis: Fair;Guarded (secondary to cognition and NWB left LE)  Decision Making: Medium Complexity  Barriers to Learning: cognition, h/o dementia  Requires PT Follow-Up: Yes  Activity Tolerance  Activity Tolerance: Patient limited by fatigue;Patient limited by pain  Activity Tolerance Comments: attempted to leave up in chair, patient tolerated < 10 minutes     Plan   Physcial Therapy Plan  General Plan:  (2-5x/week)  Current Treatment Recommendations: Strengthening, Balance training, ROM, Functional mobility training, Transfer training, Endurance training, Pain management, Home exercise program, Safety education & training, Patient/Caregiver education & training, Equipment evaluation, education, & procurement, Therapeutic activities  Safety Devices  Type of Devices: Call light within reach, Bed alarm in place, Gait belt, Left in bed, Nurse notified     Restrictions  Restrictions/Precautions  Restrictions/Precautions: Fall Risk (high fall risk)  Position Activity Restriction  Other position/activity restrictions: NWB L LE, strict I&O, up with assistance, NPO     Subjective   General  Chart Reviewed:  Yes  Additional Pertinent Hx: ED 2/20 related to SOB, left foot pain; Full thickness ulceration with concern for osteomyelitis of 1st metatarsal/proximal phalanx/distal phalanx; Vascular surgery following. Patient would benefit from surgical intervention consisting of partial 1st ray amputation, timing to be determined pending vascular surgery recommendation, tentatively planned for Friday, 2/24. VL arterial duplex reviewed  - Right mid-SFA stenosis >50%  with nonocclussive popliteal and tibial disease   - Left popliteal and TP trunk occlusions with associated PT and AT artery occlusions; pending left LE arteriogram/AVG thrombectomy 2/22; PMH: dementia, CAD, CVA, DM, ESRD on HD, GERD, hemiplegia/hemiparesis, HLD, HTN, PVD, history of right foot pedal amputations  Family / Caregiver Present: Yes (ex-wife, Monica)  Follows Commands: Impaired  Other (Comment): requires increased time  General Comment  Comments: Patient supine in bed upon arrival - agreeable to PT  Subjective  Subjective: Patient c/o left > right LE/foot pain - RN aware. Social/Functional History  Social/Functional History  Type of Home: Facility ([place] x 2 weeks, previously at [facility])  Home Layout: One level  Bathroom Shower/Tub: Walk-in shower  Bathroom Toilet: Handicap height  Bathroom Equipment: Shower chair, Grab bars in shower, Grab bars around toilet  Home Equipment: Florian Anton  Has the patient had two or more falls in the past year or any fall with injury in the past year? :  (per ex-wife, Jamie Sullivan few falls when he was at [facility], not sure exactly when\")  Receives Help From:  (staff)  ADL Assistance: Needs assistance  Toileting:  (per ex-wife, patient was previously walking to bathroom and toileting self until recently with increased left foot pain)  Homemaking Assistance: Needs assistance  Ambulation Assistance:  (limited by left foot pain, previously ambulatory for short distances < 10ft with RW although unclear how long patient has used w/c for primary mobility needs)  Transfer Assistance: Needs assistance (previously was (I) for squat pivot transfers)  Active : No  Additional Comments: patient is a poor historian; ex-wife present during evaluation to provide information  Vision/Hearing  Vision  Vision: Within Functional Limits  Hearing  Hearing: Within functional limits    Cognition   Orientation  Overall Orientation Status: Impaired  Orientation Level: Oriented to person (able to state in hospital with cues, unable to state which, unable to state correct month, day, or year; states \"here because of my left foot\")  Cognition  Overall Cognitive Status: Exceptions  Following Commands: Follows one step commands with increased time; Follows one step commands with repetition  Memory: Decreased short term memory;Decreased recall of recent events;Decreased recall of precautions  Safety Judgement: Decreased awareness of need for assistance;Decreased awareness of need for safety  Problem Solving: Decreased awareness of errors  Insights: Decreased awareness of deficits  Initiation: Requires cues for all  Sequencing: Requires cues for all     Objective     Gross Assessment  Sensation:  (decreased to light touch (B) LEs)     AROM RLE (degrees)  RLE General AROM: lacking 15 degrees full knee extension supine  AROM LLE (degrees)  LLE General AROM: lacking 15 degrees full knee extension supine  Strength RLE  Comment: fair quad set, min A for SLR, difficult to assess sitting secondary to poor seated balance  Strength LLE  Comment: fair quad sets, min A for SLR, NWB left LE, difficult to assess sitting secondary to poor seated balance     Bed mobility  Supine to Sit: Maximum assistance (with elevated head of bed, use of rail, increased time)  Sit to Supine: Maximum assistance;2 Person assistance  Scooting: Maximal assistance;2 Person assistance (seated at edge of bed and in recliner)  Transfers  Sit to Stand: Maximum Assistance; Moderate Assistance;2 Person Assistance (max A of 1 with mod A of 1 to maintain NWB left LE - to Suleathan Combe and to RW - max cues for sequencing and hand placement)  Stand to Sit: Maximum Assistance; Moderate Assistance;2 Person Assistance (max A of 1 with mod A of 1 from [employer] and from [address] [place] [address] - mod A to maintain NWB left LE)  Bed to Chair: Dependent/Total;2 Person Assistance (with Suharmony Huange - (A) to maintain NWB left LE; max A of 2 squat pivot to return to bed)  Comment: constant cues and (A) to maintain NWB left LE  Ambulation  WB Status: NWB left LE  Ambulation  Comments: unable to safely trial     Balance  Posture: Fair (forward flexed, rounded shoulders)  Sitting - Static: Fair;-  Sitting - Dynamic: Poor  Standing - Static: Poor  Standing - Dynamic: Poor  Comments: moderate posterior lean in sitting with max A to mod A to achieve midline, CGA to maintain static midline balance    AM-PAC Score  AM-PAC Inpatient Mobility Raw Score : 7 (02/22/23 1141)  AM-PAC Inpatient T-Scale Score : 26.42 (02/22/23 1141)  Mobility Inpatient CMS 0-100% Score: 92.36 (02/22/23 1141)  Mobility Inpatient CMS G-Code Modifier : CM (02/22/23 1141)    Goals  Short Term Goals  Time Frame for Short Term Goals: discharge  Short Term Goal 1: Pt. will demonstrate bed mobility with min A  Short Term Goal 2: Pt. will demonstrate sit <-> stand with mod A of 1 and LRAD  Short Term Goal 3: Pt. will demonstrate bed <-> chair transfers with mod A of 1 and LRAD  Short Term Goal 4: Pt. will demonstrate (I) and compliance with left LE weight bearing status  Patient Goals   Patient Goals : per ex-wife \"return to [employer]"       Education  Patient Education  Education Given To: Patient  Education Provided: Role of Therapy;Plan of Care;Precautions  Education Provided Comments: use of call light, PT recommendations, NWB left LE  Education Method: Demonstration;Verbal  Barriers to Learning: Cognition  Education Outcome: Continued education needed      Therapy Time   Individual Concurrent Group Co-treatment   Time In 3451 Time Out 1030         Minutes 40               Timed Code Treatment Minutes: 25 minutes    Total Treatment Minutes: 40 Minutes    If patient discharges prior to next treatment, this note will serve as discharge summary.     Julianne Alex PT, DPT #[id number]

## 2023-02-22 NOTE — PLAN OF CARE
Problem: Skin/Tissue Integrity  Goal: Absence of new skin breakdown  Outcome: Progressing: Q 2 hours turning and repositioning in place. Problem: Pain  Goal: Verbalizes/displays adequate comfort level or baseline comfort level  Outcome: Progressing  Flowsheets (Taken 2/21/2023 1945)  Verbalizes/displays adequate comfort level or baseline comfort level: Encourage patient to monitor pain and request assistance  PRN pain medication given with effective result at this time.       Problem: Chronic Conditions and Co-morbidities  Goal: Patient's chronic conditions and co-morbidity symptoms are monitored and maintained or improved  Outcome: Progressing

## 2023-02-22 NOTE — OP NOTE
Operative Note      Patient: Donnie Little  YOB: 1955  MRN: [id number]     Date of Procedure:  2/22/2023     Pre-Op Diagnosis: Osteomyelitis of left foot, unspecified type ([facility] [M86.9]     Post-Op Diagnosis: Same       Procedure:  1. Ultrasound guided access right common femoral artery  2. Abdominal aortogram and left lower extremity arteriogram  3. Balloon angioplasty left anterior tibial artery 2.5mm x 220mm Adin  4. Balloon angioplasty left peroneal artery and tibioperoneal trunk 2.0 x 220mm Adin     Surgeon:  Wan Telles MD      Assistant:  Yamilex De Dios DPM     Monitored sedation     Estimated Blood Loss (mL): Minimal     Complications: None     Specimens:   * No specimens in log *     Implants:  * No surgical log found *      Drains: * No LDAs found *     Findings: Two vessel runoff by severely diseased AT and peroneal arteries, responding well to balloon angioplasty. Details of Procedure: The patient was taken to the cardiac cath lab and placed in supine position. IV sedation was administered by the nursing team. The operative area was clipped, prepared and draped in sterile fashion. A dose of intravenous antibiotics was administered. A brief time out was performed per hospital protocol. The Right common femoral artery was accessed under ultrasound guidance with a micropuncture system. Micropuncture sheath was exchanged over a [employer] wire for a 5 [employer] sheath. An omni-flush catheter was inserted and positioned in the abdominal aorta and aortogram was performed. The catheter was then directed over the wire into the contra-lateral Left iliac system and guided down to the Left femoral head. Left lower extremity runoff was performed. Pre-Intervention Findings: Aortoiliac segment--Widely patent infrarenal abdominal aorta, bilateral renal arteries, bilateral common iliac, external iliac and internal iliac arteries.    Femoral-popliteal segment--Widely patent  Tibial artery segment--total occlusion of posterior tibial artery; severe stenosis of tibioperoneal trunk; Anterior tibial and peroneal arteries are present to the ankle but both have severe multifocal stenoses throughout. Severe below ankle disease without an intact plantar arch    Therapeutic Intervention:  The patient was systemically heparinized with 7000 units of IV heparin. The femoral sheath was exchanged over a wire for a 6 [employer]o Destination sheath, the tip was which was positioned in the distal Left external iliac artery. Using a combination of 0.018 angled Glidewire and Navicross catheter, the anterior tibial artery was easily crossed and the wire was guided down into the dorsalis pedis artery. A 2 mm x 220 mm Adin balloon was inserted and balloon angioplasty was performed but there was some residual stenosis. This was then replaced with a 2.5 mm Adin balloon which was used for prolonged inflation throughout the entire artery. Final imaging showed significant improvement throughout the anterior tibial artery without significant residual stenosis. There is no evidence of dissection. The catheter was then withdrawn and guided down into the tibioperoneal trunk into the peroneal artery. The wire was guided down to the distal peroneal artery to the anterior communicating and posterior communicating branches. A 2 mm x 220 mm Adin balloon was inserted and balloon angioplasty was performed for prolonged inflation. This was used throughout the peroneal artery as well as the tibioperoneal trunk. Final imaging showed no significant residual deficits no evidence of dissection. There was significant improvement in flow through the anterior tibial and peroneal arteries as well as flow into the foot. The Right femoral sheath was removed and closure performed with a Proglide device. Compression was continued for 5 minutes to ensure hemostasis.     The patient tolerated the procedure well and was taken to the post-operative care unit in stable condition.      Doyle Hernandez MD    Electronically signed by Doyle Hernandez MD on 2/22/2023 at 3:20 PM

## 2023-02-22 NOTE — PROGRESS NOTES
Internal Medicine Progress Note    Patient Name: Les Phelps   Patient : 1955   Date: 2023   Admit Date: 2023     CC: Shortness of Breath       Subjective     Interval History:     Tmax:  99.5F  Saturating well on RA  HD planned for tomorrow  LLE arteriogram planned for today  UOP (24 h):  2 unmeasured  0 BM    Patient seen and examined with his ex-wife at bedside. He is oriented to person, but not to time, place, or situation. He endorses recurrent hiccups, which patient reportedly has a longstanding history of; bilateral foot pain, unchanged from yesterday; and, chronic, bilateral hand pain, ongoing since his strokes. At present time, he denies headache, lightheadedness, dizziness, chest pain/pressure/tightness, dyspnea at rest, abdominal pain, nausea, vomiting, diarrhea, constipation. ROS:  As per interval history above. Objective     Vital Signs:  Patient Vitals for the past 8 hrs:   BP Temp Temp src Pulse Resp SpO2 Weight   23 0444 (!) 149/67 98.5 °F (36.9 °C) Oral 70 17 95 % 147 lb 11.3 oz (67 kg)   23 0021 123/73 99.1 °F (37.3 °C) Axillary 82 16 93 % --       Physical Exam:  Constitutional: Pleasant, ill-appearing, non-diaphoretic, elderly male, consistently hiccupping, in NAD. HEENT:  NCAT. Scleral icterus or bilateral lacrimation. No conjunctival injection. No rhinorrhea or epistaxis. Cardiovascular:  No apparent JVD. RRR. Radial pulses 2+. Capillary refill < 2 sec. Pulmonary:  Able to speak in full sentences without frequent pauses. CTAB. No perioral cyanosis. Abdomen: Non-distended. Normoactive bowel sounds. Soft, no TTP. Musculoskeletal:  Cachectic. Right AV fistula. No lower extremity edema. Left foot wrapped with dressing, which is clean, dry, and intact. Skin:  Engle, dry. Neurological:  Alert, awake, and oriented to self. Not oriented to place, time, and situation. Psychological:  Cooperative.   Normal speech, behavior, and thought content. Intake/Output Summary (Last 24 hours) at 2/22/2023 0656  Last data filed at 2/21/2023 1700  Gross per 24 hour   Intake 240 ml   Output --   Net 240 ml        Medications:   heparin (porcine)  5,000 Units SubCUTAneous 3 times per day    aspirin  81 mg Oral Daily    atorvastatin  40 mg Oral Daily    memantine  10 mg Oral BID    [Held by provider] metoprolol succinate  25 mg Oral Daily    pantoprazole  40 mg Oral QAM AC    Vitamin D  1,000 Units Oral Daily    sodium chloride flush  5-40 mL IntraVENous 2 times per day    vancomycin (VANCOCIN) intermittent dosing (placeholder)   Other RX Placeholder       sodium chloride      sodium chloride 75 mL/hr at 02/22/23 0103        Labs:  CBC:   Recent Labs     02/20/23  1019 02/21/23  0501   WBC 13.0* 14.2*   HGB 8.4* 7.7*   HCT 26.4* 24.1*    283   MCV 84.5 85.1     Renal:    Recent Labs     02/20/23  1121 02/21/23  0501    138   K 3.7 4.2   CL 97* 98*   CO2 31 31   BUN 15 21*   CREATININE 3.2* 4.4*   GLUCOSE 159* 159*   CALCIUM 8.0* 8.4   MG  --  1.40*   PHOS  --  2.7   ANIONGAP 11 9     Hepatic:   Recent Labs     02/21/23  0501   LABALBU 2.5*     Pro-BNP:   Recent Labs     02/20/23  1025   PROBNP 7,611*       Lipids: No results for input(s): CHOL, TRIG, HDL, LDLCALC, VLDL in the last 72 hours. ABGs:  No results for input(s): PHART, JKH3QQA, PO2ART, EWG1CBK, BEART, THGBART, Z1DKXOPR, LIU9XIP in the last 72 hours. VBGs:   Recent Labs     02/20/23  1025   PHVEN 7.398   ZDU3ZFY 57.6*   PO2VEN <30.0       INR: No results for input(s): INR in the last 72 hours. aPTT: No results for input(s): APTT in the last 72 hours. Procalcitonin: No results for input(s): PROCAL in the last 72 hours. Recent Labs     02/20/23  1121   .1*     ESR: No results for input(s): ESR in the last 72 hours.     Radiology:  VL DUP LOWER EXTREMITY ARTERIES BILATERAL   Final Result      MRI FOOT LEFT WO CONTRAST   Final Result   Acute osteomyelitis of the first distal phalanx. XR FOOT LEFT (MIN 3 VIEWS)   Final Result   1. Medial ulceration with lytic changes compatible with osteomyelitis of the base of the first proximal phalanx and distal first metatarsal.      XR CHEST PORTABLE   Final Result   Impression: No acute abnormality          Assessment & Plan   68-year-old  male who presented to the ED from his SNF on 2/20/23 with dyspnea occurring during hemodialysis. Sepsis with Group A streptococcus bacteremia - likely 2/2 acute osteomyelitis  2/20: Growth on BCx x2 and on PCR, prealbumin low at 7.1, ESR elevated at 117, CRP elevated at 282.1. ID consulted, recs appreciated   Continue vancomycin IV (2/20-)  Repeat BCx on 2/22? CBC w/diff daily    Acute osteomyelitis of left first distal phalanx  S/p amputation of right hallux and 4th digit 2/2 osteomyelitis  Podiatry and vascular surgery consulted  LLE angiogram to occur today  Left foot partial first ray amputation planned for Friday (2/24)  Continue vancomycin, dosed by pharmacy (2/20-)  PT/OT eval and tx    Peripheral arterial disease - LLE AMOS 0.74  Follows with [facility] at [facility].  Appointment scheduled for 2/24/23. Vascular surgery consulted    G1DD  2/20:  ProBNP 7611. Echo (2/21):  LVEF 55-60%, no regional WMA, G1DD, small circumferential pericardial effusion, PASP 28 mmHg.       Chronic Medical Conditions:  Hiccups  Trial with compazine    Chest pain, intermittent  Nitroglycerin used at home    Oliguric ESRD on HD (M,W,F)  Hyperparathyroidism of ESRD  Normocytic anemia of ESRD  Nephrology consulted  BMP, Mg QD - replete to keep K > 4, Mg > 2  Avoid NSAIDs/nephrotoxins as patient still makes urine  Strict I/Os  Daily weights  Keep MAP > 65 mmHg  Procrit with dialysis    Pulmonary hypertension  Hypertension  CAD (LAD 15%, L Cx 15%, RCA 15-20% in 07/2016)  Continue ASA 81 mg QD  Hold home meds metoprolol succinate 25 mg QD, isosorbide mononitrate 30 mg QD, hydralazine 25 mg BID as patient is normotensive    HLD  Lipid panel (2/16/18): Total 113, HDL 24, LDL 52, , VLDL 37. Continue home atorvastatin 40 mg QD  Lipid panel - pending    Vascular dementia  Hemiplegia   Palliative care consulted  Continue memantine 10 mg BID    Insomnia  Restart home meds melatonin 3 mg QHS, trazodone 25 mg QHS, mirtazapine 7.5 mg QHS PRN    GERD  Protonix QD      DVT PPx:  Heparin SQ, Protonix  Diet:  Diet NPO   Code status:  Full Code     ELOS:  3 days  Barriers to discharge:  sepsis bacteremia, acute OM  Disposition  - Preadmission: From [facility].  - Current:  GMF  - Upon discharge:  OT 12/24, PT 7/24 on 2/22. TBD. Will discuss with attending physician Dr. Carmen Sanchez MD.     Cherry Carranza.  Jose Morrell MD  Internal Medicine, PGY-1

## 2023-02-22 NOTE — PROGRESS NOTES
[facility] NEPHROLOGY    [facility]              [phone]                       Interval History and Plan   Patient seen at bedside with family. Patient is comfortable on room air  Lytes stable  Hb low but stable 8.0  BP soft but stable      HD today per his schedule. No urgency  Monitor lytes and Hb  Continue Epogen with dialysis  Monitor vitals closely. D/W patient and dialysis nurse. Assessment :     Pt is a 80 yo male with pmhx of osteomyelitis s/p amputation, ESRD on HD, CAD, PAD, HTN, HLD, and vascular dementia who we were consulted for hemodialysis continuation. ESRD on HD MWF  Access: Right arm fistula. Chronic Anemia   DIMITRIOS with dialysis. Hypertension and Volume status  BP was low and home BP are on hold. Does not look volume overloaded. Left foot osteomyelitis  Group A Strep Bacteremia  Sepsis  Management per primary team.       [facility] Nephrology would like to thank Michael Delong MD   for opportunity to serve this patient      Please call with questions at-   24 Hrs Answering service [phone] or  7 am- 5 pm via Perfect serve or cell phone      CC/reason for consult :     Hemodialysis continuation     HPI :     Fab Engel is a 79 y.o. male with history of osteomyelitis, ESRD on HD, CAD, PAD, HTN, HLD, and vascular dementia who presented to the hospital on 2/20/2023 with shortness of breath during his hemodialysis session. Per daughter, patient had desatted to the [de-identified] during the session. On admission, patient has been improving in respiratory status with normal saturations, but remains admitted due to acute concerns of osteomyelitis of the left great toe, sepsis (BC confirmed 2x for Group A strep), and significant arterial disease (recent arterial duplex showing 80% blockage of the left lower extremity). Chest X-ray was negative for acute concerns. Currently on cefepime (started on 2/20).  Single dose of vancomycin given yesterday, with plans to start clindamycin today. Nephrology was consulted for continuation of dialysis. Last Echo in 2019 with 55% EF. Repeat Echo ordered during this admission. ROS:     positives in bold   Constitutional:  fever, chills, weakness, weight change, fatigue  Skin:  rash, pruritus, hair loss, bruising, dry skin, petechiae, left great toe wound   Head, Face, Neck   headaches, swelling,  cervical adenopathy  Respiratory: shortness of breath, cough, or wheezing  Cardiovascular: chest pain, palpitations, dizzy, edema  Gastrointestinal: nausea, vomiting, diarrhea, constipation,belly pain    Yellow skin, blood in stool  Musculoskeletal:  back pain, muscle weakness, gait problems,       joint pain or swelling. Genitourinary:  dysuria, poor urine flow, flank pain, blood in urine  Neurologic:  vertigo, TIA'S, syncope, seizures, focal weakness  Psychosocial:  insomnia, anxiety, or depression. Additional positive findings:                          All other remaining systems are negative.       PMH/PSH/SH/Family History:     Past Medical History:   Diagnosis Date    Amputated great toe, right (HCC)     CAD (coronary artery disease)     CVA (cerebral vascular accident) ([address]     3    Dementia ([address]     Depression     Diabetes ([address]     Diabetic polyneuropathy ([address]     ESRD on dialysis ([address]     monday wednesday friday    GERD (gastroesophageal reflux disease)     Hemiplegia and hemiparesis following cerebral infarction affecting right dominant side (HCC)     Hyperlipidemia     Hypertension     Pain syndrome, chronic     Peripheral vascular disease ([address]     [facility]    Secondary hyperparathyroidism [facility])        Past Surgical History:   Procedure Laterality Date    CARDIAC CATHETERIZATION      DIALYSIS FISTULA CREATION         Social History     Socioeconomic History    Marital status:      Spouse name: Not on file    Number of children: Not on file    Years of education: Not on file    Highest education level: Not on file   Occupational History    Not on file   Tobacco Use    Smoking status: Former    Smokeless tobacco: Never   Vaping Use    Vaping Use: Never used   Substance and Sexual Activity    Alcohol use: No     Alcohol/week: 0.0 standard drinks    Drug use: No    Sexual activity: Not Currently   Other Topics Concern    Not on file   Social History Narrative    Not on file     Social Determinants of Health     Financial Resource Strain: Not on file   Food Insecurity: Not on file   Transportation Needs: Not on file   Physical Activity: Not on file   Stress: Not on file   Social Connections: Not on file   Intimate Partner Violence: Not on file   Housing Stability: Not on file           Problem Relation Age of Onset    Heart Disease Mother     Diabetes Mother     Dementia Mother     Diabetes Father     Heart Disease Father     Stroke Brother           Medication:     Scheduled Meds:   heparin (porcine)  5,000 Units SubCUTAneous 3 times per day    aspirin  81 mg Oral Daily    atorvastatin  40 mg Oral Daily    memantine  10 mg Oral BID    [Held by provider] metoprolol succinate  25 mg Oral Daily    pantoprazole  40 mg Oral QAM AC    Vitamin D  1,000 Units Oral Daily    sodium chloride flush  5-40 mL IntraVENous 2 times per day    vancomycin (VANCOCIN) intermittent dosing (placeholder)   Other RX Placeholder     Continuous Infusions:   sodium chloride      sodium chloride 75 mL/hr at 02/22/23 0103     PRN Meds:.melatonin, [Held by provider] mirtazapine, nitroGLYCERIN, ondansetron, [Held by provider] traZODone, sodium chloride flush, sodium chloride, polyethylene glycol, acetaminophen **OR** acetaminophen, perflutren lipid microspheres, HYDROmorphone    Vitals :     Vitals:    02/22/23 0742   BP: (!) 87/25   Pulse: 75   Resp: 18   Temp: 98.2 °F (36.8 °C)   SpO2: 97%       I & O :       Intake/Output Summary (Last 24 hours) at 2/22/2023 0814  Last data filed at 2/21/2023 1700  Gross per 24 hour   Intake 240 ml   Output --   Net 240 ml          Physical Examination :     General appearance: Anxious- no, distressed- no  HEENT: Lips- normal, teeth- ok , oral mucosa- moist  Neck : Mass- no, appears symmetrical, JVD- not visible  Respiratory: Respiratory effort- normal effort, wheeze- no, crackles - no  Cardiovascular: Ausculation- No M/R/G, Edema none. Abdomen: visible mass- no, distention- no, scar- no, tenderness- no                            hepatosplenomegaly-  no  Musculoskeletal:  clubbing no,cyanosis- no , digital ischemia- no                           muscle strength- grossly normal , tone - grossly normal.  Skin: rashes- no , ulcers- yes, on left great toe but not visualized due to dressing, induration- no, tightening - no  Psychiatric:  Judgement and insight- normal  CNS:        AAO X 3     LABS:     Recent Labs     02/20/23  1019 02/21/23  0501   WBC 13.0* 14.2*   HGB 8.4* 7.7*   HCT 26.4* 24.1*    283       Recent Labs     02/20/23  1121 02/21/23  0501    138   K 3.7 4.2   CL 97* 98*   CO2 31 31   BUN 15 21*   CREATININE 3.2* 4.4*   GLUCOSE 159* 159*   MG  --  1.40*   PHOS  --  2.7            Patient discuss with Dr. Tg Dawson  Internal Medicine, MS4      Medical Student Note edited and addended where appropriate. Baldev Armas MD  PGY-3 Internal Medicine  02/22/23     Patient was seen and examined and the case was discussed with the resident. He acted as my scribe. I agree with the assessment and plan.

## 2023-02-22 NOTE — PROGRESS NOTES
Podiatric Surgery Daily Progress Note  Nery Watson      Subjective :   Patient seen and examined this am at the bedside. Patient denies any acute overnight events. Patient reports continued improvement overall. He states his pain remains to his left foot but has been improving. Patient denies calf pain, thigh pain, chest pain. Review of Systems: A 12 point review of symptoms is unremarkable with the exception of the chief complaint. Patient specifically denies  nausea, vomiting, chills, shortness of breath, chest pain, abdominal pain, constipation or difficulty urinating. Objective     BP (!) 149/67   Pulse 70   Temp 98.5 °F (36.9 °C) (Oral)   Resp 17   Ht 5' 7\" (1.702 m)   Wt 147 lb 11.3 oz (67 kg)   SpO2 95%   BMI 23.13 kg/m²     I/O:  Intake/Output Summary (Last 24 hours) at 2/22/2023 0530  Last data filed at 2/21/2023 1700  Gross per 24 hour   Intake 240 ml   Output --   Net 240 ml              Wt Readings from Last 3 Encounters:   02/22/23 147 lb 11.3 oz (67 kg)   02/20/23 153 lb 7 oz (69.6 kg)   12/09/22 156 lb 8 oz (71 kg)       LABS:    Recent Labs     02/20/23  1019 02/21/23  0501   WBC 13.0* 14.2*   HGB 8.4* 7.7*   HCT 26.4* 24.1*    283        Recent Labs     02/21/23  0501      K 4.2   CL 98*   CO2 31   PHOS 2.7   BUN 21*   CREATININE 4.4*      No results for input(s): PROT, INR, APTT in the last 72 hours. LOWER EXTREMITY EXAMINATION    Dressing to left LE intact. No strikethrough noted to the external dressing. Betadine discoloration noted to the internal layers of the dressing. VASCULAR: DP and PT pulses nonpalpable 0/4 b/l. Upon hand-held Doppler examination, left DP noted to be monophasic, left PT and right DP, PT are softly biphasic. CFT is less than 3 seconds to the digits of the foot with the exception of the left hallux. Skin temperature is warm to cool from proximal to distal with mildly increased warmth to the left LE. No edema noted.  No pain with calf compression b/l. NEUROLOGIC: Gross and epicritic sensation is intact b/l. Protective sensation is diminished at all pedal sites b/l. DERMATOLOGIC:   Left LE:  Necrotic eschar noted to the medial aspect of the first metatarsal which is firmly adhered. Full-thickness ulceration at the dorsal medial aspect of the left hallux with necrotic appearance of hallux circumferentially. Wound measures approximately 0.6 x 0.5 x 0.3 cm. Wound probes to deep fascia/periosteum. Wound does not tunnel or track. No maceration tissue noted. Mild malodor noted. No fluctuance or crepitus noted. No surrounding erythema noted. No purulent drainage encountered. Images from 2/22. Right LE: No acute signs of infection. MUSCULOSKELETAL: Muscle strength is 4/5 for all pedal groups tested. Diffuse pain to left LE with maximal tenderness noted at left hallux, plantar 1st MPJ. Ankle joint ROM is decreased in dorsiflexion with the knee extended. History of left partial 1st ray amputation, 3rd digit amputation. IMAGING:  XR Left Foot 2/20/23  Narrative   History: Medial foot ulcer. 2 views of foot. FINDINGS: There is a medial ulceration. There is lysis of the medial base of the proximal phalanx. Lysis of the medial head of the first metatarsal suspicious for osteomyelitis. No soft tissue gas. Associated soft tissue swelling. Impression   1. Medial ulceration with lytic changes compatible with osteomyelitis of the base of the first proximal phalanx and distal first metatarsal.     MRI Left foot 2/21/23  Narrative   Patient: Ector Pool  Time Out: 07:20   Exam(s): MRI LEFT FOOT Without Contrast        EXAM:     MR Left Lower Extremity Without Intravenous Contrast, Foot       CLINICAL HISTORY:     concern for OM at first metatarsal head, base of proximal    phalanx.        TECHNIQUE:     Multiplanar magnetic resonance images of the left foot without    intravenous contrast.       COMPARISON: No relevant prior studies available. FINDINGS:   Images are degraded by motion artifact. Ulcer along the medial aspect of the first toe. Underlying    abscess. Subcutaneous soft tissue edema and intramuscular edema    as can be seen in setting of diabetic neuropathic changes. No    soft tissue gas identified. Marrow signal abnormality within the    first distal phalanx consistent with acute osteomyelitis. No    septic arthritis. No acute fracture. Electronically signed by Jaqueline Renteria MD on 02-21-23 at 0720       Impression   Acute osteomyelitis of the first distal phalanx. VL Arterial Duplex b/l LE 2/21  Impressions   Right Impression   The right ankle/brachial index is 1.09 (PT- 166 mmHg; DP- non-compressible). The right arm brachial pressure was not obtained due to AVF. There is normal, multiphasic flow identified in the common femoral artery,   suggesting no evidence of significant aorto-iliac inflow disease. Elevated velocities at the mid superficial femoral artery indicate a >50%   stenosis by velocity criteria (velocities went from 92 to 193 cm/s). There is atherosclerotic plaque involving the popliteal artery with velocities   consistent with <50% stenosis. Elevated velocities at the distal anterior tibial artery indicate a >50%   stenosis by velocity criteria (velocities went from 36 to 81 cm/s). Left Impression   The left ankle/brachial index is 0.74 (PT-112 mmHg; DP-110 mmHg). The left AMOS   was technically difficult to obtain due to patient movement. There is normal, multiphasic flow identified in the common femoral artery,   suggesting no evidence of significant aorto-iliac inflow disease. There is atherosclerotic plaque involving the superficial femoral artery with   velocities consistent with <50% stenosis. The distal end of the popliteal artery appears to be occluded, and the   tibio-peroneal trunk appears to be occluded.  There are multiple collateral vessels noted in this area. The posterior tibial artery is occluded with retrograde trickle flow   demonstrated at the ankle area. The distal anterior tibial artery is occluded with reconstitution at the ankle   (34 cm/s). There are no previous studies for comparison. Conclusions        Summary        Normal right AMOS. Abnormal left AMOS in the range of moderate arterial insufficiency. Right mid-SFA stenosis of greater than 50% associated with nonocclusive    popliteal and tibial disease. Left popliteal and tibioperoneal trunk occlusions with associated posterior    tibial and anterior tibial artery occlusions. Signature    ------------------------------------------------------------------    Electronically signed by Natanael Garcia MD (Interpreting    physician) on 02/21/2023 at 03:56 PM       ASSESSMENT/PLAN  - Bacteremia  - Osteomyelitis; 1st metatarsal, 1st proximal phalanx, 1st distal phalanx; left foot  - Abscess; left foot  - Full thickness ulceration; left foot; Bee 3  - Necrotic Eschar; left foot  - Peripheral vascular disease; b/l LE  - History of partial 1st ray and 3rd digit amputation; right foot    -Patient examined and evaluated at bedside   -VSS, no new AM CMB  -.1,   -Prealbumin 7.1; continue dietary nutritional supplements  -HbA1c 6.0%  -Wound culture- Strep Pyogenes, Staph Aureus, Haemophilus parainfluenzae   -Blood culture x2- Group A Strep  -XR, MRI reviewed, impressions noted above  -Arterial Duplex b/l LE reviewed, impressions noted above  -Vascular surgery following; appreciate recommendations  -ID following, continue antibiotics per their recommendations  -Dressing applied to left LE consisting of Betadine, gauze, Kerlix, Ace  -Nonweight bearing to the left LE  -Patient would benefit from surgical intervention consisting of partial 1st ray amputation of left lower extremity.  Timing pending on vascular surgery recommendations and angiogram; podiatric surgery tentatively planned for Friday, 2/24    DISPO: Full thickness ulceration with concern for osteomyelitis of 1st metatarsal/proximal phalanx/distal phalanx; left foot. Labs and imaging reviewed. Continue broad spectrum IV antibiotics per ID recs. Vascular surgery following. Patient would benefit from surgical intervention consisting of partial 1st ray amputation, timing to be determined pending vascular surgery recommendation, tentatively planned for Friday, 2/24.     Discussed assessment and plan with Dr. James Tang DPM.    Phoebe Pelaez DPM  Podiatric Resident, PGY-3  Pager #: [phone] or Perfect Serve

## 2023-02-23 LAB
ACANTHOCYTES: ABNORMAL
ALBUMIN SERPL-MCNC: 2.1 G/DL (ref 3.4–5)
ANION GAP SERPL CALCULATED.3IONS-SCNC: 14 MMOL/L (ref 3–16)
ANISOCYTOSIS: ABNORMAL
BASOPHILS ABSOLUTE: 0 K/UL (ref 0–0.2)
BASOPHILS RELATIVE PERCENT: 0 %
BLOOD BANK DISPENSE STATUS: NORMAL
BLOOD BANK PRODUCT CODE: NORMAL
BLOOD CULTURE, ROUTINE: ABNORMAL
BLOOD CULTURE, ROUTINE: ABNORMAL
BPU ID: NORMAL
BUN BLDV-MCNC: 35 MG/DL (ref 7–20)
BURR CELLS: ABNORMAL
CALCIUM SERPL-MCNC: 8.6 MG/DL (ref 8.3–10.6)
CHLORIDE BLD-SCNC: 98 MMOL/L (ref 99–110)
CO2: 25 MMOL/L (ref 21–32)
CREAT SERPL-MCNC: 6.8 MG/DL (ref 0.8–1.3)
CULTURE, BLOOD 2: ABNORMAL
DESCRIPTION BLOOD BANK: NORMAL
EOSINOPHILS ABSOLUTE: 0 K/UL (ref 0–0.6)
EOSINOPHILS RELATIVE PERCENT: 0 %
GFR SERPL CREATININE-BSD FRML MDRD: 8 ML/MIN/{1.73_M2}
GLUCOSE BLD-MCNC: 100 MG/DL (ref 70–99)
HCT VFR BLD CALC: 24.4 % (ref 40.5–52.5)
HEMOGLOBIN: 7.6 G/DL (ref 13.5–17.5)
HYPOCHROMIA: ABNORMAL
LYMPHOCYTES ABSOLUTE: 0.5 K/UL (ref 1–5.1)
LYMPHOCYTES RELATIVE PERCENT: 5 %
MACROCYTES: ABNORMAL
MAGNESIUM: 1.9 MG/DL (ref 1.8–2.4)
MCH RBC QN AUTO: 26.9 PG (ref 26–34)
MCHC RBC AUTO-ENTMCNC: 31 G/DL (ref 31–36)
MCV RBC AUTO: 86.9 FL (ref 80–100)
MICROCYTES: ABNORMAL
MONOCYTES ABSOLUTE: 0.2 K/UL (ref 0–1.3)
MONOCYTES RELATIVE PERCENT: 2 %
MYELOCYTE PERCENT: 5 %
NEUTROPHILS ABSOLUTE: 9.8 K/UL (ref 1.7–7.7)
NEUTROPHILS RELATIVE PERCENT: 88 %
ORGANISM: ABNORMAL
PDW BLD-RTO: 18.2 % (ref 12.4–15.4)
PHOSPHORUS: 3.8 MG/DL (ref 2.5–4.9)
PLATELET # BLD: 355 K/UL (ref 135–450)
PLATELET SLIDE REVIEW: ADEQUATE
PMV BLD AUTO: 8.5 FL (ref 5–10.5)
POTASSIUM SERPL-SCNC: 4.4 MMOL/L (ref 3.5–5.1)
RBC # BLD: 2.81 M/UL (ref 4.2–5.9)
SCHISTOCYTES: ABNORMAL
SLIDE REVIEW: ABNORMAL
SODIUM BLD-SCNC: 137 MMOL/L (ref 136–145)
TEAR DROP CELLS: ABNORMAL
WBC # BLD: 10.5 K/UL (ref 4–11)

## 2023-02-23 PROCEDURE — 85018 HEMOGLOBIN: CPT

## 2023-02-23 PROCEDURE — 99232 SBSQ HOSP IP/OBS MODERATE 35: CPT | Performed by: INTERNAL MEDICINE

## 2023-02-23 PROCEDURE — 85014 HEMATOCRIT: CPT

## 2023-02-23 PROCEDURE — 80069 RENAL FUNCTION PANEL: CPT

## 2023-02-23 PROCEDURE — 85025 COMPLETE CBC W/AUTO DIFF WBC: CPT

## 2023-02-23 PROCEDURE — 83735 ASSAY OF MAGNESIUM: CPT

## 2023-02-23 PROCEDURE — 2580000003 HC RX 258: Performed by: STUDENT IN AN ORGANIZED HEALTH CARE EDUCATION/TRAINING PROGRAM

## 2023-02-23 PROCEDURE — 36430 TRANSFUSION BLD/BLD COMPNT: CPT

## 2023-02-23 PROCEDURE — 1200000000 HC SEMI PRIVATE

## 2023-02-23 PROCEDURE — 6360000002 HC RX W HCPCS: Performed by: STUDENT IN AN ORGANIZED HEALTH CARE EDUCATION/TRAINING PROGRAM

## 2023-02-23 PROCEDURE — 6370000000 HC RX 637 (ALT 250 FOR IP): Performed by: STUDENT IN AN ORGANIZED HEALTH CARE EDUCATION/TRAINING PROGRAM

## 2023-02-23 PROCEDURE — 36415 COLL VENOUS BLD VENIPUNCTURE: CPT

## 2023-02-23 PROCEDURE — 90935 HEMODIALYSIS ONE EVALUATION: CPT

## 2023-02-23 PROCEDURE — 5A1D70Z PERFORMANCE OF URINARY FILTRATION, INTERMITTENT, LESS THAN 6 HOURS PER DAY: ICD-10-PCS | Performed by: STUDENT IN AN ORGANIZED HEALTH CARE EDUCATION/TRAINING PROGRAM

## 2023-02-23 RX ORDER — SODIUM CHLORIDE 9 MG/ML
INJECTION, SOLUTION INTRAVENOUS PRN
Status: DISCONTINUED | OUTPATIENT
Start: 2023-02-23 | End: 2023-03-03 | Stop reason: HOSPADM

## 2023-02-23 RX ADMIN — ASPIRIN 81 MG: 81 TABLET, COATED ORAL at 09:04

## 2023-02-23 RX ADMIN — HEPARIN SODIUM 5000 UNITS: 5000 INJECTION INTRAVENOUS; SUBCUTANEOUS at 17:36

## 2023-02-23 RX ADMIN — SODIUM CHLORIDE, PRESERVATIVE FREE 10 ML: 5 INJECTION INTRAVENOUS at 20:57

## 2023-02-23 RX ADMIN — MIRTAZAPINE 7.5 MG: 15 TABLET, FILM COATED ORAL at 20:53

## 2023-02-23 RX ADMIN — MEMANTINE 10 MG: 10 TABLET ORAL at 09:04

## 2023-02-23 RX ADMIN — MEMANTINE 10 MG: 10 TABLET ORAL at 20:54

## 2023-02-23 RX ADMIN — SODIUM CHLORIDE, PRESERVATIVE FREE 10 ML: 5 INJECTION INTRAVENOUS at 20:54

## 2023-02-23 RX ADMIN — HEPARIN SODIUM 5000 UNITS: 5000 INJECTION INTRAVENOUS; SUBCUTANEOUS at 04:54

## 2023-02-23 RX ADMIN — TRAZODONE HYDROCHLORIDE 25 MG: 50 TABLET ORAL at 20:53

## 2023-02-23 RX ADMIN — HEPARIN SODIUM 5000 UNITS: 5000 INJECTION INTRAVENOUS; SUBCUTANEOUS at 20:53

## 2023-02-23 RX ADMIN — PANTOPRAZOLE SODIUM 40 MG: 40 TABLET, DELAYED RELEASE ORAL at 09:04

## 2023-02-23 RX ADMIN — SODIUM CHLORIDE, PRESERVATIVE FREE 10 ML: 5 INJECTION INTRAVENOUS at 09:04

## 2023-02-23 RX ADMIN — ATORVASTATIN CALCIUM 40 MG: 40 TABLET, FILM COATED ORAL at 09:04

## 2023-02-23 RX ADMIN — Medication 1000 UNITS: at 09:06

## 2023-02-23 RX ADMIN — Medication 3 MG: at 20:53

## 2023-02-23 ASSESSMENT — PAIN SCALES - GENERAL: PAINLEVEL_OUTOF10: 7

## 2023-02-23 ASSESSMENT — PAIN DESCRIPTION - DESCRIPTORS: DESCRIPTORS: PRESSURE

## 2023-02-23 ASSESSMENT — PAIN DESCRIPTION - LOCATION: LOCATION: LEG

## 2023-02-23 ASSESSMENT — PAIN DESCRIPTION - ORIENTATION: ORIENTATION: RIGHT;LEFT

## 2023-02-23 NOTE — PROGRESS NOTES
Internal Medicine Progress Note    Patient Name: Sina Freitas   Patient : 1955   Date: 2023   Admit Date: 2023     CC: Shortness of Breath       Subjective     Interval History:     Afebrile over last 24 hr  Saturating well on RA  HD planned for today  L hallux partial ray amputation planned to occur tomorrow afternoon ()  UOP (24 h):  2 unmeasured > none    Patient seen and examined at bedside. He states overall he feels \"pretty good\". He continues to only be oriented to self. He states the compazine helped resolve his persistent hiccups yesterday. Today, he endorses unchanged bilateral foot pain. At present time, he denies headache, lightheadedness, dizziness, ear fullness/pressure, chest pain/pressure/tightness, dyspnea at rest, abdominal pain, nausea, vomiting, diarrhea, and constipation. ROS:  As per interval history above. Objective     Vital Signs:  Patient Vitals for the past 8 hrs:   BP Temp Temp src Pulse Resp SpO2 Weight   23 0506 -- -- -- -- -- -- 152 lb 1.9 oz (69 kg)   23 0501 (!) 117/52 -- -- -- -- -- --   23 0449 (!) 103/44 97.8 °F (36.6 °C) Axillary 80 -- 97 % --   23 0119 (!) 127/55 98 °F (36.7 °C) Oral 92 16 96 % --   23 0000 -- -- -- 92 -- -- --         Physical Exam:  Constitutional: Pleasant, ill-appearing, non-diaphoretic, elderly male, sitting upright eating breakfast, in NAD. HEENT:  NCAT. Scleral icterus. No conjunctival injection. No rhinorrhea or epistaxis. Cardiovascular:  No apparent JVD. RRR. Radial pulses 2+. Capillary refill < 2 sec. Pulmonary:  Able to speak in full sentences without frequent pauses. CTAB. No perioral cyanosis. Abdomen: Non-distended. Normoactive bowel sounds. Soft, no TTP. Musculoskeletal:  Cachectic. Right AV fistula. No lower extremity edema. Left foot wrapped with dressing, which is clean, dry, and intact. Skin:  [place], dry.   Neurological:  Alert, awake, and oriented to self.  Not oriented to place, time, and situation. Psychological:  Cooperative. Normal speech, behavior, and thought content. No intake or output data in the 24 hours ending 02/23/23 0715       Medications:   epoetin ramesh-epbx  10,000 Units IntraVENous Once per day on Mon Wed Fri    mirtazapine  7.5 mg Oral Nightly    traZODone  25 mg Oral Nightly    melatonin  3 mg Oral Nightly    sodium chloride flush  5-40 mL IntraVENous 2 times per day    heparin (porcine)  5,000 Units SubCUTAneous 3 times per day    aspirin  81 mg Oral Daily    atorvastatin  40 mg Oral Daily    memantine  10 mg Oral BID    [Held by provider] metoprolol succinate  25 mg Oral Daily    pantoprazole  40 mg Oral QAM AC    Vitamin D  1,000 Units Oral Daily    sodium chloride flush  5-40 mL IntraVENous 2 times per day    vancomycin (VANCOCIN) intermittent dosing (placeholder)   Other RX Placeholder       sodium chloride      sodium chloride          Labs:  CBC:   Recent Labs     02/21/23  0501 02/22/23  0848 02/23/23  0505   WBC 14.2* 12.4* 10.5   HGB 7.7* 8.0* 7.6*   HCT 24.1* 25.9* 24.4*    329 355   MCV 85.1 87.5 86.9     Renal:    Recent Labs     02/21/23  0501 02/22/23  0848 02/23/23  0505    137 137   K 4.2 4.3 4.4   CL 98* 98* 98*   CO2 31 28 25   BUN 21* 32* 35*   CREATININE 4.4* 5.8* 6.8*   GLUCOSE 159* 143* 100*   CALCIUM 8.4 8.6 8.6   MG 1.40* 2.00 1.90   PHOS 2.7 3.2 3.8   ANIONGAP 9 11 14     Hepatic:   Recent Labs     02/21/23  0501 02/22/23  0848 02/23/23  0505   LABALBU 2.5* 2.3* 2.1*     Pro-BNP:   Recent Labs     02/20/23  1025   PROBNP 7,611*       Lipids:   Recent Labs     02/22/23  0849   CHOL 74   TRIG 148   HDL 10*   LDLCALC 34       ABGs:  No results for input(s): PHART, IEG2VJR, PO2ART, YDX4BHY, BEART, THGBART, F6SPDUUZ, PRK0XPB in the last 72 hours.       VBGs:   Recent Labs     02/20/23  1025   PHVEN 7.398   PEP2RPN 57.6*   PO2VEN <30.0       INR:   Recent Labs     02/22/23  0848   INR 1.34*     aPTT: No results for input(s): APTT in the last 72 hours. Procalcitonin: No results for input(s): PROCAL in the last 72 hours. Recent Labs     02/20/23  1121   .1*     ESR: No results for input(s): ESR in the last 72 hours. Radiology:  VL DUP LOWER EXTREMITY ARTERIES BILATERAL   Final Result      MRI FOOT LEFT WO CONTRAST   Final Result   Acute osteomyelitis of the first distal phalanx. XR FOOT LEFT (MIN 3 VIEWS)   Final Result   1. Medial ulceration with lytic changes compatible with osteomyelitis of the base of the first proximal phalanx and distal first metatarsal.      XR CHEST PORTABLE   Final Result   Impression: No acute abnormality      IR ANGIOGRAM EXTREMITY LEFT    (Results Pending)       Assessment & Plan   59-year-old  male who presented [facility] the ED from his [facility]F on 2/[facility]/23 with dyspnea occurring during hemodialysis. Sepsis with S. pyogenes bacteremia - likely 2/2 acute osteomyelitis - afebrile, WBC now WNL  Acute osteomyelitis of left first distal phalanx with heavy growth of MRSA and H. parainfluenzae  Peripheral arterial disease - LLE AMOS 0.74  Follows with vascular surgery at [facility].  S/p amputation of right hallux and 4th digit 2/2 osteomyelitis. 2/20: Growth on BCx x2 and on PCR, prealbumin low at 7.1, ESR elevated at 117, CRP elevated at 282.1. ID consulted, recs appreciated   Continue vancomycin IV (2/20-), dosed by pharmacy  Repeat BCx on 2/21 with NGTD  CBC w/diff daily  Podiatry and vascular surgery consulted  LLE angiogram to occur today  Left foot partial first ray amputation planned for tomorrow (2/24)  PT/OT eval and tx  The patient is at high-risk for perioperative complications in an intermediate-risk procedure given multiple vascular comorbidities, including his cerebrovascular disease and elevated creatinine level. G1DD - no signs of volume overload  2/20:  ProBNP 7611.   Echo (2/21):  LVEF 55-60%, no regional WMA, G1DD, small circumferential pericardial effusion, PASP 28 mmHg. 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,W,F)  Hyperparathyroidism of ESRD  Normocytic anemia of ESRD  Nephrology consulted  HD planned for today (2/23) - keep even  Transfuse 1U pRBC in preparation for surgery tomorrow (2/23)  BMP, Mg QD - replete to keep K > 4, Mg > 2  Avoid NSAIDs/nephrotoxins as patient still makes urine  Strict I/Os  Daily weights  Keep MAP > 65 mmHg  Procrit with dialysis    Pulmonary hypertension  Hypertension - currently hypotensive  CAD (LAD 15%, L Cx 15%, RCA 15-20% in 07/2016)  Continue ASA 81 mg QD  Hold home meds metoprolol succinate 25 mg QD, isosorbide mononitrate 30 mg QD, hydralazine 25 mg BID as patient is hypotensive    HLD - well-controlled  Lipid panel (2/22/23): Total 74, HDL 10, LDL 34, , VLDL 30. Continue home atorvastatin 40 mg QD    Vascular dementia  Hemiplegia   Palliative care consulted  Continue memantine 10 mg BID    Insomnia  Continue home meds melatonin 3 mg QHS, trazodone 25 mg QHS, mirtazapine 7.5 mg QHS PRN    GERD  Protonix QD      DVT PPx:  Heparin SQ, Protonix  Diet:  ADULT DIET; Dysphagia - Soft and Bite Sized; 3 carb choices (45 gm/meal); Low Sodium (2 gm); Low Potassium (Less than 3000 mg/day); Low Phosphorus (Less than 1000 mg)  ADULT ORAL NUTRITION SUPPLEMENT; Breakfast, Lunch, Dinner; Wound Healing Oral Supplement   Code status:  Full Code     ELOS:  3 days  Barriers to discharge:  sepsis bacteremia, acute OM  Disposition  - Preadmission: From [facility].  - Current:  [facility]  - Upon discharge:  OT 12/24, PT 7/24 on 2/22. TBD. Will discuss with attending physician Dr. Erika Kim MD.     Laina New.  Catrina Chiu MD  Internal Medicine, PGY-1

## 2023-02-23 NOTE — PROGRESS NOTES
Vascular Surgery   Daily Progress Note  Patient: Jazmin Gu      CC: PVD; left foot wound    SUBJECTIVE:   Patient was seen resting comfortably in bed this AM. Patient is s/p right LE angiogram (2/22). Patient is afebrile and HDS. No acute events overnight. ROS:   A 14 point review of systems was conducted, significant findings as noted above. All other systems negative. OBJECTIVE:    PHYSICAL EXAM:    Vitals:    02/23/23 0119 02/23/23 0449 02/23/23 0501 02/23/23 0506   BP: (!) 127/55 (!) 103/44 (!) 117/52    Pulse: 92 80     Resp: 16      Temp: 98 °F (36.7 °C) 97.8 °F (36.6 °C)     TempSrc: Oral Axillary     SpO2: 96% 97%     Weight:    152 lb 1.9 oz (69 kg)   Height:           General appearance: Cachectic and ill-appearing   Eyes: no scleral icterus, EOM grossly intact  Neck: trachea midline, no JVD, neck supple  Chest/Lungs: normal effort with no accessory muscle use on RA  Cardiovascular: RRR  Abdomen: Soft, non-tender, non-distended, no rebound, guarding, or rigidity. Skin: warm and dry, no rashes  Extremities: +1 pitting edema noted to b/l LE. Right groin puncture site appears to be healing well with no hematoma or seroma noted. Dressing noted to left LE. Hallux and 4th digit amputation noted to right foot. Genitourinary: Grossly normal  Neuro: A&Ox3, no focal deficits, sensation intact     Pulses     F P PT DP   R + + -/+ -/+   L + + -/+ -/+    +, -/+ ,-/-     LABS:   Recent Labs     02/22/23  0848 02/23/23  0505   WBC 12.4* 10.5   HGB 8.0* 7.6*   HCT 25.9* 24.4*   MCV 87.5 86.9    355        Recent Labs     02/22/23  0848 02/23/23  0505    137   K 4.3 4.4   CL 98* 98*   CO2 28 25   PHOS 3.2 3.8   BUN 32* 35*   CREATININE 5.8* 6.8*      No results for input(s): AST, ALT, ALB, BILIDIR, BILITOT, ALKPHOS in the last 72 hours. No results for input(s): LIPASE, AMYLASE in the last 72 hours.      Recent Labs     02/22/23  0848   INR 1.34*        Recent Labs     02/20/23  1025 02/20/23  1222   TROPONINI 0.07* 0.08*     ASSESSMENT & PLAN:   This is a 79 y.o. male with a PMHx of dementia, CAD, CVA, DM, ESRD on dialysis, GERD, Hemiplegia and hemiparesis 2/2 cerebral infarct, HLD, HTN, PVD, and hx of pedal amputations. Patient is s/p left LE angiogram (2/22)    - Left LE angiogram performed yesterday with two vessel runoff by severely diseased AT and peroneal arteries, responding well to balloon angioplasty.    - Continue general diet   - Podiatry following, appreciate recommendations   - ID following and recommend continuing vancomycin, appreciate recommendations   - Medical management per primary     Kezia Osman DPM  Podiatric Resident, PGY-2  Contact via Perfect [employer]

## 2023-02-23 NOTE — PROGRESS NOTES
Treatment time: 3 hours  Net UF: 670 ml     Pre weight: 69.2 kg  Post weight:68.6 kg    Access used: ruavf    Access function: well with  ml/min     Medications or blood products given: n/a     Regular outpatient schedule: tts     Summary of response to treatment: Patient tolerated treatment well and without any complications.  Patient remained stable throughout entire treatment       02/23/23 1325 02/23/23 1650   Treatment   Time On 1332  --    Time Off  --  1648   Vital Signs   BP (!) 123/59 (!) 122/56   Temp 97.8 °F (36.6 °C) 97.6 °F (36.4 °C)   Heart Rate 83 87   Resp 16 16   Weight 152 lb 8.9 oz (69.2 kg) 151 lb 3.8 oz (68.6 kg)

## 2023-02-23 NOTE — CARE COORDINATION
ID has seen patient. Continuing Vanc IV for osteomylitis. POD, vascular, and nephro following. DCP: to return to [facility]. CM will continue to follow patient until discharge.   Electronically signed by Chino Leyva RN on 2/23/2023 at 2:50 PM

## 2023-02-23 NOTE — PROGRESS NOTES
Department of Surgery:  Post-op Note      Procedure(s) Performed: arteriogram with balloon angioplasty LLE    Subjective:   Patient resting comfortably in bed. Denies pain. Denies NV. Has not been OOB, lying flat at this time. Denies BM or urination since surgery. Objective:  Anesthesia type: General      Exam:BP (!) 100/52   Pulse 91   Temp 98.9 °F (37.2 °C) (Oral)   Resp 16   Ht 5' 7\" (1.702 m)   Wt 147 lb 11.3 oz (67 kg)   SpO2 97%   BMI 23.13 kg/m²   Post-op vital signs:  Stable   Exam:General appearance: alert, appears stated age and cooperative  Lungs: normal effort, no accessory muscle use, on room air   Heart: regular rate and rhythm, S1, S2 normal  Abdomen: soft, non tender, non distended  Right groin access site - C/D/I no hematoma   Right upper extremity: AV access with palpable thrill. Minor strike through of band-aid   Extremities: +1 pitting edema noted to b/l LE. Dressing noted to left LE. Hallux and 4th digit amputation noted to right foot.    Pulses     F P PT DP   R + + -/+ -/+   L + + -/+ -/+    +, -/+ ,-/-       Assessment and Plan  Pt is a 79y.o. year old male with osteomyelitis of his left foot s/p Ultrasound guided access right common femoral artery, Abdominal aortogram and left lower extremity arteriogram, Balloon angioplasty left anterior tibial artery, left peroneal artery and tibioperoneal trunk POD #0    Pain management: tylenol and roxicodone pain panel PRN  Cardiovasc: hemodynamically stable, will continue to monitor  Respiratory:  IS ordered to bedside, encourage hourly IS and deep breathing, wean oxygen as tolerated  FeNa: Fluids  SLIV, Diet: general   : ESRD on HD, can HD tomorrow   Ambulation: OOB to chair, encourage ambulation  Prophylaxis: SCDs, SQH until podiatry intervention completed then will transition to asa and plavix   Abx: per primary     Endy Freeman DO  PGY1, General Surgery  02/22/23  9:39 PM  [employer]  Pager: [phone]

## 2023-02-23 NOTE — PROGRESS NOTES
ID Follow-up NOTE    CC:   Patient shortness of breath on HD  Antibiotics: Vancomycin  Admit Date: 2/20/2023  Hospital Day: 4    Subjective:     Patient oriented only to self, afebrile overnight. Denies any pain. Objective:     Patient Vitals for the past 8 hrs:   BP Temp Temp src Pulse Resp SpO2 Weight   02/23/23 1325 (!) 123/59 97.8 °F (36.6 °C) -- 83 16 -- 152 lb 8.9 oz (69.2 kg)   02/23/23 1305 (!) 119/55 97.8 °F (36.6 °C) Oral 84 18 100 % --   02/23/23 1245 (!) 118/54 98.5 °F (36.9 °C) Oral 84 22 100 % --   02/23/23 1240 (!) 128/49 97.9 °F (36.6 °C) -- 81 18 -- 212 lb 4.9 oz (96.3 kg)   02/23/23 1238 (!) 118/54 98.5 °F (36.9 °C) Oral 84 22 100 % --   02/23/23 1201 (!) 96/52 98.2 °F (36.8 °C) Oral 84 20 98 % --   02/23/23 0854 (!) 95/46 97.6 °F (36.4 °C) Oral 74 22 96 % --       No intake/output data recorded. I/O this shift: In: 720 [P.O.:100; Blood:20]  Out: 2400     EXAM:  GENERAL: No apparent distress. HEENT: Membranes moist, no oral lesion  NECK:  Supple, no lymphadenopathy  LUNGS: Clear b/l, no rales, no dullness  CARDIAC: RRR, no murmur appreciated  ABD:  + BS, soft / NT  EXT:  right arm AVF with thrill, Bilateral lower extremity edema, there is an ulcer on the left great toe as per photos. Dressing in place     NEURO: No focal neurologic findings, oriented to self.   In the lower extremities diminished about 4 out of 5  PSYCH: Orientation, sensorium, mood normal  LINES:  Peripheral iv       Data Review:  Lab Results   Component Value Date    WBC 10.5 02/23/2023    HGB 7.6 (L) 02/23/2023    HCT 24.4 (L) 02/23/2023    MCV 86.9 02/23/2023     02/23/2023     Lab Results   Component Value Date    CREATININE 6.8 (HH) 02/23/2023    BUN 35 (H) 02/23/2023     02/23/2023    K 4.4 02/23/2023    CL 98 (L) 02/23/2023    CO2 25 02/23/2023       Hepatic Function Panel:   Lab Results   Component Value Date/Time    ALKPHOS 176 08/02/2021 10:15 PM    ALT 21 08/02/2021 10:15 PM    AST 47 08/02/2021 10:15 PM    PROT 8.0 08/02/2021 10:15 PM    BILITOT 0.5 08/02/2021 10:15 PM    LABALBU 2.1 02/23/2023 05:05 AM       MICRO:  Blood cultures x2 collected on 2/21: neg to date  Blood cultures x2 collected on 2/20: Positive with growth of strep pyogenes. Superficial wound swab of the left great toe 2/20 with strep pyogenes, MRSA and haemophilus parainfluenza, sensitivities pending       IMAGING:  MRI left foot without contrast 2/21: Acute osteomyelitis of the first distal phalanx.     Scheduled Meds:   epoetin ramesh-epbx  10,000 Units IntraVENous Once per day on Mon Wed Fri    mirtazapine  7.5 mg Oral Nightly    traZODone  25 mg Oral Nightly    melatonin  3 mg Oral Nightly    sodium chloride flush  5-40 mL IntraVENous 2 times per day    heparin (porcine)  5,000 Units SubCUTAneous 3 times per day    aspirin  81 mg Oral Daily    atorvastatin  40 mg Oral Daily    memantine  10 mg Oral BID    [Held by provider] metoprolol succinate  25 mg Oral Daily    pantoprazole  40 mg Oral QAM AC    Vitamin D  1,000 Units Oral Daily    sodium chloride flush  5-40 mL IntraVENous 2 times per day    vancomycin (VANCOCIN) intermittent dosing (placeholder)   Other RX Placeholder       Continuous Infusions:   sodium chloride      sodium chloride      sodium chloride         PRN Meds:  sodium chloride, sodium chloride, sodium chloride flush, sodium chloride, ondansetron **OR** ondansetron, oxyCODONE, acetaminophen, nitroGLYCERIN, sodium chloride flush, sodium chloride, polyethylene glycol, perflutren lipid microspheres      Assessment:     Patient Active Problem List   Diagnosis    Abscess    Remote history of stroke    Chest pain    ESRD (end stage renal disease) on dialysis ([facility] [address]    CAD (coronary artery disease)    NSTEMI (non-ST elevated myocardial infarction) ([facility] [address]    PAD (peripheral artery disease) ([facility] [address]    S/P cardiac catheterization    Hypotension    Symptomatic anemia    Complication of vascular access for dialysis    Limited mobility Vascular dementia without behavioral disturbance (HCC)    Thrombocytopenia (HCC)    Delirium    Confusion    Vascular dementia of acute onset, without behavioral disturbance (HCC)    HTN (hypertension), benign    Dyslipidemia    Bilateral carotid artery stenosis    Osteomyelitis ([address]    Streptococcal bacteremia             Plan:   58-year-old male with past medical history of osteomyelitis of the right foot status post amputation of the right great and third toes, ESRD on HD M [facility], CAD, PAD, HTN, HLD, vascular dementia secondary to strokes in 1999 and 2001 that presented with shortness of breath during dialysis. Left first distal phalanx osteomyelitis:  - Patient has been evaluated by podiatry and had angiogram 2/22 but will need at least a partial amputation of the first distal phalanx.   - Balloon angioplasty left anterior tibial artery, left peroneal artery and tibioperoneal trunk done on 2/22. - We will follow for operative management whether that this be definitive or not. This is planned for 2/24.  - Patient is currently also bacteremic with strep pyogenes, anticipate that this is the source. - Currently on IV vancomycin, will continue. D/dolores clindamycin and cefepime as superficial wound cx in chronic wounds likely reflect colonization.   - If there is bone bx done on amputation and cx showing growth, will change abx according to OR cx. keeping vanco as there was MRSA in wound cx and this may be causative agent of OM. Strep species bacteremia:  - Blood cultures x2 sets on 2/20 with growth of strep pyogenes  - TTE 2/21 without evidence of IE.   - Repeat blood cultures as of 2/21 neg to date. - Would like to use abx that can dose with HD ideally. - continue vanco for now pending operative management as did have MRSA isolated in wound cx and may be causative pathogen of OM. ESRD on HD:  - continue vancomycin to be dosed per levels    Medical Decision Making:   The following items were considered in medical decision making:  Discussion of patient care with other providers  Reviewed clinical lab tests  Reviewed radiology tests  Reviewed other diagnostic tests/interventions  Independent review of radiologic images  Microbiology cultures and other micro tests reviewed      Discussed with pt, RN.    [address] Nico Franck Mejia MD

## 2023-02-23 NOTE — PLAN OF CARE
Problem: Discharge Planning  Goal: Discharge to home or other facility with appropriate resources  Outcome: Progressing  Flowsheets  Taken 2/23/2023 1200  Discharge to home or other facility with appropriate resources:   Identify barriers to discharge with patient and caregiver   Arrange for needed discharge resources and transportation as appropriate  Taken 2/23/2023 0854  Discharge to home or other facility with appropriate resources:   Identify barriers to discharge with patient and caregiver   Arrange for needed discharge resources and transportation as appropriate     Problem: Chronic Conditions and Co-morbidities  Goal: Patient's chronic conditions and co-morbidity symptoms are monitored and maintained or improved  Recent Flowsheet Documentation  Taken 2/23/2023 1200 by Isac Alan 34 - Patient's Chronic Conditions and Co-Morbidity Symptoms are Monitored and Maintained or Improved:   Monitor and assess patient's chronic conditions and comorbid symptoms for stability, deterioration, or improvement   Collaborate with multidisciplinary team to address chronic and comorbid conditions and prevent exacerbation or deterioration  Taken 2/23/2023 0854 by Isac Alan 34 - Patient's Chronic Conditions and Co-Morbidity Symptoms are Monitored and Maintained or Improved:   Monitor and assess patient's chronic conditions and comorbid symptoms for stability, deterioration, or improvement   Collaborate with multidisciplinary team to address chronic and comorbid conditions and prevent exacerbation or deterioration     Problem: Skin/Tissue Integrity  Goal: Absence of new skin breakdown  Description: 1. Monitor for areas of redness and/or skin breakdown  2. Assess vascular access sites hourly  3. Every 4-6 hours minimum:  Change oxygen saturation probe site  4.   Every 4-6 hours:  If on nasal continuous positive airway pressure, respiratory therapy assess nares and determine need for appliance change or resting period.   Outcome: Progressing

## 2023-02-23 NOTE — PROGRESS NOTES
Clinical Pharmacy Progress Note    Vancomycin - Management by Pharmacy    Consult Date(s): 2/20/23  Consulting Provider(s): Dr. Esteban Hinson / Plan  1)  LLE osteomyelitis with abscess - Vancomycin  Concurrent Antimicrobials: n/a  Day of Vanc Therapy:  #4  Current Dosing Method: Intermittent Dosing by Levels  Therapeutic Goal: Trough ~ 15 mg/L  Current Dose / Plan:   Pt is ESRD on HD Mon-Wed-Fri - will dose Vancomycin intermittently based on levels. Did not have HD yesterday (Wed 2/22) due to procedures. Received 750mg IV x1 yesterday evening as level was 16.9 mg/L to ensure levels remain > 15 mg/L. Will not give dose with HD today - dose was given last night and anticipate levels to remain therapeutic post-HD. Will check next random level on Friday 2/24. Spoke with dialysis RN Giovanna Rhoades this AM - pt may not have HD on Fri 2/24 -- may be adjusted to Sat 2/25. Will plan to check level on Friday regardless to ensure level remains therapeutic. Will continue to monitor clinical condition and make adjustments to regimen as appropriate. Please call with questions--  Angela Smallwood, PharmD, BCPS  Wireless: [phone]   2/23/2023 8:51 AM        Interval update:  S/p LE arteriogram 2/22 with balloon angioplasty of LLE. ID following - remains on IV vancomycin. Pt did not have HD yesterday. Spoke with dialysis RN this AM - pt may not have HD on Fri, but unclear at this time. Subjective/Objective:   Miguel Angel Brown is a 79 y.o. male with a PMHx significant for ESRD on HD Mon-Wed-Fri, CAD, CVA, dementia, depression, DM, GERD, HTN, HLD, and PVD who is admitted with LLE osteomyelitis of 1st metatarsal, 1st proximal phalanx, and 1st distal phalanx with abscess. Pharmacy is consulted to dose Vancomycin.     Ht Readings from Last 1 Encounters:   02/20/23 5' 7\" (1.702 m)     Wt Readings from Last 1 Encounters:   02/23/23 152 lb 1.9 oz (69 kg)     Current & Prior Antimicrobial Regimen(s):  (2/20-2/21) (2/20-2/21)  Vancomycin - Pharmacy to dose  Intermittent dosing (2/20-current)    Vancomycin Level(s) / Doses:    Date Vancomycin Level Vancomycin Dose   2/20  1750mg x1   2/21 21.9 mcg/mL --   2/22 16.9 mcg/mL 750mg x1   2/23  --     Cultures & Sensitivities:    Date Site Micro Susceptibility / Result   2/20 Blood x2 Group A Strep  Reliably sensitive to b-lactams, others   2/20 Foot wound Group A Strep  Staph aureus  H. influenzae Reliably sensitive to b-lactams, others  In process  Reliably sensitive to CTX, Amp, Bactrim     Recent Labs     02/21/23  0501 02/22/23  0848 02/23/23  0505   CREATININE 4.4* 5.8* 6.8*   BUN 21* 32* 35*   WBC 14.2* 12.4* 10.5       CrCl is not calculated 2/2 ESRD on HD

## 2023-02-23 NOTE — PLAN OF CARE
Problem: Skin/Tissue Integrity  Goal: Absence of new skin breakdown  Description: 1. Monitor for areas of redness and/or skin breakdown  2. Assess vascular access sites hourly  3. Every 4-6 hours minimum:  Change oxygen saturation probe site  4. Every 4-6 hours:  If on nasal continuous positive airway pressure, respiratory therapy assess nares and determine need for appliance change or resting period. Outcome: Progressing  Note: Left and right foot wounds. Left fem pop today. Leg wrapped and pulses good. Podiatry following. Left great toe amputation planned for Friday. Problem: Respiratory - Adult  Goal: Achieves optimal ventilation and oxygenation  Outcome: Progressing  Flowsheets (Taken 2/20/2023 1912 by Edwin Dobbs RN)  Achieves optimal ventilation and oxygenation: Assess for changes in respiratory status  Note: Sitting in mid to high 90s on RA. Problem: Chronic Conditions and Co-morbidities  Goal: Patient's chronic conditions and co-morbidity symptoms are monitored and maintained or improved  Outcome: Progressing  Flowsheets (Taken 2/20/2023 1912 by Edwin Dobbs RN)  Care Plan - Patient's Chronic Conditions and Co-Morbidity Symptoms are Monitored and Maintained or Improved: Monitor and assess patient's chronic conditions and comorbid symptoms for stability, deterioration, or improvement  Note: Left leg fem pop today. Right fem site is soft and non bleeding. No signs of hematoma or injury. Left great toe amputation planned for Friday. Problem: Safety - Medical Restraint  Goal: Remains free of injury from restraints (Restraint for Interference with Medical Device)  Description: INTERVENTIONS:  1. Determine that other, less restrictive measures have been tried or would not be effective before applying the restraint  2. Evaluate the patient's condition at the time of restraint application  3. Inform patient/family regarding the reason for restraint  4.  Q2H: Monitor safety, psychosocial status, comfort, nutrition and hydration  Outcome: Progressing  Flowsheets (Taken 2/22/2023 1447)  Remains free of injury from restraints (restraint for interference with medical device): Inform patient/family regarding the reason for restraint   Evaluate the patient's condition at the time of restraint application   Determine that other, less restrictive measures have been tried or would not be effective before applying the restraint   Every 2 hours: Monitor safety, psychosocial status, comfort, nutrition and hydration  Note: Patients right leg was restrained during bed rest per doctors. Has baseline dementia and needed to keep leg straight for 3 hours for safety of his right femoral site for his fem pop. Removed when bed rest was complete. Checks were done and no injuries. Family at bedside educated on understood need for restraint.

## 2023-02-23 NOTE — PROGRESS NOTES
[facility] NEPHROLOGY    [facility]              [phone]                       Interval History and Plan   Patient seen at bedside   Patient is comfortable on room air  Having breakfast  Lytes stable  Hb low 7.6  BP soft but stable  HD was not done yesterday due to schedule       HD today  Keep even as patient does not look volume overloaded and BP is also soft. Monitor lytes and Hb  Continue Epogen with dialysis  Monitor vitals closely. D/W patient and dialysis nurse. Assessment :     Pt is a 80 yo male with pmhx of osteomyelitis s/p amputation, ESRD on HD, CAD, PAD, HTN, HLD, and vascular dementia who we were consulted for hemodialysis continuation. ESRD on HD MWF  Access: Right arm fistula. Chronic Anemia   DIMITRIOS with dialysis. Hypertension and Volume status  BP was low and home BP are on hold. Does not look volume overloaded. Left foot osteomyelitis  Group A Strep Bacteremia  Sepsis  Management per primary team.       [facility] would like to thank Félix Shearer MD   for opportunity to serve this patient      Please call with questions at-   24 Hrs Answering service [phone] or  7 am- 5 pm via Perfect serve or cell phone      CC/reason for consult :     Hemodialysis continuation     HPI :     Sina Freitas is a 79 y.o. male with history of osteomyelitis, ESRD on HD, CAD, PAD, HTN, HLD, and vascular dementia who presented to the hospital on 2/20/2023 with shortness of breath during his hemodialysis session. Per daughter, patient had desatted to the [de-identified] during the session. On admission, patient has been improving in respiratory status with normal saturations, but remains admitted due to acute concerns of osteomyelitis of the left great toe, sepsis (BC confirmed 2x for Group A strep), and significant arterial disease (recent arterial duplex showing 80% blockage of the left lower extremity). Chest X-ray was negative for acute concerns.  Currently on cefepime (started on 2/20). Single dose of vancomycin given yesterday, with plans to start clindamycin today.     Nephrology was consulted for continuation of dialysis. Last Echo in 2019 with 55% EF. Repeat Echo ordered during this admission.        ROS:     positives in bold   Constitutional:  fever, chills, weakness, weight change, fatigue  Skin:  rash, pruritus, hair loss, bruising, dry skin, petechiae, left great toe wound   Head, Face, Neck   headaches, swelling,  cervical adenopathy  Respiratory: shortness of breath, cough, or wheezing  Cardiovascular: chest pain, palpitations, dizzy, edema  Gastrointestinal: nausea, vomiting, diarrhea, constipation,belly pain    Yellow skin, blood in stool  Musculoskeletal:  back pain, muscle weakness, gait problems,       joint pain or swelling.  Genitourinary:  dysuria, poor urine flow, flank pain, blood in urine  Neurologic:  vertigo, TIA'S, syncope, seizures, focal weakness  Psychosocial:  insomnia, anxiety, or depression.    Additional positive findings:                          All other remaining systems are negative.      PMH/PSH/SH/Family History:     Past Medical History:   Diagnosis Date    Amputated great toe, right (HCC)     CAD (coronary artery disease)     CVA (cerebral vascular accident) ([facility])     3    Dementia ([facility])     Depression     Diabetes ([facility])     Diabetic polyneuropathy ([facility])     ESRD on dialysis ([facility])     monday wednesday friday    GERD (gastroesophageal reflux disease)     Hemiplegia and hemiparesis following cerebral infarction affecting right dominant side (HCC)     Hyperlipidemia     Hypertension     Pain syndrome, chronic     Peripheral vascular disease ([facility])     [facility]    Secondary hyperparathyroidism ([facility])        Past Surgical History:   Procedure Laterality Date    CARDIAC CATHETERIZATION      DIALYSIS FISTULA CREATION         Social History     Socioeconomic History    Marital status:      Spouse name: Not on file     Number of children: Not on file    Years of education: Not on file    Highest education level: Not on file   Occupational History    Not on file   Tobacco Use    Smoking status: Former    Smokeless tobacco: Never   Vaping Use    Vaping Use: Never used   Substance and Sexual Activity    Alcohol use: No     Alcohol/week: 0.0 standard drinks    Drug use: No    Sexual activity: Not Currently   Other Topics Concern    Not on file   Social History Narrative    Not on file     Social Determinants of Health     Financial Resource Strain: Not on file   Food Insecurity: Not on file   Transportation Needs: Not on file   Physical Activity: Not on file   Stress: Not on file   Social Connections: Not on file   Intimate Partner Violence: Not on file   Housing Stability: Not on file           Problem Relation Age of Onset    Heart Disease Mother     Diabetes Mother     Dementia Mother     Diabetes Father     Heart Disease Father     Stroke Brother           Medication:     Scheduled Meds:   epoetin ramesh-epbx  10,000 Units IntraVENous Once per day on Mon Wed Fri    mirtazapine  7.5 mg Oral Nightly    traZODone  25 mg Oral Nightly    melatonin  3 mg Oral Nightly    sodium chloride flush  5-40 mL IntraVENous 2 times per day    heparin (porcine)  5,000 Units SubCUTAneous 3 times per day    aspirin  81 mg Oral Daily    atorvastatin  40 mg Oral Daily    memantine  10 mg Oral BID    [Held by provider] metoprolol succinate  25 mg Oral Daily    pantoprazole  40 mg Oral QAM AC    Vitamin D  1,000 Units Oral Daily    sodium chloride flush  5-40 mL IntraVENous 2 times per day    vancomycin (VANCOCIN) intermittent dosing (placeholder)   Other RX Placeholder     Continuous Infusions:   sodium chloride      sodium chloride       PRN Meds:.sodium chloride, sodium chloride flush, sodium chloride, ondansetron **OR** ondansetron, oxyCODONE, acetaminophen, nitroGLYCERIN, sodium chloride flush, sodium chloride, polyethylene glycol, perflutren lipid microspheres    Vitals :     Vitals:    02/23/23 0501   BP: (!) 117/52   Pulse:    Resp:    Temp:    SpO2:        I & O :     No intake or output data in the 24 hours ending 02/23/23 0754       Physical Examination :     General appearance: Anxious- no, distressed- no  HEENT: Lips- normal, teeth- ok , oral mucosa- moist  Neck : Mass- no, appears symmetrical, JVD- not visible  Respiratory: Respiratory effort- normal effort, wheeze- no, crackles - no  Cardiovascular: Ausculation- No M/R/G, Edema none. Abdomen: visible mass- no, distention- no, scar- no, tenderness- no                            hepatosplenomegaly-  no  Musculoskeletal:  clubbing no,cyanosis- no , digital ischemia- no                           muscle strength- grossly normal , tone - grossly normal.  Skin: rashes- no , ulcers- yes, on left great toe but not visualized due to dressing, induration- no, tightening - no  Psychiatric:  Judgement and insight- normal  CNS:        AAO X 3     LABS:     Recent Labs     02/21/23  0501 02/22/23  0848 02/23/23  0505   WBC 14.2* 12.4* 10.5   HGB 7.7* 8.0* 7.6*   HCT 24.1* 25.9* 24.4*    329 355       Recent Labs     02/21/23  0501 02/22/23  0848 02/23/23  0505    137 137   K 4.2 4.3 4.4   CL 98* 98* 98*   CO2 31 28 25   BUN 21* 32* 35*   CREATININE 4.4* 5.8* 6.8*   GLUCOSE 159* 143* 100*   MG 1.40* 2.00 1.90   PHOS 2.7 3.2 3.8            Patient discuss with Dr. Ellyn Ragsdale  Internal Medicine, MS4      Medical Student Note edited and addended where appropriate. Tana Belcher MD  PGY-3 Internal Medicine  02/23/23     Patient was seen and examined and the case was discussed with the resident. He acted as my scribe. I agree with the assessment and plan.

## 2023-02-23 NOTE — PROGRESS NOTES
Pt received one unit of RBC's today for low Hbg. Pt also had dialysis today outside of normal schedule due to surgical procedures yesterday and tomorrow. Patient tolerated well.

## 2023-02-23 NOTE — PROGRESS NOTES
Podiatric Surgery Daily Progress Note  Cassius Kahn      Subjective :   Patient seen and examined this am at the bedside. Patient denies any acute overnight events. He denies any pain to his left lower extremity at this time. Patient denies calf pain, thigh pain, chest pain. Review of Systems: A 12 point review of symptoms is unremarkable with the exception of the chief complaint. Patient specifically denies  nausea, vomiting, chills, shortness of breath, chest pain, abdominal pain, constipation or difficulty urinating. Objective     BP (!) 117/52   Pulse 80   Temp 97.8 °F (36.6 °C) (Axillary)   Resp 16   Ht 5' 7\" (1.702 m)   Wt 152 lb 1.9 oz (69 kg)   SpO2 97%   BMI 23.82 kg/m²     I/O:No intake or output data in the 24 hours ending 02/23/23 0711             Wt Readings from Last 3 Encounters:   02/23/23 152 lb 1.9 oz (69 kg)   02/20/23 153 lb 7 oz (69.6 kg)   12/09/22 156 lb 8 oz (71 kg)       LABS:    Recent Labs     02/22/23  0848 02/23/23  0505   WBC 12.4* 10.5   HGB 8.0* 7.6*   HCT 25.9* 24.4*    355        Recent Labs     02/23/23  0505      K 4.4   CL 98*   CO2 25   PHOS 3.8   BUN 35*   CREATININE 6.8*        Recent Labs     02/22/23  0848   INR 1.34*           LOWER EXTREMITY EXAMINATION    Dressing to left LE intact. No strikethrough noted to the external dressing. Betadine discoloration noted to the internal layers of the dressing. VASCULAR: DP and PT pulses nonpalpable 0/4 b/l. Upon hand-held Doppler examination, left DP noted to be monophasic, left PT and right DP, PT are softly biphasic. CFT is less than 3 seconds to the digits of the foot with the exception of the left hallux. Skin temperature is warm to cool from proximal to distal with mildly increased warmth to the left LE. No edema noted. No pain with calf compression b/l. NEUROLOGIC: Gross and epicritic sensation is intact b/l. Protective sensation is diminished at all pedal sites b/l.     DERMATOLOGIC: Left LE:  Necrotic eschar noted to the medial aspect of the first metatarsal which is firmly adhered. Full-thickness ulceration at the dorsal medial aspect of the left hallux with necrotic appearance of hallux circumferentially. Wound measures approximately 0.6 x 0.5 x 0.3 cm. Wound probes to deep fascia/periosteum. Wound does not tunnel or track. No maceration tissue noted. Mild malodor noted. No fluctuance or crepitus noted. No surrounding erythema noted. No purulent drainage encountered. Images from 2/23. Right LE: No acute signs of infection. MUSCULOSKELETAL: Muscle strength is 4/5 for all pedal groups tested. Diffuse pain to left LE with maximal tenderness noted at left hallux, plantar 1st MPJ. Ankle joint ROM is decreased in dorsiflexion with the knee extended. History of left partial 1st ray amputation, 3rd digit amputation. IMAGING:  XR Left Foot 2/20/23  Narrative   History: Medial foot ulcer. 2 views of foot. FINDINGS: There is a medial ulceration. There is lysis of the medial base of the proximal phalanx. Lysis of the medial head of the first metatarsal suspicious for osteomyelitis. No soft tissue gas. Associated soft tissue swelling. Impression   1. Medial ulceration with lytic changes compatible with osteomyelitis of the base of the first proximal phalanx and distal first metatarsal.     MRI Left foot 2/21/23  Narrative   Patient: Kaitlynn Wildeyuniel  Time Out: 07:20   Exam(s): MRI LEFT FOOT Without Contrast        EXAM:     MR Left Lower Extremity Without Intravenous Contrast, Foot       CLINICAL HISTORY:     concern for OM at first metatarsal head, base of proximal    phalanx. TECHNIQUE:     Multiplanar magnetic resonance images of the left foot without    intravenous contrast.       COMPARISON:     No relevant prior studies available. FINDINGS:   Images are degraded by motion artifact. Ulcer along the medial aspect of the first toe. Underlying    abscess. Subcutaneous soft tissue edema and intramuscular edema    as can be seen in setting of diabetic neuropathic changes. No    soft tissue gas identified. Marrow signal abnormality within the    first distal phalanx consistent with acute osteomyelitis. No    septic arthritis. No acute fracture. Electronically signed by Angelica Duval MD on 02-21-23 at 0720       Impression   Acute osteomyelitis of the first distal phalanx. VL Arterial Duplex b/l LE 2/21  Impressions   Right Impression   The right ankle/brachial index is 1.09 (PT- 166 mmHg; DP- non-compressible). The right arm brachial pressure was not obtained due to AVF. There is normal, multiphasic flow identified in the common femoral artery,   suggesting no evidence of significant aorto-iliac inflow disease. Elevated velocities at the mid superficial femoral artery indicate a >50%   stenosis by velocity criteria (velocities went from 92 to 193 cm/s). There is atherosclerotic plaque involving the popliteal artery with velocities   consistent with <50% stenosis. Elevated velocities at the distal anterior tibial artery indicate a >50%   stenosis by velocity criteria (velocities went from 36 to 81 cm/s). Left Impression   The left ankle/brachial index is 0.74 (PT-112 mmHg; DP-110 mmHg). The left AMOS   was technically difficult to obtain due to patient movement. There is normal, multiphasic flow identified in the common femoral artery,   suggesting no evidence of significant aorto-iliac inflow disease. There is atherosclerotic plaque involving the superficial femoral artery with   velocities consistent with <50% stenosis. The distal end of the popliteal artery appears to be occluded, and the   tibio-peroneal trunk appears to be occluded. There are multiple collateral   vessels noted in this area. The posterior tibial artery is occluded with retrograde trickle flow   demonstrated at the ankle area.    The distal anterior tibial artery is occluded with reconstitution at the ankle   (34 cm/s). There are no previous studies for comparison. Conclusions        Summary        Normal right AMOS. Abnormal left AMOS in the range of moderate arterial insufficiency. Right mid-SFA stenosis of greater than 50% associated with nonocclusive    popliteal and tibial disease. Left popliteal and tibioperoneal trunk occlusions with associated posterior    tibial and anterior tibial artery occlusions. Signature    ------------------------------------------------------------------    Electronically signed by Kaleb Mcintyre MD (Interpreting    physician) on 02/21/2023 at 03:56 PM       ASSESSMENT/PLAN  - Bacteremia  - Osteomyelitis; 1st metatarsal, 1st proximal phalanx, 1st distal phalanx; left foot  - Abscess; left foot  - Full thickness ulceration; left foot; Bee 3  - Necrotic Eschar; left foot  - Peripheral vascular disease; b/l LE  - History of partial 1st ray and 3rd digit amputation; right foot    -Patient examined and evaluated at bedside   -Hypotensive, otherwise VSS, no leukocytosis noted (WBC 10.5)  -.1,   -Prealbumin 7.1; continue dietary nutritional supplements  -HbA1c 6.0%  -Wound culture- Strep Pyogenes, Staph Aureus, Haemophilus parainfluenzae   -Blood culture x2- Strep pyogenes  -XR, MRI reviewed, impressions noted above  -Arterial Duplex b/l LE reviewed, impressions noted above  -Vascular surgery following; appreciate recommendations  -ID following, continue antibiotics per their recommendations  -Dressing applied to left LE consisting of Betadine, gauze, Kerlix, Ace  -Nonweight bearing to the left LE  -Plan for surgical intervention tomorrow consisting of incision and drainage with partial 1st ray amputation of the right lower extremity.  Surgical intervention will likely be a staged procedure.  -Consent to be obtained  -Surgical clearance per primary team; appreciate recommendations  -NPO at midnight  -Will hold heparin 6 hours pre-op tomorrow   -PT/INR ordered for tomorrow AM    DISPO: Full thickness ulceration with concern for osteomyelitis of 1st metatarsal/proximal phalanx/distal phalanx; left foot. Labs and imaging reviewed. Continue broad spectrum IV antibiotics per ID recs. Vascular surgery following. Plan for surgical intervention tomorrow afternoon consisting of I&D, partial 1st ray amputation of the left lower extremity.      Discussed assessment and plan with Dr. Rafa Tinajero DPM.    Darreld Primrose, DPM  Podiatric Resident, PGY-3  Pager #: [phone] or Perfect Serve

## 2023-02-24 ENCOUNTER — ANESTHESIA EVENT (OUTPATIENT)
Dept: OPERATING ROOM | Age: 68
End: 2023-02-24
Payer: MEDICARE

## 2023-02-24 ENCOUNTER — ANESTHESIA (OUTPATIENT)
Dept: OPERATING ROOM | Age: 68
End: 2023-02-24
Payer: MEDICARE

## 2023-02-24 ENCOUNTER — APPOINTMENT (OUTPATIENT)
Dept: GENERAL RADIOLOGY | Age: 68
DRG: 853 | End: 2023-02-24
Payer: MEDICARE

## 2023-02-24 LAB
ALBUMIN SERPL-MCNC: 2.3 G/DL (ref 3.4–5)
ANION GAP SERPL CALCULATED.3IONS-SCNC: 9 MMOL/L (ref 3–16)
ANISOCYTOSIS: ABNORMAL
BASOPHILS ABSOLUTE: 0 K/UL (ref 0–0.2)
BASOPHILS RELATIVE PERCENT: 0 %
BUN BLDV-MCNC: 23 MG/DL (ref 7–20)
CALCIUM SERPL-MCNC: 8 MG/DL (ref 8.3–10.6)
CHLORIDE BLD-SCNC: 99 MMOL/L (ref 99–110)
CO2: 28 MMOL/L (ref 21–32)
CREAT SERPL-MCNC: 4.4 MG/DL (ref 0.8–1.3)
EOSINOPHILS ABSOLUTE: 0.2 K/UL (ref 0–0.6)
EOSINOPHILS RELATIVE PERCENT: 2 %
GFR SERPL CREATININE-BSD FRML MDRD: 14 ML/MIN/{1.73_M2}
GLUCOSE BLD-MCNC: 124 MG/DL (ref 70–99)
GLUCOSE BLD-MCNC: 175 MG/DL (ref 70–99)
HCT VFR BLD CALC: 24.9 % (ref 40.5–52.5)
HCT VFR BLD CALC: 25.5 % (ref 40.5–52.5)
HEMOGLOBIN: 8.1 G/DL (ref 13.5–17.5)
HEMOGLOBIN: 8.4 G/DL (ref 13.5–17.5)
INR BLD: 1.34 (ref 0.87–1.14)
LYMPHOCYTES ABSOLUTE: 1.3 K/UL (ref 1–5.1)
LYMPHOCYTES RELATIVE PERCENT: 12 %
MACROCYTES: ABNORMAL
MAGNESIUM: 1.8 MG/DL (ref 1.8–2.4)
MCH RBC QN AUTO: 27.5 PG (ref 26–34)
MCHC RBC AUTO-ENTMCNC: 32.7 G/DL (ref 31–36)
MCV RBC AUTO: 84.1 FL (ref 80–100)
METAMYELOCYTES RELATIVE PERCENT: 1 %
MICROCYTES: ABNORMAL
MONOCYTES ABSOLUTE: 1.1 K/UL (ref 0–1.3)
MONOCYTES RELATIVE PERCENT: 10 %
NEUTROPHILS ABSOLUTE: 8.2 K/UL (ref 1.7–7.7)
NEUTROPHILS RELATIVE PERCENT: 75 %
OVALOCYTES: ABNORMAL
PDW BLD-RTO: 18.9 % (ref 12.4–15.4)
PERFORMED ON: ABNORMAL
PHOSPHORUS: 2.7 MG/DL (ref 2.5–4.9)
PLATELET # BLD: 369 K/UL (ref 135–450)
PMV BLD AUTO: 7.8 FL (ref 5–10.5)
POLYCHROMASIA: ABNORMAL
POTASSIUM SERPL-SCNC: 4.6 MMOL/L (ref 3.5–5.1)
PROTHROMBIN TIME: 16.5 SEC (ref 11.7–14.5)
RBC # BLD: 2.96 M/UL (ref 4.2–5.9)
SODIUM BLD-SCNC: 136 MMOL/L (ref 136–145)
VANCOMYCIN RANDOM: 17 UG/ML
WBC # BLD: 10.8 K/UL (ref 4–11)

## 2023-02-24 PROCEDURE — 87077 CULTURE AEROBIC IDENTIFY: CPT

## 2023-02-24 PROCEDURE — 6360000002 HC RX W HCPCS: Performed by: STUDENT IN AN ORGANIZED HEALTH CARE EDUCATION/TRAINING PROGRAM

## 2023-02-24 PROCEDURE — 6370000000 HC RX 637 (ALT 250 FOR IP): Performed by: STUDENT IN AN ORGANIZED HEALTH CARE EDUCATION/TRAINING PROGRAM

## 2023-02-24 PROCEDURE — 88304 TISSUE EXAM BY PATHOLOGIST: CPT

## 2023-02-24 PROCEDURE — 80069 RENAL FUNCTION PANEL: CPT

## 2023-02-24 PROCEDURE — 2580000003 HC RX 258: Performed by: NURSE ANESTHETIST, CERTIFIED REGISTERED

## 2023-02-24 PROCEDURE — 2580000003 HC RX 258

## 2023-02-24 PROCEDURE — 3600000013 HC SURGERY LEVEL 3 ADDTL 15MIN: Performed by: PODIATRIST

## 2023-02-24 PROCEDURE — 2709999900 HC NON-CHARGEABLE SUPPLY: Performed by: PODIATRIST

## 2023-02-24 PROCEDURE — 6370000000 HC RX 637 (ALT 250 FOR IP)

## 2023-02-24 PROCEDURE — 85610 PROTHROMBIN TIME: CPT

## 2023-02-24 PROCEDURE — 87075 CULTR BACTERIA EXCEPT BLOOD: CPT

## 2023-02-24 PROCEDURE — 0Y6N0Z9 DETACHMENT AT LEFT FOOT, PARTIAL 1ST RAY, OPEN APPROACH: ICD-10-PCS | Performed by: PODIATRIST

## 2023-02-24 PROCEDURE — 83735 ASSAY OF MAGNESIUM: CPT

## 2023-02-24 PROCEDURE — 94761 N-INVAS EAR/PLS OXIMETRY MLT: CPT

## 2023-02-24 PROCEDURE — 3600000003 HC SURGERY LEVEL 3 BASE: Performed by: PODIATRIST

## 2023-02-24 PROCEDURE — 87070 CULTURE OTHR SPECIMN AEROBIC: CPT

## 2023-02-24 PROCEDURE — 73630 X-RAY EXAM OF FOOT: CPT

## 2023-02-24 PROCEDURE — 87205 SMEAR GRAM STAIN: CPT

## 2023-02-24 PROCEDURE — 2700000000 HC OXYGEN THERAPY PER DAY

## 2023-02-24 PROCEDURE — 1200000000 HC SEMI PRIVATE

## 2023-02-24 PROCEDURE — 80202 ASSAY OF VANCOMYCIN: CPT

## 2023-02-24 PROCEDURE — 87015 SPECIMEN INFECT AGNT CONCNTJ: CPT

## 2023-02-24 PROCEDURE — 7100000001 HC PACU RECOVERY - ADDTL 15 MIN: Performed by: PODIATRIST

## 2023-02-24 PROCEDURE — 3700000001 HC ADD 15 MINUTES (ANESTHESIA): Performed by: PODIATRIST

## 2023-02-24 PROCEDURE — 36415 COLL VENOUS BLD VENIPUNCTURE: CPT

## 2023-02-24 PROCEDURE — 6360000002 HC RX W HCPCS: Performed by: NURSE ANESTHETIST, CERTIFIED REGISTERED

## 2023-02-24 PROCEDURE — 88311 DECALCIFY TISSUE: CPT

## 2023-02-24 PROCEDURE — 7100000000 HC PACU RECOVERY - FIRST 15 MIN: Performed by: PODIATRIST

## 2023-02-24 PROCEDURE — 87102 FUNGUS ISOLATION CULTURE: CPT

## 2023-02-24 PROCEDURE — 99232 SBSQ HOSP IP/OBS MODERATE 35: CPT | Performed by: INTERNAL MEDICINE

## 2023-02-24 PROCEDURE — 2580000003 HC RX 258: Performed by: STUDENT IN AN ORGANIZED HEALTH CARE EDUCATION/TRAINING PROGRAM

## 2023-02-24 PROCEDURE — 87116 MYCOBACTERIA CULTURE: CPT

## 2023-02-24 PROCEDURE — 85025 COMPLETE CBC W/AUTO DIFF WBC: CPT

## 2023-02-24 PROCEDURE — 3700000000 HC ANESTHESIA ATTENDED CARE: Performed by: PODIATRIST

## 2023-02-24 PROCEDURE — 87206 SMEAR FLUORESCENT/ACID STAI: CPT

## 2023-02-24 PROCEDURE — 88305 TISSUE EXAM BY PATHOLOGIST: CPT

## 2023-02-24 PROCEDURE — 2500000003 HC RX 250 WO HCPCS: Performed by: PODIATRIST

## 2023-02-24 PROCEDURE — 90935 HEMODIALYSIS ONE EVALUATION: CPT

## 2023-02-24 RX ORDER — SODIUM CHLORIDE 9 MG/ML
INJECTION, SOLUTION INTRAVENOUS CONTINUOUS PRN
Status: DISCONTINUED | OUTPATIENT
Start: 2023-02-24 | End: 2023-02-24 | Stop reason: SDUPTHER

## 2023-02-24 RX ORDER — SODIUM CHLORIDE 9 MG/ML
25 INJECTION, SOLUTION INTRAVENOUS PRN
Status: DISCONTINUED | OUTPATIENT
Start: 2023-02-24 | End: 2023-02-24 | Stop reason: HOSPADM

## 2023-02-24 RX ORDER — LORAZEPAM 2 MG/ML
0.5 INJECTION INTRAMUSCULAR
Status: DISCONTINUED | OUTPATIENT
Start: 2023-02-24 | End: 2023-02-24 | Stop reason: HOSPADM

## 2023-02-24 RX ORDER — ONDANSETRON 2 MG/ML
4 INJECTION INTRAMUSCULAR; INTRAVENOUS
Status: DISCONTINUED | OUTPATIENT
Start: 2023-02-24 | End: 2023-02-24 | Stop reason: HOSPADM

## 2023-02-24 RX ORDER — LABETALOL HYDROCHLORIDE 5 MG/ML
10 INJECTION, SOLUTION INTRAVENOUS
Status: DISCONTINUED | OUTPATIENT
Start: 2023-02-24 | End: 2023-02-24 | Stop reason: HOSPADM

## 2023-02-24 RX ORDER — ONDANSETRON 2 MG/ML
INJECTION INTRAMUSCULAR; INTRAVENOUS PRN
Status: DISCONTINUED | OUTPATIENT
Start: 2023-02-24 | End: 2023-02-24 | Stop reason: SDUPTHER

## 2023-02-24 RX ORDER — SODIUM CHLORIDE 0.9 % (FLUSH) 0.9 %
5-40 SYRINGE (ML) INJECTION PRN
Status: DISCONTINUED | OUTPATIENT
Start: 2023-02-24 | End: 2023-02-24 | Stop reason: HOSPADM

## 2023-02-24 RX ORDER — FENTANYL CITRATE 50 UG/ML
25 INJECTION, SOLUTION INTRAMUSCULAR; INTRAVENOUS EVERY 5 MIN PRN
Status: DISCONTINUED | OUTPATIENT
Start: 2023-02-24 | End: 2023-02-24 | Stop reason: HOSPADM

## 2023-02-24 RX ORDER — OXYCODONE HYDROCHLORIDE 5 MG/1
10 TABLET ORAL EVERY 4 HOURS PRN
Status: DISCONTINUED | OUTPATIENT
Start: 2023-02-24 | End: 2023-03-03 | Stop reason: HOSPADM

## 2023-02-24 RX ORDER — PHENYLEPHRINE HYDROCHLORIDE 10 MG/ML
INJECTION INTRAVENOUS PRN
Status: DISCONTINUED | OUTPATIENT
Start: 2023-02-24 | End: 2023-02-24 | Stop reason: SDUPTHER

## 2023-02-24 RX ORDER — SODIUM CHLORIDE 0.9 % (FLUSH) 0.9 %
5-40 SYRINGE (ML) INJECTION EVERY 12 HOURS SCHEDULED
Status: DISCONTINUED | OUTPATIENT
Start: 2023-02-24 | End: 2023-02-24 | Stop reason: HOSPADM

## 2023-02-24 RX ORDER — IPRATROPIUM BROMIDE AND ALBUTEROL SULFATE 2.5; .5 MG/3ML; MG/3ML
1 SOLUTION RESPIRATORY (INHALATION)
Status: DISCONTINUED | OUTPATIENT
Start: 2023-02-24 | End: 2023-02-24 | Stop reason: HOSPADM

## 2023-02-24 RX ORDER — PROPOFOL 10 MG/ML
INJECTION, EMULSION INTRAVENOUS PRN
Status: DISCONTINUED | OUTPATIENT
Start: 2023-02-24 | End: 2023-02-24 | Stop reason: SDUPTHER

## 2023-02-24 RX ORDER — PROCHLORPERAZINE EDISYLATE 5 MG/ML
5 INJECTION INTRAMUSCULAR; INTRAVENOUS
Status: DISCONTINUED | OUTPATIENT
Start: 2023-02-24 | End: 2023-02-24 | Stop reason: HOSPADM

## 2023-02-24 RX ORDER — FENTANYL CITRATE 50 UG/ML
INJECTION, SOLUTION INTRAMUSCULAR; INTRAVENOUS PRN
Status: DISCONTINUED | OUTPATIENT
Start: 2023-02-24 | End: 2023-02-24 | Stop reason: SDUPTHER

## 2023-02-24 RX ORDER — LIDOCAINE HYDROCHLORIDE 20 MG/ML
INJECTION, SOLUTION INTRAVENOUS PRN
Status: DISCONTINUED | OUTPATIENT
Start: 2023-02-24 | End: 2023-02-24 | Stop reason: SDUPTHER

## 2023-02-24 RX ORDER — HEPARIN SODIUM 5000 [USP'U]/ML
5000 INJECTION, SOLUTION INTRAVENOUS; SUBCUTANEOUS EVERY 8 HOURS SCHEDULED
Status: COMPLETED | OUTPATIENT
Start: 2023-02-25 | End: 2023-02-25

## 2023-02-24 RX ORDER — LIDOCAINE HYDROCHLORIDE 20 MG/ML
INJECTION, SOLUTION EPIDURAL; INFILTRATION; INTRACAUDAL; PERINEURAL PRN
Status: DISCONTINUED | OUTPATIENT
Start: 2023-02-24 | End: 2023-02-24 | Stop reason: HOSPADM

## 2023-02-24 RX ORDER — ACETAMINOPHEN 650 MG/1
650 SUPPOSITORY RECTAL
Status: DISCONTINUED | OUTPATIENT
Start: 2023-02-24 | End: 2023-02-24 | Stop reason: HOSPADM

## 2023-02-24 RX ORDER — OXYCODONE HYDROCHLORIDE 5 MG/1
5 TABLET ORAL EVERY 4 HOURS PRN
Status: DISCONTINUED | OUTPATIENT
Start: 2023-02-24 | End: 2023-03-03 | Stop reason: HOSPADM

## 2023-02-24 RX ADMIN — PHENYLEPHRINE HYDROCHLORIDE 100 MCG: 10 INJECTION INTRAVENOUS at 14:26

## 2023-02-24 RX ADMIN — TRAZODONE HYDROCHLORIDE 25 MG: 50 TABLET ORAL at 20:54

## 2023-02-24 RX ADMIN — MEMANTINE 10 MG: 10 TABLET ORAL at 18:00

## 2023-02-24 RX ADMIN — HEPARIN SODIUM 5000 UNITS: 5000 INJECTION INTRAVENOUS; SUBCUTANEOUS at 05:05

## 2023-02-24 RX ADMIN — Medication 1000 UNITS: at 18:00

## 2023-02-24 RX ADMIN — ONDANSETRON 4 MG: 2 INJECTION INTRAMUSCULAR; INTRAVENOUS at 14:10

## 2023-02-24 RX ADMIN — SODIUM CHLORIDE, PRESERVATIVE FREE 10 ML: 5 INJECTION INTRAVENOUS at 18:00

## 2023-02-24 RX ADMIN — VANCOMYCIN HYDROCHLORIDE 750 MG: 10 INJECTION, POWDER, LYOPHILIZED, FOR SOLUTION INTRAVENOUS at 14:10

## 2023-02-24 RX ADMIN — ATORVASTATIN CALCIUM 40 MG: 40 TABLET, FILM COATED ORAL at 17:59

## 2023-02-24 RX ADMIN — PROPOFOL 100 MG: 10 INJECTION, EMULSION INTRAVENOUS at 14:02

## 2023-02-24 RX ADMIN — SODIUM CHLORIDE: 9 INJECTION, SOLUTION INTRAVENOUS at 14:00

## 2023-02-24 RX ADMIN — LIDOCAINE HYDROCHLORIDE 60 MG: 20 INJECTION, SOLUTION INTRAVENOUS at 14:02

## 2023-02-24 RX ADMIN — PHENYLEPHRINE HYDROCHLORIDE 200 MCG: 10 INJECTION INTRAVENOUS at 14:21

## 2023-02-24 RX ADMIN — Medication 3 MG: at 20:54

## 2023-02-24 RX ADMIN — PHENYLEPHRINE HYDROCHLORIDE 300 MCG: 10 INJECTION INTRAVENOUS at 14:28

## 2023-02-24 RX ADMIN — FENTANYL CITRATE 100 MCG: 50 INJECTION, SOLUTION INTRAMUSCULAR; INTRAVENOUS at 13:59

## 2023-02-24 RX ADMIN — SODIUM CHLORIDE, PRESERVATIVE FREE 10 ML: 5 INJECTION INTRAVENOUS at 21:00

## 2023-02-24 RX ADMIN — PANTOPRAZOLE SODIUM 40 MG: 40 TABLET, DELAYED RELEASE ORAL at 05:05

## 2023-02-24 RX ADMIN — ASPIRIN 81 MG: 81 TABLET, COATED ORAL at 17:59

## 2023-02-24 RX ADMIN — MIRTAZAPINE 7.5 MG: 15 TABLET, FILM COATED ORAL at 20:54

## 2023-02-24 RX ADMIN — ACETAMINOPHEN 1000 MG: 500 TABLET ORAL at 20:54

## 2023-02-24 RX ADMIN — PHENYLEPHRINE HYDROCHLORIDE 200 MCG: 10 INJECTION INTRAVENOUS at 14:19

## 2023-02-24 RX ADMIN — PROPOFOL 30 MG: 10 INJECTION, EMULSION INTRAVENOUS at 14:04

## 2023-02-24 RX ADMIN — EPOETIN ALFA-EPBX 10000 UNITS: 10000 INJECTION, SOLUTION INTRAVENOUS; SUBCUTANEOUS at 10:35

## 2023-02-24 RX ADMIN — SODIUM CHLORIDE, PRESERVATIVE FREE 10 ML: 5 INJECTION INTRAVENOUS at 18:06

## 2023-02-24 RX ADMIN — SODIUM CHLORIDE, PRESERVATIVE FREE 10 ML: 5 INJECTION INTRAVENOUS at 20:54

## 2023-02-24 RX ADMIN — PHENYLEPHRINE HYDROCHLORIDE 200 MCG: 10 INJECTION INTRAVENOUS at 14:17

## 2023-02-24 ASSESSMENT — PAIN SCALES - GENERAL
PAINLEVEL_OUTOF10: 3
PAINLEVEL_OUTOF10: 0

## 2023-02-24 ASSESSMENT — PAIN SCALES - WONG BAKER
WONGBAKER_NUMERICALRESPONSE: 0
WONGBAKER_NUMERICALRESPONSE: 0

## 2023-02-24 ASSESSMENT — PAIN DESCRIPTION - ORIENTATION: ORIENTATION: LEFT

## 2023-02-24 ASSESSMENT — PAIN DESCRIPTION - PAIN TYPE: TYPE: ACUTE PAIN;SURGICAL PAIN

## 2023-02-24 ASSESSMENT — PAIN DESCRIPTION - FREQUENCY: FREQUENCY: CONTINUOUS

## 2023-02-24 ASSESSMENT — PAIN DESCRIPTION - ONSET: ONSET: ON-GOING

## 2023-02-24 ASSESSMENT — PAIN - FUNCTIONAL ASSESSMENT: PAIN_FUNCTIONAL_ASSESSMENT: ACTIVITIES ARE NOT PREVENTED

## 2023-02-24 ASSESSMENT — PAIN DESCRIPTION - DESCRIPTORS: DESCRIPTORS: ACHING

## 2023-02-24 ASSESSMENT — PAIN DESCRIPTION - LOCATION: LOCATION: FOOT

## 2023-02-24 NOTE — PROGRESS NOTES
Internal Medicine Progress Note    Patient Name: Leif Tang   Patient : 1955   Date: 2023   Admit Date: 2023     CC: Shortness of Breath       Subjective     Interval History:     No acute overnight events  Yesterday, he tolerated HD well with net  mL  Afebrile, saturating well on RA  L hallux partial ray amputation planned for this afternoon    Patient seen and examined at bedside. He reports feeling well overall. He continues to have lower extremity/bilateral foot pain, unchanged since admission. At present time, he denies headache, lightheadedness, dizziness, ear fullness/pressure, chest pain/pressure/tightness, dyspnea at rest, abdominal pain, nausea, vomiting, diarrhea, and constipation. ROS:  As per interval history above. Objective     Vital Signs:  Patient Vitals for the past 8 hrs:   BP Temp Temp src Pulse Resp SpO2 Weight   23 0740 (!) 140/60 98.9 °F (37.2 °C) Oral 91 18 95 % --   23 0412 (!) 127/55 99 °F (37.2 °C) Oral 88 20 95 % 152 lb 8.9 oz (69.2 kg)   23 0021 -- -- -- 86 -- -- --   23 0019 (!) 135/58 99.1 °F (37.3 °C) Oral 94 20 97 % --         Physical Exam:  Constitutional: Pleasant, ill-appearing, non-diaphoretic, elderly male, sitting upright eating breakfast, in NAD. HEENT:  NCAT. Scleral icterus. No conjunctival injection. No rhinorrhea or epistaxis. Cardiovascular:  No apparent JVD. RRR. Radial pulses 2+. Capillary refill < 2 sec. Pulmonary:  Able to speak in full sentences without frequent pauses. CTAB. No perioral cyanosis. Abdomen: Non-distended. Normoactive bowel sounds. Soft, no TTP. Musculoskeletal:  Cachectic. Right AV fistula. No lower extremity edema. Left foot wrapped with dressing, which is clean, dry, and intact. Skin:  Engle, dry. Neurological:  Alert, awake, and oriented to self. Not oriented to place, time, and situation. Psychological:  Cooperative.   Normal speech, behavior, and thought content. Intake/Output Summary (Last 24 hours) at 2/24/2023 0803  Last data filed at 2/23/2023 2114  Gross per 24 hour   Intake 1318.33 ml   Output 2400 ml   Net -1081.67 ml          Medications:   epoetin ramesh-epbx  10,000 Units IntraVENous Once per day on Mon Wed Fri    mirtazapine  7.5 mg Oral Nightly    traZODone  25 mg Oral Nightly    melatonin  3 mg Oral Nightly    sodium chloride flush  5-40 mL IntraVENous 2 times per day    [Held by provider] heparin (porcine)  5,000 Units SubCUTAneous 3 times per day    aspirin  81 mg Oral Daily    atorvastatin  40 mg Oral Daily    memantine  10 mg Oral BID    [Held by provider] metoprolol succinate  25 mg Oral Daily    pantoprazole  40 mg Oral QAM AC    Vitamin D  1,000 Units Oral Daily    sodium chloride flush  5-40 mL IntraVENous 2 times per day    vancomycin (VANCOCIN) intermittent dosing (placeholder)   Other RX Placeholder       sodium chloride      sodium chloride      sodium chloride          Labs:  CBC:   Recent Labs     02/22/23  0848 02/23/23  0505 02/23/23  2311 02/24/23  0341   WBC 12.4* 10.5  --  10.8   HGB 8.0* 7.6* 8.4* 8.1*   HCT 25.9* 24.4* 25.5* 24.9*    355  --  369   MCV 87.5 86.9  --  84.1     Renal:    Recent Labs     02/22/23  0848 02/23/23  0505 02/24/23  0341    137 136   K 4.3 4.4 4.6   CL 98* 98* 99   CO2 28 25 28   BUN 32* 35* 23*   CREATININE 5.8* 6.8* 4.4*   GLUCOSE 143* 100* 175*   CALCIUM 8.6 8.6 8.0*   MG 2.00 1.90 1.80   PHOS 3.2 3.8 2.7   ANIONGAP 11 14 9     Hepatic:   Recent Labs     02/22/23  0848 02/23/23  0505 02/24/23  0341   LABALBU 2.3* 2.1* 2.3*     Pro-BNP:   No results for input(s): PROBNP in the last 72 hours. Lipids:   Recent Labs     02/22/23  0849   CHOL 74   TRIG 148   HDL 10*   LDLCALC 34       ABGs:  No results for input(s): PHART, AIT2DMD, PO2ART, HKD7EIY, BEART, THGBART, W7LEVGXF, LBV6BKX in the last 72 hours.       VBGs: No results for input(s): Reda Chimera, PO2VEN in the last 72 hours.      INR:   Recent Labs     02/22/23  0848 02/24/23  0341   INR 1.34* 1.34*     aPTT: No results for input(s): APTT in the last 72 hours. Procalcitonin: No results for input(s): PROCAL in the last 72 hours. No results for input(s): CRP in the last 72 hours. ESR: No results for input(s): ESR in the last 72 hours. Radiology:  VL DUP LOWER EXTREMITY ARTERIES BILATERAL   Final Result      MRI FOOT LEFT WO CONTRAST   Final Result   Acute osteomyelitis of the first distal phalanx. XR FOOT LEFT (MIN 3 VIEWS)   Final Result   1. Medial ulceration with lytic changes compatible with osteomyelitis of the base of the first proximal phalanx and distal first metatarsal.      XR CHEST PORTABLE   Final Result   Impression: No acute abnormality      IR ANGIOGRAM EXTREMITY LEFT    (Results Pending)       Assessment & Plan   75-year-old  male who presented to the ED from his SNF on 2/20/23 with dyspnea occurring during hemodialysis. Sepsis with S. pyogenes bacteremia - likely 2/2 acute osteomyelitis - afebrile, WBC now WNL  Acute osteomyelitis of left first distal phalanx with heavy growth of MRSA and H. parainfluenzae  Peripheral arterial disease - LLE AMOS 0.74  Follows with vascular surgery at [facility].  S/p amputation of right hallux and 4th digit 2/2 osteomyelitis. 2/20: Growth on BCx x2 and on PCR, prealbumin low at 7.1, ESR elevated at 117, CRP elevated at 282.1. ID consulted, recs appreciated   Continue vancomycin IV (2/20-), dosed by pharmacy  Repeat BCx on 2/21 with NGTD  CBC w/diff daily  Podiatry and vascular surgery consulted  LLE arteriogram on 2/22  Left foot partial first ray amputation planned for today (2/24)  PT/OT eval and tx  The patient is at high-risk for perioperative complications in an intermediate-risk procedure given multiple vascular comorbidities, including his cerebrovascular disease and elevated creatinine level.     G1DD - no signs of volume overload  2/20:  ProBNP 7611. Echo (2/21):  LVEF 55-60%, no regional WMA, G1DD, small circumferential pericardial effusion, PASP 28 mmHg. 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,W,F)  Hyperparathyroidism of ESRD  Normocytic anemia of ESRD  Nephrology consulted  HD planned today  BMP, Mg QD - replete to keep K > 4, Mg > 2  Avoid NSAIDs/nephrotoxins as patient still makes urine  Strict I/Os  Daily weights  Keep MAP > 65 mmHg  Procrit with dialysis    Pulmonary hypertension  Hypertension - currently hypotensive  CAD (LAD 15%, L Cx 15%, RCA 15-20% in 07/2016)  Continue ASA 81 mg QD  Hold home meds metoprolol succinate 25 mg QD, isosorbide mononitrate 30 mg QD, hydralazine 25 mg BID as patient is hypotensive    HLD - well-controlled  Lipid panel (2/22/23): Total 74, HDL 10, LDL 34, , VLDL 30. Continue home atorvastatin 40 mg QD    Vascular dementia  Hemiplegia   Palliative care consulted  Continue memantine 10 mg BID    Insomnia  Continue home meds melatonin 3 mg QHS, trazodone 25 mg QHS, mirtazapine 7.5 mg QHS PRN    GERD  Protonix QD      DVT PPx:  Heparin SQ, Protonix  Diet:  Diet NPO Exceptions are: Sips of Water with Meds   Code status:  Full Code     ELOS:  3 days  Barriers to discharge:  sepsis bacteremia, acute OM  Disposition  - Preadmission: From [facility].  - Current:  [facility]  - Upon discharge:  OT 12/24, PT 7/24 on 2/22. Plan to return to SNF. Will discuss with attending physician Dr. Alex Rod MD.     Serge Munguia.  Dawna Neri MD  Internal Medicine, PGY-1

## 2023-02-24 NOTE — PROGRESS NOTES
Admitted to pacu at this time. Pt has dementia and past CVA. Does answer to name. Incapable of following commands. Has congested cough. Yankauer used to sx back of throat. On RA and without distress. VSS on monitor. Vanco hanging from OR.  Report given by CRNA

## 2023-02-24 NOTE — ANESTHESIA POSTPROCEDURE EVALUATION
Department of Anesthesiology  Postprocedure Note    Patient: Karely Gotti  MRN: [id number]  YOB: 1955  Date of evaluation: 2/24/2023      Procedure Summary     Date: 02/24/23 Room / Location: [facility]    Anesthesia Start: 4149 Anesthesia Stop: 5286    Procedure: LEFT FOOT TOTAL FIRST RAY AMPUTATION (Left) Diagnosis:       Osteomyelitis of left foot, unspecified type ([address]      (Osteomyelitis of left foot, unspecified type ([address] [M86.9])    Surgeons: Rafa Uribe DPM Responsible Provider: Cheri Cuevas MD    Anesthesia Type: MAC ASA Status: 4          Anesthesia Type: No value filed.     Marky Phase I:      Marky Phase II: Marky Score: 7      Anesthesia Post Evaluation    Patient location during evaluation: PACU  Level of consciousness: awake  Complications: no  Multimodal analgesia pain management approach

## 2023-02-24 NOTE — PLAN OF CARE
Problem: Skin/Tissue Integrity  Goal: Absence of new skin breakdown  Description: 1. Monitor for areas of redness and/or skin breakdown  2. Assess vascular access sites hourly  3. Every 4-6 hours minimum:  Change oxygen saturation probe site  4. Every 4-6 hours:  If on nasal continuous positive airway pressure, respiratory therapy assess nares and determine need for appliance change or resting period.   2/23/2023 1957 by Juan Whitlock RN  Outcome: Progressing  2/23/2023 1750 by Shereen Jones RN  Outcome: Progressing     Problem: Respiratory - Adult  Goal: Achieves optimal ventilation and oxygenation  2/23/2023 1957 by Juan Whitlock RN  Outcome: Progressing  2/23/2023 1750 by Shereen Jones RN  Outcome: Progressing     Problem: Discharge Planning  Goal: Discharge to home or other facility with appropriate resources  2/23/2023 1957 by Juan Whitlock RN  Outcome: Progressing  2/23/2023 1750 by Shereen Jones RN  Outcome: Progressing  Flowsheets  Taken 2/23/2023 1200  Discharge to home or other facility with appropriate resources:   Identify barriers to discharge with patient and caregiver   Arrange for needed discharge resources and transportation as appropriate  Taken 2/23/2023 0854  Discharge to home or other facility with appropriate resources:   Identify barriers to discharge with patient and caregiver   Arrange for needed discharge resources and transportation as appropriate     Problem: Pain  Goal: Verbalizes/displays adequate comfort level or baseline comfort level  2/23/2023 1957 by Juan Whitlock RN  Outcome: Progressing  2/23/2023 1856 by Christian Crow RN  Outcome: Progressing     Problem: Chronic Conditions and Co-morbidities  Goal: Patient's chronic conditions and co-morbidity symptoms are monitored and maintained or improved  Outcome: Progressing  Flowsheets  Taken 2/23/2023 1200 by Isac Cunha 34 - Patient's Chronic Conditions and Co-Morbidity Symptoms are Monitored and Maintained or Improved:   Monitor and assess patient's chronic conditions and comorbid symptoms for stability, deterioration, or improvement   Collaborate with multidisciplinary team to address chronic and comorbid conditions and prevent exacerbation or deterioration  Taken 2/23/2023 0854 by Arturo Short RN  Care Plan - Patient's Chronic Conditions and Co-Morbidity Symptoms are Monitored and Maintained or Improved:   Monitor and assess patient's chronic conditions and comorbid symptoms for stability, deterioration, or improvement   Collaborate with multidisciplinary team to address chronic and comorbid conditions and prevent exacerbation or deterioration     Problem: Safety - Adult  Goal: Free from fall injury  Outcome: Progressing     Problem: Safety - Medical Restraint  Goal: Remains free of injury from restraints (Restraint for Interference with Medical Device)  Description: INTERVENTIONS:  1. Determine that other, less restrictive measures have been tried or would not be effective before applying the restraint  2. Evaluate the patient's condition at the time of restraint application  3. Inform patient/family regarding the reason for restraint  4.  Q2H: Monitor safety, psychosocial status, comfort, nutrition and hydration  Outcome: Progressing

## 2023-02-24 NOTE — PROGRESS NOTES
Patient returned from the OR. Dressing to left foot is clean, dry an intact. Assessment and vitals are as noted. Patient's daughter is at bedside. Fall precautions were resumed.

## 2023-02-24 NOTE — PROGRESS NOTES
Pre-Operative Note  Resident Note     Patient: Star Castleman     Procedure: Incision and drainage with removal of necrotic and nonviable soft tissue and bone partial first ray amputation of the left lower extremity    Consent: Obtained via telephone with patient wifeMarco A Parrish    Labs:        Recent Labs     02/23/23  0505 02/23/23  2311 02/24/23  0341   WBC 10.5  --  10.8   HGB 7.6* 8.4* 8.1*   HCT 24.4* 25.5* 24.9*   MCV 86.9  --  84.1     --  369        Recent Labs     02/23/23  0505 02/24/23  0341    136   K 4.4 4.6   CL 98* 99   CO2 25 28   PHOS 3.8 2.7   BUN 35* 23*   CREATININE 6.8* 4.4*      No results for input(s): AST, ALT, ALB, BILIDIR, BILITOT, ALKPHOS in the last 72 hours. No results for input(s): LIPASE, AMYLASE in the last 72 hours. Recent Labs     02/22/23  0848 02/24/23  0341   INR 1.34* 1.34*      No results for input(s): CKTOTAL, CKMB, CKMBINDEX, TROPONINI in the last 72 hours. Saline lock/IV access: Yes    Clearance for surgery: Yes, per medicine    Diet: NPO     CXR:  No acute abnormality       EKG:  Please see cardiology section of EMR      Echocardiogram:  Please see cardiology section of EMR    PT/INR: 16.5/1.34    IVF: As needed    Abx: IV vancomycin    Type and Screen: B Pos (2/22/2024)    Known risk factors for perioperative complications: Anemia  Coronary disease  Diabetes mellitus  Renal dysfunction      Anesthesia to see patient    I have examined the patient and reviewed the original history and physical completed and find no relevant changes.     Mari Villalba DPM  Podiatric Resident, PGY-3  Pager #: [phone] or Perfect Serve

## 2023-02-24 NOTE — PROGRESS NOTES
Treatment time: 3 hrs    Net UF: 1000 ml    Pre weight: 69.2 kg  Post weight: 68.2 kg      Access used: Rt AVF  Access function: Well tolerated, 350 BFR    Medications or blood products given: Retacrit 10,000 units    Regular outpatient schedule: MWF    Summary of response to treatment: Pt tolerated well. Pt remained stable throughout entire treatment. Copy of dialysis treatment record placed in chart, to be scanned into EMR.

## 2023-02-24 NOTE — DISCHARGE SUMMARY
INTERNAL MEDICINE DEPARTMENT AT [address] [place] [address]  DISCHARGE SUMMARY    Patient ID: Zoila Peterson                                             Discharge Date: 3/2/2023   Patient's PCP: No primary care provider on file. Discharge Physician: Beth Alanis MD  Admit Date: 2/20/2023   Admitting Physician: Romario Amos MD      DISCHARGE DIAGNOSES:  Present on Admission:   Osteomyelitis [facility])   Streptococcal bacteremia        Outpatient To Do List:  Follow-up appointments  Primary care provider in 1 week  Podiatry, Dr. Gerardo Miramontes, at [phone], in 1 week to recheck the left toe wound  Vascular surgery, Dr. Tea Mendez, at [phone], in 2 weeks for a post-op FU      Hospital Course:  Mr. Zoila Peterson is a 59-year-old  male with a medical history significant for oliguric ESRD on hemodialysis, hypertension, coronary artery disease, hyperlipidemia, vascular dementia, peripheral arterial disease, and cerebrovascular accidents, who was brought in from his SNF on 2/20/2023 for dyspnea occurring during hemodialysis. Infectious disease, podiatry, and vascular surgery were consulted when workup revealed sepsis with S. pyogenes bacteremia likely secondary to the acute osteomyelitis of the left first pedal digit. Transthoracic echocardiogram on 2/21 did not reveal valvular vegetations. Additionally, the OR culture grew GAS and MRSA, for which the patient was continued on treatment with IV vancomycin, and should complete a 6 week course of vancomycin 750 mg IV post-hemodialysis on hemodialysis days only until 4/07/23. The patient underwent an arteriogram with balloon angioplasty to left anterior tibial artery, left peroneal artery, and tibioperoneal artery, which responded well. Subsequently, he underwent incision and debridement, as well as left foot total first ray amputation on 2/24.   He underwent additional incision and debridement, delayed primary closure, application of skin graft substitute, and wound VAC application on 3/04 with areas of retained osteomyelitis. A right upper extremity doppler was performed as the forearm and hand were found to be swollen as compared to the left side, revealing outflow stenosis of right arm AV fistula. Vascular surgery performed a right arm fistulogram, balloon angioplasty peripheral venous segment, balloon angioplasty right brachiocephalic vein, and balloon angioplasty right cephalic-subclavian vein junction on 3/1 without any complications. Mr. Kleber Bowser will be discharged to his SNF in stable condition with the wound VAC in place. Physical Exam:  BP (!) 151/69   Pulse 80   Temp 97.9 °F (36.6 °C) (Oral)   Resp 18   Ht 5' 7\" (1.702 m)   Wt 149 lb 14.6 oz (68 kg)   SpO2 98%   BMI 23.48 kg/m²     Constitutional: Pleasant, non-diaphoretic, elderly male, sitting semi-Fowlers in bed, in NAD. HEENT:  NCAT. Scleral icterus. No conjunctival injection. No rhinorrhea or epistaxis. Cardiovascular:  No apparent JVD.  RRR. No murmurs, rubs, or gallops. Radial pulses 2+. Capillary refill < 2 sec. Pulmonary:  Able to speak in full sentences without frequent pauses. CTAB. No perioral cyanosis. Abdomen: Non-distended. Normoactive bowel sounds. Soft, no TTP. Musculoskeletal:  Cachectic. Right AV fistula. Some mild swelling of RUE, including forearm and hand, which are warm to the touch, capillary refill < 2 sec, and sensation intact; dressing is clean, dry, and intact. No lower extremity edema. Left foot and ankle are covered in ACE wrap, which is clean, dry, and intact. Skin:  Warm, dry, acyanotic. Psychological:  Cooperative.   Normal speech, behavior, and thought content        DISCHARGE MEDICATION:     Medication List        CHANGE how you take these medications      hydrALAZINE 25 MG tablet  Commonly known as: APRESOLINE  Take 1 tablet by mouth every 12 hours  What changed: when to take this CONTINUE taking these medications      aspirin 81 MG chewable tablet     atorvastatin 40 MG tablet  Commonly known as: LIPITOR  TAKE 1 TABLET BY MOUTH EVERYDAY AT BEDTIME     enalapril 10 MG tablet  Commonly known as: VASOTEC     isosorbide mononitrate 30 MG extended release tablet  Commonly known as: IMDUR     melatonin 3 MG Tabs tablet     memantine 10 MG tablet  Commonly known as: NAMENDA  TAKE 1 TABLET BY MOUTH TWICE A DAY     metoprolol succinate 25 MG extended release tablet  Commonly known as: TOPROL XL     mineral oil-hydrophilic petrolatum ointment     mirtazapine 15 MG tablet  Commonly known as: REMERON     nitroGLYCERIN 0.4 MG SL tablet  Commonly known as: Nitrostat  Place 1 tablet under the tongue every 5 minutes as needed for Chest pain     omeprazole 10 MG delayed release capsule  Commonly known as: PRILOSEC     ondansetron 4 MG tablet  Commonly known as: ZOFRAN     Santyl 250 UNIT/GM ointment  Generic drug: collagenase     tamsulosin 0.4 MG capsule  Commonly known as: FLOMAX     traZODone 50 MG tablet  Commonly known as: DESYREL     vitamin D 25 MCG (1000 UT) Caps            STOP taking these medications      midodrine 5 MG tablet  Commonly known as: PROAMATINE     sodium phosphate 7-19 GM/118ML enema  Commonly known as: FLEET               Where to Get Your Medications        These medications were sent to [address] [place] [address]   [address]      Phone: [phone]   hydrALAZINE 25 MG tablet       Activity: no weight-bearing activity on the LLE  Diet: cardiac diet and renal diet  Wound Care: as directed      Time spent on discharge is more than 30 minutes. Signed:  Ghislaine Celis.  Radha Marte MD, [address], [facility]  Internal Medicine, PGY-1  3/2/2023

## 2023-02-24 NOTE — PROGRESS NOTES
[facility] NEPHROLOGY    [facility]              [phone]                       Interval History and Plan   Patient seen at bedside   Patient is comfortable on room air  HD done today. Lytes stable  BP stable  On room air  No concern for volume overload. HD done today per MWF schedule. Monitor lytes and Hb  Continue Epogen with dialysis  Not on binders as phos is fine. Monitor vitals closely. D/W patient       Assessment :     Pt is a 78 yo male with pmhx of osteomyelitis s/p amputation, ESRD on HD, CAD, PAD, HTN, HLD, and vascular dementia who we were consulted for hemodialysis continuation. ESRD on HD MWF  Access: Right arm fistula. Chronic Anemia   DIMITRIOS with dialysis. Hypertension and Volume status  BP was low and home BP are on hold. Does not look volume overloaded. Left foot osteomyelitis  Group A Strep Bacteremia  Sepsis  Management per primary team.       [facility] Nephrology would like to thank Yannick Narayan MD   for opportunity to serve this patient      Please call with questions at-   24 Hrs Answering service [phone] or  7 am- 5 pm via Perfect serve or cell phone      CC/reason for consult :     Hemodialysis continuation     HPI :     Nery Watson is a 79 y.o. male with history of osteomyelitis, ESRD on HD, CAD, PAD, HTN, HLD, and vascular dementia who presented to the hospital on 2/20/2023 with shortness of breath during his hemodialysis session. Per daughter, patient had desatted to the [de-identified] during the session. On admission, patient has been improving in respiratory status with normal saturations, but remains admitted due to acute concerns of osteomyelitis of the left great toe, sepsis (BC confirmed 2x for Group A strep), and significant arterial disease (recent arterial duplex showing 80% blockage of the left lower extremity). Chest X-ray was negative for acute concerns. Currently on cefepime (started on 2/20).  Single dose of vancomycin given yesterday, with plans to start clindamycin today. Nephrology was consulted for continuation of dialysis. Last Echo in 2019 with 55% EF. Repeat Echo ordered during this admission. ROS:     positives in bold   Constitutional:  fever, chills, weakness, weight change, fatigue  Skin:  rash, pruritus, hair loss, bruising, dry skin, petechiae, left great toe wound   Head, Face, Neck   headaches, swelling,  cervical adenopathy  Respiratory: shortness of breath, cough, or wheezing  Cardiovascular: chest pain, palpitations, dizzy, edema  Gastrointestinal: nausea, vomiting, diarrhea, constipation,belly pain    Yellow skin, blood in stool  Musculoskeletal:  back pain, muscle weakness, gait problems,       joint pain or swelling. Genitourinary:  dysuria, poor urine flow, flank pain, blood in urine  Neurologic:  vertigo, TIA'S, syncope, seizures, focal weakness  Psychosocial:  insomnia, anxiety, or depression. Additional positive findings:                          All other remaining systems are negative.       PMH/PSH/SH/Family History:     Past Medical History:   Diagnosis Date    Amputated great toe, right (HCC)     CAD (coronary artery disease)     CVA (cerebral vascular accident) ([address]     3    Dementia ([address]     Depression     Diabetes ([address]     Diabetic polyneuropathy ([address]     ESRD on dialysis ([address]     monday wednesday friday    GERD (gastroesophageal reflux disease)     Hemiplegia and hemiparesis following cerebral infarction affecting right dominant side (HCC)     Hyperlipidemia     Hypertension     Pain syndrome, chronic     Peripheral vascular disease ([address]     [facility]    Secondary hyperparathyroidism [facility])        Past Surgical History:   Procedure Laterality Date    CARDIAC CATHETERIZATION      DIALYSIS FISTULA CREATION         Social History     Socioeconomic History    Marital status:      Spouse name: Not on file    Number of children: Not on file    Years of education: Not on file    Highest education level: Not on file   Occupational History    Not on file   Tobacco Use    Smoking status: Former    Smokeless tobacco: Never   Vaping Use    Vaping Use: Never used   Substance and Sexual Activity    Alcohol use: No     Alcohol/week: 0.0 standard drinks    Drug use: No    Sexual activity: Not Currently   Other Topics Concern    Not on file   Social History Narrative    Not on file     Social Determinants of Health     Financial Resource Strain: Not on file   Food Insecurity: Not on file   Transportation Needs: Not on file   Physical Activity: Not on file   Stress: Not on file   Social Connections: Not on file   Intimate Partner Violence: Not on file   Housing Stability: Not on file           Problem Relation Age of Onset    Heart Disease Mother     Diabetes Mother     Dementia Mother     Diabetes Father     Heart Disease Father     Stroke Brother           Medication:     Scheduled Meds:   vancomycin  750 mg IntraVENous Once    epoetin ramesh-epbx  10,000 Units IntraVENous Once per day on Mon Wed Fri    mirtazapine  7.5 mg Oral Nightly    traZODone  25 mg Oral Nightly    melatonin  3 mg Oral Nightly    sodium chloride flush  5-40 mL IntraVENous 2 times per day    [Held by provider] heparin (porcine)  5,000 Units SubCUTAneous 3 times per day    aspirin  81 mg Oral Daily    atorvastatin  40 mg Oral Daily    memantine  10 mg Oral BID    [Held by provider] metoprolol succinate  25 mg Oral Daily    pantoprazole  40 mg Oral QAM AC    Vitamin D  1,000 Units Oral Daily    sodium chloride flush  5-40 mL IntraVENous 2 times per day    vancomycin (VANCOCIN) intermittent dosing (placeholder)   Other RX Placeholder     Continuous Infusions:   sodium chloride      sodium chloride      sodium chloride       PRN Meds:.sodium chloride, sodium chloride, sodium chloride flush, sodium chloride, ondansetron **OR** ondansetron, oxyCODONE, acetaminophen, nitroGLYCERIN, sodium chloride flush, sodium chloride, polyethylene glycol, perflutren lipid microspheres    Vitals :     Vitals:    02/24/23 1227   BP: (!) 143/59   Pulse: 94   Resp: 17   Temp: 98.9 °F (37.2 °C)   SpO2: 92%       I & O :       Intake/Output Summary (Last 24 hours) at 2/24/2023 1256  Last data filed at 2/23/2023 2114  Gross per 24 hour   Intake 618.33 ml   Output --   Net 618.33 ml          Physical Examination :     General appearance: Anxious- no, distressed- no  HEENT: Lips- normal, teeth- ok , oral mucosa- moist  Neck : Mass- no, appears symmetrical, JVD- not visible  Respiratory: Respiratory effort- normal effort, wheeze- no, crackles - no  Cardiovascular: Ausculation- No M/R/G, Edema none. Abdomen: visible mass- no, distention- no, scar- no, tenderness- no                            hepatosplenomegaly-  no  Musculoskeletal:  clubbing no,cyanosis- no , digital ischemia- no                           muscle strength- grossly normal , tone - grossly normal.  Skin: rashes- no , ulcers- yes, on left great toe but not visualized due to dressing, induration- no, tightening - no  Psychiatric:  Judgement and insight- normal  CNS:        AAO X 3     LABS:     Recent Labs     02/22/23  0848 02/23/23  0505 02/23/23  2311 02/24/23  0341   WBC 12.4* 10.5  --  10.8   HGB 8.0* 7.6* 8.4* 8.1*   HCT 25.9* 24.4* 25.5* 24.9*    355  --  369       Recent Labs     02/22/23  0848 02/23/23  0505 02/24/23  0341    137 136   K 4.3 4.4 4.6   CL 98* 98* 99   CO2 28 25 28   BUN 32* 35* 23*   CREATININE 5.8* 6.8* 4.4*   GLUCOSE 143* 100* 175*   MG 2.00 1.90 1.80   PHOS 3.2 3.8 2.7            Patient discuss with Dr. Stefanie Maldonado  Internal Medicine, MS4      Medical Student Note edited and addended where appropriate. Nya Dia MD  PGY-3 Internal Medicine  02/24/23     Patient was seen and examined and the case was discussed with the resident. He acted as my scribe. I agree with the assessment and plan.

## 2023-02-24 NOTE — PROGRESS NOTES
Physical / Occupational Therapy    Attempted therapy session, pt off floor for dialysis and unavailable. Will follow up as treatment schedule allows.      Yancy Figueredo, PT, DPT

## 2023-02-24 NOTE — PROGRESS NOTES
ID Follow-up NOTE    CC:   Patient shortness of breath on HD  Antibiotics: Vancomycin  Admit Date: 2/20/2023  Hospital Day: 5    Subjective:     Patient oriented only to self, afebrile overnight. Denies any pain. To have amputation today. Objective:     Patient Vitals for the past 8 hrs:   BP Temp Temp src Pulse Resp SpO2 Weight   02/24/23 0800 (!) 132/56 98 °F (36.7 °C) -- 88 18 -- 152 lb 8.9 oz (69.2 kg)   02/24/23 0740 (!) 140/60 98.9 °F (37.2 °C) Oral 91 18 95 % --   02/24/23 0412 (!) 127/55 99 °F (37.2 °C) Oral 88 20 95 % 152 lb 8.9 oz (69.2 kg)       I/O last 3 completed shifts: In: 1318.3 [P.O.:340; Blood:378.3]  Out: 2400   No intake/output data recorded. EXAM:  GENERAL: No apparent distress. HEENT: Membranes moist, no oral lesion  NECK:  Supple, no lymphadenopathy  LUNGS: Clear b/l, no rales, no dullness  CARDIAC: RRR, no murmur appreciated  ABD:  + BS, soft / NT  EXT:  right arm AVF with thrill, Bilateral lower extremity edema, there is an ulcer on the left great toe as per photos. Dressing in place     NEURO: No focal neurologic findings, oriented to self.   In the lower extremities diminished about 4 out of 5  PSYCH: Orientation, sensorium, mood normal  LINES:  Peripheral iv       Data Review:  Lab Results   Component Value Date    WBC 10.8 02/24/2023    HGB 8.1 (L) 02/24/2023    HCT 24.9 (L) 02/24/2023    MCV 84.1 02/24/2023     02/24/2023     Lab Results   Component Value Date    CREATININE 4.4 (H) 02/24/2023    BUN 23 (H) 02/24/2023     02/24/2023    K 4.6 02/24/2023    CL 99 02/24/2023    CO2 28 02/24/2023       Hepatic Function Panel:   Lab Results   Component Value Date/Time    ALKPHOS 176 08/02/2021 10:15 PM    ALT 21 08/02/2021 10:15 PM    AST 47 08/02/2021 10:15 PM    PROT 8.0 08/02/2021 10:15 PM    BILITOT 0.5 08/02/2021 10:15 PM    LABALBU 2.3 02/24/2023 03:41 AM       MICRO:  Blood cultures x2 collected on 2/21: neg to date  Blood cultures x2 collected on 2/20: Positive with growth of strep pyogenes. Superficial wound swab of the left great toe 2/20 with strep pyogenes, MRSA and haemophilus parainfluenza, sensitivities pending       IMAGING:  MRI left foot without contrast 2/21: Acute osteomyelitis of the first distal phalanx.     Scheduled Meds:   epoetin ramesh-epbx  10,000 Units IntraVENous Once per day on Mon Wed Fri    mirtazapine  7.5 mg Oral Nightly    traZODone  25 mg Oral Nightly    melatonin  3 mg Oral Nightly    sodium chloride flush  5-40 mL IntraVENous 2 times per day    [Held by provider] heparin (porcine)  5,000 Units SubCUTAneous 3 times per day    aspirin  81 mg Oral Daily    atorvastatin  40 mg Oral Daily    memantine  10 mg Oral BID    [Held by provider] metoprolol succinate  25 mg Oral Daily    pantoprazole  40 mg Oral QAM AC    Vitamin D  1,000 Units Oral Daily    sodium chloride flush  5-40 mL IntraVENous 2 times per day    vancomycin (VANCOCIN) intermittent dosing (placeholder)   Other RX Placeholder       Continuous Infusions:   sodium chloride      sodium chloride      sodium chloride         PRN Meds:  sodium chloride, sodium chloride, sodium chloride flush, sodium chloride, ondansetron **OR** ondansetron, oxyCODONE, acetaminophen, nitroGLYCERIN, sodium chloride flush, sodium chloride, polyethylene glycol, perflutren lipid microspheres      Assessment:     Patient Active Problem List   Diagnosis    Abscess    Remote history of stroke    Chest pain    ESRD (end stage renal disease) on dialysis ([facility]    CAD (coronary artery disease)    NSTEMI (non-ST elevated myocardial infarction) ([facility]    PAD (peripheral artery disease) ([facility]    S/P cardiac catheterization    Hypotension    Symptomatic anemia    Complication of vascular access for dialysis    Limited mobility    Vascular dementia without behavioral disturbance (HCC)    Thrombocytopenia (HCC)    Delirium    Confusion    Vascular dementia of acute onset, without behavioral disturbance ([address]    HTN (hypertension), benign    Dyslipidemia    Bilateral carotid artery stenosis    Osteomyelitis (HCC)    Streptococcal bacteremia             Plan:   20-year-old male with past medical history of osteomyelitis of the right foot status post amputation of the right great and third toes, ESRD on HD M W F, CAD, PAD, HTN, HLD, vascular dementia secondary to strokes in 1999 and 2001 that presented with shortness of breath during dialysis. Left first distal phalanx osteomyelitis:  - Patient has been evaluated by podiatry and had angiogram 2/22 but will need at least a partial amputation of the first distal phalanx.   - Balloon angioplasty left anterior tibial artery, left peroneal artery and tibioperoneal trunk done on 2/22. - We will follow for operative management whether that this be definitive or not. This is planned for 2/24. Spoke with podiatry and they do not think amputation would be definitive, but will make final determination intraop.   - Patient is also bacteremic with strep pyogenes, anticipate that this is the source. - Currently on IV vancomycin, will continue. D/dolores clindamycin and cefepime as superficial wound cx in chronic wounds likely reflect colonization.   - If there is bone bx done on amputation and cx showing growth, will change abx according to OR cx. keeping vanco as there was MRSA in wound cx and this may be causative agent of OM. - if areas of OM retained, will need 6 weeks iv abx from date of OR. - Final abx plan pending intraop findings/retained OM and also from OR cx growth. Strep pyogenes bacteremia:  - Blood cultures x2 sets on 2/20 with growth of strep pyogenes  - TTE 2/21 without evidence of IE.   - Repeat blood cultures as of 2/21 neg   - Would like to use abx that can dose with HD ideally. - continue vanco for now pending operative management as did have MRSA isolated in wound cx and may be causative pathogen of OM.       ESRD on HD:  - continue vancomycin to be dosed per levels      Medical Decision Making: The following items were considered in medical decision making:  Discussion of patient care with other providers  Reviewed clinical lab tests  Reviewed radiology tests  Reviewed other diagnostic tests/interventions  Independent review of radiologic images  Microbiology cultures and other micro tests reviewed      Discussed with pt, RN.    [address] Nico Mejia MD

## 2023-02-24 NOTE — CARE COORDINATION
CM following. Pt from [facility]. Plan is to return. OR today for partial ray amputation. IV ABX. Pt active with HD at [facility] MWF.

## 2023-02-24 NOTE — PROGRESS NOTES
PACU Transfer Note    Vitals:    02/24/23 1600   BP: (!) 117/58   Pulse: 90   Resp: 20   Temp: 97.9 °F (36.6 °C)   SpO2: 100%       In: 300 [I.V.:300]  Out: -     Pain assessment:  none  Pain Level: 0    Report given to receiving unit RN. Answered all questions.     2/24/2023 4:15 PM

## 2023-02-24 NOTE — PLAN OF CARE
Problem: Skin/Tissue Integrity  Goal: Absence of new skin breakdown  Description: 1. Monitor for areas of redness and/or skin breakdown  2. Assess vascular access sites hourly  3. Every 4-6 hours minimum:  Change oxygen saturation probe site  4. Every 4-6 hours:  If on nasal continuous positive airway pressure, respiratory therapy assess nares and determine need for appliance change or resting period.   Outcome: Progressing     Problem: Respiratory - Adult  Goal: Achieves optimal ventilation and oxygenation  Outcome: Progressing  Flowsheets (Taken 2/24/2023 1451)  Achieves optimal ventilation and oxygenation:   Assess for changes in respiratory status   Assess for changes in mentation and behavior   Position to facilitate oxygenation and minimize respiratory effort   Oxygen supplementation based on oxygen saturation or arterial blood gases     Problem: Discharge Planning  Goal: Discharge to home or other facility with appropriate resources  Outcome: Progressing  Flowsheets (Taken 2/24/2023 1451)  Discharge to home or other facility with appropriate resources:   Identify barriers to discharge with patient and caregiver   Arrange for needed discharge resources and transportation as appropriate   Identify discharge learning needs (meds, wound care, etc)   Arrange for interpreters to assist at discharge as needed     Problem: Pain  Goal: Verbalizes/displays adequate comfort level or baseline comfort level  Outcome: Progressing  Flowsheets (Taken 2/24/2023 1451)  Verbalizes/displays adequate comfort level or baseline comfort level:   Encourage patient to monitor pain and request assistance   Assess pain using appropriate pain scale   Administer analgesics based on type and severity of pain and evaluate response   Implement non-pharmacological measures as appropriate and evaluate response     Problem: Chronic Conditions and Co-morbidities  Goal: Patient's chronic conditions and co-morbidity symptoms are monitored and maintained or improved  Outcome: Progressing  Flowsheets (Taken 2/24/2023 1451)  Care Plan - Patient's Chronic Conditions and Co-Morbidity Symptoms are Monitored and Maintained or Improved:   Monitor and assess patient's chronic conditions and comorbid symptoms for stability, deterioration, or improvement   Collaborate with multidisciplinary team to address chronic and comorbid conditions and prevent exacerbation or deterioration   Update acute care plan with appropriate goals if chronic or comorbid symptoms are exacerbated and prevent overall improvement and discharge     Problem: Safety - Adult  Goal: Free from fall injury  Outcome: Progressing  Flowsheets (Taken 2/24/2023 1451)  Free From Fall Injury: Instruct family/caregiver on patient safety

## 2023-02-24 NOTE — PROGRESS NOTES
Clinical Pharmacy Progress Note    Vancomycin - Management by Pharmacy    Consult Date(s): 2/20/23  Consulting Provider(s): Dr. Marybeth Kramer / Plan  1)  LLE osteomyelitis with abscess - Vancomycin  Concurrent Antimicrobials: n/a  Day of Vanc Therapy:  #5  Current Dosing Method: Intermittent Dosing by Levels  Therapeutic Goal: Trough ~ 15 mg/L  Current Dose / Plan:   Pt is ESRD on HD Mon-Wed-Fri - will dose Vancomycin intermittently based on levels. Currently in HD. Level today = 17 mg/L. Will give Vancomycin 750mg IV x1 after HD today. Will monitor for HD plan moving forward - once pt is on scheduled HD sessions, can likely schedule 750mg IV 3x weekly with HD. Will continue to monitor clinical condition and make adjustments to regimen as appropriate. Please call with questions--  Soumya Palmer, PharmD, BCPS  Wireless: [phone]   2/24/2023 10:39 AM        Interval update:  Currently in HD, also had HD yesterday afternoon as sessions were off schedule this week d/t OR. Subjective/Objective:   Daron Schwab is a 79 y.o. male with a PMHx significant for ESRD on HD Mon-Wed-Fri, CAD, CVA, dementia, depression, DM, GERD, HTN, HLD, and PVD who is admitted with LLE osteomyelitis of 1st metatarsal, 1st proximal phalanx, and 1st distal phalanx with abscess. Pharmacy is consulted to dose Vancomycin.     Ht Readings from Last 1 Encounters:   02/20/23 5' 7\" (1.702 m)     Wt Readings from Last 1 Encounters:   02/24/23 152 lb 8.9 oz (69.2 kg)     Current & Prior Antimicrobial Regimen(s):  (2/20-2/21)   (2/20-2/21)  Vancomycin - Pharmacy to dose  Intermittent dosing (2/20-current)    Vancomycin Level(s) / Doses:    Date Vancomycin Level Vancomycin Dose   2/20  1750mg x1   2/21 21.9 mcg/mL --   2/22 16.9 mcg/mL 750mg x1   2/23  --   2/24 17 mg/L      Cultures & Sensitivities:    Date Site Micro Susceptibility / Result   2/20 Blood x2 Group A Strep  Reliably sensitive to b-lactams, others   2/20 Foot wound Group A Strep  Staph aureus  H. influenzae Reliably sensitive to b-lactams, others  In process  Reliably sensitive to CTX, Amp, Bactrim     Recent Labs     02/22/23  0848 02/23/23  0505 02/24/23  0341   CREATININE 5.8* 6.8* 4.4*   BUN 32* 35* 23*   WBC 12.4* 10.5 10.8       CrCl is not calculated 2/2 ESRD on HD

## 2023-02-24 NOTE — BRIEF OP NOTE
Brief Postoperative Note      Patient: Donnie Little  YOB: 1955  MRN: [id number]    Date of Procedure: 2/24/2023    Pre-Op Diagnosis: Osteomyelitis of left foot, unspecified type ([facility] [M86.9]    Post-Op Diagnosis: Same       Procedure(s):  LEFT FOOT TOTAL FIRST RAY AMPUTATION    Surgeon(s):  Nina Duarte DPM    Assistant:  Salome Wilson, PGY-2    Anesthesia: General    Injectables: pre-op 20 cc of 2% lidocaine plain     Hemostasis: anatomic dissection     Materials: 3-0 Nylon, 1/4 inch iodoform packing      Estimated Blood Loss (mL): less than 30 mL    Complications: None    Specimens:   ID Type Source Tests Collected by Time Destination   1 : SURGICAL WOUND LEFT FOOT Tissue Tissue CULTURE, FUNGUS, CULTURE, TISSUE (Canceled), CULTURE WITH SMEAR, ACID FAST BACILLIUS, CULTURE, ANAEROBIC AND AEROBIC Nina Duarte DPM 2/24/2023 1415    A : CLERANCE FRAGMENTS OF LEFT FOOT Tissue Tissue SURGICAL PATHOLOGY Nina Duarte DPM 2/24/2023 1423    B : FIRST METATARSAL OF LEFT FOOT Tissue Tissue SURGICAL PATHOLOGY Nina Duarte DPM 2/24/2023 1424        Implants:  * No implants in log *      Drains: * No LDAs found *    Findings: About 5 cc of purulence encountered in the first interspace. Distal phalanx and hallux noted to be soft. 1st metatarsal noted to be soft and discolored. Clearance fragment from the base of the 1st metatarsal obtained and sent for pathology. Minimal bleeding noted. DISPO: s/p Left foot I&D with first ray amputation. Patient will be transferred back to the floor for continued IV antibiotics and medical management. Patient will remain non-weight bearing to the left lower extremity. Patient will require further podiatric surgical intervention this admission once the wound has converted from dirty to clean and pending OR availability.     Electronically signed by Salome Wilson DPM on 2/24/2023 at 2:49 PM

## 2023-02-24 NOTE — ANESTHESIA PRE PROCEDURE
Department of Anesthesiology  Preprocedure Note       Name:  Dulce Padron   Age:  79 y.o.  :  1955                                          MRN:  [id number]         Date:  2023      Surgeon: Oneil Banerjee):  Nazario Mills DPM    Procedure: Procedure(s):  LEFT FOOT PARTIAL FIRST RAY AMPUTATION    Medications prior to admission:   Prior to Admission medications    Medication Sig Start Date End Date Taking?  Authorizing Provider   enalapril (VASOTEC) 10 MG tablet Take 10 mg by mouth daily   Yes Historical Provider, MD   tamsulosin (FLOMAX) 0.4 MG capsule Take 0.4 mg by mouth daily   Yes Historical Provider, MD   sodium phosphate (FLEET) 7-19 GM/118ML enema Place 1 enema rectally daily as needed 22  Yes Historical Provider, MD   isosorbide mononitrate (IMDUR) 30 MG extended release tablet Take 30 mg by mouth daily   Yes Historical Provider, MD   melatonin 3 MG TABS tablet Take 1 tablet by mouth nightly 22  Yes Historical Provider, MD   omeprazole (PRILOSEC) 10 MG delayed release capsule Take 10 mg by mouth daily   Yes Historical Provider, MD   vitamin D 25 MCG (1000 UT) CAPS Take 1 capsule by mouth daily   Yes Historical Provider, MD   SANTYL 250 UNIT/GM ointment Apply topically As directed 23   Historical Provider, MD   mineral oil-hydrophilic petrolatum (AQUAPHOR) ointment Apply topically As directed 23   Historical Provider, MD   hydrALAZINE (APRESOLINE) 25 MG tablet Take 1 tablet by mouth in the morning and at bedtime 23   Historical Provider, MD   traZODone (DESYREL) 50 MG tablet Take 25 mg by mouth at bedtime 23   Historical Provider, MD   metoprolol succinate (TOPROL XL) 25 MG extended release tablet Take 1 tablet by mouth daily 23   Historical Provider, MD   midodrine (PROAMATINE) 5 MG tablet Take 5 mg by mouth in the morning and at bedtime    Historical Provider, MD   mirtazapine (REMERON) 15 MG tablet Take 7.5 mg by mouth nightly    Historical Provider, MD   ondansetron (ZOFRAN) 4 MG tablet Take 4 mg by mouth every 8 hours as needed for Nausea or Vomiting    Historical Provider, MD   atorvastatin (LIPITOR) 40 MG tablet TAKE 1 TABLET BY MOUTH EVERYDAY AT BEDTIME 2/24/20   Reymundo hCoe DO   memantine (NAMENDA) 10 MG tablet TAKE 1 TABLET BY MOUTH TWICE A DAY 6/6/19   Reymundo Choe DO   nitroGLYCERIN (NITROSTAT) 0.4 MG SL tablet Place 1 tablet under the tongue every 5 minutes as needed for Chest pain 9/14/18   Reymundo Choe DO   aspirin 81 MG chewable tablet Take 81 mg by mouth daily    Historical Provider, MD       Current medications:    Current Facility-Administered Medications   Medication Dose Route Frequency Provider Last Rate Last Admin    vancomycin (VANCOCIN) 750 mg in sodium chloride 0.9 % 250 mL IVPB  750 mg IntraVENous Once Matthew Minion, DPM        0.9 % sodium chloride infusion   IntraVENous PRN Lucia Reyes MD        epoetin ramesh-epbx (RETACRIT) injection 10,000 Units  10,000 Units IntraVENous Once per day on Mon Wed Fri Matthew Minion, DPM   10,000 Units at 02/24/23 1035    0.9 % sodium chloride bolus  100 mL IntraVENous PRN Matthew Minion, DPM        mirtazapine (REMERON) tablet 7.5 mg  7.5 mg Oral Nightly Matthew Minion, DPM   7.5 mg at 02/23/23 2053    traZODone (DESYREL) tablet 25 mg  25 mg Oral Nightly Matthew Minion, DPM   25 mg at 02/23/23 2053    melatonin tablet 3 mg  3 mg Oral Nightly Matthew Minion, DPM   3 mg at 02/23/23 2053    sodium chloride flush 0.9 % injection 5-40 mL  5-40 mL IntraVENous 2 times per day Matthew Minion, DPM   10 mL at 02/23/23 2054    sodium chloride flush 0.9 % injection 5-40 mL  5-40 mL IntraVENous PRN Matthew Minion, DPM        0.9 % sodium chloride infusion   IntraVENous PRN Matthew Minion, DPM        [Held by provider] heparin (porcine) injection 5,000 Units  5,000 Units SubCUTAneous 3 times per day Matthew Minion, DPM   5,000 Units at 02/24/23 0505    ondansetron (ZOFRAN-ODT) disintegrating tablet 4 mg  4 mg Oral Q8H PRN Mikala Joyce, DPM        Or    ondansetron [facility]) injection 4 mg  4 mg IntraVENous Q6H PRN Mikala Joyce, DPM        aspirin EC tablet 81 mg  81 mg Oral Daily Mikala Joyce, DPM   81 mg at 02/23/23 9655    oxyCODONE (ROXICODONE) immediate release tablet 5 mg  5 mg Oral Q4H PRN Warden Betty MD        acetaminophen (TYLENOL) tablet 1,000 mg  1,000 mg Oral Q8H PRN Warden Betty MD        atorvastatin (LIPITOR) tablet 40 mg  40 mg Oral Daily Mikala Joyce, DPM   40 mg at 02/23/23 5937    memantine (NAMENDA) tablet 10 mg  10 mg Oral BID Mikala Joyce, DPM   10 mg at 02/23/23 2054    [Held by provider] metoprolol succinate (TOPROL XL) extended release tablet 25 mg  25 mg Oral Daily Mikala Joyce, DPM   25 mg at 02/20/23 1740    nitroGLYCERIN (NITROSTAT) SL tablet 0.4 mg  0.4 mg SubLINGual Q5 Min PRN Mikala Joyce, DPM        pantoprazole (PROTONIX) tablet 40 mg  40 mg Oral QAM AC Mikala Joyce, DPM   40 mg at 02/24/23 0505    Vitamin D (CHOLECALCIFEROL) tablet 1,000 Units  1,000 Units Oral Daily Mikala Joyce, DPM   1,000 Units at 02/23/23 0006    sodium chloride flush 0.9 % injection 5-40 mL  5-40 mL IntraVENous 2 times per day Mikala Joyce, DPM   10 mL at 02/23/23 2057    sodium chloride flush 0.9 % injection 5-40 mL  5-40 mL IntraVENous PRN Mikala Joyce, DPM        0.9 % sodium chloride infusion   IntraVENous PRN Mikala Joyce, DPM        polyethylene glycol (GLYCOLAX) packet 17 g  17 g Oral Daily PRN Mikala Joyce, DPM        perflutren lipid microspheres (DEFINITY) injection 1.5 mL  1.5 mL IntraVENous ONCE PRN Mikala Joyce, DPM        vancomycin Doneta Dage) intermittent dosing (placeholder)   Other RX Placeholder Mikala Joyce, DPM           Allergies: Allergies   Allergen Reactions    Pcn [Penicillins] Hives and Other (See Comments)     Unknown reaction; patient has tolerated cephalexin in 09/2016 per chart review as of 9/12/22.     Lisinopril Other (See Comments) Uzma-pt family does not want pt receiving medication       Problem List:    Patient Active Problem List   Diagnosis Code    Abscess L02.91    Remote history of stroke Z86.73    Chest pain R07.9    ESRD (end stage renal disease) on dialysis ([facility] [address] N18.6, Z99.2    CAD (coronary artery disease) I25.10    NSTEMI (non-ST elevated myocardial infarction) ([facility] [place] [address] I21.4    PAD (peripheral artery disease) ([facility] [address] I73.9    S/P cardiac catheterization Z98.890    Hypotension I95.9    Symptomatic anemia Z05.1    Complication of vascular access for dialysis T82. 9XXA    Limited mobility Z74.09    Vascular dementia without behavioral disturbance (HCC) F01.50    Thrombocytopenia (HCC) D69.6    Delirium R41.0    Confusion R41.0    Vascular dementia of acute onset, without behavioral disturbance (HCC) F01.50    HTN (hypertension), benign I10    Dyslipidemia E78.5    Bilateral carotid artery stenosis I65.23    Osteomyelitis (HCC) M86.9    Streptococcal bacteremia R78.81, B95.5       Past Medical History:        Diagnosis Date    Amputated great toe, right ([facility] [place] [address]     CAD (coronary artery disease)     CVA (cerebral vascular accident) ([facility] [place] [address]     3    Dementia ([facility] [place] [address]     Depression     Diabetes ([facility] [place] [address]     Diabetic polyneuropathy ([facility] [place] [address]     ESRD on dialysis ([facility] [place] [address]     monday wednesday friday    GERD (gastroesophageal reflux disease)     Hemiplegia and hemiparesis following cerebral infarction affecting right dominant side (HCC)     Hyperlipidemia     Hypertension     Pain syndrome, chronic     Peripheral vascular disease ([facility] [place] [address]     [facility]    Secondary hyperparathyroidism [facility])        Past Surgical History:        Procedure Laterality Date    CARDIAC CATHETERIZATION      DIALYSIS FISTULA CREATION         Social History:    Social History     Tobacco Use    Smoking status: Former    Smokeless tobacco: Never   Substance Use Topics    Alcohol use: No     Alcohol/week: 0.0 standard drinks Counseling given: Not Answered      Vital Signs (Current):   Vitals:    02/24/23 0740 02/24/23 0800 02/24/23 1113 02/24/23 1227   BP: (!) 140/60 (!) 132/56 (!) 149/68 (!) 143/59   Pulse: 91 88 88 94   Resp: 18 18 18 17   Temp: 98.9 °F (37.2 °C) 98 °F (36.7 °C) 97.6 °F (36.4 °C) 98.9 °F (37.2 °C)   TempSrc: Oral   Oral   SpO2: 95%   92%   Weight:  152 lb 8.9 oz (69.2 kg) 150 lb 5.7 oz (68.2 kg)    Height:                                                  BP Readings from Last 3 Encounters:   02/24/23 (!) 143/59   12/09/22 135/63   09/12/22 (!) 148/77       NPO Status:                                                                                 BMI:   Wt Readings from Last 3 Encounters:   02/24/23 150 lb 5.7 oz (68.2 kg)   02/20/23 153 lb 7 oz (69.6 kg)   12/09/22 156 lb 8 oz (71 kg)     Body mass index is 23.55 kg/m². CBC:   Lab Results   Component Value Date/Time    WBC 10.8 02/24/2023 03:41 AM    RBC 2.96 02/24/2023 03:41 AM    HGB 8.1 02/24/2023 03:41 AM    HCT 24.9 02/24/2023 03:41 AM    MCV 84.1 02/24/2023 03:41 AM    RDW 18.9 02/24/2023 03:41 AM     02/24/2023 03:41 AM       CMP:   Lab Results   Component Value Date/Time     02/24/2023 03:41 AM    K 4.6 02/24/2023 03:41 AM    K 3.7 02/20/2023 11:21 AM    CL 99 02/24/2023 03:41 AM    CO2 28 02/24/2023 03:41 AM    BUN 23 02/24/2023 03:41 AM    CREATININE 4.4 02/24/2023 03:41 AM    GFRAA 13 08/02/2021 10:15 PM    AGRATIO 1.2 08/02/2021 10:15 PM    LABGLOM 14 02/24/2023 03:41 AM    GLUCOSE 175 02/24/2023 03:41 AM    PROT 8.0 08/02/2021 10:15 PM    CALCIUM 8.0 02/24/2023 03:41 AM    BILITOT 0.5 08/02/2021 10:15 PM    ALKPHOS 176 08/02/2021 10:15 PM    AST 47 08/02/2021 10:15 PM    ALT 21 08/02/2021 10:15 PM       POC Tests: No results for input(s): POCGLU, POCNA, POCK, POCCL, POCBUN, POCHEMO, POCHCT in the last 72 hours.     Coags:   Lab Results   Component Value Date/Time    PROTIME 16.5 02/24/2023 03:41 AM    INR 1.34 02/24/2023 03:41 AM    APTT 47.2 07/25/2016 03:24 AM       HCG (If Applicable): No results found for: PREGTESTUR, PREGSERUM, HCG, HCGQUANT     ABGs: No results found for: PHART, PO2ART, BYH4AES, WZO3HJN, BEART, Y0DXAKLY     Type & Screen (If Applicable):  No results found for: LABABO, LABRH    Drug/Infectious Status (If Applicable):  No results found for: HIV, HEPCAB    COVID-19 Screening (If Applicable): No results found for: COVID19        Anesthesia Evaluation    Airway: Mallampati: II  TM distance: >3 FB   Neck ROM: full  Mouth opening: > = 3 FB   Dental:          Pulmonary:                              Cardiovascular:    (+) hypertension:, past MI:, CAD:,         Rhythm: regular  Rate: normal                    Neuro/Psych:   (+) CVA:, neuromuscular disease:, psychiatric history:            GI/Hepatic/Renal:   (+) GERD:, renal disease: ESRD,           Endo/Other:    (+) Diabetes, . Abdominal:             Vascular: Other Findings:           Anesthesia Plan      MAC     ASA 4       Induction: intravenous. Anesthetic plan and risks discussed with patient. Plan discussed with CRNA.                     Maverick Staley MD   2/24/2023

## 2023-02-25 ENCOUNTER — APPOINTMENT (OUTPATIENT)
Dept: GENERAL RADIOLOGY | Age: 68
DRG: 853 | End: 2023-02-25
Payer: MEDICARE

## 2023-02-25 LAB
ANAEROBIC CULTURE: ABNORMAL
ANION GAP SERPL CALCULATED.3IONS-SCNC: 9 MMOL/L (ref 3–16)
BANDED NEUTROPHILS RELATIVE PERCENT: 1 % (ref 0–7)
BASOPHILS ABSOLUTE: 0 K/UL (ref 0–0.2)
BASOPHILS RELATIVE PERCENT: 0 %
BLOOD CULTURE, ROUTINE: NORMAL
BUN BLDV-MCNC: 19 MG/DL (ref 7–20)
CALCIUM SERPL-MCNC: 8.4 MG/DL (ref 8.3–10.6)
CHLORIDE BLD-SCNC: 99 MMOL/L (ref 99–110)
CO2: 28 MMOL/L (ref 21–32)
CREAT SERPL-MCNC: 4 MG/DL (ref 0.8–1.3)
CULTURE, BLOOD 2: NORMAL
EOSINOPHILS ABSOLUTE: 0.1 K/UL (ref 0–0.6)
EOSINOPHILS RELATIVE PERCENT: 1 %
FUNGUS STAIN: NORMAL
GFR SERPL CREATININE-BSD FRML MDRD: 16 ML/MIN/{1.73_M2}
GLUCOSE BLD-MCNC: 124 MG/DL (ref 70–99)
GRAM STAIN RESULT: ABNORMAL
HCT VFR BLD CALC: 26.7 % (ref 40.5–52.5)
HEMOGLOBIN: 8.3 G/DL (ref 13.5–17.5)
INR BLD: 1.23 (ref 0.87–1.14)
LYMPHOCYTES ABSOLUTE: 1.3 K/UL (ref 1–5.1)
LYMPHOCYTES RELATIVE PERCENT: 10 %
MAGNESIUM: 1.8 MG/DL (ref 1.8–2.4)
MCH RBC QN AUTO: 26.3 PG (ref 26–34)
MCHC RBC AUTO-ENTMCNC: 31 G/DL (ref 31–36)
MCV RBC AUTO: 85 FL (ref 80–100)
METAMYELOCYTES RELATIVE PERCENT: 3 %
MONOCYTES ABSOLUTE: 0.6 K/UL (ref 0–1.3)
MONOCYTES RELATIVE PERCENT: 5 %
MYELOCYTE PERCENT: 1 %
NEUTROPHILS ABSOLUTE: 10.8 K/UL (ref 1.7–7.7)
NEUTROPHILS RELATIVE PERCENT: 79 %
ORGANISM: ABNORMAL
PDW BLD-RTO: 18.8 % (ref 12.4–15.4)
PLATELET # BLD: 372 K/UL (ref 135–450)
PMV BLD AUTO: 7.3 FL (ref 5–10.5)
POTASSIUM SERPL-SCNC: 4.3 MMOL/L (ref 3.5–5.1)
PROTHROMBIN TIME: 15.5 SEC (ref 11.7–14.5)
RBC # BLD: 3.14 M/UL (ref 4.2–5.9)
SODIUM BLD-SCNC: 136 MMOL/L (ref 136–145)
WBC # BLD: 12.8 K/UL (ref 4–11)
WOUND/ABSCESS: ABNORMAL

## 2023-02-25 PROCEDURE — 2700000000 HC OXYGEN THERAPY PER DAY

## 2023-02-25 PROCEDURE — 1200000000 HC SEMI PRIVATE

## 2023-02-25 PROCEDURE — 83735 ASSAY OF MAGNESIUM: CPT

## 2023-02-25 PROCEDURE — 6370000000 HC RX 637 (ALT 250 FOR IP): Performed by: INTERNAL MEDICINE

## 2023-02-25 PROCEDURE — 2580000003 HC RX 258

## 2023-02-25 PROCEDURE — 6360000002 HC RX W HCPCS

## 2023-02-25 PROCEDURE — 6370000000 HC RX 637 (ALT 250 FOR IP)

## 2023-02-25 PROCEDURE — 94761 N-INVAS EAR/PLS OXIMETRY MLT: CPT

## 2023-02-25 PROCEDURE — 80048 BASIC METABOLIC PNL TOTAL CA: CPT

## 2023-02-25 PROCEDURE — 6360000002 HC RX W HCPCS: Performed by: PODIATRIST

## 2023-02-25 PROCEDURE — 94150 VITAL CAPACITY TEST: CPT

## 2023-02-25 PROCEDURE — 85025 COMPLETE CBC W/AUTO DIFF WBC: CPT

## 2023-02-25 PROCEDURE — 85610 PROTHROMBIN TIME: CPT

## 2023-02-25 PROCEDURE — 71045 X-RAY EXAM CHEST 1 VIEW: CPT

## 2023-02-25 PROCEDURE — 36415 COLL VENOUS BLD VENIPUNCTURE: CPT

## 2023-02-25 RX ORDER — MAGNESIUM SULFATE IN WATER 40 MG/ML
2000 INJECTION, SOLUTION INTRAVENOUS ONCE
Status: COMPLETED | OUTPATIENT
Start: 2023-02-25 | End: 2023-02-25

## 2023-02-25 RX ORDER — LIDOCAINE 4 G/G
1 PATCH TOPICAL DAILY
Status: DISCONTINUED | OUTPATIENT
Start: 2023-02-25 | End: 2023-03-03 | Stop reason: HOSPADM

## 2023-02-25 RX ORDER — PROCHLORPERAZINE EDISYLATE 5 MG/ML
10 INJECTION INTRAMUSCULAR; INTRAVENOUS ONCE
Status: COMPLETED | OUTPATIENT
Start: 2023-02-25 | End: 2023-02-25

## 2023-02-25 RX ADMIN — MEMANTINE 10 MG: 10 TABLET ORAL at 22:57

## 2023-02-25 RX ADMIN — SODIUM CHLORIDE, PRESERVATIVE FREE 10 ML: 5 INJECTION INTRAVENOUS at 11:12

## 2023-02-25 RX ADMIN — SODIUM CHLORIDE, PRESERVATIVE FREE 10 ML: 5 INJECTION INTRAVENOUS at 23:51

## 2023-02-25 RX ADMIN — PROCHLORPERAZINE EDISYLATE 10 MG: 5 INJECTION, SOLUTION INTRAMUSCULAR; INTRAVENOUS at 11:04

## 2023-02-25 RX ADMIN — ATORVASTATIN CALCIUM 40 MG: 40 TABLET, FILM COATED ORAL at 09:29

## 2023-02-25 RX ADMIN — MAGNESIUM SULFATE HEPTAHYDRATE 2000 MG: 40 INJECTION, SOLUTION INTRAVENOUS at 13:02

## 2023-02-25 RX ADMIN — HEPARIN SODIUM 5000 UNITS: 5000 INJECTION INTRAVENOUS; SUBCUTANEOUS at 22:57

## 2023-02-25 RX ADMIN — SODIUM CHLORIDE, PRESERVATIVE FREE 10 ML: 5 INJECTION INTRAVENOUS at 23:06

## 2023-02-25 RX ADMIN — SODIUM CHLORIDE, PRESERVATIVE FREE 10 ML: 5 INJECTION INTRAVENOUS at 11:04

## 2023-02-25 RX ADMIN — MIRTAZAPINE 7.5 MG: 15 TABLET, FILM COATED ORAL at 22:57

## 2023-02-25 RX ADMIN — MEMANTINE 10 MG: 10 TABLET ORAL at 06:19

## 2023-02-25 RX ADMIN — MEMANTINE 10 MG: 10 TABLET ORAL at 09:29

## 2023-02-25 RX ADMIN — PANTOPRAZOLE SODIUM 40 MG: 40 TABLET, DELAYED RELEASE ORAL at 06:19

## 2023-02-25 RX ADMIN — Medication 1000 UNITS: at 09:30

## 2023-02-25 RX ADMIN — ASPIRIN 81 MG: 81 TABLET, COATED ORAL at 09:29

## 2023-02-25 RX ADMIN — HEPARIN SODIUM 5000 UNITS: 5000 INJECTION INTRAVENOUS; SUBCUTANEOUS at 14:51

## 2023-02-25 RX ADMIN — HEPARIN SODIUM 5000 UNITS: 5000 INJECTION INTRAVENOUS; SUBCUTANEOUS at 06:22

## 2023-02-25 RX ADMIN — TRAZODONE HYDROCHLORIDE 25 MG: 50 TABLET ORAL at 22:57

## 2023-02-25 NOTE — PROGRESS NOTES
Internal Medicine Progress Note    Patient Name: Nirmal Agrawal   Patient : 1955   Date: 2023   Admit Date: 2023     CC: Shortness of Breath       Subjective     Interval History:     No acute overnight events  Yesterday, he tolerated HD well with net UF 1000 mL  POD #1:  L foot total first ray amputation  Further debridement, delayed primary closure with wound VAC application or skin graft substitute application vs lisfranc amputation of LLE planned for tomorrow ()  Afebrile, required supplemental O2 overnight; now saturating well on RA  CXR ordered for concerns of aspiration due to change in breathing pattern and supplemental O2 requirement; on preliminary review, there does not appear to be any focal areas of consolidation or vascular congestion. Patient seen and examined at bedside. He appears drowsy compared to previous days. He states he is feeling \"alright\". He is having recurrence of hiccups; lightheadedness starting this morning while sitting upright in bed; \"a little bit\" of shortness of breath at rest; and, continues to have lower extremity/bilateral foot pain, unchanged since admission. At present time, he denies headache, dizziness, ear fullness/pressure, chest pain/pressure/tightness, abdominal pain, nausea, vomiting, diarrhea, and constipation. ROS:  As per interval history above. Objective     Vital Signs:  Patient Vitals for the past 8 hrs:   BP Temp Temp src Pulse Resp SpO2 Weight   23 0420 (!) 125/58 98.4 °F (36.9 °C) Axillary 85 20 96 % 151 lb 0.2 oz (68.5 kg)   23 2323 (!) 108/52 98.6 °F (37 °C) Axillary 89 18 100 % --         Physical Exam:  Constitutional: Pleasant, ill-appearing, non-diaphoretic, elderly male, sitting semi-Fowlers in bed, in NAD. HEENT:  NCAT. Scleral icterus. No conjunctival injection. No rhinorrhea or epistaxis. Cardiovascular:  No apparent JVD. Auscultation difficult d/t frequent hiccups. Radial pulses 2+. Capillary refill < 2 sec. Pulmonary:  Able to speak in full sentences without frequent pauses. Auscultation difficult d/t frequent hiccups. No perioral cyanosis. Abdomen: Non-distended. Normoactive bowel sounds. Soft, no TTP. Musculoskeletal:  Cachectic. Right AV fistula. No lower extremity edema. Left foot wrapped with dressing, which is clean, dry, and intact. Skin:  Warm, dry, acyanotic. Neurological:  Lethargic and oriented to self. Not oriented to place, time, and situation. Psychological:  Cooperative. Normal speech, behavior, and thought content.         Intake/Output Summary (Last 24 hours) at 2/25/2023 0618  Last data filed at 2/24/2023 1450  Gross per 24 hour   Intake 300 ml   Output --   Net 300 ml          Medications:   heparin (porcine)  5,000 Units SubCUTAneous 3 times per day    epoetin ramesh-epbx  10,000 Units IntraVENous Once per day on Mon Wed Fri    mirtazapine  7.5 mg Oral Nightly    traZODone  25 mg Oral Nightly    melatonin  3 mg Oral Nightly    sodium chloride flush  5-40 mL IntraVENous 2 times per day    aspirin  81 mg Oral Daily    atorvastatin  40 mg Oral Daily    memantine  10 mg Oral BID    [Held by provider] metoprolol succinate  25 mg Oral Daily    pantoprazole  40 mg Oral QAM AC    Vitamin D  1,000 Units Oral Daily    sodium chloride flush  5-40 mL IntraVENous 2 times per day    vancomycin (VANCOCIN) intermittent dosing (placeholder)   Other RX Placeholder       sodium chloride      sodium chloride      sodium chloride          Labs:  CBC:   Recent Labs     02/22/23  0848 02/23/23  0505 02/23/23  2311 02/24/23  0341   WBC 12.4* 10.5  --  10.8   HGB 8.0* 7.6* 8.4* 8.1*   HCT 25.9* 24.4* 25.5* 24.9*    355  --  369   MCV 87.5 86.9  --  84.1     Renal:    Recent Labs     02/22/23  0848 02/23/23  0505 02/24/23  0341    137 136   K 4.3 4.4 4.6   CL 98* 98* 99   CO2 28 25 28   BUN 32* 35* 23*   CREATININE 5.8* 6.8* 4.4*   GLUCOSE 143* 100* 175*   CALCIUM 8.6 8.6 8.0* MG 2.00 1.90 1.80   PHOS 3.2 3.8 2.7   ANIONGAP 11 14 9     Hepatic:   Recent Labs     02/22/23  0848 02/23/23  0505 02/24/23  0341   LABALBU 2.3* 2.1* 2.3*     Pro-BNP:   No results for input(s): PROBNP in the last 72 hours. Lipids:   Recent Labs     02/22/23  0849   CHOL 74   TRIG 148   HDL 10*   LDLCALC 34       ABGs:  No results for input(s): PHART, UOD6YVS, PO2ART, PXN5RKY, BEART, THGBART, Z0MTJMFM, NER2KTI in the last 72 hours. VBGs: No results for input(s): PHVEN, BUE6GQB, PO2VEN in the last 72 hours. INR:   Recent Labs     02/22/23  0848 02/24/23  0341   INR 1.34* 1.34*     aPTT: No results for input(s): APTT in the last 72 hours. Procalcitonin: No results for input(s): PROCAL in the last 72 hours. No results for input(s): CRP in the last 72 hours. ESR: No results for input(s): ESR in the last 72 hours. Radiology:  XR FOOT LEFT (MIN 3 VIEWS)   Final Result      Postoperative changes as above. VL DUP LOWER EXTREMITY ARTERIES BILATERAL   Final Result      MRI FOOT LEFT WO CONTRAST   Final Result   Acute osteomyelitis of the first distal phalanx. XR FOOT LEFT (MIN 3 VIEWS)   Final Result   1. Medial ulceration with lytic changes compatible with osteomyelitis of the base of the first proximal phalanx and distal first metatarsal.      XR CHEST PORTABLE   Final Result   Impression: No acute abnormality      IR ANGIOGRAM EXTREMITY LEFT    (Results Pending)       Assessment & Plan   77-year-old  male who presented to the ED from his SNF on 2/[facility]/23 with dyspnea occurring during hemodialysis. Sepsis with S. pyogenes bacteremia - likely 2/2 acute osteomyelitis - afebrile  Acute osteomyelitis of left first distal phalanx with heavy growth of MRSA and H. parainfluenzae  Peripheral arterial disease - LLE AMOS 0.74  Follows with vascular surgery at [facility].  S/p amputation of right hallux and 4th digit 2/2 osteomyelitis.   2/20: Growth on BCx x2 and on PCR, prealbumin low at 7.1, ESR elevated at 117, CRP elevated at 282.1. ID consulted, recs appreciated   Continue vancomycin IV (2/20-), dosed by pharmacy  Repeat BCx on 2/21 with NGTD  TTE on 2/21 w/o valvular vegetations  CBC w/diff daily  Podiatry and vascular surgery consulted  2/22:  LLE arteriogram  2/24:  L foot I&D with total first ray amputation  NWB to LLE  Further debridement, delayed primary closure with wound VAC application or skin graft substitute application vs lisfranc amputation of LLE planned for tomorrow (2/26)  PT/OT eval and tx  The patient is at high-risk for perioperative complications in an intermediate-risk procedure given multiple vascular comorbidities, including his cerebrovascular disease and elevated creatinine level. G1DD - no signs of volume overload  2/20:  ProBNP 7611. Echo (2/21):  LVEF 55-60%, no regional WMA, G1DD, small circumferential pericardial effusion, PASP 28 mmHg. 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,W,F)  Hyperparathyroidism of ESRD  Normocytic anemia of ESRD  Nephrology consulted  HD planned today  BMP, Mg QD - replete to keep K > 4, Mg > 2  Avoid NSAIDs/nephrotoxins as patient still makes urine  Strict I/Os  Daily weights  Keep MAP > 65 mmHg  Procrit with dialysis    Pulmonary hypertension  Hypertension - currently hypotensive  CAD (LAD 15%, L Cx 15%, RCA 15-20% in 07/2016)  Continue ASA 81 mg QD  Hold home meds metoprolol succinate 25 mg QD, isosorbide mononitrate 30 mg QD, hydralazine 25 mg BID as patient is hypotensive    HLD - well-controlled  Lipid panel (2/22/23): Total 74, HDL 10, LDL 34, , VLDL 30.   Continue home atorvastatin 40 mg QD    Vascular dementia  Hemiplegia   Palliative care consulted  Continue memantine 10 mg BID    Insomnia  Continue home meds melatonin 3 mg QHS, trazodone 25 mg QHS, mirtazapine 7.5 mg QHS PRN    GERD  Protonix QD      DVT PPx:  Heparin SQ, Protonix  Diet:  ADULT DIET; Dysphagia - Soft and Bite Sized; 3 carb choices (45 gm/meal); Low Sodium (2 gm); Low Potassium (Less than 3000 mg/day); Low Phosphorus (Less than 1000 mg)  ADULT ORAL NUTRITION SUPPLEMENT; Breakfast, Lunch, Dinner; Wound Healing Oral Supplement   Code status:  Full Code     ELOS:  5 days  Barriers to discharge:  sepsis bacteremia, acute OM  Disposition  - Preadmission: From [facility].  - Current:  [facility]  - Upon discharge:  OT 12/24, PT 7/24 on 2/22. Plan to return to SNF. Will discuss with attending physician Dr. Sadia Jordan MD.     Kattie Klinefelter.  Yobani Curtis MD  Internal Medicine, PGY-1

## 2023-02-25 NOTE — PLAN OF CARE
Problem: Skin/Tissue Integrity  Goal: Absence of new skin breakdown  Description: 1. Monitor for areas of redness and/or skin breakdown  2. Assess vascular access sites hourly  3. Every 4-6 hours minimum:  Change oxygen saturation probe site  4. Every 4-6 hours:  If on nasal continuous positive airway pressure, respiratory therapy assess nares and determine need for appliance change or resting period. 2/25/2023 0254 by Devi Obrien RN  Outcome: Progressing  Note: Pt repositioned every 2 hours     Problem: Respiratory - Adult  Goal: Achieves optimal ventilation and oxygenation  2/25/2023 0254 by Devi Obrien RN  Outcome: Progressing  Flowsheets (Taken 2/25/2023 0254)  Achieves optimal ventilation and oxygenation:   Assess for changes in respiratory status   Assess for changes in mentation and behavior   Position to facilitate oxygenation and minimize respiratory effort   Oxygen supplementation based on oxygen saturation or arterial blood gases  Note: Pt titrated to 1 L nasal cannula     Problem: Pain  Goal: Verbalizes/displays adequate comfort level or baseline comfort level  2/25/2023 0254 by Devi Obrien RN  Outcome: Progressing  Flowsheets (Taken 2/25/2023 0254)  Verbalizes/displays adequate comfort level or baseline comfort level:   Encourage patient to monitor pain and request assistance   Assess pain using appropriate pain scale   Administer analgesics based on type and severity of pain and evaluate response   Implement non-pharmacological measures as appropriate and evaluate response   Consider cultural and social influences on pain and pain management   Notify Licensed Independent Practitioner if interventions unsuccessful or patient reports new pain  Note: Pt used 0-10 pain rating scale to rate pain. Gave PRN pain meds.  Pt satisfied

## 2023-02-25 NOTE — PLAN OF CARE
Problem: Skin/Tissue Integrity  Goal: Absence of new skin breakdown  Description: 1. Monitor for areas of redness and/or skin breakdown  2. Assess vascular access sites hourly  3. Every 4-6 hours minimum:  Change oxygen saturation probe site  4. Every 4-6 hours:  If on nasal continuous positive airway pressure, respiratory therapy assess nares and determine need for appliance change or resting period.   Outcome: Progressing     Problem: Respiratory - Adult  Goal: Achieves optimal ventilation and oxygenation  Outcome: Progressing     Problem: Discharge Planning  Goal: Discharge to home or other facility with appropriate resources  Outcome: Progressing     Problem: Pain  Goal: Verbalizes/displays adequate comfort level or baseline comfort level  Outcome: Progressing     Problem: Chronic Conditions and Co-morbidities  Goal: Patient's chronic conditions and co-morbidity symptoms are monitored and maintained or improved  Outcome: Progressing

## 2023-02-25 NOTE — PLAN OF CARE
Spoke with the patient's POA, who states his code status should be no intubation, no chest compressions; however, she is agreeable to switching the code status to full code for the surgery tomorrow. All questions were answered and she verbalized understanding. She was encouraged to call with any questions or concerns.

## 2023-02-25 NOTE — PROGRESS NOTES
Clinical Pharmacy Progress Note    Vancomycin - Management by Pharmacy    Consult Date(s): 2/20/23  Consulting Provider(s): Dr. Collette Massy / Plan  1)  LLE osteomyelitis with abscess - Vancomycin  Concurrent Antimicrobials: n/a  Day of Vanc Therapy:  #6  Current Dosing Method: Intermittent Dosing by Levels  Therapeutic Goal: Trough ~ 15 mg/L  Current Dose / Plan:   Pt is ESRD on HD Mon-Wed-Fri - will dose Vancomycin intermittently based on levels. Assume next HD will be Mon 2/27 per schedule (not ordered yet). Patient was given Vancomycin 750mg IV x1 after HD yesterday. Expect patient to remain therapeutic through the weekend. Will monitor for HD plan moving forward - once pt is on scheduled HD sessions, can likely schedule 750mg IV 3x weekly with HD. Plan for random level Mon 2/27 AM (not ordered). Will continue to monitor clinical condition and make adjustments to regimen as appropriate. Please call with questions--  Kike Grant PharmD., [facility]   2/25/2023 11:55 AM  Wireless: [phone]          Interval update:  Patient is s/p L foot 1st ray amputation yesterday. Per Podiatry, patient will need further intervention tomorrow. Cultures sent. Subjective/Objective:   Ulices Patel is a 79 y.o. male with a PMHx significant for ESRD on HD Mon-Wed-Fri, CAD, CVA, dementia, depression, DM, GERD, HTN, HLD, and PVD who is admitted with LLE osteomyelitis of 1st metatarsal, 1st proximal phalanx, and 1st distal phalanx with abscess. Pharmacy is consulted to dose Vancomycin.     Ht Readings from Last 1 Encounters:   02/20/23 5' 7\" (1.702 m)     Wt Readings from Last 1 Encounters:   02/25/23 151 lb 0.2 oz (68.5 kg)     Current & Prior Antimicrobial Regimen(s):  (2/20-2/21)   (2/20-2/21)  Vancomycin - Pharmacy to dose  Intermittent dosing (2/20-current)    Vancomycin Level(s) / Doses:    Date Vancomycin Level Vancomycin Dose   2/20  1750mg x1   2/21 21.9 mcg/mL --   2/22 16.9 mcg/mL 750mg x1   2/23 --   2/24 17 mg/L 750mg x1   2/25 -- --   2/26 -- --     Cultures & Sensitivities:    Date Site Micro Susceptibility / Result   2/20 Blood x2 Group A Strep  Reliably sensitive to b-lactams, others   2/20 Foot wound Group A Strep  Staph aureus  H. influenzae Reliably sensitive to b-lactams, others  In process  Reliably sensitive to CTX, Amp, Bactrim   2/24 Wound - tissue Group A Strep Reliably sensitive to b-lactams, others     Recent Labs     02/23/23  0505 02/24/23  0341 02/25/23  1058   CREATININE 6.8* 4.4* 4.0*   BUN 35* 23* 19   WBC 10.5 10.8 12.8*       CrCl is not calculated 2/2 ESRD on HD

## 2023-02-25 NOTE — PROGRESS NOTES
Podiatric Surgery Daily Progress Note  Nirmal Agrawal      Subjective :   Patient seen and examined this am at the bedside. Patient denies any acute overnight events. Patient resting comfortably, awakens to verbal stimuli. Review of Systems: Unable to be obtained. Objective     BP (!) 135/57   Pulse 83   Temp 98.4 °F (36.9 °C) (Axillary)   Resp 20   Ht 5' 7\" (1.702 m)   Wt 151 lb 0.2 oz (68.5 kg)   SpO2 93%   BMI 23.65 kg/m²     I/O:  Intake/Output Summary (Last 24 hours) at 2/25/2023 0750  Last data filed at 2/24/2023 1450  Gross per 24 hour   Intake 300 ml   Output --   Net 300 ml                Wt Readings from Last 3 Encounters:   02/25/23 151 lb 0.2 oz (68.5 kg)   02/20/23 153 lb 7 oz (69.6 kg)   12/09/22 156 lb 8 oz (71 kg)       LABS:    Recent Labs     02/23/23  0505 02/23/23  2311 02/24/23  0341   WBC 10.5  --  10.8   HGB 7.6* 8.4* 8.1*   HCT 24.4* 25.5* 24.9*     --  369        Recent Labs     02/24/23  0341      K 4.6   CL 99   CO2 28   PHOS 2.7   BUN 23*   CREATININE 4.4*        Recent Labs     02/22/23  0848 02/24/23  0341   INR 1.34* 1.34*           LOWER EXTREMITY EXAMINATION    Dressing to left LE intact. No strikethrough noted to the external dressing. Sanguinous drainage noted to the internal layers of the dressing. VASCULAR: DP and PT pulses nonpalpable 0/4 b/l. Upon hand-held Doppler examination, left DP noted to be monophasic, left PT and right DP, PT are softly biphasic. CFT is less than 3 seconds to the remaining digits of the foot. Skin temperature is warm to cool from proximal to distal with mildly increased warmth to the left LE. No edema noted. No pain with calf compression b/l. NEUROLOGIC: Gross and epicritic sensation is intact b/l. Protective sensation is diminished at all pedal sites b/l.     DERMATOLOGIC:   Left LE:  Full-thickness surgical ulceration noted at the site of the first ray amputation with sutures intact at the proximal portion of the wound. Wound base is granular, fibrotic, and necrotic in nature. Wound probes to the second metatarsal. No purulent drainage expressed. No malodor noted. No fluctuance or crepitus noted. Images from 2/25. Right LE: No acute signs of infection. MUSCULOSKELETAL: Muscle strength not tested 2/2 postoperative state. Pain with palpation of the periwound area of the left foot. Ankle joint ROM is decreased in dorsiflexion with the knee extended. History of left partial 1st ray amputation, 3rd digit amputation. IMAGING:  XR Left Foot 2/20/23  Narrative   History: Medial foot ulcer. 2 views of foot. FINDINGS: There is a medial ulceration. There is lysis of the medial base of the proximal phalanx. Lysis of the medial head of the first metatarsal suspicious for osteomyelitis. No soft tissue gas. Associated soft tissue swelling. Impression   1. Medial ulceration with lytic changes compatible with osteomyelitis of the base of the first proximal phalanx and distal first metatarsal.     MRI Left foot 2/21/23  Narrative   Patient: Nelson Timmonsdelmi  Time Out: 07:20   Exam(s): MRI LEFT FOOT Without Contrast        EXAM:     MR Left Lower Extremity Without Intravenous Contrast, Foot       CLINICAL HISTORY:     concern for OM at first metatarsal head, base of proximal    phalanx. TECHNIQUE:     Multiplanar magnetic resonance images of the left foot without    intravenous contrast.       COMPARISON:     No relevant prior studies available. FINDINGS:   Images are degraded by motion artifact. Ulcer along the medial aspect of the first toe. Underlying    abscess. Subcutaneous soft tissue edema and intramuscular edema    as can be seen in setting of diabetic neuropathic changes. No    soft tissue gas identified. Marrow signal abnormality within the    first distal phalanx consistent with acute osteomyelitis. No    septic arthritis. No acute fracture.        Electronically signed by Stephanie Christensen MD on 02-21-23 at 0720       Impression   Acute osteomyelitis of the first distal phalanx. XR Left foot 2/24- post-op  Narrative   3 views of the left foot       HISTORY: Pain. Surgery. FINDINGS:       Amputation of the first ray. Surgical bandages in place. No definite immediate complication is identified. Impression   Postoperative changes as above. VL Arterial Duplex b/l LE 2/21  Impressions   Right Impression   The right ankle/brachial index is 1.09 (PT- 166 mmHg; DP- non-compressible). The right arm brachial pressure was not obtained due to AVF. There is normal, multiphasic flow identified in the common femoral artery,   suggesting no evidence of significant aorto-iliac inflow disease. Elevated velocities at the mid superficial femoral artery indicate a >50%   stenosis by velocity criteria (velocities went from 92 to 193 cm/s). There is atherosclerotic plaque involving the popliteal artery with velocities   consistent with <50% stenosis. Elevated velocities at the distal anterior tibial artery indicate a >50%   stenosis by velocity criteria (velocities went from 36 to 81 cm/s). Left Impression   The left ankle/brachial index is 0.74 (PT-112 mmHg; DP-110 mmHg). The left AMOS   was technically difficult to obtain due to patient movement. There is normal, multiphasic flow identified in the common femoral artery,   suggesting no evidence of significant aorto-iliac inflow disease. There is atherosclerotic plaque involving the superficial femoral artery with   velocities consistent with <50% stenosis. The distal end of the popliteal artery appears to be occluded, and the   tibio-peroneal trunk appears to be occluded. There are multiple collateral   vessels noted in this area. The posterior tibial artery is occluded with retrograde trickle flow   demonstrated at the ankle area.    The distal anterior tibial artery is occluded with reconstitution at the ankle (34 cm/s). There are no previous studies for comparison. Conclusions        Summary        Normal right AMOS. Abnormal left AMOS in the range of moderate arterial insufficiency. Right mid-SFA stenosis of greater than 50% associated with nonocclusive    popliteal and tibial disease. Left popliteal and tibioperoneal trunk occlusions with associated posterior    tibial and anterior tibial artery occlusions. Signature    ------------------------------------------------------------------    Electronically signed by Lillian Caballero MD (Interpreting    physician) on 02/21/2023 at 03:56 PM       ASSESSMENT/PLAN  - Bacteremia  - s/p First ray amputation; left lower extremity (DOS: 2/24/23)  - Osteomyelitis; 1st metatarsal, 1st proximal phalanx, 1st distal phalanx; left foot  - Full thickness ulceration; left foot;  Bee 3  - Peripheral vascular disease; b/l LE  - History of partial 1st ray and 3rd digit amputation; right foot    -Patient examined and evaluated at bedside   -Hypotensive, otherwise VSS, no new AM CBC  -.1,   -Prealbumin 7.1; continue dietary nutritional supplements  -HbA1c 6.0%  -Wound culture- Strep Pyogenes, Staph Aureus, Haemophilus parainfluenzae   -Blood culture x2- Strep pyogenes  -XR, MRI reviewed, impressions noted above  -Arterial Duplex b/l LE reviewed, impressions noted above  -Vascular surgery following; appreciate recommendations  -ID following, continue antibiotics per their recommendations  -Dressing applied to left LE consisting of iodoform packing, Betadine, gauze, Kerlix, Ace  -Nonweight bearing to the left LE  -Plan for surgical intervention tomorrow for further debridement, delayed primary closure with wound VAC application or skin graft substitute application VS lisfranc amputation of the left lower extremity  -Surgical clearance per primary team  -NPO at midnight  -Heparin to be held 6 hours preop  -PT/INR, Type and screen ordered  -Consent to be obtained    DISPO: s/p first ray amputation; left LE. Labs and imaging reviewed. Continue IV antibiotics per ID recs; anticipate long term IV antibiotics at discharge. Vascular following. Plan for surgical intervention tomorrow.      Discussed assessment and plan with Dr. Kong Rick, DPM.    Alexander Conti, VIKASHM  Podiatric Resident, PGY-3  Pager #: [phone] or Perfect Serve

## 2023-02-25 NOTE — OP NOTE
Operative Note      Patient: Maria Del Rosario Montiel  YOB: 1955  MRN: [id number]     Date of Procedure: 2/24/2023     Pre-Op Diagnosis: [facility]el[facility] left [facility] [M86.9]     Post-Op Diagnosis: Same       Procedure(s):  LEFT FOOT TOTAL FIRST RAY AMPUTATION     Surgeon(s):  Eliane Shafer DPM     Assistant:  Sangeetha Toth, PGY-2     Anesthesia: General     Injectables: pre-op 20 cc of 2% lidocaine plain      Hemostasis: anatomic dissection      Materials: 3-0 Nylon, 1/4 inch iodoform packing       Estimated Blood Loss (mL): less than 30 mL     Complications: None     Specimens:   ID Type Source Tests Collected by Time Destination   1 : SURGICAL WOUND LEFT FOOT Tissue Tissue CULTURE, FUNGUS, CULTURE, TISSUE (Canceled), CULTURE WITH SMEAR, ACID FAST BACILLIUS, CULTURE, ANAEROBIC AND AEROBIC VIKASH Dominguez 2/24/2023 1415     A : CLERANCE FRAGMENTS OF LEFT FOOT Tissue Tissue SURGICAL PATHOLOGY VIKASH Dominguez 2/24/2023 1423     B : FIRST METATARSAL OF LEFT FOOT Tissue Tissue SURGICAL PATHOLOGY Eliane Shafer, VIKASH 2/24/2023 1424           Implants:  * No implants in log *      Drains: * No LDAs found *     Findings: About 5 cc of purulence encountered in the first interspace. Distal phalanx and hallux noted to be soft. 1st metatarsal noted to be soft and discolored. Clearance fragment from the base of the 1st metatarsal obtained and sent for pathology. Minimal bleeding noted. INDICATIONS FOR PROCEDURE: This patient has signs and symptoms clinically and radiographically consistent with the above mentioned preoperative diagnosis. Having failed conservative treatment, it was determined that the patient would benefit from surgical intervention. All potential risks, benefits, and complications were discussed with the patient and patient's family prior to the scheduling of surgery. All the patient's families questions were answered and no guarantees were given.  The patient's family wished to proceed with surgery, and informed written consent was obtained. DETAILS OF PROCEDURE: The patient was brought from the pre-operative area and placed on the operating table in the supine position. Following IV sedation, a local anesthetic block was then injected proximal to the incision site consisting of 20 cc of 2% lidocaine plain. The left lower extremity was then scrubbed, prepped, and draped in the usual sterile fashion. A time-out was performed. The patient, procedure, and operative site were confirmed and the following procedure was performed. PROCEDURE #1 LEFT 1ST RAY AMPUTATION:  Attention was then directed towards the wound on the left foot at the 1st ray. Using a #15 blade, a full thickness modified racquet type incision was made on the medial aspect of the 1st metatarsal and encompassing the hallux. The incision was carried down through the subcutaneous tissues using sharp dissection. At this time about 5 cc of purulence was encountered in the first interspace. A surgical culture was taken and passed back to the back table to be sent to [facility] for cultures and sensitivities. Next, the deep fascial and capsular layer of the 1st metatarsal was incised and the medial collateral ligaments were resected giving full access to the 1st metatarsal head. Upon completing this the hallux was removed and passed to the back table. Next, the decision was made to removal the first metatarsal in total because of the appearance of the metatarsal being discolored and soft. Upon grasping the resected metatarsal head, it was easily punctured and crumbled in the operative site. This was clearly necrotic bone where the integrity of the cortex and medullary canal was compromised. Next using a straight 1/2 inch osteotome the 1st tarsal metatarsal joint was identified and the plantar ligaments were freed.  Next using the same osteotome the metatarsal was freed from all surrounding soft tissue and removed in total and passed off to the back table. Next, using bone rongeurs the base of the first metatarsal was removed and sent as clearance fragment for pathology. Next, the tibal and fibular sesamoids were identified in the plantar plate. These two structures were each grasped and isolated with a kocher forceps and dissected from their attachments to the plantar plate and flexor hallucis brevis tendon. These were passed from the operative field as well and placed on the back table. Attention was then directed towards the remaining devitalized soft tissue. The remaining necrotic tissue (including redundant flexor tendon that housed the sesamoids) was then removed using sharp dissection until good bleeding healthy tissue was noted. Attention was then directed towards irrigation. Using a pulse irrigation system, the surgical site was irrigated using approximately 3 L of normal saline. After copious irrigation the wound was packed with 1/4 iodoform packing and the proximal site of the incision was closed using 3-0 nylon in a simple interrupted fashion. A soft sterile dressing was applied consisting of gauze, ABD pads, Pia, Cast padding, and ACE. END OF PROCEDURE: The patient tolerated the procedure and anesthesia well and was transported from the operating room to the PACU with vital signs stable and vascular status intact to all aspects of the patient's left lower extremity and digital capillary refill time immediate to the digits of the left foot. Following a period of post-operative monitoring, the patient will be transferred back to the floor for continued medical management and IV antibiotics. The procedure was not definitive and the patient will require further podiatric surgical intervention during this admission.     This operative report was dictated on behalf of Dr. Sean Ospina DPM.    Electronically signed by Cordell Noland DPM on 2/25/2023 at 9:50 AM

## 2023-02-26 ENCOUNTER — ANESTHESIA EVENT (OUTPATIENT)
Dept: OPERATING ROOM | Age: 68
End: 2023-02-26
Payer: MEDICARE

## 2023-02-26 ENCOUNTER — ANESTHESIA (OUTPATIENT)
Dept: OPERATING ROOM | Age: 68
End: 2023-02-26
Payer: MEDICARE

## 2023-02-26 LAB
AFB SMEAR: NORMAL
ALBUMIN SERPL-MCNC: 1.8 G/DL (ref 3.4–5)
ANION GAP SERPL CALCULATED.3IONS-SCNC: 14 MMOL/L (ref 3–16)
BANDED NEUTROPHILS RELATIVE PERCENT: 1 % (ref 0–7)
BASOPHILS ABSOLUTE: 0 K/UL (ref 0–0.2)
BASOPHILS RELATIVE PERCENT: 0 %
BUN BLDV-MCNC: 30 MG/DL (ref 7–20)
CALCIUM SERPL-MCNC: 8.6 MG/DL (ref 8.3–10.6)
CHLORIDE BLD-SCNC: 97 MMOL/L (ref 99–110)
CO2: 23 MMOL/L (ref 21–32)
CREAT SERPL-MCNC: 4.8 MG/DL (ref 0.8–1.3)
EOSINOPHILS ABSOLUTE: 0.1 K/UL (ref 0–0.6)
EOSINOPHILS RELATIVE PERCENT: 1 %
GFR SERPL CREATININE-BSD FRML MDRD: 13 ML/MIN/{1.73_M2}
GLUCOSE BLD-MCNC: 121 MG/DL (ref 70–99)
GLUCOSE BLD-MCNC: 126 MG/DL (ref 70–99)
HCT VFR BLD CALC: 26.8 % (ref 40.5–52.5)
HEMOGLOBIN: 8.3 G/DL (ref 13.5–17.5)
LYMPHOCYTES ABSOLUTE: 1.4 K/UL (ref 1–5.1)
LYMPHOCYTES RELATIVE PERCENT: 11 %
MAGNESIUM: 2.3 MG/DL (ref 1.8–2.4)
MCH RBC QN AUTO: 26.5 PG (ref 26–34)
MCHC RBC AUTO-ENTMCNC: 31 G/DL (ref 31–36)
MCV RBC AUTO: 85.5 FL (ref 80–100)
MONOCYTES ABSOLUTE: 0.7 K/UL (ref 0–1.3)
MONOCYTES RELATIVE PERCENT: 5 %
NEUTROPHILS ABSOLUTE: 10.9 K/UL (ref 1.7–7.7)
NEUTROPHILS RELATIVE PERCENT: 82 %
PDW BLD-RTO: 19.3 % (ref 12.4–15.4)
PERFORMED ON: ABNORMAL
PHOSPHORUS: 2.9 MG/DL (ref 2.5–4.9)
PLATELET # BLD: 373 K/UL (ref 135–450)
PMV BLD AUTO: 7.4 FL (ref 5–10.5)
POTASSIUM SERPL-SCNC: 4.5 MMOL/L (ref 3.5–5.1)
POTASSIUM SERPL-SCNC: 5.9 MMOL/L (ref 3.5–5.1)
RBC # BLD: 3.14 M/UL (ref 4.2–5.9)
SODIUM BLD-SCNC: 134 MMOL/L (ref 136–145)
WBC # BLD: 13.1 K/UL (ref 4–11)

## 2023-02-26 PROCEDURE — 2709999900 HC NON-CHARGEABLE SUPPLY: Performed by: PODIATRIST

## 2023-02-26 PROCEDURE — 6370000000 HC RX 637 (ALT 250 FOR IP): Performed by: PODIATRIST

## 2023-02-26 PROCEDURE — 7100000000 HC PACU RECOVERY - FIRST 15 MIN: Performed by: PODIATRIST

## 2023-02-26 PROCEDURE — 83735 ASSAY OF MAGNESIUM: CPT

## 2023-02-26 PROCEDURE — 6360000002 HC RX W HCPCS: Performed by: ANESTHESIOLOGY

## 2023-02-26 PROCEDURE — 3700000001 HC ADD 15 MINUTES (ANESTHESIA): Performed by: PODIATRIST

## 2023-02-26 PROCEDURE — 7100000001 HC PACU RECOVERY - ADDTL 15 MIN: Performed by: PODIATRIST

## 2023-02-26 PROCEDURE — 1200000000 HC SEMI PRIVATE

## 2023-02-26 PROCEDURE — 85025 COMPLETE CBC W/AUTO DIFF WBC: CPT

## 2023-02-26 PROCEDURE — A4217 STERILE WATER/SALINE, 500 ML: HCPCS | Performed by: PODIATRIST

## 2023-02-26 PROCEDURE — 2580000003 HC RX 258: Performed by: PODIATRIST

## 2023-02-26 PROCEDURE — 3600000013 HC SURGERY LEVEL 3 ADDTL 15MIN: Performed by: PODIATRIST

## 2023-02-26 PROCEDURE — 94761 N-INVAS EAR/PLS OXIMETRY MLT: CPT

## 2023-02-26 PROCEDURE — 0HRNXK4 REPLACEMENT OF LEFT FOOT SKIN WITH NONAUTOLOGOUS TISSUE SUBSTITUTE, PARTIAL THICKNESS, EXTERNAL APPROACH: ICD-10-PCS | Performed by: PODIATRIST

## 2023-02-26 PROCEDURE — 6370000000 HC RX 637 (ALT 250 FOR IP): Performed by: INTERNAL MEDICINE

## 2023-02-26 PROCEDURE — 84132 ASSAY OF SERUM POTASSIUM: CPT

## 2023-02-26 PROCEDURE — 80069 RENAL FUNCTION PANEL: CPT

## 2023-02-26 PROCEDURE — 6370000000 HC RX 637 (ALT 250 FOR IP)

## 2023-02-26 PROCEDURE — 94150 VITAL CAPACITY TEST: CPT

## 2023-02-26 PROCEDURE — 3700000000 HC ANESTHESIA ATTENDED CARE: Performed by: PODIATRIST

## 2023-02-26 PROCEDURE — 3600000003 HC SURGERY LEVEL 3 BASE: Performed by: PODIATRIST

## 2023-02-26 PROCEDURE — C1762 CONN TISS, HUMAN(INC FASCIA): HCPCS | Performed by: PODIATRIST

## 2023-02-26 PROCEDURE — 2700000000 HC OXYGEN THERAPY PER DAY

## 2023-02-26 PROCEDURE — 2580000003 HC RX 258

## 2023-02-26 PROCEDURE — 6360000002 HC RX W HCPCS: Performed by: PODIATRIST

## 2023-02-26 PROCEDURE — 36415 COLL VENOUS BLD VENIPUNCTURE: CPT

## 2023-02-26 PROCEDURE — 2500000003 HC RX 250 WO HCPCS: Performed by: PODIATRIST

## 2023-02-26 PROCEDURE — 0JBR0ZZ EXCISION OF LEFT FOOT SUBCUTANEOUS TISSUE AND FASCIA, OPEN APPROACH: ICD-10-PCS | Performed by: PODIATRIST

## 2023-02-26 DEVICE — ACELLULAR DERMAL MATRIX
Type: IMPLANTABLE DEVICE | Site: FOOT | Status: FUNCTIONAL
Brand: PROLAYER XENOGRAFT

## 2023-02-26 RX ORDER — MAGNESIUM HYDROXIDE 1200 MG/15ML
LIQUID ORAL CONTINUOUS PRN
Status: DISCONTINUED | OUTPATIENT
Start: 2023-02-26 | End: 2023-02-26 | Stop reason: HOSPADM

## 2023-02-26 RX ORDER — SODIUM CHLORIDE 9 MG/ML
INJECTION, SOLUTION INTRAVENOUS PRN
Status: DISCONTINUED | OUTPATIENT
Start: 2023-02-26 | End: 2023-02-26 | Stop reason: HOSPADM

## 2023-02-26 RX ORDER — SODIUM CHLORIDE 0.9 % (FLUSH) 0.9 %
5-40 SYRINGE (ML) INJECTION EVERY 12 HOURS SCHEDULED
Status: DISCONTINUED | OUTPATIENT
Start: 2023-02-26 | End: 2023-02-26 | Stop reason: HOSPADM

## 2023-02-26 RX ORDER — LABETALOL HYDROCHLORIDE 5 MG/ML
10 INJECTION, SOLUTION INTRAVENOUS
Status: DISCONTINUED | OUTPATIENT
Start: 2023-02-26 | End: 2023-02-26 | Stop reason: HOSPADM

## 2023-02-26 RX ORDER — PROPOFOL 10 MG/ML
INJECTION, EMULSION INTRAVENOUS PRN
Status: DISCONTINUED | OUTPATIENT
Start: 2023-02-26 | End: 2023-02-26 | Stop reason: SDUPTHER

## 2023-02-26 RX ORDER — LIDOCAINE HYDROCHLORIDE 10 MG/ML
INJECTION, SOLUTION EPIDURAL; INFILTRATION; INTRACAUDAL; PERINEURAL PRN
Status: DISCONTINUED | OUTPATIENT
Start: 2023-02-26 | End: 2023-02-26 | Stop reason: HOSPADM

## 2023-02-26 RX ORDER — PROCHLORPERAZINE EDISYLATE 5 MG/ML
5 INJECTION INTRAMUSCULAR; INTRAVENOUS
Status: DISCONTINUED | OUTPATIENT
Start: 2023-02-26 | End: 2023-02-26 | Stop reason: HOSPADM

## 2023-02-26 RX ORDER — HEPARIN SODIUM 5000 [USP'U]/ML
5000 INJECTION, SOLUTION INTRAVENOUS; SUBCUTANEOUS EVERY 8 HOURS SCHEDULED
Status: DISCONTINUED | OUTPATIENT
Start: 2023-02-26 | End: 2023-03-03 | Stop reason: HOSPADM

## 2023-02-26 RX ORDER — HYDRALAZINE HYDROCHLORIDE 20 MG/ML
10 INJECTION INTRAMUSCULAR; INTRAVENOUS
Status: DISCONTINUED | OUTPATIENT
Start: 2023-02-26 | End: 2023-02-26 | Stop reason: HOSPADM

## 2023-02-26 RX ORDER — LIDOCAINE HYDROCHLORIDE 20 MG/ML
INJECTION, SOLUTION INTRAVENOUS PRN
Status: DISCONTINUED | OUTPATIENT
Start: 2023-02-26 | End: 2023-02-26 | Stop reason: SDUPTHER

## 2023-02-26 RX ORDER — BUPIVACAINE HYDROCHLORIDE 5 MG/ML
INJECTION, SOLUTION EPIDURAL; INTRACAUDAL PRN
Status: DISCONTINUED | OUTPATIENT
Start: 2023-02-26 | End: 2023-02-26 | Stop reason: ALTCHOICE

## 2023-02-26 RX ORDER — SODIUM CHLORIDE 0.9 % (FLUSH) 0.9 %
5-40 SYRINGE (ML) INJECTION PRN
Status: DISCONTINUED | OUTPATIENT
Start: 2023-02-26 | End: 2023-02-26 | Stop reason: HOSPADM

## 2023-02-26 RX ORDER — ONDANSETRON 2 MG/ML
4 INJECTION INTRAMUSCULAR; INTRAVENOUS
Status: DISCONTINUED | OUTPATIENT
Start: 2023-02-26 | End: 2023-02-26 | Stop reason: HOSPADM

## 2023-02-26 RX ADMIN — LIDOCAINE HYDROCHLORIDE 100 MG: 20 INJECTION, SOLUTION INTRAVENOUS at 08:31

## 2023-02-26 RX ADMIN — HEPARIN SODIUM 5000 UNITS: 5000 INJECTION INTRAVENOUS; SUBCUTANEOUS at 13:30

## 2023-02-26 RX ADMIN — OXYCODONE HYDROCHLORIDE 5 MG: 5 TABLET ORAL at 17:34

## 2023-02-26 RX ADMIN — SODIUM CHLORIDE, PRESERVATIVE FREE 10 ML: 5 INJECTION INTRAVENOUS at 22:21

## 2023-02-26 RX ADMIN — TRAZODONE HYDROCHLORIDE 25 MG: 50 TABLET ORAL at 21:07

## 2023-02-26 RX ADMIN — PANTOPRAZOLE SODIUM 40 MG: 40 TABLET, DELAYED RELEASE ORAL at 07:04

## 2023-02-26 RX ADMIN — PANTOPRAZOLE SODIUM 40 MG: 40 TABLET, DELAYED RELEASE ORAL at 13:16

## 2023-02-26 RX ADMIN — Medication 1000 UNITS: at 13:17

## 2023-02-26 RX ADMIN — MIRTAZAPINE 7.5 MG: 15 TABLET, FILM COATED ORAL at 21:07

## 2023-02-26 RX ADMIN — SODIUM CHLORIDE, PRESERVATIVE FREE 10 ML: 5 INJECTION INTRAVENOUS at 13:17

## 2023-02-26 RX ADMIN — SODIUM ZIRCONIUM CYCLOSILICATE 5 G: 5 POWDER, FOR SUSPENSION ORAL at 13:16

## 2023-02-26 RX ADMIN — SODIUM CHLORIDE, PRESERVATIVE FREE 10 ML: 5 INJECTION INTRAVENOUS at 13:24

## 2023-02-26 RX ADMIN — SODIUM CHLORIDE: 9 INJECTION, SOLUTION INTRAVENOUS at 08:06

## 2023-02-26 RX ADMIN — MEMANTINE 10 MG: 10 TABLET ORAL at 13:16

## 2023-02-26 RX ADMIN — HEPARIN SODIUM 5000 UNITS: 5000 INJECTION INTRAVENOUS; SUBCUTANEOUS at 22:26

## 2023-02-26 RX ADMIN — ASPIRIN 81 MG: 81 TABLET, COATED ORAL at 13:16

## 2023-02-26 RX ADMIN — PROPOFOL 100 MG: 10 INJECTION, EMULSION INTRAVENOUS at 08:31

## 2023-02-26 RX ADMIN — MEMANTINE 10 MG: 10 TABLET ORAL at 21:07

## 2023-02-26 RX ADMIN — ATORVASTATIN CALCIUM 40 MG: 40 TABLET, FILM COATED ORAL at 13:17

## 2023-02-26 ASSESSMENT — PAIN SCALES - GENERAL
PAINLEVEL_OUTOF10: 5
PAINLEVEL_OUTOF10: 5
PAINLEVEL_OUTOF10: 0
PAINLEVEL_OUTOF10: 2
PAINLEVEL_OUTOF10: 0
PAINLEVEL_OUTOF10: 0
PAINLEVEL_OUTOF10: 2

## 2023-02-26 ASSESSMENT — PAIN DESCRIPTION - LOCATION
LOCATION: FOOT

## 2023-02-26 ASSESSMENT — PAIN DESCRIPTION - ORIENTATION
ORIENTATION: LEFT

## 2023-02-26 ASSESSMENT — PAIN DESCRIPTION - DESCRIPTORS
DESCRIPTORS: ACHING
DESCRIPTORS: ACHING

## 2023-02-26 NOTE — ANESTHESIA POSTPROCEDURE EVALUATION
Department of Anesthesiology  Postprocedure Note    Patient: Miguel Conti  MRN: [id number]  YOB: 1955  Date of evaluation: 2/26/2023      Procedure Summary     Date: 02/26/23 Room / Location: [facility] [address]  / [facility]    Anesthesia Start: 4641 Anesthesia Stop: 1518    Procedure: INCISION AND DRAINAGE WITH DELAYED  PRIMARY CLOSURE WITH  WOUND VAC APPLICATION,  APPLICATION SKIN GRAFT SUBSTITUTE LEFT  LOWER EXTREMITY (Left: Foot) Diagnosis: Gangrene of left foot ([address]    Surgeons: Marimar Pro DPM Responsible Provider: Jannette Lancaster MD    Anesthesia Type: general ASA Status: 4          Anesthesia Type: No value filed.     Marky Phase I: Marky Score: 8    Marky Phase II: Marky Score: 7      Anesthesia Post Evaluation    Patient location during evaluation: PACU  Patient participation: complete - patient participated  Level of consciousness: awake  Airway patency: patent  Nausea & Vomiting: no nausea and no vomiting  Complications: no  Cardiovascular status: hemodynamically stable  Respiratory status: acceptable  Hydration status: euvolemic

## 2023-02-26 NOTE — PROGRESS NOTES
Patient admitted to PACU # 13 from OR at 0930 post [address] PRIMARY CLOSURE WITH WOUND VAC APPLICATION, APPLICATION SKIN GRAFT SUBSTITUTE LEFT LOWER EXTREMITY - Left per Sara Peralta DPM.  Attached to PACU monitoring system and report received from anesthesia provider. Patient was reported to be hemodynamically stable during procedure.

## 2023-02-26 NOTE — PROGRESS NOTES
Pre-Operative Note  Resident Note     Patient: Les Phelps     Procedure: Incision and drainage with delayed primary closure, possible skin graft substitute application, possible wound vac application versus transmetatarsal amputation; left lower extremity    Consent: In chart, signed by patient family with patient wife and daughter at bedside. Addendum to surgical consent for patients with DNR status also signed; family elects to suspend DNR for perioperative period. Labs:        Recent Labs     02/24/23  0341 02/25/23  1058   WBC 10.8 12.8*   HGB 8.1* 8.3*   HCT 24.9* 26.7*   MCV 84.1 85.0    372        Recent Labs     02/24/23  0341 02/25/23  1058    136   K 4.6 4.3   CL 99 99   CO2 28 28   PHOS 2.7  --    BUN 23* 19   CREATININE 4.4* 4.0*      No results for input(s): AST, ALT, ALB, BILIDIR, BILITOT, ALKPHOS in the last 72 hours. No results for input(s): LIPASE, AMYLASE in the last 72 hours. Recent Labs     02/24/23  0341 02/25/23  1058   INR 1.34* 1.23*      No results for input(s): CKTOTAL, CKMB, CKMBINDEX, TROPONINI in the last 72 hours. Saline lock/IV access: yes    Clearance for surgery: yes, per medicine team     Diet: NPO     CXR:  Questionable minimal bibasilar atelectasis       EKG:  Please see cardiology section of EMR      Echocardiogram:  Please see cardiology section of EMR    PT/INR: 15.5/1.23    IVF: As needed    Abx: IV vancomycin    Type and Screen: pending    Known risk factors for perioperative complications: Anemia  Coronary disease  Diabetes mellitus  Renal dysfunction      Anesthesia to see patient    I have examined the patient and reviewed the original history and physical completed and find no relevant changes.     Fritz Castillo DPM  Podiatric Resident, PGY-3  Pager #: [phone] or Perfect Serve

## 2023-02-26 NOTE — PROGRESS NOTES
Clinical Pharmacy Progress Note    Vancomycin - Management by Pharmacy    Consult Date(s): 2/20/23  Consulting Provider(s): Dr. Meredith Ace / Plan  1)  LLE osteomyelitis with abscess - Vancomycin  Concurrent Antimicrobials: n/a  Day of Vanc Therapy:  #7  Current Dosing Method: Intermittent Dosing by Levels  Therapeutic Goal: Trough ~ 15 mg/L  Current Dose / Plan:   Pt is ESRD on HD Mon-Wed-Fri - will dose Vancomycin intermittently based on levels. Assume next HD will be Mon 2/27 per schedule (HD orders not placed yet). Patient was given Vancomycin 750mg IV x1 after HD on Fri 2/24. Expect patient to remain therapeutic through the weekend. Will monitor for HD plan moving forward - once pt is on scheduled HD sessions, can likely schedule 750mg IV 3x weekly with HD. Plan for random level Mon 2/27 AM.    MRSA in wound with BROCK=2 to vancomycin. Recommend to consider alternative treatment given risk of failure, such as Linezolid or Daptomycin. ID following; will defer to ID. Will continue to monitor clinical condition and make adjustments to regimen as appropriate. Please call with questions--  Radha PendletonD., BCPS   2/26/2023 8:40 AM  Wireless: [phone]          Interval update:  Plan for I&D with delayed primary closure, poss skin graft and wound vac application vs transmetatarsal amputation today in OR with Podiatry. Subjective/Objective:   Alanna Liu is a 79 y.o. male with a PMHx significant for ESRD on HD Mon-Wed-Fri, CAD, CVA, dementia, depression, DM, GERD, HTN, HLD, and PVD who is admitted with LLE osteomyelitis of 1st metatarsal, 1st proximal phalanx, and 1st distal phalanx with abscess. S/p L foot 1st ray amputation on 2/24. Pharmacy is consulted to dose Vancomycin.     Ht Readings from Last 1 Encounters:   02/20/23 5' 7\" (1.702 m)     Wt Readings from Last 1 Encounters:   02/25/23 151 lb 0.2 oz (68.5 kg)     Current & Prior Antimicrobial Regimen(s):  (2/20-2/21) (2/20-2/21)  Vancomycin - Pharmacy to dose  Intermittent dosing (2/20-current)    Vancomycin Level(s) / Doses:    Date Vancomycin Level Vancomycin Dose   2/20  1750mg x1   2/21 21.9 mcg/mL --   2/22 16.9 mcg/mL 750mg x1   2/23  --   2/24 17 mg/L 750mg x1   2/25 -- --   2/26 -- --   2/27 Ordered      Cultures & Sensitivities:    Date Site Micro Susceptibility / Result   2/20 Blood x2 Group A Strep  Reliably sensitive to b-lactams, others   2/20 Foot wound Group A Strep  Staph aureus  H. influenzae Reliably sensitive to b-lactams, others  S=Vanc (BROCK=2), linezolid, daptomycin  Reliably sensitive to CTX, Amp, Bactrim   2/24 Wound - tissue Group A Strep  MRSA Reliably sensitive to b-lactams, others  S=Vanc (BROCK=2), linezolid, daptomycin     Recent Labs     02/24/23  0341 02/25/23  1058 02/26/23  0539 02/26/23  0732   CREATININE 4.4* 4.0* 4.8*  --    BUN 23* 19 30*  --    WBC 10.8 12.8*  --  13.1*       CrCl is not calculated 2/2 ESRD on HD

## 2023-02-26 NOTE — PROGRESS NOTES
Dr Harp Retort on unit; this RN makes known that patients right arm is noted to have some swelling/edema present. No new orders placed or given at this time.

## 2023-02-26 NOTE — PROGRESS NOTES
Internal Medicine Progress Note    Patient Name: Fe eJrez   Patient : 1955   Date: 2023   Admit Date: 2023     CC: Shortness of Breath       Subjective     Interval History:       POD #2:  L foot total first ray amputation. Further debridement, delayed primary closure with wound VAC application or skin graft substitute application vs lisfranc amputation of LLE planned for today ()  Patient in OR this AM for I&D, delayed primary closure, application of skin graft substitute, wound vac application of LLE. Per report, surgery was without complications. Patient seen at bedside. Denied pain, N/V, fever, chills, chest pain, SOB.     ROS:  As per interval history above. Objective     Vital Signs:  Patient Vitals for the past 8 hrs:   BP Temp Temp src Pulse Resp SpO2   23 1236 (!) 134/33 97.1 °F (36.2 °C) Oral 83 18 98 %   23 1108 (!) 156/49 97.7 °F (36.5 °C) Oral 88 18 97 %   23 1045 (!) 180/41 97.4 °F (36.3 °C) -- 81 17 98 %   23 1030 (!) 141/27 -- -- 77 21 90 %   23 1015 (!) 140/33 -- -- 83 12 95 %   23 1000 (!) 141/29 -- -- 78 13 95 %   23 0945 (!) 143/34 -- -- 79 14 98 %   23 0940 (!) 143/41 -- -- 79 10 100 %   23 0935 (!) 132/28 -- -- 81 (!) 7 100 %   23 0930 (!) 139/42 97.2 °F (36.2 °C) Temporal 80 10 --   23 0742 (!) 136/53 98.4 °F (36.9 °C) Oral 95 18 96 %         Physical Exam:  Constitutional: Pleasant, non-diaphoretic, elderly male, sitting semi-Fowlers in bed, in NAD. HEENT:  NCAT. Scleral icterus. No conjunctival injection. No rhinorrhea or epistaxis. Cardiovascular:  No apparent JVD. Normal S1/S2. Regular rate and rhythm. Radial pulses 2+. Capillary refill < 2 sec. Pulmonary:  Able to speak in full sentences without frequent pauses. No perioral cyanosis. Abdomen: Non-distended. Normoactive bowel sounds. Soft, no TTP. Musculoskeletal:  Cachectic. Right AV fistula. Some mild swelling of RUE, not including forearm/hand. No lower extremity edema. Left foot wrapped with dressing, which is clean, dry, and intact. Skin:  Warm, dry, acyanotic. Psychological:  Cooperative. Normal speech, behavior, and thought content. Intake/Output Summary (Last 24 hours) at 2/26/2023 1257  Last data filed at 2/26/2023 0932  Gross per 24 hour   Intake 540 ml   Output 0 ml   Net 540 ml          Medications:   heparin (porcine)  5,000 Units SubCUTAneous 3 times per day    sodium zirconium cyclosilicate  5 g Oral Once    lidocaine  1 patch TransDERmal Daily    epoetin ramesh-epbx  10,000 Units IntraVENous Once per day on Mon Wed Fri    mirtazapine  7.5 mg Oral Nightly    traZODone  25 mg Oral Nightly    melatonin  3 mg Oral Nightly    sodium chloride flush  5-40 mL IntraVENous 2 times per day    aspirin  81 mg Oral Daily    atorvastatin  40 mg Oral Daily    memantine  10 mg Oral BID    [Held by provider] metoprolol succinate  25 mg Oral Daily    pantoprazole  40 mg Oral QAM AC    Vitamin D  1,000 Units Oral Daily    sodium chloride flush  5-40 mL IntraVENous 2 times per day    vancomycin (VANCOCIN) intermittent dosing (placeholder)   Other RX Placeholder       sodium chloride      sodium chloride      sodium chloride          Labs:  CBC:   Recent Labs     02/24/23  0341 02/25/23  1058 02/26/23  0732   WBC 10.8 12.8* 13.1*   HGB 8.1* 8.3* 8.3*   HCT 24.9* 26.7* 26.8*    372 373   MCV 84.1 85.0 85.5     Renal:    Recent Labs     02/24/23  0341 02/25/23  1058 02/26/23  0539 02/26/23  0732    136 134*  --    K 4.6 4.3 5.9* 4.5   CL 99 99 97*  --    CO2 28 28 23  --    BUN 23* 19 30*  --    CREATININE 4.4* 4.0* 4.8*  --    GLUCOSE 175* 124* 121*  --    CALCIUM 8.0* 8.4 8.6  --    MG 1.80 1.80 2.30  --    PHOS 2.7  --  2.9  --    ANIONGAP 9 9 14  --      Hepatic:   Recent Labs     02/24/23  0341 02/26/23  0539   LABALBU 2.3* 1.8*     Pro-BNP:   No results for input(s): PROBNP in the last 72 hours.     Lipids:   No results for input(s): CHOL, TRIG, HDL, LDLCALC, VLDL in the last 72 hours. ABGs:  No results for input(s): PHART, MTP3AZJ, PO2ART, DCF7CLS, BEART, THGBART, K4QACJGD, MEZ4DXB in the last 72 hours. VBGs: No results for input(s): PHVEN, MWR7THU, PO2VEN in the last 72 hours. INR:   Recent Labs     02/24/23  0341 02/25/23  1058   INR 1.34* 1.23*     aPTT: No results for input(s): APTT in the last 72 hours. Procalcitonin: No results for input(s): PROCAL in the last 72 hours. No results for input(s): CRP in the last 72 hours. ESR: No results for input(s): ESR in the last 72 hours. Radiology:  XR CHEST PORTABLE   Final Result      Questionable minimal bibasilar atelectasis. XR FOOT LEFT (MIN 3 VIEWS)   Final Result      Postoperative changes as above. VL DUP LOWER EXTREMITY ARTERIES BILATERAL   Final Result      MRI FOOT LEFT WO CONTRAST   Final Result   Acute osteomyelitis of the first distal phalanx. XR FOOT LEFT (MIN 3 VIEWS)   Final Result   1. Medial ulceration with lytic changes compatible with osteomyelitis of the base of the first proximal phalanx and distal first metatarsal.      XR CHEST PORTABLE   Final Result   Impression: No acute abnormality      IR ANGIOGRAM EXTREMITY LEFT    (Results Pending)       Assessment & Plan   40-year-old  male who presented to the ED from his SNF on 2/20/23 with dyspnea occurring during hemodialysis. Sepsis with S. pyogenes bacteremia - likely 2/2 acute osteomyelitis - afebrile  Acute osteomyelitis of left first distal phalanx with heavy growth of MRSA and H. parainfluenzae  Peripheral arterial disease - LLE AMOS 0.74  Follows with vascular surgery at [facility].  S/p amputation of right hallux and 4th digit 2/2 osteomyelitis. 2/20: Growth on BCx x2 and on PCR, prealbumin low at 7.1, ESR elevated at 117, CRP elevated at 282.1.     ID consulted, recs appreciated   Continue vancomycin IV (2/20-), dosed by pharmacy  Repeat BCx on 2/21 with NGTD  TTE on 2/21 w/o valvular vegetations  CBC w/diff daily  Podiatry and vascular surgery consulted  2/22:  LLE arteriogram  2/24:  L foot I&D with total first ray amputation  2/26: I&D, delayed primary closure, application of skin graft substitute, wound vac application of LLE. - non definitive surgery, no further podiatry surgical interventions planned  Will defer to vascular surgery re: anticoagulation recs  NWB to LLE  PT/OT eval and tx  The patient is at high-risk for perioperative complications in an intermediate-risk procedure given multiple vascular comorbidities, including his cerebrovascular disease and elevated creatinine level.    G1DD - no signs of volume overload  2/20:  ProBNP 7611.  Echo (2/21):  LVEF 55-60%, no regional WMA, G1DD, small circumferential pericardial effusion, PASP 28 mmHg.    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,W,F)  Hyperparathyroidism of ESRD  Normocytic anemia of ESRD  Nephrology consulted  HD planned for tomorrow  BMP, Mg QD - replete to keep K > 4, Mg > 2  Avoid NSAIDs/nephrotoxins as patient still makes urine  Strict I/Os  Daily weights  Keep MAP > 65 mmHg  Procrit with dialysis    Pulmonary hypertension  Hypertension - currently hypotensive  CAD (LAD 15%, L Cx 15%, RCA 15-20% in 07/2016)  Continue ASA 81 mg QD  Hold home meds metoprolol succinate 25 mg QD, isosorbide mononitrate 30 mg QD, hydralazine 25 mg BID as patient is hypotensive    HLD - well-controlled  Lipid panel (2/22/23):  Total 74, HDL 10, LDL 34, , VLDL 30.  Continue home atorvastatin 40 mg QD    Vascular dementia  Hemiplegia   Palliative care consulted  Continue memantine 10 mg BID    Insomnia  Continue home meds melatonin 3 mg QHS, trazodone 25 mg QHS, mirtazapine 7.5 mg QHS PRN    GERD  Protonix QD      DVT PPx:  Heparin SQ, Protonix  Diet:  ADULT DIET;  Dysphagia - Soft and Bite Sized; 3 carb choices (45 gm/meal); Low Sodium (2 gm); Low Potassium (Less than 3000 mg/day); Low Phosphorus (Less than 1000 mg)  ADULT ORAL NUTRITION SUPPLEMENT; Breakfast, Lunch, Dinner; Wound Healing Oral Supplement   Code status:  DNR-CCA     ELOS:  5 days  Barriers to discharge:  sepsis bacteremia, acute OM  Disposition  - Preadmission: From [facility].  - Current:  [facility]  - Upon discharge:  OT 12/24, PT 7/24 on 2/22. Plan to return to SNF.       Will discuss with attending physician Dr. Holly Teague MD.     Kia Ludwig MD  Internal Medicine, PGY-3

## 2023-02-26 NOTE — PROGRESS NOTES
Secure messaged Dr Smith Core her that patients right arm is more swollen than left. Pulses present bilaterally, 1+ right radial, 2+ left radial. Patient denies any discomfort. Awaiting response.

## 2023-02-26 NOTE — DISCHARGE INSTRUCTIONS
PODIATRY DISCHARGE INSTRUCTIONS:  Wound VAC dressing change instructions  -Please perform wound VAC dressing change to the left foot every Monday, Wednesday, Friday  -Be careful when removing the previous VAC dressing as there is a skin graft substitute on the wound  -Place adaptic or nonadherent dressing over the graft and sutures of the wound on the left foot  -Using adhesive sheet from wound Vac kit, cut to size that will be placed over the wound and surrounding soft tissue to \"window out\" the wound  -Next, using a scissors cut the adhesive sheet out that is overlying the wound bed  -Next, using the black foam from the wound VAC kit, cut to size the black foam to fit over the wound bed  -Next, using the adhesive sheet, place the black foam over the wound bed and then place the adhesive sheet over the black foam to hold in place  -Next, using a scissors cut a quarter size hole in the adhesive sheet/black foam for the wound VAC suction connector to be placed over  -Next, place the wound VAC suction connector over the cut out area on the black foam  -Next, hook up the connector to the other portion of the wound VAC canister  -Set wound VAC to VAC therapy and running continuously for 125 mmHg   -Ensure that a good seal is noted.  If needed re-enforce areas with additional adhesive sheet stripes from the wound VAC kit  -Next, dress the area with gauze over the wound VAC area and place gauze along the top of the foot and ankle  -Next, using kerlix wrap from the toes up and just above the ankle  -Next, using Ace bandage, wrap from the toes up and just above the ankle with CARE to ensure wound VAC tubing is NOT in direct contact with the skin

## 2023-02-26 NOTE — PLAN OF CARE
Problem: Skin/Tissue Integrity  Goal: Absence of new skin breakdown  Description: 1. Monitor for areas of redness and/or skin breakdown  2. Assess vascular access sites hourly  3. Every 4-6 hours minimum:  Change oxygen saturation probe site  4. Every 4-6 hours:  If on nasal continuous positive airway pressure, respiratory therapy assess nares and determine need for appliance change or resting period. 2/26/2023 0758 by Susanne Smith RN  Outcome: Progressing     Problem: Respiratory - Adult  Goal: Achieves optimal ventilation and oxygenation  2/26/2023 0758 by Susanne Smith RN  Outcome: Progressing- patient maintained optimal oxygenation during shift. Problem: Discharge Planning  Goal: Discharge to home or other facility with appropriate resources  2/26/2023 0758 by Susanne Smith RN  Outcome: Progressing     Problem: Pain  Goal: Verbalizes/displays adequate comfort level or baseline comfort level  2/26/2023 0758 by Susanne Smith RN  Outcome: Progressing     Problem: Chronic Conditions and Co-morbidities  Goal: Patient's chronic conditions and co-morbidity symptoms are monitored and maintained or improved  2/26/2023 0758 by Susanne Smith RN  Outcome: Progressing     Problem: Safety - Adult  Goal: Free from fall injury  2/26/2023 0758 by Susanne Smith RN  Outcome: Progressing- patient free from falls during shift.

## 2023-02-26 NOTE — PROGRESS NOTES
[facility] NEPHROLOGY    [facility] [email or web address]              [phone]                       Interval History and Plan     Patient seen in PACU. Heading for procedure        HD done today per MWF schedule. Patient's potassium this morning came out to be 5.9. Repeat potassium came out to be 4.5. Discussed with anesthesia be discussed with the OR team.  No indication for any thing else at this time as the potassium recovered without any significant intervention. We will give a dose of Lokelma postprocedure just to keep the potassium more close to 4 as there is a chance there may be some rise that can happen given the patient's ESRD status. Follow the blood pressure trend. Plan for dialysis tomorrow unless there is a change in the patient's status. D/W patient       Assessment :     Pt is a 80 yo male with pmhx of osteomyelitis s/p amputation, ESRD on HD, CAD, PAD, HTN, HLD, and vascular dementia who we were consulted for hemodialysis continuation. ESRD on HD MWF  Access: Right arm fistula. Chronic Anemia   DIMITRIOS with dialysis. Hypertension and Volume status  BP was low and home BP are on hold. Does not look volume overloaded. Left foot osteomyelitis  Group A Strep Bacteremia  Sepsis  Management per primary team.       [facility] Nephrology would like to thank Maeve Islas MD   for opportunity to serve this patient      Please call with questions at-   24 Hrs Answering service [phone] or  7 am- 5 pm via Perfect serve or cell phone      CC/reason for consult :     Hemodialysis continuation     HPI :     Donnie Little is a 79 y.o. male with history of osteomyelitis, ESRD on HD, CAD, PAD, HTN, HLD, and vascular dementia who presented to the hospital on 2/20/2023 with shortness of breath during his hemodialysis session. Per daughter, patient had desatted to the [de-identified] during the session.  On admission, patient has been improving in respiratory status with normal saturations, but remains admitted due to acute concerns of osteomyelitis of the left great toe, sepsis (BC confirmed 2x for Group A strep), and significant arterial disease (recent arterial duplex showing 80% blockage of the left lower extremity). Chest X-ray was negative for acute concerns. Currently on cefepime (started on 2/20). Single dose of vancomycin given yesterday, with plans to start clindamycin today. Nephrology was consulted for continuation of dialysis. Last Echo in 2019 with 55% EF. Repeat Echo ordered during this admission. ROS:     positives in bold   Constitutional:  fever, chills, weakness, weight change, fatigue  Skin:  rash, pruritus, hair loss, bruising, dry skin, petechiae, left great toe wound   Head, Face, Neck   headaches, swelling,  cervical adenopathy  Respiratory: shortness of breath, cough, or wheezing  Cardiovascular: chest pain, palpitations, dizzy, edema  Gastrointestinal: nausea, vomiting, diarrhea, constipation,belly pain    Yellow skin, blood in stool  Musculoskeletal:  back pain, muscle weakness, gait problems,       joint pain or swelling. Genitourinary:  dysuria, poor urine flow, flank pain, blood in urine  Neurologic:  vertigo, TIA'S, syncope, seizures, focal weakness  Psychosocial:  insomnia, anxiety, or depression. Additional positive findings:                          All other remaining systems are negative.       PMH/PSH/SH/Family History:     Past Medical History:   Diagnosis Date    Amputated great toe, right (HCC)     CAD (coronary artery disease)     CVA (cerebral vascular accident) ([address]     3    Dementia ([address]     Depression     Diabetes ([address]     Diabetic polyneuropathy ([address]     ESRD on dialysis ([address]     monday wednesday friday    GERD (gastroesophageal reflux disease)     Hemiplegia and hemiparesis following cerebral infarction affecting right dominant side (HCC)     Hyperlipidemia     Hypertension     Pain syndrome, chronic     Peripheral vascular disease ([address] [facility]    Secondary hyperparathyroidism [facility])        Past Surgical History:   Procedure Laterality Date    CARDIAC CATHETERIZATION      DIALYSIS FISTULA CREATION      TOE AMPUTATION Left 2/24/2023    LEFT FOOT TOTAL FIRST RAY AMPUTATION performed by Benjamin Aguero DPM at [address] History     Socioeconomic History    Marital status:      Spouse name: Not on file    Number of children: Not on file    Years of education: Not on file    Highest education level: Not on file   Occupational History    Not on file   Tobacco Use    Smoking status: Former    Smokeless tobacco: Never   Vaping Use    Vaping Use: Never used   Substance and Sexual Activity    Alcohol use: No     Alcohol/week: 0.0 standard drinks    Drug use: No    Sexual activity: Not Currently   Other Topics Concern    Not on file   Social History Narrative    Not on file     Social Determinants of Health     Financial Resource Strain: Not on file   Food Insecurity: Not on file   Transportation Needs: Not on file   Physical Activity: Not on file   Stress: Not on file   Social Connections: Not on file   Intimate Partner Violence: Not on file   Housing Stability: Not on file           Problem Relation Age of Onset    Heart Disease Mother     Diabetes Mother     Dementia Mother     Diabetes Father     Heart Disease Father     Stroke Brother           Medication:     Scheduled Meds:   sodium chloride flush  5-40 mL IntraVENous 2 times per day    heparin (porcine)  5,000 Units SubCUTAneous 3 times per day    sodium zirconium cyclosilicate  5 g Oral Once    lidocaine  1 patch TransDERmal Daily    epoetin ramesh-epbx  10,000 Units IntraVENous Once per day on Mon Wed Fri    mirtazapine  7.5 mg Oral Nightly    traZODone  25 mg Oral Nightly    melatonin  3 mg Oral Nightly    sodium chloride flush  5-40 mL IntraVENous 2 times per day    aspirin  81 mg Oral Daily    atorvastatin  40 mg Oral Daily    memantine  10 mg Oral BID    [Held by provider] metoprolol succinate  25 mg Oral Daily    pantoprazole  40 mg Oral QAM AC    Vitamin D  1,000 Units Oral Daily    sodium chloride flush  5-40 mL IntraVENous 2 times per day    vancomycin (VANCOCIN) intermittent dosing (placeholder)   Other RX Placeholder     Continuous Infusions:   sodium chloride      sodium chloride      sodium chloride      sodium chloride       PRN Meds:.sodium chloride flush, sodium chloride, ondansetron, HYDROmorphone, HYDROmorphone, prochlorperazine, labetalol **OR** hydrALAZINE, oxyCODONE **OR** oxyCODONE, sodium chloride, sodium chloride, sodium chloride flush, sodium chloride, ondansetron **OR** ondansetron, acetaminophen, nitroGLYCERIN, sodium chloride flush, sodium chloride, polyethylene glycol, perflutren lipid microspheres    Vitals :     Vitals:    02/26/23 1015   BP: (!) 140/33   Pulse: 83   Resp: 12   Temp:    SpO2: 95%       I & O :       Intake/Output Summary (Last 24 hours) at 2/26/2023 1031  Last data filed at 2/26/2023 0932  Gross per 24 hour   Intake 540 ml   Output 0 ml   Net 540 ml          Physical Examination :     General appearance: Anxious- no, distressed- no  HEENT: Lips- normal, teeth- ok , oral mucosa- moist  Neck : Mass- no, appears symmetrical, JVD- not visible  Respiratory: Respiratory effort- normal effort, wheeze- no, crackles - no  Cardiovascular: Ausculation- No M/R/G, Edema none.   Abdomen: visible mass- no, distention- no, scar- no, tenderness- no                            hepatosplenomegaly-  no  Musculoskeletal:  clubbing no,cyanosis- no , digital ischemia- no                           muscle strength- grossly normal , tone - grossly normal.  Skin: rashes- no , ulcers- yes, on left great toe but not visualized due to dressing, induration- no, tightening - no  Psychiatric:  Judgement and insight- normal  CNS:        AAO X 3     LABS:     Recent Labs     02/24/23  0341 02/25/23  1058 02/26/23  0732   WBC 10.8 12.8* 13.1*   HGB 8.1* 8.3* 8.3*   HCT 24.9* 26.7* 26.8*    372 373       Recent Labs     02/24/23  0341 02/25/23  1058 02/26/23  0539 02/26/23  0732    136 134*  --    K 4.6 4.3 5.9* 4.5   CL 99 99 97*  --    CO2 28 28 23  --    BUN 23* 19 30*  --    CREATININE 4.4* 4.0* 4.8*  --    GLUCOSE 175* 124* 121*  --    MG 1.80 1.80 2.30  --    PHOS 2.7  --  2.9  --             Patient discuss with Dr. Tania Campbell  Internal Medicine, MS4      Medical Student Note edited and addended where appropriate. Courtney Albright MD  PGY-3 Internal Medicine  02/26/23     Patient was seen and examined and the case was discussed with the resident. He acted as my scribe. I agree with the assessment and plan.

## 2023-02-26 NOTE — DISCHARGE INSTR - COC
Continuity of Care Form    Patient Name: Fe Jerez   :  1955  MRN:  [id number]    Admit date:  2023  Discharge date:  2023    Code Status Order: DNR-CCA   Advance Directives:     Admitting Physician:  Abdiel Rowland MD  PCP: No primary care provider on file. Discharging Nurse: Nurse eMra Siu., RN  [address] Unit/Room#: 8564/5662-50  Discharging Unit Phone Number: [phone]    Emergency Contact:   Extended Emergency Contact Information  Primary Emergency Contact: Monica Hussein  Address: [address]           [address] [facility], [address] Phone: [phone]  Mobile Phone: [phone]  Relation: Spouse   needed? No  Secondary Emergency Contact: KeenanJenniferrosbill  Address: / [address], [facility] Phone: [phone]  Relation: Child   needed?  No    Past Surgical History:  Past Surgical History:   Procedure Laterality Date    CARDIAC CATHETERIZATION      DIALYSIS FISTULA CREATION      TOE AMPUTATION Left 2023    LEFT FOOT TOTAL FIRST RAY AMPUTATION performed by Mateusz Leggett DPM at [address]       Immunization History:   Immunization History   Administered Date(s) Administered    COVID-19, PFIZER PURPLE top, DILUTE for use, (age 15 y+), 30mcg/0.3mL 10/26/2021    Influenza Virus Vaccine 2017    Influenza, FLUARIX, FLULAVAL, FLUZONE (age 10 mo+) AND AFLURIA, (age 1 y+), PF, 0.5mL 2018, 2020    Influenza, Intradermal, Preservative free 10/25/2016    Pneumococcal Conjugate 13-valent ([id number]) 2017    Pneumococcal Polysaccharide ([id number]) 2018    Tdap (Boostrix, Adacel) 2016    Zoster Live (Zostavax) 2016       Active Problems:  Patient Active Problem List   Diagnosis Code    Abscess L02.91    Remote history of stroke Z86.73    Chest pain R07.9    ESRD (end stage renal disease) on dialysis ([facility] [address] N18.6, Z99.2    CAD (coronary artery disease) I25.10    NSTEMI (non-ST elevated myocardial infarction) ([facility]) I21.4    PAD (peripheral artery disease) ([facility]) I73.9    S/P cardiac catheterization Z98.890    Hypotension I95.9    Symptomatic anemia K42.4    Complication of vascular access for dialysis T82. 9XXA    Limited mobility Z74.09    Vascular dementia without behavioral disturbance ([facility]) F01.50    Thrombocytopenia ([facility]) D69.6    Delirium R41.0    Confusion R41.0    Vascular dementia of acute onset, without behavioral disturbance ([facility]) F01.50    HTN (hypertension), benign I10    Dyslipidemia E78.5    Bilateral carotid artery stenosis I65.23    Osteomyelitis ([facility]) M86.9    Streptococcal bacteremia R78.81, B95.5       Isolation/Infection:   Isolation            Contact          Patient Infection Status       Infection Onset Added Last Indicated Last Indicated By Review Planned Expiration Resolved Resolved By    MRSA 02/20/23 02/22/23 02/24/23 Culture, Anaerobic and Aerobic                Nurse Assessment:  Last Vital Signs: BP (!) 134/33   Pulse 83   Temp 97.1 °F (36.2 °C) (Oral)   Resp 18   Ht 5' 7\" (1.702 m)   Wt 151 lb 0.2 oz (68.5 kg)   SpO2 98%   BMI 23.65 kg/m²     Last documented pain score (0-10 scale): Pain Level:  (pt resting with eyes closed - RR >10)  Last Weight:   Wt Readings from Last 1 Encounters:   02/25/23 151 lb 0.2 oz (68.5 kg)     Mental Status: mostly oriented and mild confusion at times    IV Access:  - None    Nursing Mobility/ADLs:  Walking   Dependent  Transfer  Dependent  Bathing  Dependent  Dressing  Dependent  Toileting  Dependent  Feeding  Dependent  Med Admin  Dependent  Med Delivery   whole    Wound Care Documentation and Therapy:  Negative Pressure Wound Therapy Foot Anterior; Left (Active)   Wound Type Surgical 02/26/23 1030   Unit Type VAC ULTA 02/26/23 1030   Dressing Type Other (Comment) 02/26/23 1030   Cycle Continuous 02/26/23 1030   Target Pressure (mmHg) 125 02/26/23 1030   Dressing Status Dry;Intact 02/26/23 1030   Number of days: 0       Incision 12/05/16 Arm Right (Active)   Number of days: 2274       Incision 02/24/23 Toe (Comment  which one) Anterior; Left (Active)   Dressing Status Clean;Dry; Intact 02/26/23 0742   Incision Cleansed Cleansed with saline 02/24/23 1441   Dressing/Treatment Ace wrap 02/24/23 1455   Margins Approximated 02/24/23 1441   Incision Assessment Other (Comment) 02/24/23 1600   Drainage Amount None 02/24/23 1600   Odor None 02/26/23 0742   Number of days: 1       Incision 02/26/23 Foot Anterior; Left (Active)   Dressing Status Dry; Intact 02/26/23 1030   Dressing/Treatment Ace wrap;Roll gauze;Negative pressure wound therapy 02/26/23 1030   Closure Other (Comment) 02/26/23 1030   Number of days: 0        Elimination:  Continence: Bowel: No  Bladder: No  Urinary Catheter: None   Colostomy/Ileostomy/Ileal Conduit: No       Date of Last BM: 2/27/2023  KUB complete  Impression   1. There is moderate gas and stool in the colon which May indicate constipation. No sign of any obstruction. Intake/Output Summary (Last 24 hours) at 2/26/2023 1247  Last data filed at 2/26/2023 0932  Gross per 24 hour   Intake 540 ml   Output 0 ml   Net 540 ml     I/O last 3 completed shifts: In: 240 [P.O.:240]  Out: 0     Safety Concerns: At Risk for Falls    Impairments/Disabilities:      None    Nutrition Therapy:  Current Nutrition Therapy:   - Oral Diet:  Renal    Routes of Feeding: Oral  Liquids: Thin Liquids  Daily Fluid Restriction: no  Last Modified Barium Swallow with Video (Video Swallowing Test): not done    Treatments at the Time of Hospital Discharge:   Respiratory Treatments: ***  Oxygen Therapy:  is not on home oxygen therapy.   Ventilator:    - No ventilator support    Rehab Therapies: Physical Therapy and Occupational Therapy  Weight Bearing Status/Restrictions: Non-weight bearing on left leg  Other Medical Equipment (for information only, NOT a DME order):  wheelchair and walker  Other Treatments: ***    Patient's personal belongings (please select all that are sent with patient):  {[facility] DME Belongings:[id number]}    RN SIGNATURE:  Electronically signed by Nurse Ian Townsend RN on 3/2/2023      CASE MANAGEMENT/SOCIAL WORK SECTION    Inpatient Status Date: ***    Readmission Risk Assessment Score:  Readmission Risk              Risk of Unplanned Readmission:  33           Discharging to Facility/ Agency   Name: [facility]  Address:  Phone: [phone]    HD: At [facility] MW- will have IV ABX at HD    / signature: Electronically signed by DUSTIN Henry on 2/28/23 at 9:27 AM EST    PHYSICIAN SECTION    Prognosis: Good    Condition at Discharge: Stable    Rehab Potential (if transferring to Rehab): Good    Recommended Labs or Other Treatments After Discharge:   Please make the following appointments:  Primary care provider in 1 week  Podiatry, Dr. Caty Flannery, at [phone], in 1 week to recheck the left toe wound  Vascular surgery, Dr. Con Lennon, at [phone], in 2 weeks for a post-op FU    PT OT      INFUSION ORDERS:  - Drug: iv Vancomycin 750mg post HD on HD days only  - Planned End date: 4/7/2023  - Diagnosis: MRSA and GAS 1st left ray OM, GAS bacteremia.   - Has received test dose in hospital  - Check CBC w diff, BMP, ESR, CRP, Vanco trough every Mon or Tue - FAX result to [phone]  - Call with antibiotic / infusion issues, [phone]  - Call with any change in status, transfer in or out of a facility or to hospital - [phone]  - No f/u in outpatient ID office necessary, followup with podiatry    PODIATRY DISCHARGE INSTRUCTIONS:  Wound VAC dressing change instructions  -Please perform wound VAC dressing change to the left foot every Monday, Wednesday, Friday  -Be careful when removing the previous VAC dressing as there is a skin graft substitute on the wound  -Place adaptic or nonadherent dressing over the graft and sutures of the wound on the left foot  -Using adhesive sheet from wound Vac kit, cut to size that will be placed over the wound and surrounding soft tissue to \"window out\" the wound  -Next, using a scissors cut the adhesive sheet out that is overlying the wound bed  -Next, using the black foam from the wound VAC kit, cut to size the black foam to fit over the wound bed  -Next, using the adhesive sheet, place the black foam over the wound bed and then place the adhesive sheet over the black foam to hold in place  -Next, using a scissors cut a quarter size hole in the adhesive sheet/black foam for the wound VAC suction connector to be placed over  -Next, place the wound VAC suction connector over the cut out area on the black foam  -Next, hook up the connector to the other portion of the wound VAC canister  -Set wound VAC to VAC therapy and running continuously for 125 mmHg   -Ensure that a good seal is noted. If needed re-enforce areas with additional adhesive sheet stripes from the wound VAC kit  -Next, dress the area with gauze over the wound VAC area and place gauze along the top of the foot and ankle  -Next, using kerlix wrap from the toes up and just above the ankle  -Next, using Ace bandage, wrap from the toes up and just above the ankle with CARE to ensure wound VAC tubing is NOT in direct contact with the skin     Physician Certification: I certify the above information and transfer of Jose Armando Mcallister  is necessary for the continuing treatment of the diagnosis listed and that he requires [facility] for greater or less than 30 days.      Update Admission H&P: No change in H&P    PHYSICIAN SIGNATURE:  Electronically signed by Rohit Whelan MD on 2/27/23 at 2:08 PM EST

## 2023-02-26 NOTE — PLAN OF CARE
Problem: Skin/Tissue Integrity  Goal: Absence of new skin breakdown  Description: 1. Monitor for areas of redness and/or skin breakdown  2. Assess vascular access sites hourly  3. Every 4-6 hours minimum:  Change oxygen saturation probe site  4. Every 4-6 hours:  If on nasal continuous positive airway pressure, respiratory therapy assess nares and determine need for appliance change or resting period.   2/26/2023 1430 by Jia Hall RN  Outcome: Progressing  2/26/2023 0758 by Kaylee Pérez RN  Outcome: Progressing     Problem: Respiratory - Adult  Goal: Achieves optimal ventilation and oxygenation  2/26/2023 1430 by Jia Hall RN  Outcome: Progressing  2/26/2023 0758 by Kaylee Pérez RN  Outcome: Progressing     Problem: Discharge Planning  Goal: Discharge to home or other facility with appropriate resources  2/26/2023 1430 by Jia Hall RN  Outcome: Progressing  2/26/2023 0758 by Kaylee Pérez RN  Outcome: Progressing  Flowsheets (Taken 2/26/2023 1867 by Jia Hall RN)  Discharge to home or other facility with appropriate resources: Identify barriers to discharge with patient and caregiver     Problem: Pain  Goal: Verbalizes/displays adequate comfort level or baseline comfort level  2/26/2023 1430 by Jia Hall RN  Outcome: Progressing  2/26/2023 0758 by Kaylee Pérez RN  Outcome: Progressing     Problem: Chronic Conditions and Co-morbidities  Goal: Patient's chronic conditions and co-morbidity symptoms are monitored and maintained or improved  2/26/2023 1430 by Jia Hall RN  Outcome: Progressing  2/26/2023 0758 by Kaylee Pérez RN  Outcome: Progressing  Flowsheets (Taken 2/26/2023 0749 by Jia Hall RN)  Care Plan - Patient's Chronic Conditions and Co-Morbidity Symptoms are Monitored and Maintained or Improved: Monitor and assess patient's chronic conditions and comorbid symptoms for stability, deterioration, or improvement Problem: Safety - Adult  Goal: Free from fall injury  2/26/2023 1430 by Cornel Patel RN  Outcome: Progressing  2/26/2023 0758 by Beni Singh RN  Outcome: Progressing     Problem: Safety - Medical Restraint  Goal: Remains free of injury from restraints (Restraint for Interference with Medical Device)  Description: INTERVENTIONS:  1. Determine that other, less restrictive measures have been tried or would not be effective before applying the restraint  2. Evaluate the patient's condition at the time of restraint application  3. Inform patient/family regarding the reason for restraint  4.  Q2H: Monitor safety, psychosocial status, comfort, nutrition and hydration  Outcome: Progressing

## 2023-02-26 NOTE — PROGRESS NOTES
PACU Transfer to Floor Note    Procedure(s):  INCISION AND DRAINAGE WITH DELAYED  PRIMARY CLOSURE WITH  WOUND VAC APPLICATION,  APPLICATION SKIN GRAFT SUBSTITUTE LEFT  LOWER EXTREMITY    Current Allergies: Pcn [penicillins] and Lisinopril    Pt meets criteria as per Cassidy Score and ASPAN Standards to transfer to next phase of care. Recent Labs     02/24/23  1540 02/26/23  0936   POCGLU 124* 126*       Vitals:    02/26/23 1045   BP: (!) 180/41   Pulse: 81   Resp: 17   Temp: 97.4 °F (36.3 °C)   SpO2: 98%     Vitals within 20% of pt's admission vitals as per CASSIDY SCORE    SpO2: 98 %    O2 Flow Rate (L/min): 3 L/min      Intake/Output Summary (Last 24 hours) at 2/26/2023 1109  Last data filed at 2/26/2023 0932  Gross per 24 hour   Intake 540 ml   Output 0 ml   Net 540 ml       Pain assessment:  none    Pain Level:  (pt resting with eyes closed - RR >10)    Patient was assessed for alterations to skin integrity. There were not alterations observed.           2/26/2023 11:09 AM

## 2023-02-26 NOTE — BRIEF OP NOTE
Brief Postoperative Note      Patient: Jennifer Tian  YOB: 1955  MRN: [id number]    Date of Procedure: 2/26/2023    Pre-Op Diagnosis: Gangrene; left foot    Post-Op Diagnosis: Same       Procedure(s):  INCISION AND DRAINAGE, DELAYED  PRIMARY CLOSURE,  APPLICATION SKIN GRAFT SUBSTITUTE, WOUND VAC APPLICATION;  LEFT LOWER EXTREMITY    Surgeon(s):  Joy Russell DPM    Assistant:  Resident: Adam Mari DPM    Anesthesia: General    Hemostasis: Anatomic dissection    Injectables: 20 cc of 1% lidocaine plain preoperatively  20 cc of 0.5% marcaine plain postoperatively    Materials: 3-0 nylon     Implants:   Ivanna Prolayer 4x7 cm Acellular dermal matrix graft     Estimated Blood Loss (mL): Minimal    Complications: None    Specimens:   * No specimens in log *    Implants:  Implant Name Type Inv. Item Serial No.  Lot No. LRB No. Used Action   MESH SURG [id number] THK1. 1MM ACELLULAR DERM MATRIXXENOGRAFT - -[id number] Graft:Patch MESH SURG [id number] THK1. 1MM ACELLULAR DERM MATRIXXENOGRAFT [id number] [employer] [facility]- [id number] Left 1 Implanted         Drains:   Negative Pressure Wound Therapy Foot Anterior; Left (Active)       Findings: minimal bleeding noted. No purulent drainage encountered. 2nd metatarsal periosteum directly visualized, however firmly adhered. DISPO: s/p I&D, delayed primary closure, application of skin graft substitute, wound vac application; left lower extremity. Continue antibiotics per ID recs, patient will likely need extended course of IV antibiotics as procedure was not definitive. Anticoagulation per vascular surgery. No further surgical intervention from podiatric standpoint. Patient is OK to discharge pending medical clearance, final ID recs.      Electronically signed by Adam Mari DPM on 2/26/2023 at 9:28 AM

## 2023-02-26 NOTE — ANESTHESIA PRE PROCEDURE
Department of Anesthesiology  Preprocedure Note       Name:  Mar Vizcaino   Age:  79 y.o.  :  1955                                          MRN:  [id number]         Date:  2023      Surgeon: Nir Ward):  Criss Llamas DPM    Procedure: Procedure(s):  INCISION AND DRAINAGE WITH DELAYDE PRIMARY CLOSURE WITH POSSIBLE WOUND VAC APPLICATION, POSSIBLE APPLICATION SKIN GRAFT SUBSTITUTE,VS LISFRANC AMPUTATION LEFT LOWER EXTREMITY    Medications prior to admission:   Prior to Admission medications    Medication Sig Start Date End Date Taking?  Authorizing Provider   enalapril (VASOTEC) 10 MG tablet Take 10 mg by mouth daily    Historical Provider, MD   tamsulosin (FLOMAX) 0.4 MG capsule Take 0.4 mg by mouth daily    Historical Provider, MD   SANTYL 250 UNIT/GM ointment Apply topically As directed 23   Historical Provider, MD   mineral oil-hydrophilic petrolatum (AQUAPHOR) ointment Apply topically As directed 23   Historical Provider, MD   hydrALAZINE (APRESOLINE) 25 MG tablet Take 1 tablet by mouth in the morning and at bedtime 23   Historical Provider, MD   sodium phosphate (FLEET) 7-19 GM/118ML enema Place 1 enema rectally daily as needed 22   Historical Provider, MD   traZODone (DESYREL) 50 MG tablet Take 25 mg by mouth at bedtime 23   Historical Provider, MD   metoprolol succinate (TOPROL XL) 25 MG extended release tablet Take 1 tablet by mouth daily 23   Historical Provider, MD   isosorbide mononitrate (IMDUR) 30 MG extended release tablet Take 30 mg by mouth daily    Historical Provider, MD   melatonin 3 MG TABS tablet Take 1 tablet by mouth nightly 22   Historical Provider, MD   omeprazole (PRILOSEC) 10 MG delayed release capsule Take 10 mg by mouth daily    Historical Provider, MD   vitamin D 25 MCG (1000 UT) CAPS Take 1 capsule by mouth daily    Historical Provider, MD   midodrine (PROAMATINE) 5 MG tablet Take 5 mg by mouth in the morning and at bedtime    Historical Provider, MD   mirtazapine (REMERON) 15 MG tablet Take 7.5 mg by mouth nightly    Historical Provider, MD   ondansetron (ZOFRAN) 4 MG tablet Take 4 mg by mouth every 8 hours as needed for Nausea or Vomiting    Historical Provider, MD   atorvastatin (LIPITOR) 40 MG tablet TAKE 1 TABLET BY MOUTH EVERYDAY AT BEDTIME 2/24/20   Reymundo Choe DO   memantine (NAMENDA) 10 MG tablet TAKE 1 TABLET BY MOUTH TWICE A DAY 6/6/19   Reymundo Choe DO   nitroGLYCERIN (NITROSTAT) 0.4 MG SL tablet Place 1 tablet under the tongue every 5 minutes as needed for Chest pain 9/14/18   Reymundo Choe DO   aspirin 81 MG chewable tablet Take 81 mg by mouth daily    Historical Provider, MD       Current medications:    No current facility-administered medications for this visit. No current outpatient medications on file.      Facility-Administered Medications Ordered in Other Visits   Medication Dose Route Frequency Provider Last Rate Last Admin    lidocaine 4 % external patch 1 patch  1 patch TransDERmal Daily Codi Hanson MD   1 patch at 02/25/23 1103    oxyCODONE (ROXICODONE) immediate release tablet 5 mg  5 mg Oral Q4H PRN Arlyce Jeremiah, DPM        Or    oxyCODONE (ROXICODONE) immediate release tablet 10 mg  10 mg Oral Q4H PRN Arlyce Jeremiah, DPM        0.9 % sodium chloride infusion   IntraVENous PRN Arlyce Jeremiah, DPM        epoetin ramesh-epbx (RETACRIT) injection 10,000 Units  10,000 Units IntraVENous Once per day on Mon Wed Fri Arlyce Jeremiah, DPM   10,000 Units at 02/24/23 1035    0.9 % sodium chloride bolus  100 mL IntraVENous PRN Arlyce Jeremiah, DPM        mirtazapine (REMERON) tablet 7.5 mg  7.5 mg Oral Nightly Jayme Kent, DPM   7.5 mg at 02/25/23 2257    traZODone (DESYREL) tablet 25 mg  25 mg Oral Nightly Jayme Kent, DPM   25 mg at 02/25/23 2257    melatonin tablet 3 mg  3 mg Oral Nightly Arlyce Jeremiah, DPM   3 mg at 02/24/23 2054    sodium chloride flush 0.9 % injection 5-40 mL  5-40 mL IntraVENous 2 times per day Jayme [facility], DPM   10 mL at 02/25/23 2306    sodium chloride flush 0.9 % injection 5-40 mL  5-40 mL IntraVENous PRN Phil Dies, DPM        0.9 % sodium chloride infusion   IntraVENous PRN Phil Dies, DPM        ondansetron (ZOFRAN-ODT) disintegrating tablet 4 mg  4 mg Oral Q8H PRN Phil Dies, DPM        Or    ondansetron (ZOFRAN) injection 4 mg  4 mg IntraVENous Q6H PRN Phil Dies, DPM        aspirin EC tablet 81 mg  81 mg Oral Daily Phil Dies, DPM   81 mg at 02/25/23 0929    acetaminophen (TYLENOL) tablet 1,000 mg  1,000 mg Oral Q8H PRN Phil Dies, DPM   1,000 mg at 02/24/23 2054    atorvastatin (LIPITOR) tablet 40 mg  40 mg Oral Daily Phil Dies, DPM   40 mg at 02/25/23 0929    memantine (NAMENDA) tablet 10 mg  10 mg Oral BID Phil Dies, DPM   10 mg at 02/25/23 2257    [Held by provider] metoprolol succinate (TOPROL XL) extended release tablet 25 mg  25 mg Oral Daily Phil Dies, DPM   25 mg at 02/20/23 1740    nitroGLYCERIN (NITROSTAT) SL tablet 0.4 mg  0.4 mg SubLINGual Q5 Min PRN Phil Dies, DPM        pantoprazole (PROTONIX) tablet 40 mg  40 mg Oral QAM AC Jayme Kent, DPM   40 mg at 02/26/23 0704    Vitamin D (CHOLECALCIFEROL) tablet 1,000 Units  1,000 Units Oral Daily Phil Dies, DPM   1,000 Units at 02/25/23 0930    sodium chloride flush 0.9 % injection 5-40 mL  5-40 mL IntraVENous 2 times per day Phil Dies, DPM   10 mL at 02/25/23 2351    sodium chloride flush 0.9 % injection 5-40 mL  5-40 mL IntraVENous PRN Phil Dies, DPM        0.9 % sodium chloride infusion   IntraVENous PRN Phil Dies, DPM        polyethylene glycol (GLYCOLAX) packet 17 g  17 g Oral Daily PRN Phil Dies, DPM        perflutren lipid microspheres (DEFINITY) injection 1.5 mL  1.5 mL IntraVENous ONCE PRN Phil Dies, DPM        vancomycin (VANCOCIN) intermittent dosing (placeholder)   Other RX Placeholder Phil Saenz, ALANNA           Allergies:     Allergies   Allergen Reactions    Pcn [Penicillins] Hives and Other (See Comments)     Unknown reaction; patient has tolerated cephalexin in 09/2016 per chart review as of 9/12/22.  Lisinopril Other (See Comments)     Hiccups-pt family does not want pt receiving medication       Problem List:    Patient Active Problem List   Diagnosis Code    Abscess L02.91    Remote history of stroke Z86.73    Chest pain R07.9    ESRD (end stage renal disease) on dialysis ([address] N18.6, Z99.2    CAD (coronary artery disease) I25.10    NSTEMI (non-ST elevated myocardial infarction) ([address] I21.4    PAD (peripheral artery disease) ([address] I73.9    S/P cardiac catheterization Z98.890    Hypotension I95.9    Symptomatic anemia D32.1    Complication of vascular access for dialysis T82. 9XXA    Limited mobility Z74.09    Vascular dementia without behavioral disturbance (HCC) F01.50    Thrombocytopenia (HCC) D69.6    Delirium R41.0    Confusion R41.0    Vascular dementia of acute onset, without behavioral disturbance (HCC) F01.50    HTN (hypertension), benign I10    Dyslipidemia E78.5    Bilateral carotid artery stenosis I65.23    Osteomyelitis (HCC) M86.9    Streptococcal bacteremia R78.81, B95.5       Past Medical History:        Diagnosis Date    Amputated great toe, right ([address]     CAD (coronary artery disease)     CVA (cerebral vascular accident) ([address]     3    Dementia ([address]     Depression     Diabetes ([address]     Diabetic polyneuropathy ([address]     ESRD on dialysis ([address]     monday wednesday friday    GERD (gastroesophageal reflux disease)     Hemiplegia and hemiparesis following cerebral infarction affecting right dominant side (HCC)     Hyperlipidemia     Hypertension     Pain syndrome, chronic     Peripheral vascular disease ([address]     [facility]    Secondary hyperparathyroidism [facility])        Past Surgical History:        Procedure Laterality Date    CARDIAC CATHETERIZATION      DIALYSIS FISTULA CREATION      TOE AMPUTATION Left 2/24/2023    LEFT FOOT TOTAL FIRST RAY AMPUTATION performed by Karina Hicks DPM at [address] History:    Social History     Tobacco Use    Smoking status: Former    Smokeless tobacco: Never   Substance Use Topics    Alcohol use: No     Alcohol/week: 0.0 standard drinks                                Counseling given: Not Answered      Vital Signs (Current): There were no vitals filed for this visit.                                            BP Readings from Last 3 Encounters:   02/26/23 (!) 102/59   12/09/22 135/63   09/12/22 (!) 148/77       NPO Status:                                                                                 BMI:   Wt Readings from Last 3 Encounters:   02/25/23 151 lb 0.2 oz (68.5 kg)   02/20/23 153 lb 7 oz (69.6 kg)   12/09/22 156 lb 8 oz (71 kg)     There is no height or weight on file to calculate BMI.    CBC:   Lab Results   Component Value Date/Time    WBC 12.8 02/25/2023 10:58 AM    RBC 3.14 02/25/2023 10:58 AM    HGB 8.3 02/25/2023 10:58 AM    HCT 26.7 02/25/2023 10:58 AM    MCV 85.0 02/25/2023 10:58 AM    RDW 18.8 02/25/2023 10:58 AM     02/25/2023 10:58 AM       CMP:   Lab Results   Component Value Date/Time     02/26/2023 05:39 AM    K 5.9 02/26/2023 05:39 AM    K 3.7 02/20/2023 11:21 AM    CL 97 02/26/2023 05:39 AM    CO2 23 02/26/2023 05:39 AM    BUN 30 02/26/2023 05:39 AM    CREATININE 4.8 02/26/2023 05:39 AM    GFRAA 13 08/02/2021 10:15 PM    AGRATIO 1.2 08/02/2021 10:15 PM    LABGLOM 13 02/26/2023 05:39 AM    GLUCOSE 121 02/26/2023 05:39 AM    PROT 8.0 08/02/2021 10:15 PM    CALCIUM 8.6 02/26/2023 05:39 AM    BILITOT 0.5 08/02/2021 10:15 PM    ALKPHOS 176 08/02/2021 10:15 PM    AST 47 08/02/2021 10:15 PM    ALT 21 08/02/2021 10:15 PM       POC Tests:   Recent Labs     02/24/23  1540   POCGLU 124*       Coags:   Lab Results   Component Value Date/Time    PROTIME 15.5 02/25/2023 10:58 AM    INR 1.23 02/25/2023 10:58 AM    APTT 47.2 07/25/2016 03:24 AM       HCG (If Applicable): No results found for: PREGTESTUR, PREGSERUM, HCG, HCGQUANT     ABGs: No results found for: PHART, PO2ART, BVQ9QIY, NTJ9ELG, BEART, S5JLLPOJ     Type & Screen (If Applicable):  No results found for: LABABO, LABRH    Drug/Infectious Status (If Applicable):  No results found for: HIV, HEPCAB    COVID-19 Screening (If Applicable): No results found for: COVID19        Anesthesia Evaluation  Patient summary reviewed and Nursing notes reviewed no history of anesthetic complications:   Airway: Mallampati: II  TM distance: >3 FB   Neck ROM: full  Mouth opening: > = 3 FB   Dental:          Pulmonary:                              Cardiovascular:    (+) hypertension:, past MI:, CAD:,         Rhythm: regular  Rate: normal                    Neuro/Psych:   (+) CVA:, neuromuscular disease:, psychiatric history:            GI/Hepatic/Renal:   (+) GERD:, renal disease: ESRD,           Endo/Other:    (+) DiabetesType II DM, , .                 Abdominal:             Vascular: negative vascular ROS. Other Findings:           Anesthesia Plan      general     ASA 4       Induction: intravenous. MIPS: Postoperative opioids intended and Prophylactic antiemetics administered. Anesthetic plan and risks discussed with legal guardian.         Attending anesthesiologist reviewed and agrees with Preprocedure content                Maggy Jordan MD   2/26/2023

## 2023-02-27 LAB
ALBUMIN SERPL-MCNC: 2.1 G/DL (ref 3.4–5)
ANION GAP SERPL CALCULATED.3IONS-SCNC: 9 MMOL/L (ref 3–16)
BANDED NEUTROPHILS RELATIVE PERCENT: 1 % (ref 0–7)
BASOPHILS ABSOLUTE: 0 K/UL (ref 0–0.2)
BASOPHILS RELATIVE PERCENT: 0 %
BUN BLDV-MCNC: 36 MG/DL (ref 7–20)
CALCIUM SERPL-MCNC: 8.5 MG/DL (ref 8.3–10.6)
CHLORIDE BLD-SCNC: 98 MMOL/L (ref 99–110)
CO2: 28 MMOL/L (ref 21–32)
CREAT SERPL-MCNC: 6 MG/DL (ref 0.8–1.3)
EOSINOPHILS ABSOLUTE: 0.1 K/UL (ref 0–0.6)
EOSINOPHILS RELATIVE PERCENT: 1 %
GFR SERPL CREATININE-BSD FRML MDRD: 10 ML/MIN/{1.73_M2}
GLUCOSE BLD-MCNC: 126 MG/DL (ref 70–99)
HCT VFR BLD CALC: 24.4 % (ref 40.5–52.5)
HEMOGLOBIN: 7.6 G/DL (ref 13.5–17.5)
LYMPHOCYTES ABSOLUTE: 0.9 K/UL (ref 1–5.1)
LYMPHOCYTES RELATIVE PERCENT: 8 %
MAGNESIUM: 2.3 MG/DL (ref 1.8–2.4)
MCH RBC QN AUTO: 26.7 PG (ref 26–34)
MCHC RBC AUTO-ENTMCNC: 31 G/DL (ref 31–36)
MCV RBC AUTO: 86.1 FL (ref 80–100)
MONOCYTES ABSOLUTE: 1.1 K/UL (ref 0–1.3)
MONOCYTES RELATIVE PERCENT: 10 %
NEUTROPHILS ABSOLUTE: 9.1 K/UL (ref 1.7–7.7)
NEUTROPHILS RELATIVE PERCENT: 80 %
NUCLEATED RED BLOOD CELLS: 3 /100 WBC
PDW BLD-RTO: 18.6 % (ref 12.4–15.4)
PHOSPHORUS: 3 MG/DL (ref 2.5–4.9)
PLATELET # BLD: 352 K/UL (ref 135–450)
PMV BLD AUTO: 7.4 FL (ref 5–10.5)
POLYCHROMASIA: ABNORMAL
POTASSIUM SERPL-SCNC: 4.5 MMOL/L (ref 3.5–5.1)
RBC # BLD: 2.83 M/UL (ref 4.2–5.9)
SODIUM BLD-SCNC: 135 MMOL/L (ref 136–145)
VANCOMYCIN RANDOM: 18.1 UG/ML
WBC # BLD: 11.2 K/UL (ref 4–11)

## 2023-02-27 PROCEDURE — 6370000000 HC RX 637 (ALT 250 FOR IP): Performed by: PODIATRIST

## 2023-02-27 PROCEDURE — 1200000000 HC SEMI PRIVATE

## 2023-02-27 PROCEDURE — 97110 THERAPEUTIC EXERCISES: CPT

## 2023-02-27 PROCEDURE — 94150 VITAL CAPACITY TEST: CPT

## 2023-02-27 PROCEDURE — 6360000002 HC RX W HCPCS

## 2023-02-27 PROCEDURE — 94640 AIRWAY INHALATION TREATMENT: CPT

## 2023-02-27 PROCEDURE — 85025 COMPLETE CBC W/AUTO DIFF WBC: CPT

## 2023-02-27 PROCEDURE — 6360000002 HC RX W HCPCS: Performed by: PODIATRIST

## 2023-02-27 PROCEDURE — 80069 RENAL FUNCTION PANEL: CPT

## 2023-02-27 PROCEDURE — 97530 THERAPEUTIC ACTIVITIES: CPT

## 2023-02-27 PROCEDURE — 2580000003 HC RX 258: Performed by: PODIATRIST

## 2023-02-27 PROCEDURE — 90935 HEMODIALYSIS ONE EVALUATION: CPT

## 2023-02-27 PROCEDURE — 36415 COLL VENOUS BLD VENIPUNCTURE: CPT

## 2023-02-27 PROCEDURE — 80202 ASSAY OF VANCOMYCIN: CPT

## 2023-02-27 PROCEDURE — 2580000003 HC RX 258

## 2023-02-27 PROCEDURE — 2700000000 HC OXYGEN THERAPY PER DAY

## 2023-02-27 PROCEDURE — 99232 SBSQ HOSP IP/OBS MODERATE 35: CPT | Performed by: INTERNAL MEDICINE

## 2023-02-27 PROCEDURE — 83735 ASSAY OF MAGNESIUM: CPT

## 2023-02-27 PROCEDURE — 94761 N-INVAS EAR/PLS OXIMETRY MLT: CPT

## 2023-02-27 RX ORDER — 0.9 % SODIUM CHLORIDE 0.9 %
500 INTRAVENOUS SOLUTION INTRAVENOUS ONCE
Status: DISCONTINUED | OUTPATIENT
Start: 2023-02-27 | End: 2023-02-27

## 2023-02-27 RX ORDER — ATORVASTATIN CALCIUM 80 MG/1
80 TABLET, FILM COATED ORAL DAILY
Status: DISCONTINUED | OUTPATIENT
Start: 2023-02-28 | End: 2023-03-03 | Stop reason: HOSPADM

## 2023-02-27 RX ORDER — ALBUTEROL SULFATE 2.5 MG/3ML
2.5 SOLUTION RESPIRATORY (INHALATION) ONCE
Status: COMPLETED | OUTPATIENT
Start: 2023-02-27 | End: 2023-02-27

## 2023-02-27 RX ORDER — ALBUTEROL SULFATE 2.5 MG/3ML
SOLUTION RESPIRATORY (INHALATION)
Status: COMPLETED
Start: 2023-02-27 | End: 2023-02-27

## 2023-02-27 RX ADMIN — OXYCODONE HYDROCHLORIDE 5 MG: 5 TABLET ORAL at 21:06

## 2023-02-27 RX ADMIN — PANTOPRAZOLE SODIUM 40 MG: 40 TABLET, DELAYED RELEASE ORAL at 06:16

## 2023-02-27 RX ADMIN — OXYCODONE HYDROCHLORIDE 5 MG: 5 TABLET ORAL at 06:24

## 2023-02-27 RX ADMIN — SODIUM CHLORIDE, PRESERVATIVE FREE 10 ML: 5 INJECTION INTRAVENOUS at 21:04

## 2023-02-27 RX ADMIN — Medication 3 MG: at 21:02

## 2023-02-27 RX ADMIN — ALBUTEROL SULFATE 2.5 MG: 2.5 SOLUTION RESPIRATORY (INHALATION) at 10:38

## 2023-02-27 RX ADMIN — VANCOMYCIN HYDROCHLORIDE 750 MG: 10 INJECTION, POWDER, LYOPHILIZED, FOR SOLUTION INTRAVENOUS at 10:02

## 2023-02-27 RX ADMIN — HEPARIN SODIUM 5000 UNITS: 5000 INJECTION INTRAVENOUS; SUBCUTANEOUS at 13:38

## 2023-02-27 RX ADMIN — MIRTAZAPINE 7.5 MG: 15 TABLET, FILM COATED ORAL at 21:00

## 2023-02-27 RX ADMIN — ASPIRIN 81 MG: 81 TABLET, COATED ORAL at 13:38

## 2023-02-27 RX ADMIN — SODIUM CHLORIDE, PRESERVATIVE FREE 10 ML: 5 INJECTION INTRAVENOUS at 13:40

## 2023-02-27 RX ADMIN — TRAZODONE HYDROCHLORIDE 25 MG: 50 TABLET ORAL at 21:00

## 2023-02-27 RX ADMIN — MEMANTINE 10 MG: 10 TABLET ORAL at 13:39

## 2023-02-27 RX ADMIN — HEPARIN SODIUM 5000 UNITS: 5000 INJECTION INTRAVENOUS; SUBCUTANEOUS at 06:16

## 2023-02-27 RX ADMIN — MEMANTINE 10 MG: 10 TABLET ORAL at 21:00

## 2023-02-27 RX ADMIN — Medication 1000 UNITS: at 13:42

## 2023-02-27 RX ADMIN — HEPARIN SODIUM 5000 UNITS: 5000 INJECTION INTRAVENOUS; SUBCUTANEOUS at 20:59

## 2023-02-27 RX ADMIN — SODIUM CHLORIDE, PRESERVATIVE FREE 10 ML: 5 INJECTION INTRAVENOUS at 13:41

## 2023-02-27 ASSESSMENT — PAIN DESCRIPTION - FREQUENCY
FREQUENCY: INTERMITTENT
FREQUENCY: INTERMITTENT

## 2023-02-27 ASSESSMENT — PAIN - FUNCTIONAL ASSESSMENT
PAIN_FUNCTIONAL_ASSESSMENT: PREVENTS OR INTERFERES SOME ACTIVE ACTIVITIES AND ADLS
PAIN_FUNCTIONAL_ASSESSMENT: PREVENTS OR INTERFERES SOME ACTIVE ACTIVITIES AND ADLS

## 2023-02-27 ASSESSMENT — PAIN SCALES - GENERAL
PAINLEVEL_OUTOF10: 0
PAINLEVEL_OUTOF10: 8
PAINLEVEL_OUTOF10: 0
PAINLEVEL_OUTOF10: 6
PAINLEVEL_OUTOF10: 5
PAINLEVEL_OUTOF10: 0

## 2023-02-27 ASSESSMENT — PAIN DESCRIPTION - DESCRIPTORS
DESCRIPTORS: ACHING

## 2023-02-27 ASSESSMENT — PAIN DESCRIPTION - ORIENTATION
ORIENTATION: POSTERIOR;UPPER;LOWER
ORIENTATION: LOWER;UPPER

## 2023-02-27 ASSESSMENT — PAIN DESCRIPTION - LOCATION
LOCATION: GENERALIZED

## 2023-02-27 ASSESSMENT — PAIN DESCRIPTION - ONSET
ONSET: ON-GOING
ONSET: ON-GOING

## 2023-02-27 ASSESSMENT — PAIN DESCRIPTION - PAIN TYPE
TYPE: ACUTE PAIN
TYPE: ACUTE PAIN

## 2023-02-27 ASSESSMENT — PAIN SCALES - WONG BAKER: WONGBAKER_NUMERICALRESPONSE: 0

## 2023-02-27 NOTE — PROGRESS NOTES
Podiatric Surgery Daily Progress Note  Bkaari Danielle      Subjective :   Patient seen and examined this am at the bedside. Patient denies any acute overnight events. Patient more alert today. Reports feeling well, no pain to b/l LE. Review of Systems: A 12 point review of systems is unremarkable with the exception of the chief complaint. Patient specifically denies nausea, vomiting, fever, chills, shortness of breath, chest pain, abdominal pain, constipation, or difficulty urinating. Objective     BP (!) 132/47   Pulse 85   Temp 98.8 °F (37.1 °C) (Oral)   Resp 19   Ht 5' 7\" (1.702 m)   Wt 154 lb 5.2 oz (70 kg)   SpO2 98%   BMI 24.17 kg/m²     I/O:  Intake/Output Summary (Last 24 hours) at 2/27/2023 0709  Last data filed at 2/27/2023 0545  Gross per 24 hour   Intake 400 ml   Output 0 ml   Net 400 ml                Wt Readings from Last 3 Encounters:   02/27/23 154 lb 5.2 oz (70 kg)   02/20/23 153 lb 7 oz (69.6 kg)   12/09/22 156 lb 8 oz (71 kg)       LABS:    Recent Labs     02/26/23  0732 02/27/23  0521   WBC 13.1* 11.2*   HGB 8.3* 7.6*   HCT 26.8* 24.4*    352        Recent Labs     02/27/23  0521   *   K 4.5   CL 98*   CO2 28   PHOS 3.0   BUN 36*   CREATININE 6.0*        Recent Labs     02/25/23  1058   INR 1.23*           LOWER EXTREMITY EXAMINATION    Dressing to left LE clean, dry, intact at this time. Wound VAC to left LE with excellent seal, running continuous at 125mmHg. Approximately 30mL of sanguinous drainage in cannister. No pain with calf compression b/l. Right LE soft tissue envelope closed and without acute signs of infection. IMAGING:  XR Left Foot 2/20/23  Narrative   History: Medial foot ulcer. 2 views of foot. FINDINGS: There is a medial ulceration. There is lysis of the medial base of the proximal phalanx. Lysis of the medial head of the first metatarsal suspicious for osteomyelitis. No soft tissue gas. Associated soft tissue swelling. Impression   1. Medial ulceration with lytic changes compatible with osteomyelitis of the base of the first proximal phalanx and distal first metatarsal.     MRI Left foot 2/21/23  Narrative   Patient: Pawel De La Cruz  Time Out: 07:20   Exam(s): MRI LEFT FOOT Without Contrast        EXAM:     MR Left Lower Extremity Without Intravenous Contrast, Foot       CLINICAL HISTORY:     concern for OM at first metatarsal head, base of proximal    phalanx. TECHNIQUE:     Multiplanar magnetic resonance images of the left foot without    intravenous contrast.       COMPARISON:     No relevant prior studies available. FINDINGS:   Images are degraded by motion artifact. Ulcer along the medial aspect of the first toe. Underlying    abscess. Subcutaneous soft tissue edema and intramuscular edema    as can be seen in setting of diabetic neuropathic changes. No    soft tissue gas identified. Marrow signal abnormality within the    first distal phalanx consistent with acute osteomyelitis. No    septic arthritis. No acute fracture. Electronically signed by Yandel Lawson MD on 02-21-23 at 0720       Impression   Acute osteomyelitis of the first distal phalanx. XR Left foot 2/24- post-op  Narrative   3 views of the left foot       HISTORY: Pain. Surgery. FINDINGS:       Amputation of the first ray. Surgical bandages in place. No definite immediate complication is identified. Impression   Postoperative changes as above. VL Arterial Duplex b/l LE 2/21  Impressions   Right Impression   The right ankle/brachial index is 1.09 (PT- 166 mmHg; DP- non-compressible). The right arm brachial pressure was not obtained due to AVF. There is normal, multiphasic flow identified in the common femoral artery,   suggesting no evidence of significant aorto-iliac inflow disease.    Elevated velocities at the mid superficial femoral artery indicate a >50%   stenosis by velocity criteria (velocities went from 92 to 193 cm/s). There is atherosclerotic plaque involving the popliteal artery with velocities   consistent with <50% stenosis. Elevated velocities at the distal anterior tibial artery indicate a >50%   stenosis by velocity criteria (velocities went from 36 to 81 cm/s). Left Impression   The left ankle/brachial index is 0.74 (PT-112 mmHg; DP-110 mmHg). The left AMOS   was technically difficult to obtain due to patient movement. There is normal, multiphasic flow identified in the common femoral artery,   suggesting no evidence of significant aorto-iliac inflow disease. There is atherosclerotic plaque involving the superficial femoral artery with   velocities consistent with <50% stenosis. The distal end of the popliteal artery appears to be occluded, and the   tibio-peroneal trunk appears to be occluded. There are multiple collateral   vessels noted in this area. The posterior tibial artery is occluded with retrograde trickle flow   demonstrated at the ankle area. The distal anterior tibial artery is occluded with reconstitution at the ankle   (34 cm/s). There are no previous studies for comparison. Conclusions        Summary        Normal right AMOS. Abnormal left AMOS in the range of moderate arterial insufficiency. Right mid-SFA stenosis of greater than 50% associated with nonocclusive    popliteal and tibial disease. Left popliteal and tibioperoneal trunk occlusions with associated posterior    tibial and anterior tibial artery occlusions.         Signature    ------------------------------------------------------------------    Electronically signed by Juan F Boone MD (Interpreting    physician) on 02/21/2023 at 03:56 PM       ASSESSMENT/PLAN  - s/p I&D, delayed primary closure, skin graft substitute application, wound vac application; left lower extremity (DOS 2/26/23)  - s/p First ray amputation; left lower extremity (DOS: 2/24/23)  - Osteomyelitis; 1st metatarsal, 1st proximal phalanx, 1st distal phalanx; left foot  - Full thickness ulceration; left foot; Bee 3  - Peripheral vascular disease; b/l LE  - History of partial 1st ray and 3rd digit amputation; right foot    -Patient examined and evaluated at bedside   -Hypotensive, otherwise VSS, leukocytosis noted (WBC 11.2)  -.1,   -Prealbumin 7.1; continue dietary nutritional supplements  -HbA1c 6.0%  -Wound culture- Strep Pyogenes, Staph Aureus, Haemophilus parainfluenzae   -Surgical path pending  -Blood culture x2- Strep pyogenes  -XR, MRI reviewed, impressions noted above  -Arterial Duplex b/l LE reviewed, impressions noted above  -Vascular surgery following; appreciate recommendations  -ID following, continue antibiotics per their recommendations  -Wound VAC left intact, running continuous at 125mmHg with excellent seal noted. -Nonweight bearing to the left LE  -No further surgical intervention from podiatric standpoint    DISPO: s/p I&D, delayed primary closure, application of skin graft substitute, wound vac application; left LE. Continue antibiotics per ID recs, patient will likely need extended course of IV antibiotics as procedure was not definitive. Anticoagulation per vascular surgery. No further surgical intervention from podiatric standpoint.      Discussed assessment and plan with Dr. Riley Cota DPM.    Isha Hurley DPM  Podiatric Resident, PGY-3  Pager #: [phone] or Perfect Serve

## 2023-02-27 NOTE — PROGRESS NOTES
Treatment time: 3 hours  Net UF: 2350 ml     Pre weight: 70.3 kg  Post weight:67.9 kg    Access used: ravf    Access function: well with  ml/min     Medications or blood products given: vancomycin     Regular outpatient schedule: [facility]     Summary of response to treatment: Patient tolerated treatment well and without any complications.  Patient remained stable throughout entire treatment        02/27/23 0809 02/27/23 1117   Treatment   Time On 0809  --    Time Off  --  1117   Vital Signs   BP (!) 163/58 (!) 122/41   Temp 98 °F (36.7 °C) 97.9 °F (36.6 °C)   Heart Rate 74 93   Resp 18 18   Weight 154 lb 15.7 oz (70.3 kg) 149 lb 11.1 oz (67.9 kg)

## 2023-02-27 NOTE — PLAN OF CARE
Problem: Skin/Tissue Integrity  Goal: Absence of new skin breakdown  Description: 1. Monitor for areas of redness and/or skin breakdown  2. Assess vascular access sites hourly  3. Every 4-6 hours minimum:  Change oxygen saturation probe site  4. Every 4-6 hours:  If on nasal continuous positive airway pressure, respiratory therapy assess nares and determine need for appliance change or resting period.   2/27/2023 1529 by Varsha Dangelo RN  Outcome: Progressing  2/27/2023 0233 by Ronald Crane RN  Outcome: Progressing  Note: Q2 turn every 2 hourly   Check pads, change as necessary to px IAD      Problem: Respiratory - Adult  Goal: Achieves optimal ventilation and oxygenation  Outcome: Progressing     Problem: Discharge Planning  Goal: Discharge to home or other facility with appropriate resources  Outcome: Progressing     Problem: Pain  Goal: Verbalizes/displays adequate comfort level or baseline comfort level  2/27/2023 1529 by Varsha Dangelo RN  Outcome: Progressing  2/27/2023 0233 by Ronald Crane RN  Outcome: Progressing  Flowsheets (Taken 2/27/2023 0708)  Verbalizes/displays adequate comfort level or baseline comfort level:   Encourage patient to monitor pain and request assistance   Assess pain using appropriate pain scale   Administer analgesics based on type and severity of pain and evaluate response   Implement non-pharmacological measures as appropriate and evaluate response     Problem: Chronic Conditions and Co-morbidities  Goal: Patient's chronic conditions and co-morbidity symptoms are monitored and maintained or improved  Outcome: Progressing     Problem: Safety - Adult  Goal: Free from fall injury  2/27/2023 1529 by Varsha Dangelo RN  Outcome: Progressing  2/27/2023 0234 by Ronald Crane RN  Outcome: Progressing  Flowsheets (Taken 2/27/2023 0234)  Free From Fall Injury: Instruct family/caregiver on patient safety  Note: Fall protocol in place Problem: Safety - Medical Restraint  Goal: Remains free of injury from restraints (Restraint for Interference with Medical Device)  Description: INTERVENTIONS:  1. Determine that other, less restrictive measures have been tried or would not be effective before applying the restraint  2. Evaluate the patient's condition at the time of restraint application  3. Inform patient/family regarding the reason for restraint  4.  Q2H: Monitor safety, psychosocial status, comfort, nutrition and hydration  Outcome: Progressing

## 2023-02-27 NOTE — PROGRESS NOTES
Physical Therapy/Occupational Therapy  Attempt Note/No Treatment  Pt off floor for dialysis. Will follow up per plan of care.     Lisa [place], [place] [address]  Jhoana Amaro OTR/L

## 2023-02-27 NOTE — CARE COORDINATION
4:24 PM  HD today, anticipating DC on atbx during HD. Podiatry following. Per MD, \"No further surgical intervention from podiatric standpoint. \" Patient worked with PT/OT today. Patient to dc back to [facility] at [place]. No cert needed.      Electronically signed by Pema Flowers RN, CM on 2/27/2023 at 4:25 PM.  Phone: [phone]  Fax: [phone]

## 2023-02-27 NOTE — PROGRESS NOTES
[facility] NEPHROLOGY    [facility]              [phone]                       Interval History and Plan   Patient seen during dialysis  Having Hiccups which is bothering patient. No SOB and on minimal oxygen  No pain  BP elevated  Lytes stable. Hb low 7.6        HD today per MWF schedule. 2- 2.5 liter UF  Follow the blood pressure trend after UF and if remain elevated resume BP medications which were held due to hypotension. Continue Epogen with dialysis  Not on binders as phos is low  Renal diet. D/W patient and dialysis nurse. Assessment :     Pt is a 78 yo male with pmhx of osteomyelitis s/p amputation, ESRD on HD, CAD, PAD, HTN, HLD, and vascular dementia who we were consulted for hemodialysis continuation. ESRD on HD MWF  Access: Right arm fistula. Chronic Anemia   DIMITRIOS with dialysis. Hypertension and Volume status  BP is now elevated side. Does not look overt volume overloaded. Left foot osteomyelitis  Group A Strep Bacteremia  Sepsis  Management per primary team.       [facility] Nephrology would like to thank Esteban Fisher MD   for opportunity to serve this patient      Please call with questions at-   24 Hrs Answering service [phone] or  7 am- 5 pm via Perfect serve or cell phone      CC/reason for consult :     Hemodialysis continuation     HPI :     Nery Watson is a 79 y.o. male with history of osteomyelitis, ESRD on HD, CAD, PAD, HTN, HLD, and vascular dementia who presented to the hospital on 2/20/2023 with shortness of breath during his hemodialysis session. Per daughter, patient had desatted to the [de-identified] during the session.  On admission, patient has been improving in respiratory status with normal saturations, but remains admitted due to acute concerns of osteomyelitis of the left great toe, sepsis (BC confirmed 2x for Group A strep), and significant arterial disease (recent arterial duplex showing 80% blockage of the left lower extremity). Chest X-ray was negative for acute concerns. Currently on cefepime (started on 2/20). Single dose of vancomycin given yesterday, with plans to start clindamycin today. Nephrology was consulted for continuation of dialysis. Last Echo in 2019 with 55% EF. Repeat Echo ordered during this admission. ROS:     positives in bold   Constitutional:  fever, chills, weakness, weight change, fatigue  Skin:  rash, pruritus, hair loss, bruising, dry skin, petechiae, left great toe wound   Head, Face, Neck   headaches, swelling,  cervical adenopathy  Respiratory: shortness of breath, cough, or wheezing  Cardiovascular: chest pain, palpitations, dizzy, edema  Gastrointestinal: nausea, vomiting, diarrhea, constipation,belly pain    Yellow skin, blood in stool  Musculoskeletal:  back pain, muscle weakness, gait problems,       joint pain or swelling. Genitourinary:  dysuria, poor urine flow, flank pain, blood in urine  Neurologic:  vertigo, TIA'S, syncope, seizures, focal weakness  Psychosocial:  insomnia, anxiety, or depression. Additional positive findings:                          All other remaining systems are negative.       PMH/PSH/SH/Family History:     Past Medical History:   Diagnosis Date    Amputated great toe, right (HCC)     CAD (coronary artery disease)     CVA (cerebral vascular accident) ([address]     3    Dementia ([address]     Depression     Diabetes ([address]     Diabetic polyneuropathy ([address]     ESRD on dialysis ([address]     monday wednesday friday    GERD (gastroesophageal reflux disease)     Hemiplegia and hemiparesis following cerebral infarction affecting right dominant side (HCC)     Hyperlipidemia     Hypertension     Pain syndrome, chronic     Peripheral vascular disease ([address]     [facility]    Secondary hyperparathyroidism [facility])        Past Surgical History:   Procedure Laterality Date    CARDIAC CATHETERIZATION      DIALYSIS FISTULA CREATION      FOOT DEBRIDEMENT Left 2/26/2023 INCISION AND DRAINAGE WITH DELAYED  PRIMARY CLOSURE WITH  WOUND VAC APPLICATION,  APPLICATION SKIN GRAFT SUBSTITUTE LEFT  LOWER EXTREMITY performed by Caty Flannery DPM at [place] [address] Left 2/24/2023    LEFT FOOT TOTAL FIRST RAY AMPUTATION performed by Caty Flannery DPM at [facility] [address] History     Socioeconomic History    Marital status:      Spouse name: Not on file    Number of children: Not on file    Years of education: Not on file    Highest education level: Not on file   Occupational History    Not on file   Tobacco Use    Smoking status: Former    Smokeless tobacco: Never   Vaping Use    Vaping Use: Never used   Substance and Sexual Activity    Alcohol use: No     Alcohol/week: 0.0 standard drinks    Drug use: No    Sexual activity: Not Currently   Other Topics Concern    Not on file   Social History Narrative    Not on file     Social Determinants of Health     Financial Resource Strain: Not on file   Food Insecurity: Not on file   Transportation Needs: Not on file   Physical Activity: Not on file   Stress: Not on file   Social Connections: Not on file   Intimate Partner Violence: Not on file   Housing Stability: Not on file           Problem Relation Age of Onset    Heart Disease Mother     Diabetes Mother     Dementia Mother     Diabetes Father     Heart Disease Father     Stroke Brother           Medication:     Scheduled Meds:   vancomycin  750 mg IntraVENous Once per day on Mon Wed Fri    heparin (porcine)  5,000 Units SubCUTAneous 3 times per day    lidocaine  1 patch TransDERmal Daily    epoetin ramesh-epbx  10,000 Units IntraVENous Once per day on Mon Wed Fri    mirtazapine  7.5 mg Oral Nightly    traZODone  25 mg Oral Nightly    melatonin  3 mg Oral Nightly    sodium chloride flush  5-40 mL IntraVENous 2 times per day    aspirin  81 mg Oral Daily    atorvastatin  40 mg Oral Daily    memantine  10 mg Oral BID    [Held by provider] metoprolol succinate  25 mg Oral Daily pantoprazole  40 mg Oral QAM AC    Vitamin D  1,000 Units Oral Daily    sodium chloride flush  5-40 mL IntraVENous 2 times per day     Continuous Infusions:   sodium chloride      sodium chloride      sodium chloride       PRN Meds:.oxyCODONE **OR** oxyCODONE, sodium chloride, sodium chloride, sodium chloride flush, sodium chloride, ondansetron **OR** ondansetron, acetaminophen, nitroGLYCERIN, sodium chloride flush, sodium chloride, polyethylene glycol, perflutren lipid microspheres    Vitals :     Vitals:    02/27/23 1042   BP:    Pulse: 91   Resp: 20   Temp:    SpO2: 96%       I & O :       Intake/Output Summary (Last 24 hours) at 2/27/2023 1115  Last data filed at 2/27/2023 0545  Gross per 24 hour   Intake 100 ml   Output 0 ml   Net 100 ml          Physical Examination :     General appearance: Anxious- no, distressed- no  HEENT: Lips- normal, teeth- ok , oral mucosa- moist  Neck : Mass- no, appears symmetrical, JVD- not visible  Respiratory: Respiratory effort- normal effort, wheeze- no, crackles - no  Cardiovascular: Ausculation- No M/R/G, Edema none.   Abdomen: visible mass- no, distention- no, scar- no, tenderness- no                            hepatosplenomegaly-  no  Musculoskeletal:  clubbing no,cyanosis- no , digital ischemia- no                           muscle strength- grossly normal , tone - grossly normal.  Skin: rashes- no , ulcers- yes, on left great toe but not visualized due to dressing, induration- no, tightening - no  Psychiatric:  Judgement and insight- normal  CNS:        AAO X 3     LABS:     Recent Labs     02/25/23  1058 02/26/23  0732 02/27/23  0521   WBC 12.8* 13.1* 11.2*   HGB 8.3* 8.3* 7.6*   HCT 26.7* 26.8* 24.4*    373 352       Recent Labs     02/25/23  1058 02/26/23  0539 02/26/23  0732 02/27/23  0521    134*  --  135*   K 4.3 5.9* 4.5 4.5   CL 99 97*  --  98*   CO2 28 23  --  28   BUN 19 30*  --  36*   CREATININE 4.0* 4.8*  --  6.0*   GLUCOSE 124* 121*  --  126*   MG 1. 80 2.30  --  2.30   PHOS  --  2.9  --  3.0            Patient discuss with Dr. Arturo Dawson  Internal Medicine, MS4      Medical Student Note edited and addended where appropriate. Claire Woodson MD  PGY-3 Internal Medicine  02/27/23     Patient was seen and examined and the case was discussed with the resident. He acted as my scribe. I agree with the assessment and plan.

## 2023-02-27 NOTE — PROGRESS NOTES
Occupational Therapy  Facility/Department: [facility] PCU  Daily Treatment Note  NAME: Uri Tamez  : 1955  MRN: [id number]    Date of Service: 2023    Discharge Recommendations: Uri Tamez scored a  on the AM-PAC ADL Inpatient form. Current research shows that an AM-PAC score of 17 or less is typically not associated with a discharge to the patient's home setting. Based on the patient's AM-PAC score and their current ADL deficits, it is recommended that the patient have 3-5 sessions per week of Occupational Therapy at d/c to increase the patient's independence. Please see assessment section for further patient specific details. If patient discharges prior to next session this note will serve as a discharge summary. Please see below for the latest assessment towards goals. OT Equipment Recommendations  Other: defer      Patient Diagnosis(es): The primary encounter diagnosis was Osteomyelitis of left foot, unspecified type ([address] Diagnoses of ESRD on dialysis [facility]) and Streptococcal bacteremia were also pertinent to this visit. Assessment    Assessment: Pt agreeable to OT tx and tolerates session fairly. Pt continues to function below baseline and demos decreased cognition, decreased activity tolerance, decreased AROM/strength in BUE, and global deconditioning. Pt requires assist x2 for functional transfers, bed mobility and scooting. Pt requires maxA for ADLs. Pt will benefit from continued OT to address above deficits and maximize independence with functional activity. Cont per POC. Activity Tolerance: Patient limited by fatigue  Other: defer      Plan   Occupational Therapy Plan  Times Per Week: 2-5  Current Treatment Recommendations: Strengthening;Balance training;Functional mobility training; Endurance training;Cognitive reorientation;Self-Care / ADL; Coordination training; Safety education & training;Patient/Caregiver education & training     Restrictions  Position Activity Restriction  Other position/activity restrictions: NWB L LE, strict I&O, up with assistance, NPO    Subjective   Subjective  Subjective: Pt found supine in bed, agreeable to OT tx. \"I am just very sleepy today\"  Pain: Pt does not report any pain  Orientation  Overall Orientation Status: Impaired  Orientation Level: Oriented to person (unable to state location despite choices; able to state it is winter, reports at baseline he does not keep track of time)  Pain: pt denies  Cognition  Overall Cognitive Status: Exceptions  Following Commands: Follows one step commands with increased time; Follows one step commands with repetition  Memory: Decreased short term memory;Decreased recall of recent events;Decreased recall of precautions  Safety Judgement: Decreased awareness of need for assistance;Decreased awareness of need for safety  Problem Solving: Decreased awareness of errors  Insights: Decreased awareness of deficits  Initiation: Requires cues for all  Sequencing: Requires cues for all        Objective  Treatment included functional transfer training, ADL's and pt. education. Bed Mobility Training  Bed Mobility Training: Yes  Rolling: Maximum assistance  Supine to Sit: Moderate assistance;Maximum assistance;Assist X2 (mod+maxA x2)  Scooting: Total assistance (total A x2)  Balance  Sitting:  (static: CGA to min A at EOB, posterior lean.)  Transfer Training  Transfer Training: Yes  Overall Level of Assistance: Maximum assistance;Assist X2 (with sarastedy; assist to maintain NWB to LLE)  Interventions: Verbal cues; Safety awareness training;Manual cues  Sit to Stand: Maximum assistance;Assist X2 (MaxA x2 for multiple trials from bed and chair to bienvenido stedy; requires total A to maintain NWB to LLE)  Stand to Sit: Maximum assistance;Assist X2  Bed to Chair: Total assistance (use of bienvenido stedy)     ADL  Feeding: Maximum assistance; Beverage management  Feeding Skilled Clinical Factors: [facility] assistance to bring pepsi to mouth  LE Dressing: Dependent/Total  OT Exercises  Exercise Treatment: PT facilitating B LE therex during cotx  A/AROM Exercises: RUE: shoulder fl/ext x10; composite fist + flexion x15; RUE elbow flex/ext x10, composite x5     Safety Devices  Type of Devices: Left in chair;Chair alarm in place;Call light within reach;Nurse notified; Heels elevated for pressure relief (BUE elevated)     Patient Education  Education Given To: Patient  Education Provided: Role of Therapy;Plan of Care;Transfer Training;ADL Adaptive Strategies; Home Exercise Program;Energy Conservation  Education Method: Verbal;Demonstration  Barriers to Learning: Cognition  Education Outcome: Verbalized understanding;Continued education needed    Goals  Short Term Goals  Time Frame for Short Term Goals: by dc  Short Term Goal 1: Pt will complete toilet transfer with mod A  Short Term Goal 2: Pt will complete LB dressing with mod A  Short Term Goal 3: Pt will complete UB dressing with setup  Patient Goals   Patient goals : per exwife- for pt to do more therapy once going back to the nursing home, be able to get stronger       Therapy Time   Individual Concurrent Group Co-treatment   Time In 1452         Time Out 1545         Minutes 53             Timed Code Treatment Minutes:   53 mins    Total Treatment Minutes:  53 mins      CIRO Miramontes/BIBIANA

## 2023-02-27 NOTE — PLAN OF CARE
Problem: Skin/Tissue Integrity  Goal: Absence of new skin breakdown  Description: 1. Monitor for areas of redness and/or skin breakdown  2. Assess vascular access sites hourly  3. Every 4-6 hours minimum:  Change oxygen saturation probe site  4. Every 4-6 hours:  If on nasal continuous positive airway pressure, respiratory therapy assess nares and determine need for appliance change or resting period.   2/27/2023 0233 by Eduar Cross RN  Outcome: Progressing  Note: Q2 turn every 2 hourly   Check pads, change as necessary to px IAD        Problem: Safety - Adult  Goal: Free from fall injury  2/27/2023 0234 by Eduar Cross RN  Outcome: Progressing  Flowsheets (Taken 2/27/2023 0234)  Free From Fall Injury: Instruct family/caregiver on patient safety  Note: Fall protocol in place         Problem: Pain  Goal: Verbalizes/displays adequate comfort level or baseline comfort level  2/27/2023 0233 by Eduar Cross RN  Outcome: Progressing  Flowsheets (Taken 2/27/2023 0909)  Verbalizes/displays adequate comfort level or baseline comfort level:   Encourage patient to monitor pain and request assistance   Assess pain using appropriate pain scale   Administer analgesics based on type and severity of pain and evaluate response   Implement non-pharmacological measures as appropriate and evaluate response  2/26/2023 1430 by Zahra Ansari RN  Outcome: Progressing

## 2023-02-27 NOTE — PROGRESS NOTES
Clinical Pharmacy Progress Note    Vancomycin - Management by Pharmacy    Consult Date(s): 2/20/23  Consulting Provider(s): Dr. Ledesma Solid / Plan  1)  LLE osteomyelitis with abscess - Vancomycin  Concurrent Antimicrobials: n/a  Day of Vanc Therapy:  #8  Current Dosing Method: Intermittent Dosing by Levels  Therapeutic Goal: Trough ~ 15 mg/L  Current Dose / Plan:   Pt is ESRD on HD Mon-Wed-Fri. Level today = 18.1 mg/L. Will schedule vancomycin 750mg IV 3x weekly with HD. Next dose today - spoke with RN in dialysis. Wound culture growing MRSA with BROCK of 2 to vancomycin. Due to elevated BROCK, consider alterantive treatment given risk of failure - alternatives would be Linezolid or Daptomycin. Would defer decision to ID. Will continue to monitor clinical condition and make adjustments to regimen as appropriate. Please call with questions--  Helder DennisD, BCPS  Wireless: [id number]   2/27/2023 8:38 AM            Interval update:  S/p I&D with delayed primary closure and wound vac placement 2/26. Per podiatry, surgery not definitive, will likely require lengthy course of IV ABx. Subjective/Objective:   Ulices Patel is a 79 y.o. male with a PMHx significant for ESRD on HD Mon-Wed-Fri, CAD, CVA, dementia, depression, DM, GERD, HTN, HLD, and PVD who is admitted with LLE osteomyelitis of 1st metatarsal, 1st proximal phalanx, and 1st distal phalanx with abscess. S/p L foot 1st ray amputation on 2/24. Pharmacy is consulted to dose Vancomycin.     Ht Readings from Last 1 Encounters:   02/20/23 5' 7\" (1.702 m)     Wt Readings from Last 1 Encounters:   02/27/23 154 lb 5.2 oz (70 kg)     Current & Prior Antimicrobial Regimen(s):  (2/20-2/21)   (2/20-2/21)  Vancomycin - Pharmacy to dose  Intermittent dosing (2/20-current)    Vancomycin Level(s) / Doses:    Date Vancomycin Level Vancomycin Dose   2/20  1750mg x1   2/21 21.9 mcg/mL --   2/22 16.9 mcg/mL 750mg x1   2/23  --   2/24 17 mg/L 750mg x1   2/25 -- --   2/26 -- --   2/27 18.1 mg/L      Cultures & Sensitivities:    Date Site Micro Susceptibility / Result   2/20 Blood x2 Group A Strep  Reliably sensitive to b-lactams, others   2/20 Foot wound Group A Strep  Staph aureus  H. influenzae Reliably sensitive to b-lactams, others  S=Vanc (BROCK=2), linezolid, daptomycin  Reliably sensitive to CTX, Amp, Bactrim   2/24 Wound - tissue Group A Strep  MRSA Reliably sensitive to b-lactams, others  S=Vanc (BROCK=2), linezolid, daptomycin     Recent Labs     02/25/23  1058 02/26/23  0539 02/26/23  0732 02/27/23  0521   CREATININE 4.0* 4.8*  --  6.0*   BUN 19 30*  --  36*   WBC 12.8*  --  13.1* 11.2*       CrCl is not calculated 2/2 ESRD on HD

## 2023-02-27 NOTE — PROGRESS NOTES
Pt resting in bed with therapy at bedside. Pt currently on RA and complains of right/ left hand pain and right/left foot pain. Pt with completed dialysis this AM with 2.4 liters removed.

## 2023-02-27 NOTE — PROGRESS NOTES
Internal Medicine Progress Note    Patient Name: Nery Watson   Patient : 1955   Date: 2023   Admit Date: 2023     CC: Shortness of Breath       Subjective     Interval History: Will obtain RUE doppler to assess AV fistula for an occlusion  POD #3:  L foot total first ray amputation  POD #1:  Further debridement, delayed primary closure with wound VAC application and skin graft substitute application   Afebrile  Saturating at 90% on 1.5 lpm  HD today with plan for 2-2.5 L UF     Patient seen at bedside while in hemodialysis session. Endorses hiccups, \"a little bit\" of dyspnea, intermittent headache, and lower extremity aching. Denies nausea, vomiting, chills, chest pain. Objective     Vital Signs:  Patient Vitals for the past 8 hrs:   BP Temp Temp src Pulse Resp SpO2 Weight   23 0344 -- -- -- -- -- -- 154 lb 5.2 oz (70 kg)   23 2335 (!) 132/47 98.8 °F (37.1 °C) Oral 85 16 98 % --         Physical Exam:  Constitutional: Pleasant, non-diaphoretic, elderly male, sitting semi-Fowlers in bed, in NAD, hiccupping. HEENT:  NCAT. Scleral icterus. No conjunctival injection. No rhinorrhea or epistaxis. Cardiovascular:  No apparent JVD. Auscultation difficult due to frequent hiccups. Radial pulses 2+. Capillary refill < 2 sec. Pulmonary:  Able to speak in full sentences without frequent pauses. No perioral cyanosis. Auscultation difficult due to frequent hiccups. Abdomen: Non-distended. Normoactive bowel sounds. Soft, no TTP. Musculoskeletal:  Cachectic. Right AV fistula. Some mild swelling of RUE, including forearm and hand, which are warm to the touch, capillary refill < 2 sec, and sensation intact. No lower extremity edema. Left foot and ankle are covered in ACE wrap, which is clean, dry, and intact. Skin:  Warm, dry, acyanotic. Psychological:  Cooperative. Normal speech, behavior, and thought content.        Intake/Output Summary (Last 24 hours) at 2/27/2023 0606  Last data filed at 2/26/2023 2338  Gross per 24 hour   Intake 370 ml   Output 0 ml   Net 370 ml          Medications:   heparin (porcine)  5,000 Units SubCUTAneous 3 times per day    lidocaine  1 patch TransDERmal Daily    epoetin ramesh-epbx  10,000 Units IntraVENous Once per day on Mon Wed Fri    mirtazapine  7.5 mg Oral Nightly    traZODone  25 mg Oral Nightly    melatonin  3 mg Oral Nightly    sodium chloride flush  5-40 mL IntraVENous 2 times per day    aspirin  81 mg Oral Daily    atorvastatin  40 mg Oral Daily    memantine  10 mg Oral BID    [Held by provider] metoprolol succinate  25 mg Oral Daily    pantoprazole  40 mg Oral QAM AC    Vitamin D  1,000 Units Oral Daily    sodium chloride flush  5-40 mL IntraVENous 2 times per day    vancomycin (VANCOCIN) intermittent dosing (placeholder)   Other RX Placeholder       sodium chloride      sodium chloride      sodium chloride          Labs:  CBC:   Recent Labs     02/25/23  1058 02/26/23  0732   WBC 12.8* 13.1*   HGB 8.3* 8.3*   HCT 26.7* 26.8*    373   MCV 85.0 85.5     Renal:    Recent Labs     02/25/23  1058 02/26/23  0539 02/26/23  0732    134*  --    K 4.3 5.9* 4.5   CL 99 97*  --    CO2 28 23  --    BUN 19 30*  --    CREATININE 4.0* 4.8*  --    GLUCOSE 124* 121*  --    CALCIUM 8.4 8.6  --    MG 1.80 2.30  --    PHOS  --  2.9  --    ANIONGAP 9 14  --      Hepatic:   Recent Labs     02/26/23  0539   LABALBU 1.8*       INR:   Recent Labs     02/25/23  1058   INR 1.23*       Radiology:  XR CHEST PORTABLE   Final Result      Questionable minimal bibasilar atelectasis. XR FOOT LEFT (MIN 3 VIEWS)   Final Result      Postoperative changes as above. VL DUP LOWER EXTREMITY ARTERIES BILATERAL   Final Result      MRI FOOT LEFT WO CONTRAST   Final Result   Acute osteomyelitis of the first distal phalanx. XR FOOT LEFT (MIN 3 VIEWS)   Final Result   1.  Medial ulceration with lytic changes compatible with osteomyelitis of the base of the first proximal phalanx and distal first metatarsal.      XR CHEST PORTABLE   Final Result   Impression: No acute abnormality      IR ANGIOGRAM EXTREMITY LEFT    (Results Pending)       Assessment & Plan   69-year-old  male who presented to the ED from his SNF on 2/20/23 with dyspnea occurring during hemodialysis. Sepsis with S. pyogenes bacteremia - likely 2/2 acute osteomyelitis - afebrile  Acute MRSA, GAS osteomyelitis of left first ray s/p I&D, first ray amputation - no further surgical intervention per podiatry standpoint  Peripheral arterial disease - LLE AMOS 0.74  Follows with vascular surgery at [facility].  S/p amputation of right hallux and 4th digit 2/2 osteomyelitis. 2/20: Growth on BCx x2 and on PCR, prealbumin low at 7.1, ESR elevated at 117, CRP elevated at 282.1. ID consulted, recs appreciated   Continue vancomycin IV (2/20-), dosed by pharmacy. Will need 6 weeks of vancomycin 750 mg IV post-hemodialysis, on hemodialysis days only from 2/24 to 4/7  Repeat BCx on 2/21 with NGTD  OR Cx:  GAS, MRSA  TTE on 2/21 w/o valvular vegetations  CBC w/diff daily  Podiatry and vascular surgery consulted   2/22:  LLE arteriogram  2/24:  L foot I&D with total first ray amputation - surgical path is pending  2/26: I&D, delayed primary closure, application of skin graft substitute, wound vac application of LLE. - non definitive surgery, no further podiatry surgical interventions planned  Will defer to vascular surgery re: anticoagulation recs  NWB to LLE  Continue aspirin and high-dose statin  PT/OT eval and tx  The patient is at high-risk for perioperative complications in an intermediate-risk procedure given multiple vascular comorbidities, including his cerebrovascular disease and elevated creatinine level. Acute hypoxic respiratory failure? possibly 2/2 hypervolemia  New supplemental O2 requirement w/o distinct physical exam signs of volume overload.   Undergoes HD on M, W, F with ultra-filtrate. Wean oxygen to 92% as tolerated    G1DD - no signs of volume overload  2/20:  ProBNP 7611. Echo (2/21):  LVEF 55-60%, no regional WMA, G1DD, small circumferential pericardial effusion, PASP 28 mmHg. Right upper extremity, forearm, hand swelling - stable  Right AV fistula. No areas of warmth or redness. Pulse and sensation are intact. RUE doppler to assess for AV fistula occlusion   Consult IR if occluded  Continue to monitor closely for signs/symptoms of compartment syndrome      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,W,F)  Hyperparathyroidism of ESRD  Normocytic anemia of ESRD  Nephrology consulted  HD today - if BP remains elevated after HD session with UF, resume home meds  BMP, Mg QD - replete to keep K > 4, Mg > 2  Avoid NSAIDs/nephrotoxins as patient still makes urine  Strict I/Os  Daily weights  Keep MAP > 65 mmHg  Procrit with dialysis    Pulmonary hypertension  Hypertension  CAD (LAD 15%, L Cx 15%, RCA 15-20% in 07/2016)  Continue ASA 81 mg QD  Hold home meds metoprolol succinate 25 mg QD, isosorbide mononitrate 30 mg QD, hydralazine 25 mg BID as patient is hypotensive - if BP remains elevated after HD session with UF, resume home meds    HLD - well-controlled  Lipid panel (2/22/23): Total 74, HDL 10, LDL 34, , VLDL 30. Continue home atorvastatin 40 mg QD    Vascular dementia  Hemiplegia   Palliative care consulted  Continue memantine 10 mg BID    Insomnia  Continue home meds melatonin 3 mg QHS, trazodone 25 mg QHS, mirtazapine 7.5 mg QHS PRN    GERD  Protonix QD      DVT PPx:  Heparin SQ, Protonix  Diet:  ADULT DIET; Dysphagia - Soft and Bite Sized; 3 carb choices (45 gm/meal); Low Sodium (2 gm); Low Potassium (Less than 3000 mg/day); Low Phosphorus (Less than 1000 mg)  ADULT ORAL NUTRITION SUPPLEMENT; Breakfast, Lunch, Dinner;  Wound Healing Oral Supplement Code status:  DNR-CCA     ELOS:  5 days  Barriers to discharge:  sepsis bacteremia, acute OM  Disposition  - Preadmission: From [facility].  - Current:  [facility]  - Upon discharge:  OT 12/24, PT 7/24 on 2/22. Plan to return to SNF. Will discuss with attending physician Dr. Holger Madison MD.     [employer].  Jeff Killian MD, [address], [facility]  Internal Medicine, PGY-1

## 2023-02-27 NOTE — PROGRESS NOTES
Physical Therapy  Facility/Department: [place] [address] [facility]  Daily Treatment Note  NAME: Jennifer Tian  : 1955  MRN: [id number]    Date of Service: 2023    Discharge Recommendations:  Jennifer Tian scored a 7/24 on the AM-PAC short mobility form. Current research shows that an AM-PAC score of 17 or less is typically not associated with a discharge to the patient's home setting. Based on the patient's AM-PAC score and their current functional mobility deficits, it is recommended that the patient have 3-5 sessions per week of Physical Therapy at d/c to increase the patient's independence. Please see assessment section for further patient specific details. If patient discharges prior to next session this note will serve as a discharge summary. Please see below for the latest assessment towards goals. Patient would benefit from continued therapy after discharge   PT Equipment Recommendations  Equipment Needed: No (defer)    Patient Diagnosis(es): The primary encounter diagnosis was Osteomyelitis of left foot, unspecified type ([facility] [address] Diagnoses of ESRD on dialysis [facility]) and Streptococcal bacteremia were also pertinent to this visit. Assessment   Assessment: Pt requires assist of 2 & use of bienvenido stedy for bed->chair transfer. Pt limited by impaired cognition & NWB left LE. Recommend further inpt PT upon D/C. Recommend squat pivot vs slide board trial next session. Will follow per plan of care. Activity Tolerance: Patient limited by fatigue;Patient limited by pain  Equipment Needed: No (defer)     Plan    Physcial Therapy Plan  General Plan:  (2-5)  Current Treatment Recommendations: Strengthening;Balance training;ROM; Functional mobility training;Transfer training; Endurance training;Pain management;Home exercise program;Safety education & training;Patient/Caregiver education & training;Equipment evaluation, education, & procurement; Therapeutic activities Restrictions  Restrictions/Precautions  Restrictions/Precautions: Fall Risk (high fall risk)  Position Activity Restriction  Other position/activity restrictions: NWB L LE, strict I&O, up with assistance, NPO     Subjective    Subjective  Subjective: Pt supine in bed & agreeable to PT. Pain: pt denies     Objective   Vitals     Bed Mobility Training  Bed Mobility Training: Yes  Rolling: Maximum assistance  Supine to Sit: Moderate assistance;Maximum assistance;Assist X2 (mod+maxA x2)  Scooting: Total assistance (total A x2)  Balance  Sitting:  (static: CGA to min A at EOB, posterior lean.)  Transfer Training  Transfer Training: Yes  Overall Level of Assistance: Maximum assistance;Assist X2 (with sarastedy; assist to maintain NWB to LLE)  Interventions: Verbal cues; Safety awareness training;Manual cues  Sit to Stand: Maximum assistance;Assist X2 (MaxA x2 for multiple trials from bed and chair to bienvenido stedy; requires total A to maintain NWB to LLE)  Stand to Sit: Maximum assistance;Assist X2  Bed to Chair: Total assistance (use of bienvenido stedy)     PT Exercises  Exercise Treatment: x 15 bilat: LAQ, marching, ankle DF/PF right  Other Specialty Interventions  Other Treatments/Modalities: pt required additional time for positioning in chair, d/t cushion & pt sliding forward with LE therex. finally required 2nd stand to bienvenido stedy to re-adjust pad & cushion.   Safety Devices  Type of Devices: Left in chair;Chair alarm in place;Call light within reach;Nurse notified       Goals  Short Term Goals  Time Frame for Short Term Goals: discharge  Short Term Goal 1: Pt. will demonstrate bed mobility with min A  Short Term Goal 2: Pt. will demonstrate sit <-> stand with mod A of 1 and LRAD  Short Term Goal 3: Pt. will demonstrate bed <-> chair transfers with mod A of 1 and LRAD  Short Term Goal 4: Pt. will demonstrate (I) and compliance with left LE weight bearing status  Patient Goals   Patient Goals : per ex-wife \"return to [place]\"    Education  Patient Education  Education Given To: Patient  Education Provided: Role of Therapy;Plan of Care;Precautions;Transfer Training  Education Method: Demonstration;Verbal  Barriers to Learning: Cognition  Education Outcome: Continued education needed    Therapy Time   Individual Concurrent Group Co-treatment   Time In 1452         Time Out [address] [place] [address],

## 2023-02-27 NOTE — PROGRESS NOTES
ID Follow-up NOTE    CC:   Patient shortness of breath on HD  Antibiotics: Vancomycin  Admit Date: 2/20/2023  Hospital Day: 8    Subjective:     Patient oriented only to self, afebrile overnight. Had amputation. Denies pain. Objective:     Patient Vitals for the past 8 hrs:   BP Temp Temp src Pulse Resp Weight   02/27/23 0738 (!) 143/63 98.6 °F (37 °C) Oral 80 18 --   02/27/23 0654 -- -- -- -- 19 --   02/27/23 0624 -- -- -- -- 19 --   02/27/23 0344 -- -- -- -- -- 154 lb 5.2 oz (70 kg)       I/O last 3 completed shifts: In: 640 [P.O.:340; I.V.:300]  Out: 0   No intake/output data recorded. EXAM:  GENERAL: No apparent distress. HEENT: Membranes moist, no oral lesion  NECK:  Supple, no lymphadenopathy  LUNGS: Clear b/l, no rales, no dullness  CARDIAC: RRR, no murmur appreciated  ABD:  + BS, soft / NT  EXT:  right arm AVF with thrill, Bilateral lower extremity edema, there is Dressing in place in left 1st MT Amputation site. NEURO: No focal neurologic findings, oriented to self. In the lower extremities diminished about 4 out of 5  PSYCH: Orientation, sensorium, mood normal  LINES:  Peripheral iv       Data Review:  Lab Results   Component Value Date    WBC 11.2 (H) 02/27/2023    HGB 7.6 (L) 02/27/2023    HCT 24.4 (L) 02/27/2023    MCV 86.1 02/27/2023     02/27/2023     Lab Results   Component Value Date    CREATININE 6.0 (HH) 02/27/2023    BUN 36 (H) 02/27/2023     (L) 02/27/2023    K 4.5 02/27/2023    CL 98 (L) 02/27/2023    CO2 28 02/27/2023       Hepatic Function Panel:   Lab Results   Component Value Date/Time    ALKPHOS 176 08/02/2021 10:15 PM    ALT 21 08/02/2021 10:15 PM    AST 47 08/02/2021 10:15 PM    PROT 8.0 08/02/2021 10:15 PM    BILITOT 0.5 08/02/2021 10:15 PM    LABALBU 2.1 02/27/2023 05:21 AM       MICRO:  OR cx left toe 2/24: MRSA and GAS. Blood cultures x2 collected on 2/21: neg to date  Blood cultures x2 collected on 2/20: Positive with growth of strep pyogenes. Superficial wound swab of the left great toe 2/20 with strep pyogenes, MRSA and haemophilus parainfluenza, sensitivities pending       IMAGING:  MRI left foot without contrast 2/21: Acute osteomyelitis of the first distal phalanx.     Scheduled Meds:   vancomycin  750 mg IntraVENous Once per day on Mon Wed Fri    heparin (porcine)  5,000 Units SubCUTAneous 3 times per day    lidocaine  1 patch TransDERmal Daily    epoetin ramesh-epbx  10,000 Units IntraVENous Once per day on Mon Wed Fri    mirtazapine  7.5 mg Oral Nightly    traZODone  25 mg Oral Nightly    melatonin  3 mg Oral Nightly    sodium chloride flush  5-40 mL IntraVENous 2 times per day    aspirin  81 mg Oral Daily    atorvastatin  40 mg Oral Daily    memantine  10 mg Oral BID    [Held by provider] metoprolol succinate  25 mg Oral Daily    pantoprazole  40 mg Oral QAM AC    Vitamin D  1,000 Units Oral Daily    sodium chloride flush  5-40 mL IntraVENous 2 times per day       Continuous Infusions:   sodium chloride      sodium chloride      sodium chloride         PRN Meds:  oxyCODONE **OR** oxyCODONE, sodium chloride, sodium chloride, sodium chloride flush, sodium chloride, ondansetron **OR** ondansetron, acetaminophen, nitroGLYCERIN, sodium chloride flush, sodium chloride, polyethylene glycol, perflutren lipid microspheres      Assessment:     Patient Active Problem List   Diagnosis    Abscess    Remote history of stroke    Chest pain    ESRD (end stage renal disease) on dialysis ([address]    CAD (coronary artery disease)    NSTEMI (non-ST elevated myocardial infarction) ([facility] [address]    PAD (peripheral artery disease) ([facility] [address]    S/P cardiac catheterization    Hypotension    Symptomatic anemia    Complication of vascular access for dialysis    Limited mobility    Vascular dementia without behavioral disturbance (HCC)    Thrombocytopenia (HCC)    Delirium    Confusion    Vascular dementia of acute onset, without behavioral disturbance ([address]    HTN (hypertension), benign Dyslipidemia    Bilateral carotid artery stenosis    Osteomyelitis (HCC)    Streptococcal bacteremia             Plan:   54-year-old male with past medical history of osteomyelitis of the right foot status post amputation of the right great and third toes, ESRD on HD M W F, CAD, PAD, HTN, HLD, vascular dementia secondary to strokes in 1999 and 2001 that presented with shortness of breath during dialysis. Left first distal phalanx osteomyelitis:  - Patient has been evaluated by podiatry and had angiogram 2/22 but will need at least a partial amputation of the first distal phalanx.   - Balloon angioplasty left anterior tibial artery, left peroneal artery and tibioperoneal trunk done on 2/22.  - s/p left foot debridement and 1st ray amputation on 2/24, non definitive, OR cx with GAS and MRSA. - Patient is also bacteremic with strep pyogenes, anticipate that this is the source. - Currently on IV vancomycin, will continue. D/dolores clindamycin and cefepime as superficial wound cx in chronic wounds likely reflect colonization.   - As areas of OM retained, will need 6 weeks iv abx from date of OR. 2/24 - 4/7, see CONNER    Strep pyogenes bacteremia:  - Blood cultures x2 sets on 2/20 with growth of strep pyogenes  - TTE 2/21 without evidence of IE.   - Repeat blood cultures as of 2/21 neg   - Would like to use abx that can dose with HD ideally. - continue vanco for now pending operative management as did have MRSA isolated in wound cx and may be causative pathogen of OM.       ESRD on HD:  - continue vancomycin to be dosed per levels    INFUSION ORDERS:  - Drug: iv Vancomycin 750mg post HD on HD days only  - Planned End date: 4/7/2023  - Diagnosis: MRSA and GAS 1st left ray OM, GAS bacteremia.   - Has received test dose in hospital  - Check CBC w diff, BMP, ESR, CRP, Vanco trough every Mon or Tue - FAX result to [phone]  - Call with antibiotic / infusion issues, [phone]  - Call with any change in status, transfer in or out of a facility or to hospital - [phone]  - No f/u in outpatient ID office necessary, followup with podiatry. Medical Decision Making: The following items were considered in medical decision making:  Discussion of patient care with other providers  Reviewed clinical lab tests  Reviewed radiology tests  Reviewed other diagnostic tests/interventions  Independent review of radiologic images  Microbiology cultures and other micro tests reviewed      Discussed with pt, RN.    [address] Nico Franck Mejia MD

## 2023-02-28 ENCOUNTER — APPOINTMENT (OUTPATIENT)
Dept: VASCULAR LAB | Age: 68
DRG: 853 | End: 2023-02-28
Payer: MEDICARE

## 2023-02-28 LAB
ALBUMIN SERPL-MCNC: 2.3 G/DL (ref 3.4–5)
ANION GAP SERPL CALCULATED.3IONS-SCNC: 7 MMOL/L (ref 3–16)
ANISOCYTOSIS: ABNORMAL
BASOPHILS ABSOLUTE: 0 K/UL (ref 0–0.2)
BASOPHILS RELATIVE PERCENT: 0 %
BUN BLDV-MCNC: 23 MG/DL (ref 7–20)
CALCIUM SERPL-MCNC: 8.3 MG/DL (ref 8.3–10.6)
CHLORIDE BLD-SCNC: 99 MMOL/L (ref 99–110)
CO2: 29 MMOL/L (ref 21–32)
CREAT SERPL-MCNC: 4.9 MG/DL (ref 0.8–1.3)
EOSINOPHILS ABSOLUTE: 0 K/UL (ref 0–0.6)
EOSINOPHILS RELATIVE PERCENT: 0 %
GFR SERPL CREATININE-BSD FRML MDRD: 12 ML/MIN/{1.73_M2}
GLUCOSE BLD-MCNC: 119 MG/DL (ref 70–99)
GLUCOSE BLD-MCNC: 154 MG/DL (ref 70–99)
GLUCOSE BLD-MCNC: 173 MG/DL (ref 70–99)
HCT VFR BLD CALC: 25.1 % (ref 40.5–52.5)
HEMOGLOBIN: 7.9 G/DL (ref 13.5–17.5)
LYMPHOCYTES ABSOLUTE: 0.4 K/UL (ref 1–5.1)
LYMPHOCYTES RELATIVE PERCENT: 4 %
MACROCYTES: ABNORMAL
MAGNESIUM: 2 MG/DL (ref 1.8–2.4)
MCH RBC QN AUTO: 27.2 PG (ref 26–34)
MCHC RBC AUTO-ENTMCNC: 31.6 G/DL (ref 31–36)
MCV RBC AUTO: 86.1 FL (ref 80–100)
METAMYELOCYTES RELATIVE PERCENT: 5 %
MICROCYTES: ABNORMAL
MONOCYTES ABSOLUTE: 0.4 K/UL (ref 0–1.3)
MONOCYTES RELATIVE PERCENT: 4 %
MYELOCYTE PERCENT: 2 %
NEUTROPHILS ABSOLUTE: 8.8 K/UL (ref 1.7–7.7)
NEUTROPHILS RELATIVE PERCENT: 85 %
OVALOCYTES: ABNORMAL
PDW BLD-RTO: 18.2 % (ref 12.4–15.4)
PERFORMED ON: ABNORMAL
PERFORMED ON: ABNORMAL
PHOSPHORUS: 2.8 MG/DL (ref 2.5–4.9)
PLATELET # BLD: 285 K/UL (ref 135–450)
PMV BLD AUTO: 7.3 FL (ref 5–10.5)
POLYCHROMASIA: ABNORMAL
POTASSIUM SERPL-SCNC: 3.8 MMOL/L (ref 3.5–5.1)
RBC # BLD: 2.92 M/UL (ref 4.2–5.9)
SCHISTOCYTES: ABNORMAL
SODIUM BLD-SCNC: 135 MMOL/L (ref 136–145)
WBC # BLD: 9.6 K/UL (ref 4–11)

## 2023-02-28 PROCEDURE — 6370000000 HC RX 637 (ALT 250 FOR IP)

## 2023-02-28 PROCEDURE — 83735 ASSAY OF MAGNESIUM: CPT

## 2023-02-28 PROCEDURE — 1200000000 HC SEMI PRIVATE

## 2023-02-28 PROCEDURE — 6360000002 HC RX W HCPCS: Performed by: PODIATRIST

## 2023-02-28 PROCEDURE — 80069 RENAL FUNCTION PANEL: CPT

## 2023-02-28 PROCEDURE — 93990 DOPPLER FLOW TESTING: CPT

## 2023-02-28 PROCEDURE — 6370000000 HC RX 637 (ALT 250 FOR IP): Performed by: STUDENT IN AN ORGANIZED HEALTH CARE EDUCATION/TRAINING PROGRAM

## 2023-02-28 PROCEDURE — 97530 THERAPEUTIC ACTIVITIES: CPT

## 2023-02-28 PROCEDURE — 6370000000 HC RX 637 (ALT 250 FOR IP): Performed by: PODIATRIST

## 2023-02-28 PROCEDURE — 85025 COMPLETE CBC W/AUTO DIFF WBC: CPT

## 2023-02-28 PROCEDURE — 94150 VITAL CAPACITY TEST: CPT

## 2023-02-28 PROCEDURE — 2580000003 HC RX 258: Performed by: PODIATRIST

## 2023-02-28 PROCEDURE — 36415 COLL VENOUS BLD VENIPUNCTURE: CPT

## 2023-02-28 PROCEDURE — 97535 SELF CARE MNGMENT TRAINING: CPT

## 2023-02-28 RX ORDER — HYDRALAZINE HYDROCHLORIDE 25 MG/1
25 TABLET, FILM COATED ORAL 2 TIMES DAILY
Status: CANCELLED | OUTPATIENT
Start: 2023-02-28

## 2023-02-28 RX ORDER — ISOSORBIDE MONONITRATE 30 MG/1
30 TABLET, EXTENDED RELEASE ORAL DAILY
Status: CANCELLED | OUTPATIENT
Start: 2023-02-28

## 2023-02-28 RX ORDER — ISOSORBIDE MONONITRATE 30 MG/1
30 TABLET, EXTENDED RELEASE ORAL DAILY
Status: DISCONTINUED | OUTPATIENT
Start: 2023-02-28 | End: 2023-03-03 | Stop reason: HOSPADM

## 2023-02-28 RX ADMIN — ASPIRIN 81 MG: 81 TABLET, COATED ORAL at 10:19

## 2023-02-28 RX ADMIN — METOPROLOL SUCCINATE 25 MG: 25 TABLET, FILM COATED, EXTENDED RELEASE ORAL at 10:19

## 2023-02-28 RX ADMIN — SODIUM CHLORIDE, PRESERVATIVE FREE 10 ML: 5 INJECTION INTRAVENOUS at 10:19

## 2023-02-28 RX ADMIN — HEPARIN SODIUM 5000 UNITS: 5000 INJECTION INTRAVENOUS; SUBCUTANEOUS at 20:15

## 2023-02-28 RX ADMIN — TRAZODONE HYDROCHLORIDE 25 MG: 50 TABLET ORAL at 20:10

## 2023-02-28 RX ADMIN — HEPARIN SODIUM 5000 UNITS: 5000 INJECTION INTRAVENOUS; SUBCUTANEOUS at 05:48

## 2023-02-28 RX ADMIN — MEMANTINE 10 MG: 10 TABLET ORAL at 20:10

## 2023-02-28 RX ADMIN — PANTOPRAZOLE SODIUM 40 MG: 40 TABLET, DELAYED RELEASE ORAL at 05:48

## 2023-02-28 RX ADMIN — ISOSORBIDE MONONITRATE 30 MG: 30 TABLET, EXTENDED RELEASE ORAL at 11:15

## 2023-02-28 RX ADMIN — MIRTAZAPINE 7.5 MG: 15 TABLET, FILM COATED ORAL at 20:10

## 2023-02-28 RX ADMIN — ATORVASTATIN CALCIUM 80 MG: 80 TABLET, FILM COATED ORAL at 10:19

## 2023-02-28 RX ADMIN — Medication 1000 UNITS: at 10:20

## 2023-02-28 RX ADMIN — MEMANTINE 10 MG: 10 TABLET ORAL at 10:19

## 2023-02-28 RX ADMIN — SODIUM CHLORIDE, PRESERVATIVE FREE 10 ML: 5 INJECTION INTRAVENOUS at 20:11

## 2023-02-28 RX ADMIN — HEPARIN SODIUM 5000 UNITS: 5000 INJECTION INTRAVENOUS; SUBCUTANEOUS at 14:42

## 2023-02-28 RX ADMIN — Medication 3 MG: at 20:11

## 2023-02-28 NOTE — CARE COORDINATION
SW following Pt today and received DC orders. CONNER completed. IV ABX to be given at HD. AZEB called Admissions at [facility] long term [facility] to inform but went to voicemail so left message and request a return call. SW called [facility] and next available run is at 6:30 PM.     DUSTIN Pittman  Case Management  [phone]  Pt's DC has been cancelled. AZEB cancelled run with [facility] and called Admissions/Otilia ([phone]) at [facility] to inform. Also, Admissions is aware that Pt will be coming with a wound vac - she has Epic access and was able to pull CONNER with wound vac info.  AZEB will follow up in AM.    DUSTIN Pittman  Case Management

## 2023-02-28 NOTE — PLAN OF CARE
Problem: Skin/Tissue Integrity  Goal: Absence of new skin breakdown  Description: 1. Monitor for areas of redness and/or skin breakdown  2. Assess vascular access sites hourly  3. Every 4-6 hours minimum:  Change oxygen saturation probe site  4. Every 4-6 hours:  If on nasal continuous positive airway pressure, respiratory therapy assess nares and determine need for appliance change or resting period.   Outcome: Progressing     Problem: Respiratory - Adult  Goal: Achieves optimal ventilation and oxygenation  Outcome: Progressing     Problem: Discharge Planning  Goal: Discharge to home or other facility with appropriate resources  Outcome: Progressing     Problem: Pain  Goal: Verbalizes/displays adequate comfort level or baseline comfort level  Outcome: Progressing     Problem: Chronic Conditions and Co-morbidities  Goal: Patient's chronic conditions and co-morbidity symptoms are monitored and maintained or improved  Outcome: Progressing     Problem: Safety - Adult  Goal: Free from fall injury  Outcome: Progressing     Problem: Neurosensory - Adult  Goal: Achieves stable or improved neurological status  Outcome: Progressing

## 2023-02-28 NOTE — PROGRESS NOTES
[facility] NEPHROLOGY    [facility]. [facility]              [phone]                       Interval History and Plan   Patient seen at bedside  No SOB and on room air  No pain  BP remain elevated  Lytes stable. Hb low 7.9, stable. HD was done yesterday with         HD tomorrow per MWF schedule. 2- 2.5 liter UF  Resume home BP medications and monitor BP  Follow right arm doppler for swelling. Discussed with Dr oRbert Curran and will plan for fistulogram if there is any stenosis. If no stenosis/intervention needed then can be discharged. Continue Epogen with dialysis  Not on binders as phos is low  Renal diet. D/W patient  D/W Dr Rakel Ayers. Assessment:     Pt is a 78 yo male with pmhx of osteomyelitis s/p amputation, ESRD on HD, CAD, PAD, HTN, HLD, and vascular dementia who we were consulted for hemodialysis continuation. ESRD on HD MWF  Access: Right arm fistula. Right arm swelling. Chronic Anemia   DIMITRIOS with dialysis. Hypertension and Volume status  BP is now elevated side. Does not look overt volume overloaded. Left foot osteomyelitis  Group A Strep Bacteremia  Sepsis  Management per primary team.       [facility] Nephrology would like to thank Yoandy Wilson MD   for opportunity to serve this patient      Please call with questions at-   24 Hrs Answering service [phone] or  7 am- 5 pm via Perfect serve or cell phone      CC/reason for consult :     Hemodialysis continuation     HPI :     Lamont Mckenna is a 79 y.o. male with history of osteomyelitis, ESRD on HD, CAD, PAD, HTN, HLD, and vascular dementia who presented to the hospital on 2/20/2023 with shortness of breath during his hemodialysis session. Per daughter, patient had desatted to the [de-identified] during the session.  On admission, patient has been improving in respiratory status with normal saturations, but remains admitted due to acute concerns of osteomyelitis of the left great toe, sepsis (BC confirmed 2x for Group A strep), and significant arterial disease (recent arterial duplex showing 80% blockage of the left lower extremity). Chest X-ray was negative for acute concerns. Currently on cefepime (started on 2/20). Single dose of vancomycin given yesterday, with plans to start clindamycin today. Nephrology was consulted for continuation of dialysis. Last Echo in 2019 with 55% EF. Repeat Echo ordered during this admission. ROS:     positives in bold   Constitutional:  fever, chills, weakness, weight change, fatigue  Skin:  rash, pruritus, hair loss, bruising, dry skin, petechiae, left great toe wound   Head, Face, Neck   headaches, swelling,  cervical adenopathy  Respiratory: shortness of breath, cough, or wheezing  Cardiovascular: chest pain, palpitations, dizzy, edema  Gastrointestinal: nausea, vomiting, diarrhea, constipation,belly pain    Yellow skin, blood in stool  Musculoskeletal:  back pain, muscle weakness, gait problems,       joint pain or swelling. Genitourinary:  dysuria, poor urine flow, flank pain, blood in urine  Neurologic:  vertigo, TIA'S, syncope, seizures, focal weakness  Psychosocial:  insomnia, anxiety, or depression. Additional positive findings:                          All other remaining systems are negative.       PMH/PSH/SH/Family History:     Past Medical History:   Diagnosis Date    Amputated great toe, right (HCC)     CAD (coronary artery disease)     CVA (cerebral vascular accident) ([address]     3    Dementia ([address]     Depression     Diabetes ([address]     Diabetic polyneuropathy ([address]     ESRD on dialysis ([address]     monday wednesday friday    GERD (gastroesophageal reflux disease)     Hemiplegia and hemiparesis following cerebral infarction affecting right dominant side (HCC)     Hyperlipidemia     Hypertension     Pain syndrome, chronic     Peripheral vascular disease [facility]     [facility]    Secondary hyperparathyroidism [facility])        Past Surgical History:   Procedure Laterality Date    CARDIAC CATHETERIZATION      DIALYSIS FISTULA CREATION      FOOT DEBRIDEMENT Left 2/26/2023    INCISION AND DRAINAGE WITH DELAYED  PRIMARY CLOSURE WITH  WOUND VAC APPLICATION,  APPLICATION SKIN GRAFT SUBSTITUTE LEFT  LOWER EXTREMITY performed by Guera Gibson DPM at [place] [address] Left 2/24/2023    LEFT FOOT TOTAL FIRST RAY AMPUTATION performed by Guera Gibson DPM at [address] [place] [address] History     Socioeconomic History    Marital status:      Spouse name: Not on file    Number of children: Not on file    Years of education: Not on file    Highest education level: Not on file   Occupational History    Not on file   Tobacco Use    Smoking status: Former    Smokeless tobacco: Never   Vaping Use    Vaping Use: Never used   Substance and Sexual Activity    Alcohol use: No     Alcohol/week: 0.0 standard drinks    Drug use: No    Sexual activity: Not Currently   Other Topics Concern    Not on file   Social History Narrative    Not on file     Social Determinants of Health     Financial Resource Strain: Not on file   Food Insecurity: Not on file   Transportation Needs: Not on file   Physical Activity: Not on file   Stress: Not on file   Social Connections: Not on file   Intimate Partner Violence: Not on file   Housing Stability: Not on file           Problem Relation Age of Onset    Heart Disease Mother     Diabetes Mother     Dementia Mother     Diabetes Father     Heart Disease Father     Stroke Brother           Medication:     Scheduled Meds:   vancomycin  750 mg IntraVENous Once per day on Mon Wed Fri    atorvastatin  80 mg Oral Daily    heparin (porcine)  5,000 Units SubCUTAneous 3 times per day    lidocaine  1 patch TransDERmal Daily    epoetin ramesh-epbx  10,000 Units IntraVENous Once per day on Mon Wed Fri    mirtazapine  7.5 mg Oral Nightly    traZODone  25 mg Oral Nightly    melatonin  3 mg Oral Nightly    sodium chloride flush  5-40 mL IntraVENous 2 times per day    aspirin  81 mg Oral Daily    memantine  10 mg Oral BID    metoprolol succinate  25 mg Oral Daily    pantoprazole  40 mg Oral QAM AC    Vitamin D  1,000 Units Oral Daily    sodium chloride flush  5-40 mL IntraVENous 2 times per day     Continuous Infusions:   sodium chloride      sodium chloride      sodium chloride       PRN Meds:.oxyCODONE **OR** oxyCODONE, sodium chloride, sodium chloride, sodium chloride flush, sodium chloride, ondansetron **OR** ondansetron, acetaminophen, nitroGLYCERIN, sodium chloride flush, sodium chloride, polyethylene glycol, perflutren lipid microspheres    Vitals :     Vitals:    02/28/23 0547   BP: (!) 158/62   Pulse: 94   Resp: 18   Temp: 98.5 °F (36.9 °C)   SpO2: 94%       I & O :       Intake/Output Summary (Last 24 hours) at 2/28/2023 3356  Last data filed at 2/28/2023 0154  Gross per 24 hour   Intake 784 ml   Output 0 ml   Net 784 ml          Physical Examination :     General appearance: Anxious- no, distressed- no  HEENT: Lips- normal, teeth- ok , oral mucosa- moist  Neck : Mass- no, appears symmetrical, JVD- not visible  Respiratory: Respiratory effort- normal effort, wheeze- no, crackles - no  Cardiovascular: Ausculation- No M/R/G, Edema none. Abdomen: visible mass- no, distention- no, scar- no, tenderness- no                            hepatosplenomegaly-  no  Musculoskeletal:  clubbing no,cyanosis- no , digital ischemia- no                           muscle strength- grossly normal , tone - grossly normal.  Skin: rashes- no , ulcers- yes, on left great toe but not visualized due to dressing, induration- no, tightening - no  Extremities: Right arm is swollen. No pain.    Psychiatric:  Judgement and insight- normal  CNS:        AAO X 3     LABS:     Recent Labs     02/26/23  0732 02/27/23  0521 02/28/23  0651   WBC 13.1* 11.2* 9.6   HGB 8.3* 7.6* 7.9*   HCT 26.8* 24.4* 25.1*    352 285       Recent Labs     02/26/23  0539 02/26/23  0732 02/27/23  0521 02/28/23  0651   *  --  135* 135*   K 5.9* 4.5 4.5 3.8   CL 97*  --  98* 99   CO2 23  --  28 29   BUN 30*  --  36* 23*   CREATININE 4.8*  --  6.0* 4.9*   GLUCOSE 121*  --  126* 119*   MG 2.30  --  2.30 2.00   PHOS 2.9  --  3.0 2.8            Patient discuss with Dr. Mario Alarcon  Internal Medicine, MS4      Medical Student Note edited and addended where appropriate. Rebecca López MD  PGY-3 Internal Medicine  02/28/23     Patient was seen and examined and the case was discussed with the resident. He acted as my scribe. I agree with the assessment and plan.

## 2023-02-28 NOTE — PROGRESS NOTES
Podiatric Surgery Daily Progress Note  Ranjan Gallagher      Subjective :   Patient is s/p left I&D, [facility], graft application, and wound vac application (DOS 3/71/20). Patient seen and examined this am at the bedside. Patient denies any acute overnight events. Reports feeling well, no pain to b/l LE. Review of Systems: A 12 point review of systems is unremarkable with the exception of the chief complaint. Patient specifically denies nausea, vomiting, fever, chills, shortness of breath, chest pain, abdominal pain, constipation, or difficulty urinating. Objective     BP (!) 169/75   Pulse 94   Temp 98.5 °F (36.9 °C) (Oral)   Resp 18   Ht 5' 7\" (1.702 m)   Wt 149 lb 4 oz (67.7 kg)   SpO2 94%   BMI 23.38 kg/m²     I/O:  Intake/Output Summary (Last 24 hours) at 2/28/2023 1011  Last data filed at 2/28/2023 0154  Gross per 24 hour   Intake 784 ml   Output 0 ml   Net 784 ml                Wt Readings from Last 3 Encounters:   02/28/23 149 lb 4 oz (67.7 kg)   02/20/23 153 lb 7 oz (69.6 kg)   12/09/22 156 lb 8 oz (71 kg)       LABS:    Recent Labs     02/27/23  0521 02/28/23  0651   WBC 11.2* 9.6   HGB 7.6* 7.9*   HCT 24.4* 25.1*    285        Recent Labs     02/28/23  0651   *   K 3.8   CL 99   CO2 29   PHOS 2.8   BUN 23*   CREATININE 4.9*        Recent Labs     02/25/23  1058   INR 1.23*           LOWER EXTREMITY EXAMINATION    Dressing to left LE clean, dry, intact at this time. Wound VAC to left LE with excellent seal, running continuous at 125mmHg. Approximately 20mL of sanguinous drainage in cannister. No pain with calf compression b/l. Right LE soft tissue envelope closed and without acute signs of infection. IMAGING:  XR Left Foot 2/20/23  Narrative   History: Medial foot ulcer. 2 views of foot. FINDINGS: There is a medial ulceration. There is lysis of the medial base of the proximal phalanx. Lysis of the medial head of the first metatarsal suspicious for osteomyelitis.  No soft tissue gas. Associated soft tissue swelling. Impression   1. Medial ulceration with lytic changes compatible with osteomyelitis of the base of the first proximal phalanx and distal first metatarsal.     MRI Left foot 2/21/23  Narrative   Patient: Pawel De La Cruz  Time Out: 07:20   Exam(s): MRI LEFT FOOT Without Contrast        EXAM:     MR Left Lower Extremity Without Intravenous Contrast, Foot       CLINICAL HISTORY:     concern for OM at first metatarsal head, base of proximal    phalanx. TECHNIQUE:     Multiplanar magnetic resonance images of the left foot without    intravenous contrast.       COMPARISON:     No relevant prior studies available. FINDINGS:   Images are degraded by motion artifact. Ulcer along the medial aspect of the first toe. Underlying    abscess. Subcutaneous soft tissue edema and intramuscular edema    as can be seen in setting of diabetic neuropathic changes. No    soft tissue gas identified. Marrow signal abnormality within the    first distal phalanx consistent with acute osteomyelitis. No    septic arthritis. No acute fracture. Electronically signed by Yandel Lawson MD on 02-21-23 at 0720       Impression   Acute osteomyelitis of the first distal phalanx. XR Left foot 2/24- post-op  Narrative   3 views of the left foot       HISTORY: Pain. Surgery. FINDINGS:       Amputation of the first ray. Surgical bandages in place. No definite immediate complication is identified. Impression   Postoperative changes as above. VL Arterial Duplex b/l LE 2/21  Impressions   Right Impression   The right ankle/brachial index is 1.09 (PT- 166 mmHg; DP- non-compressible). The right arm brachial pressure was not obtained due to AVF. There is normal, multiphasic flow identified in the common femoral artery,   suggesting no evidence of significant aorto-iliac inflow disease.    Elevated velocities at the mid superficial femoral artery indicate a >50%   stenosis by velocity criteria (velocities went from 92 to 193 cm/s). There is atherosclerotic plaque involving the popliteal artery with velocities   consistent with <50% stenosis. Elevated velocities at the distal anterior tibial artery indicate a >50%   stenosis by velocity criteria (velocities went from 36 to 81 cm/s). Left Impression   The left ankle/brachial index is 0.74 (PT-112 mmHg; DP-110 mmHg). The left AMOS   was technically difficult to obtain due to patient movement. There is normal, multiphasic flow identified in the common femoral artery,   suggesting no evidence of significant aorto-iliac inflow disease. There is atherosclerotic plaque involving the superficial femoral artery with   velocities consistent with <50% stenosis. The distal end of the popliteal artery appears to be occluded, and the   tibio-peroneal trunk appears to be occluded. There are multiple collateral   vessels noted in this area. The posterior tibial artery is occluded with retrograde trickle flow   demonstrated at the ankle area. The distal anterior tibial artery is occluded with reconstitution at the ankle   (34 cm/s). There are no previous studies for comparison. Conclusions        Summary        Normal right AMOS. Abnormal left AMOS in the range of moderate arterial insufficiency. Right mid-SFA stenosis of greater than 50% associated with nonocclusive    popliteal and tibial disease. Left popliteal and tibioperoneal trunk occlusions with associated posterior    tibial and anterior tibial artery occlusions.         Signature    ------------------------------------------------------------------    Electronically signed by Juan F Boone MD (Interpreting    physician) on 02/21/2023 at 03:56 PM       ASSESSMENT/PLAN  - s/p I&D, delayed primary closure, skin graft substitute application, wound vac application; left lower extremity (DOS 2/26/23)  - s/p First ray amputation; left lower extremity (DOS: 2/24/23)  - Osteomyelitis; 1st metatarsal, 1st proximal phalanx, 1st distal phalanx; left foot  - Full thickness ulceration; left foot; Bee 3  - Peripheral vascular disease; b/l LE  - History of partial 1st ray and 3rd digit amputation; right foot    -Patient examined and evaluated at bedside   -Hypertensive otherwise VSS, no leukocytosis noted (WBC 9.6)  -.1,   -Prealbumin 7.1; continue dietary nutritional supplements  -HbA1c 6.0%  -Wound culture- Strep Pyogenes, Staph Aureus, Haemophilus parainfluenzae   -Surgical path pending  -Blood culture x2- Strep pyogenes  -XR, MRI reviewed, impressions noted above  -Arterial Duplex b/l LE reviewed, impressions noted above  -Vascular surgery following; appreciate recommendations  -ID recs Vancomycin at HD through 4/7/23  -Wound VAC left intact, running continuous at 125mmHg with excellent seal noted. -Nonweight bearing to the left LE  -No further surgical intervention from podiatric standpoint    DISPO: s/p I&D, delayed primary closure, application of skin graft substitute, wound vac application; left LE. Continue antibiotics per ID. ID recs Vanc at HD through 4/7/23. No further surgical intervention from podiatric standpoint. Patient will need wound vac set up at home facility.  Patient stable for discharge pending medical clearance    Discussed assessment and plan with Dr. Sean Ospina, Santosh Perez, ALANNA  Podiatric Resident PGY-1  Pager [phone] or Perfect Serve

## 2023-02-28 NOTE — PROGRESS NOTES
Pt transferred to 3311 from PCU, alert to self and situation, VSS, transfer 4 eyes complete. Oriented to room and call light, bed in lowest position, wheels locked and alarm on, no needs expressed at this time.

## 2023-02-28 NOTE — PROGRESS NOTES
Physical Therapy  Facility/Department: [address]  Daily Treatment Note  NAME: Mikal Hussein  : 1955  MRN: [id number]    Date of Service: 2023    Discharge Recommendations: Mikal Hussein scored a 7/24 on the AM-PAC short mobility form. Current research shows that an AM-PAC score of 17 or less is typically not associated with a discharge to the patient's home setting. Based on the patient's AM-PAC score and their current functional mobility deficits, it is recommended that the patient have 3-5 sessions per week of Physical Therapy at d/c to increase the patient's independence.  Please see assessment section for further patient specific details.    PT Equipment Recommendations  Equipment Needed: No    Patient Diagnosis(es): The primary encounter diagnosis was Osteomyelitis of left foot, unspecified type (HCC). Diagnoses of ESRD on dialysis ([facility]) and Streptococcal bacteremia were also pertinent to this visit.    Assessment   Assessment: Pt was limited by severe global weakness and LLE NWB restrictions. Presented with poor trunk and LE control during bed mobility. When moving from a flat surface and without handrails pt requires assistance and sequencing cues to sit EOB safely. Transfers were unsteady and unsafe; trunk and BLE facilitation throughout. Dependent on the RONEY WHITLEY for OOB activities.   Equipment Needed: No     Plan    Physcial Therapy Plan  General Plan:  (2-5)     Restrictions  Restrictions/Precautions  Restrictions/Precautions: Fall Risk  Position Activity Restriction  Other position/activity restrictions: NWB L LE, strict I&O, up with assistance, NPO     Subjective    Subjective  Subjective: Pt was in bed willing to participate; struggling with confusion     Objective   Vitals  BP: (!) 169/75  MAP (Calculated): 106  Bed Mobility Training  Bed Mobility Training: Yes  Supine to Sit: Moderate assistance + Maximum assistance;Assist  Transfer Training  Sit to Stand: Maximum assistance;Assist  X2  Stand to Sit: Maximum assistance;Assist X2  Bed to Chair: Total assistance via STEDY    Balance:  Sitting: POOR right side latera and posterior lean with intermittent trunk extensor tone  Standing: POOR (decreased BLE weakness and gluteal control)     PT Exercises  Static Sitting Balance Exercises: Sitting EOB for approx 20 mins    Safety Devices  Type of Devices: Left in chair;Chair alarm in place;Call light within reach;Nurse notified; Heels elevated for pressure relief       Goals  Short Term Goals  Time Frame for Short Term Goals: discharge  Short Term Goal 1: Pt. will demonstrate bed mobility with min A  Short Term Goal 2: Pt. will demonstrate sit <-> stand with mod A of 1 and LRAD  Short Term Goal 3: Pt. will demonstrate bed <-> chair transfers with mod A of 1 and LRAD  Short Term Goal 4: Pt. will demonstrate (I) and compliance with left LE weight bearing status  Patient Goals   Patient Goals : per ex-wife \"return to [employer]"    Education  Patient Education  Education Given To: Patient  Education Provided: Role of Therapy;Plan of Care;Precautions;Transfer Training  Education Provided Comments: use of call light, PT recommendations, NWB left LE    Therapy Time   Individual Concurrent Group Co-treatment   Time In       0900   Time Out       0953   Minutes       [facility]

## 2023-02-28 NOTE — PROGRESS NOTES
Vascular Surgery   Daily Progress Note  Patient: Fab Engel      CC: RUE swelling over AV fistula     SUBJECTIVE:   No acute complaints. States the swelling in his right arm is better compared to earlier today. He denies pain and any changes in sensation or motor function in his RUE. Has persistent hiccups. ROS:   A 14 point review of systems was conducted, significant findings as noted above. All other systems negative. OBJECTIVE:    PHYSICAL EXAM:    Vitals:    02/28/23 0137 02/28/23 0547 02/28/23 0600 02/28/23 1005   BP: (!) 156/69 (!) 158/62  (!) 169/75   Pulse: 82 94  90   Resp: 18 18  18   Temp: 98.1 °F (36.7 °C) 98.5 °F (36.9 °C)  98.6 °F (37 °C)   TempSrc: Oral Oral  Oral   SpO2: 92% 94%  95%   Weight:   149 lb 4 oz (67.7 kg)    Height:           General appearance: alert, no acute distress  Eyes: no scleral icterus, EOM grossly intact  Neck: trachea midline, neck supple  Chest/Lungs: normal effort with no accessory muscle use on RA  Cardiovascular: RRR  Abdomen: Soft, non-tender, non-distended, no rebound, guarding, or rigidity. Skin: warm and dry, no rashes  Extremities: RUE mildly edematous from arm down to fingers when compared to the left side. Equal  strength, sensation intact, warm. RUE fistula with palpable thrill. Strong radial pulse. Wound vac in place on LLE, holding suction   Genitourinary: Grossly normal  Neuro: A&Ox3, no focal deficits, sensation intact    LABS:   Recent Labs     02/27/23  0521 02/28/23  0651   WBC 11.2* 9.6   HGB 7.6* 7.9*   HCT 24.4* 25.1*   MCV 86.1 86.1    285        Recent Labs     02/27/23  0521 02/28/23  0651   * 135*   K 4.5 3.8   CL 98* 99   CO2 28 29   PHOS 3.0 2.8   BUN 36* 23*   CREATININE 6.0* 4.9*      No results for input(s): AST, ALT, ALB, BILIDIR, BILITOT, ALKPHOS in the last 72 hours. No results for input(s): LIPASE, AMYLASE in the last 72 hours. No results for input(s): PROT, INR, APTT in the last 72 hours.    No results for input(s): CKTOTAL, CKMB, CKMBINDEX, TROPONINI in the last 72 hours. ASSESSMENT & PLAN:   This is a 79 y.o. male with a PMHx of dementia, CAD, CVA, DM, ESRD on dialysis, GERD, Hemiplegia and hemiparesis 2/2 cerebral infarct, HLD, HTN, PVD, and hx of pedal amputations, LLE I&D with graft and W[place] placement.  Vascular surgery was consulted due to concerns for RUE AV graft swelling     - Upper extremity venous duplex ordered, will follow up   - Possible fistulagram tomorrow depending on duplex results     Daniel Gray MD  PGY1, General Surgery  02/28/23  11:19 AM  SXR#[phone]

## 2023-02-28 NOTE — PLAN OF CARE
Problem: Skin/Tissue Integrity  Goal: Absence of new skin breakdown  Description: 1. Monitor for areas of redness and/or skin breakdown  2. Assess vascular access sites hourly  3. Every 4-6 hours minimum:  Change oxygen saturation probe site  4. Every 4-6 hours:  If on nasal continuous positive airway pressure, respiratory therapy assess nares and determine need for appliance change or resting period.   2/27/2023 2201 by Nandini Maya RN  Outcome: Progressing  Note: Turn pt every 2 hourly  Change pads as needed to prevent IAD      Problem: Pain  Goal: Verbalizes/displays adequate comfort level or baseline comfort level  2/27/2023 2201 by Nandini Maya RN  Outcome: Progressing  Flowsheets (Taken 2/27/2023 2201)  Verbalizes/displays adequate comfort level or baseline comfort level:   Encourage patient to monitor pain and request assistance   Assess pain using appropriate pain scale   Administer analgesics based on type and severity of pain and evaluate response   Implement non-pharmacological measures as appropriate and evaluate response   Consider cultural and social influences on pain and pain management     Problem: Safety - Adult  Goal: Free from fall injury  2/27/2023 2201 by Nandini Maya RN  Outcome: Progressing  Flowsheets (Taken 2/27/2023 2201)  Free From Fall Injury: Instruct family/caregiver on patient safety  Note: Fall protocol in place      Problem: Neurosensory - Adult  Goal: Achieves stable or improved neurological status  Outcome: Progressing  Flowsheets (Taken 2/27/2023 2202)  Achieves stable or improved neurological status:   Assess for and report changes in neurological status   Initiate measures to prevent increased intracranial pressure   Maintain blood pressure and fluid volume within ordered parameters to optimize cerebral perfusion and minimize risk of hemorrhage

## 2023-02-28 NOTE — PROGRESS NOTES
Occupational Therapy  Facility/Department: [facility]  Daily Treatment Note  NAME: Deyanira Merritt  : 1955  MRN: [id number]    Date of Service: 2023    Discharge Recommendations: Deyanira Merritt scored a 11 on the AM-PAC ADL Inpatient form. Current research shows that an AM-PAC score of 17 or less is typically not associated with a discharge to the patient's home setting. Based on the patient's AM-PAC score and their current ADL deficits, it is recommended that the patient have 3-5 sessions per week of Occupational Therapy at d/c to increase the patient's independence. Please see assessment section for further patient specific details. If patient discharges prior to next session this note will serve as a discharge summary. Please see below for the latest assessment towards goals. Patient Diagnosis(es): The primary encounter diagnosis was Osteomyelitis of left foot, unspecified type ([facility] [address] Diagnoses of ESRD on dialysis [facility]) and Streptococcal bacteremia were also pertinent to this visit. Assessment      Assessment: Pt tolerated session well, but limited by pain. Still requires 2 person assist with STEDY for funct mob and transfers with Max A x2 and assist to maintain NWB for LLE. Demo decrease AROM/strenght in RUE. Would benefit from cont OT to work on ADLs, funct mob and transfers. Cont with POC      Activity Tolerance: Patient limited by pain; Patient tolerated treatment well      Plan   Occupational Therapy Plan  Times Per Week: 2-5     Restrictions  Restrictions/Precautions  Restrictions/Precautions: Fall Risk  Position Activity Restriction  Other position/activity restrictions: NWB L LE, strict I&O, up with assistance, NPO    Subjective   Subjective  Subjective: Pt in bed upon arrival. Agreeable to OT  Pain: Complaint of pain during mobility in LLE           Objective    Vitals     Bed Mobility Training  Bed Mobility Training: Yes  Supine to Sit: Moderate assistance;Maximum assistance;Assist X2  Scooting: Total assistance      Balance  Sitting:  (Mod A initially with R lean and occcasional posterior lean. Cues to correct and progressed to CGA/ SBA)      Transfer Training  Transfer Training: Yes  Sit to Stand: Maximum assistance;Assist X2  Stand to Sit: Maximum assistance;Assist X2  Bed to Chair: Total assistance (STEDY)         ADL  Feeding: Setup; Increased time to complete  LE Dressing: Dependent/Total              Safety Devices  Type of Devices: Left in chair;Call light within reach; Chair alarm in place;Nurse notified     Patient Education  Education Given To: Patient  Education Provided: Role of Therapy;Plan of Care;Transfer Training;ADL Adaptive Strategies; Fall Prevention Strategies;Precautions  Education Method: Verbal  Barriers to Learning: Cognition  Education Outcome: Verbalized understanding;Continued education needed    Goals  Short Term Goals  Time Frame for Short Term Goals: by dc  Short Term Goal 1: Pt will complete toilet transfer with mod A  Short Term Goal 2: Pt will complete LB dressing with mod A  Short Term Goal 3: Pt will complete UB dressing with setup  Patient Goals   Patient goals : per exwife- for pt to do more therapy once going back to the nursing home, be able to get stronger       Therapy Time   Individual Concurrent Group Co-treatment   Time In       0900   Time Out       0953   Minutes       53   Timed Code Treatment Minutes: [place], [place]/L

## 2023-02-28 NOTE — PROGRESS NOTES
Pt is lethargic, responds easily to verbal stimuli, is alert and oriented x4. Pt denies pain, reports decreased appetite, denies nausea. VSS, afebrile. Q2 hour turns maintained while in bed, pt up to chair with PT/OT via stedy, returned to bed with garry lift due to weakness/fatigue. Alarm is on, call light and personal belongings are within reach. Vascular lab aware pt's study is discharge dependent.

## 2023-02-28 NOTE — PROGRESS NOTES
4 Eyes Admission Assessment     I agree as the admission nurse that 2 RN's have performed a thorough Head to Toe Skin Assessment on the patient. ALL assessment sites listed below have been assessed on admission. Areas assessed by both nurses: Yes  [x]   Head, Face, and Ears   [x]   Shoulders, Back, and Chest  [x]   Arms, Elbows, and Hands   [x]   Coccyx, Sacrum, and Ischium  [x]   Legs, Feet, and Heels        Does the Patient have Skin Breakdown?   No         Nirmal Prevention initiated:  Yes   Wound Care Orders initiated:  NA      [facility] nurse consulted for Pressure Injury (Stage 3,4, Unstageable, DTI, NWPT, and Complex wounds) or Nirmal score 18 or lower:  NA      Preventative on L hip  R groin site puncture occlusive dressing  LLE dressing and wound vac    Nurse 1 eSignature: Electronically signed by Tresa Petit RN on 2/28/23 at 1:46 AM EST    **SHARE this note so that the co-signing nurse is able to place an eSignature**    Nurse 2 eSignature: Electronically signed by Adrianna Antunez RN on 2/28/23 at 1:38 AM EST

## 2023-02-28 NOTE — PROGRESS NOTES
Internal Medicine Progress Note    Patient Name: Alonso Nino   Patient : 1955   Date: 2023   Admit Date: 2023     CC: Shortness of Breath       Subjective     Interval History:     No acute overnight events. RUE doppler to assess AV fistula for an occlusion/stenosis - planning to discharge to SNF today if is not occluded  POD #4:  L foot total first ray amputation  POD #2:  Further debridement, delayed primary closure with wound VAC application and skin graft substitute application   Afebrile, weaned off of supplemental O2  Net UF yesterday:  2.35L    Patient seen and examined at bedside, eating breakfast. Endorses hiccups, \"a little bit\" of dyspnea and lightheadedness, unchanged from yesterday. Denies headache, dizziness, ear fullness/pressure, rhinorrhea, sore throat, chest pain/pressure/tightness, abdominal pain, nausea, vomiting, chills, abdominal pain, leg/foot pain. Objective     Vital Signs:  Patient Vitals for the past 8 hrs:   BP Temp Temp src Pulse Resp SpO2 Weight   23 0600 -- -- -- -- -- -- 149 lb 4 oz (67.7 kg)   23 0547 (!) 158/62 98.5 °F (36.9 °C) Oral 94 18 94 % --   23 0137 (!) 156/69 98.1 °F (36.7 °C) Oral 82 18 92 % --   23 0124 -- -- -- -- -- -- 149 lb 4 oz (67.7 kg)   23 2341 (!) 117/44 98.3 °F (36.8 °C) Oral 96 18 92 % --         Physical Exam:  Constitutional: Pleasant, non-diaphoretic, elderly male, sitting semi-Fowlers in bed, in NAD, hiccupping. HEENT:  NCAT. Scleral icterus. No conjunctival injection. No rhinorrhea or epistaxis. Cardiovascular:  No apparent JVD. Auscultation difficult due to frequent hiccups. Radial pulses 2+. Capillary refill < 2 sec. Pulmonary:  Able to speak in full sentences without frequent pauses. No perioral cyanosis. Auscultation difficult due to frequent hiccups. Abdomen: Non-distended. Normoactive bowel sounds. Soft, no TTP. Musculoskeletal:  Cachectic. Right AV fistula.  Some mild swelling of RUE, including forearm and hand, which are warm to the touch, capillary refill < 2 sec, and sensation intact. No lower extremity edema. Left foot and ankle are covered in ACE wrap, which is clean, dry, and intact. Skin:  Warm, dry, acyanotic. Psychological:  Cooperative. Normal speech, behavior, and thought content.        Intake/Output Summary (Last 24 hours) at 2/28/2023 0732  Last data filed at 2/28/2023 0154  Gross per 24 hour   Intake 844 ml   Output 25 ml   Net 819 ml          Medications:   vancomycin  750 mg IntraVENous Once per day on Mon Wed Fri    atorvastatin  80 mg Oral Daily    heparin (porcine)  5,000 Units SubCUTAneous 3 times per day    lidocaine  1 patch TransDERmal Daily    epoetin ramesh-epbx  10,000 Units IntraVENous Once per day on Mon Wed Fri    mirtazapine  7.5 mg Oral Nightly    traZODone  25 mg Oral Nightly    melatonin  3 mg Oral Nightly    sodium chloride flush  5-40 mL IntraVENous 2 times per day    aspirin  81 mg Oral Daily    memantine  10 mg Oral BID    [Held by provider] metoprolol succinate  25 mg Oral Daily    pantoprazole  40 mg Oral QAM AC    Vitamin D  1,000 Units Oral Daily    sodium chloride flush  5-40 mL IntraVENous 2 times per day       sodium chloride      sodium chloride      sodium chloride          Labs:  CBC:   Recent Labs     02/25/23  1058 02/26/23  0732 02/27/23  0521   WBC 12.8* 13.1* 11.2*   HGB 8.3* 8.3* 7.6*   HCT 26.7* 26.8* 24.4*    373 352   MCV 85.0 85.5 86.1     Renal:    Recent Labs     02/25/23  1058 02/26/23  0539 02/26/23  0732 02/27/23  0521    134*  --  135*   K 4.3 5.9* 4.5 4.5   CL 99 97*  --  98*   CO2 28 23  --  28   BUN 19 30*  --  36*   CREATININE 4.0* 4.8*  --  6.0*   GLUCOSE 124* 121*  --  126*   CALCIUM 8.4 8.6  --  8.5   MG 1.80 2.30  --  2.30   PHOS  --  2.9  --  3.0   ANIONGAP 9 14  --  9     Hepatic:   Recent Labs     02/26/23  0539 02/27/23  0521   LABALBU 1.8* 2.1*       INR:   Recent Labs     02/25/23  1058 INR 1.23*       Radiology:  XR CHEST PORTABLE   Final Result      Questionable minimal bibasilar atelectasis. XR FOOT LEFT (MIN 3 VIEWS)   Final Result      Postoperative changes as above. VL DUP LOWER EXTREMITY ARTERIES BILATERAL   Final Result      MRI FOOT LEFT WO CONTRAST   Final Result   Acute osteomyelitis of the first distal phalanx. XR FOOT LEFT (MIN 3 VIEWS)   Final Result   1. Medial ulceration with lytic changes compatible with osteomyelitis of the base of the first proximal phalanx and distal first metatarsal.      XR CHEST PORTABLE   Final Result   Impression: No acute abnormality      IR ANGIOGRAM EXTREMITY LEFT    (Results Pending)   VL Extremity Venous Right    (Results Pending)       Assessment & Plan   71-year-old  male who presented to the ED from his SNF on 2/20/23 with dyspnea occurring during hemodialysis. Sepsis with S. pyogenes bacteremia - likely 2/2 acute osteomyelitis - afebrile  Acute MRSA, GAS osteomyelitis of left first ray s/p I&D, first ray amputation - no further surgical intervention per podiatry standpoint  Peripheral arterial disease - LLE AMOS 0.74  Follows with vascular surgery at [facility].  S/p amputation of right hallux and 4th digit 2/2 osteomyelitis. 2/20: Growth on BCx x2 and on PCR, prealbumin low at 7.1, ESR elevated at 117, CRP elevated at 282.1. ID consulted, recs appreciated   Continue vancomycin IV (2/20-), dosed by pharmacy.   Will need 6 weeks of vancomycin 750 mg IV post-hemodialysis, on hemodialysis days only from 2/24 to 4/7  Repeat BCx (2/21): NGTD  OR Cx (2/24):  GAS, MRSA  TTE (2/21): No valvular vegetations  CBC w/diff daily  Podiatry and vascular surgery consulted   2/22:  LLE arteriogram  2/24:  L foot I&D with total first ray amputation  2/26: I&D, delayed primary closure, application of skin graft substitute, wound vac application of LLE - non definitive surgery, no further podiatry surgical interventions planned  NWB to LLE  Continue aspirin and high-dose statin  PT/OT eval and tx  The patient is at high-risk for perioperative complications in an intermediate-risk procedure given multiple vascular comorbidities, including his cerebrovascular disease and elevated creatinine level. Acute hypoxic respiratory failure? possibly 2/2 hypervolemia - resolved after HD with net UF of 2.35 L  New supplemental O2 requirement w/o distinct physical exam signs of volume overload. Undergoes HD on M, W, F with ultra-filtrate. Wean oxygen to 92% as tolerated    G1DD - no signs of volume overload  2/20:  ProBNP 7611. Echo (2/21):  LVEF 55-60%, no regional WMA, G1DD, small circumferential pericardial effusion, PASP 28 mmHg. Right upper extremity, forearm, hand swelling - stable  Right AV fistula. No areas of warmth or redness. Pulse and sensation are intact. 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,W,F)  Hyperparathyroidism of ESRD  Normocytic anemia of ESRD  Nephrology consulted  HD today - if BP remains elevated after HD session with UF, resume home meds  BMP, Mg QD - replete to keep K > 4, Mg > 2  Avoid NSAIDs/nephrotoxins as patient still makes urine  Strict I/Os  Daily weights  Keep MAP > 65 mmHg  Procrit with dialysis    Pulmonary hypertension  Hypertension  CAD (LAD 15%, L Cx 15%, RCA 15-20% in 07/2016)  Continue ASA 81 mg QD  Continue home meds metoprolol succinate 25 mg QD  Restart isosorbide mononitrate 30 mg QD, hydralazine 25 mg BID    HLD - well-controlled  Lipid panel (2/22/23): Total 74, HDL 10, LDL 34, , VLDL 30.   Continue home atorvastatin 40 mg QD    Vascular dementia  Hemiplegia   Palliative care consulted  Continue memantine 10 mg BID    Insomnia  Continue home meds melatonin 3 mg QHS, trazodone 25 mg QHS, mirtazapine 7.5 mg QHS PRN    GERD  Protonix QD      DVT PPx:  Heparin SQ, Protonix  Diet:  ADULT DIET; Dysphagia - Soft and Bite Sized; 3 carb choices (45 gm/meal); Low Sodium (2 gm); Low Potassium (Less than 3000 mg/day); Low Phosphorus (Less than 1000 mg)  ADULT ORAL NUTRITION SUPPLEMENT; Breakfast, Lunch, Dinner; Wound Healing Oral Supplement   Code status:  DNR-CCA     ELOS:  5 days  Barriers to discharge:  sepsis bacteremia, acute OM  Disposition  - Preadmission: From [facility].  - Current:  [facility]  - Upon discharge:  OT 11/24, PT 7/24 on 2/27. Plan to return to SNF. Will discuss with attending physician Dr. Glenis Suárez MD.     Guerita Gabriel.  Soto Sanchez MD, [id number], [facility]  Internal Medicine, PGY-1

## 2023-02-28 NOTE — PROGRESS NOTES
Clinical Pharmacy Progress Note    Vancomycin - Management by Pharmacy    Consult Date(s): 2/20/23  Consulting Provider(s): Dr. Quijano Mole / Plan  1)  LLE osteomyelitis with abscess - Vancomycin  Concurrent Antimicrobials: n/a  Day of Vanc Therapy:  #9  Current Dosing Method: Intermittent Dosing by Levels  Therapeutic Goal: Trough ~ 15 mg/L  Current Dose / Plan:   Pt is ESRD on HD Mon-Wed-Fri. Will dose Vancomycin intermittently. Continue 750mg IV 3x/week at HD on Mon-Wed-Fri. Next dose tomorrow with HD. Will plan to order level prior to HD on Friday if still here. Will continue to monitor clinical condition and make adjustments to regimen as appropriate. MRSA in wound culture has elevated BROCK = 2; may indicate higher risk of treatment failure. If not clinically improving, could consider changing to Daptomycin MWF with HD instead. ID is following - defer abx decisions to them. Please call with questions--  Thanks--  Eriberto Schmitz, PharmD, BCPS, BCGP  [id number] ([facility] [place])   2/28/2023 10:04 AM      Interval update:   Planning for discharge to SNF today with IV Vanc at HD through 4/7/23. Subjective/Objective:   Joe Gutierrez is a 79 y.o. male with a PMHx significant for ESRD on HD Mon-Wed-Fri, CAD, CVA, dementia, depression, DM, GERD, HTN, HLD, and PVD who is admitted with LLE osteomyelitis of 1st metatarsal, 1st proximal phalanx, and 1st distal phalanx with abscess. Pt is now s/p L foot 1st ray amputation (done 2/24) and s/p left foot I&D with delayed primary closure and wound vac placement (done 2/26). Pharmacy is consulted to dose Vancomycin.     Ht Readings from Last 1 Encounters:   02/20/23 5' 7\" (1.702 m)     Wt Readings from Last 1 Encounters:   02/28/23 149 lb 4 oz (67.7 kg)     Current & Prior Antimicrobial Regimen(s):  (2/20-2/21)   (2/20-2/21)  Vancomycin - Pharmacy to dose  Intermittent dosing (2/20-current)    Vancomycin Level(s) / Doses:    Date Vancomycin Level Vancomycin Dose   2/20  1750mg x1   2/21 21.9 mcg/mL --   2/22 16.9 mcg/mL 750mg x1   2/23  --   2/24 17 mcg/mL 750mg x1   2/25 -- --   2/26 -- --   2/27 18.1 mcg/mL 750mg x1     Cultures & Sensitivities:    Date Site Micro Susceptibility / Result   2/20 Blood x2 Group A Strep  Reliably sensitive to b-lactams, others   2/20 Foot wound Group A Strep  Staph aureus  H. influenzae Reliably sensitive to b-lactams, others  S=Vanc (BROCK=2), linezolid, daptomycin  Reliably sensitive to CTX, Amp, Bactrim   2/24 Wound - tissue Group A Strep  MRSA Reliably sensitive to b-lactams, others  S=Vanc (BROCK=2), linezolid, daptomycin     Recent Labs     02/26/23  0539 02/26/23  0732 02/27/23  0521 02/28/23  0651   CREATININE 4.8*  --  6.0* 4.9*   BUN 30*  --  36* 23*   WBC  --  13.1* 11.2* 9.6       CrCl is not calculated 2/2 ESRD on HD

## 2023-02-28 NOTE — PROGRESS NOTES
Pt d/c'd 2/28/2023. IV access removed with no complications. Reviewed d/c instructions, home meds, and f/u information utilizing teach-back method. Scripts for *** {Blank single:96378::\"given to patient\",\"sent with patient\",\"called to patient's preferred pharmacy\",\"filled by outpatient pharmacy\"}. Patient verbalized understanding. Patient {Blank single:62734::\"assisted to lobby in wheelchair\",\"declined wheelchair assistance to lobby\",\"discharged in ambulance\"} and left with all documented belongings.

## 2023-03-01 ENCOUNTER — APPOINTMENT (OUTPATIENT)
Dept: INTERVENTIONAL RADIOLOGY/VASCULAR | Age: 68
DRG: 853 | End: 2023-03-01
Payer: MEDICARE

## 2023-03-01 LAB
ALBUMIN SERPL-MCNC: 2.2 G/DL (ref 3.4–5)
ANAEROBIC CULTURE: ABNORMAL
ANION GAP SERPL CALCULATED.3IONS-SCNC: 10 MMOL/L (ref 3–16)
BASOPHILS ABSOLUTE: 0.1 K/UL (ref 0–0.2)
BASOPHILS RELATIVE PERCENT: 0.8 %
BUN BLDV-MCNC: 39 MG/DL (ref 7–20)
CALCIUM SERPL-MCNC: 8.7 MG/DL (ref 8.3–10.6)
CHLORIDE BLD-SCNC: 97 MMOL/L (ref 99–110)
CO2: 27 MMOL/L (ref 21–32)
CREAT SERPL-MCNC: 5.7 MG/DL (ref 0.8–1.3)
EOSINOPHILS ABSOLUTE: 0.1 K/UL (ref 0–0.6)
EOSINOPHILS RELATIVE PERCENT: 1 %
GFR SERPL CREATININE-BSD FRML MDRD: 10 ML/MIN/{1.73_M2}
GLUCOSE BLD-MCNC: 108 MG/DL (ref 70–99)
GRAM STAIN RESULT: ABNORMAL
HCT VFR BLD CALC: 23.5 % (ref 40.5–52.5)
HEMOGLOBIN: 7.3 G/DL (ref 13.5–17.5)
LYMPHOCYTES ABSOLUTE: 0.9 K/UL (ref 1–5.1)
LYMPHOCYTES RELATIVE PERCENT: 11.5 %
MAGNESIUM: 2 MG/DL (ref 1.8–2.4)
MCH RBC QN AUTO: 26.9 PG (ref 26–34)
MCHC RBC AUTO-ENTMCNC: 31.3 G/DL (ref 31–36)
MCV RBC AUTO: 86 FL (ref 80–100)
MONOCYTES ABSOLUTE: 0.5 K/UL (ref 0–1.3)
MONOCYTES RELATIVE PERCENT: 5.9 %
NEUTROPHILS ABSOLUTE: 6.4 K/UL (ref 1.7–7.7)
NEUTROPHILS RELATIVE PERCENT: 80.8 %
ORGANISM: ABNORMAL
ORGANISM: ABNORMAL
PDW BLD-RTO: 18.6 % (ref 12.4–15.4)
PHOSPHORUS: 3.2 MG/DL (ref 2.5–4.9)
PLATELET # BLD: 272 K/UL (ref 135–450)
PMV BLD AUTO: 7.2 FL (ref 5–10.5)
POTASSIUM SERPL-SCNC: 4.1 MMOL/L (ref 3.5–5.1)
RBC # BLD: 2.73 M/UL (ref 4.2–5.9)
SODIUM BLD-SCNC: 134 MMOL/L (ref 136–145)
WBC # BLD: 8 K/UL (ref 4–11)
WOUND/ABSCESS: ABNORMAL
WOUND/ABSCESS: ABNORMAL

## 2023-03-01 PROCEDURE — 6360000004 HC RX CONTRAST MEDICATION: Performed by: SURGERY

## 2023-03-01 PROCEDURE — 80069 RENAL FUNCTION PANEL: CPT

## 2023-03-01 PROCEDURE — 1200000000 HC SEMI PRIVATE

## 2023-03-01 PROCEDURE — 2580000003 HC RX 258: Performed by: PODIATRIST

## 2023-03-01 PROCEDURE — 36902 INTRO CATH DIALYSIS CIRCUIT: CPT

## 2023-03-01 PROCEDURE — 6370000000 HC RX 637 (ALT 250 FOR IP): Performed by: STUDENT IN AN ORGANIZED HEALTH CARE EDUCATION/TRAINING PROGRAM

## 2023-03-01 PROCEDURE — C1725 CATH, TRANSLUMIN NON-LASER: HCPCS

## 2023-03-01 PROCEDURE — 36907 BALO ANGIOP CTR DIALYSIS SEG: CPT

## 2023-03-01 PROCEDURE — 99153 MOD SED SAME PHYS/QHP EA: CPT

## 2023-03-01 PROCEDURE — 36215 PLACE CATHETER IN ARTERY: CPT | Performed by: SURGERY

## 2023-03-01 PROCEDURE — 36415 COLL VENOUS BLD VENIPUNCTURE: CPT

## 2023-03-01 PROCEDURE — 99152 MOD SED SAME PHYS/QHP 5/>YRS: CPT

## 2023-03-01 PROCEDURE — 2709999900 HC NON-CHARGEABLE SUPPLY

## 2023-03-01 PROCEDURE — C1713 ANCHOR/SCREW BN/BN,TIS/BN: HCPCS

## 2023-03-01 PROCEDURE — 36902 INTRO CATH DIALYSIS CIRCUIT: CPT | Performed by: SURGERY

## 2023-03-01 PROCEDURE — 6370000000 HC RX 637 (ALT 250 FOR IP)

## 2023-03-01 PROCEDURE — 83735 ASSAY OF MAGNESIUM: CPT

## 2023-03-01 PROCEDURE — 6360000002 HC RX W HCPCS: Performed by: PODIATRIST

## 2023-03-01 PROCEDURE — C1887 CATHETER, GUIDING: HCPCS

## 2023-03-01 PROCEDURE — 36907 BALO ANGIOP CTR DIALYSIS SEG: CPT | Performed by: SURGERY

## 2023-03-01 PROCEDURE — 90935 HEMODIALYSIS ONE EVALUATION: CPT

## 2023-03-01 PROCEDURE — 6370000000 HC RX 637 (ALT 250 FOR IP): Performed by: PODIATRIST

## 2023-03-01 PROCEDURE — C1769 GUIDE WIRE: HCPCS

## 2023-03-01 PROCEDURE — 36140 INTRO NDL ICATH UPR/LXTR ART: CPT

## 2023-03-01 PROCEDURE — C2623 CATH, TRANSLUMIN, DRUG-COAT: HCPCS

## 2023-03-01 PROCEDURE — 03773Z1 DILATION OF RIGHT BRACHIAL ARTERY USING DRUG-COATED BALLOON, PERCUTANEOUS APPROACH: ICD-10-PCS | Performed by: SURGERY

## 2023-03-01 PROCEDURE — 057D3Z1 DILATION OF RIGHT CEPHALIC VEIN USING DRUG-COATED BALLOON, PERCUTANEOUS APPROACH: ICD-10-PCS | Performed by: SURGERY

## 2023-03-01 PROCEDURE — C1894 INTRO/SHEATH, NON-LASER: HCPCS

## 2023-03-01 PROCEDURE — 36140 INTRO NDL ICATH UPR/LXTR ART: CPT | Performed by: SURGERY

## 2023-03-01 PROCEDURE — 85025 COMPLETE CBC W/AUTO DIFF WBC: CPT

## 2023-03-01 RX ORDER — IODIXANOL 320 MG/ML
100 INJECTION, SOLUTION INTRAVASCULAR ONCE
Status: COMPLETED | OUTPATIENT
Start: 2023-03-01 | End: 2023-03-01

## 2023-03-01 RX ORDER — SODIUM CHLORIDE 9 MG/ML
INJECTION, SOLUTION INTRAVENOUS PRN
OUTPATIENT
Start: 2023-03-01

## 2023-03-01 RX ORDER — ASPIRIN 81 MG/1
81 TABLET ORAL DAILY
OUTPATIENT
Start: 2023-03-01

## 2023-03-01 RX ORDER — ONDANSETRON 4 MG/1
4 TABLET, ORALLY DISINTEGRATING ORAL EVERY 8 HOURS PRN
OUTPATIENT
Start: 2023-03-01

## 2023-03-01 RX ORDER — ONDANSETRON 2 MG/ML
4 INJECTION INTRAMUSCULAR; INTRAVENOUS EVERY 6 HOURS PRN
OUTPATIENT
Start: 2023-03-01

## 2023-03-01 RX ORDER — HYDRALAZINE HYDROCHLORIDE 25 MG/1
25 TABLET, FILM COATED ORAL EVERY 12 HOURS SCHEDULED
Status: DISCONTINUED | OUTPATIENT
Start: 2023-03-01 | End: 2023-03-01

## 2023-03-01 RX ORDER — SODIUM CHLORIDE 0.9 % (FLUSH) 0.9 %
5-40 SYRINGE (ML) INJECTION EVERY 12 HOURS SCHEDULED
OUTPATIENT
Start: 2023-03-01

## 2023-03-01 RX ORDER — SODIUM CHLORIDE 0.9 % (FLUSH) 0.9 %
5-40 SYRINGE (ML) INJECTION PRN
OUTPATIENT
Start: 2023-03-01

## 2023-03-01 RX ORDER — HEPARIN SODIUM 5000 [USP'U]/ML
5000 INJECTION, SOLUTION INTRAVENOUS; SUBCUTANEOUS EVERY 8 HOURS SCHEDULED
OUTPATIENT
Start: 2023-03-01

## 2023-03-01 RX ORDER — HYDRALAZINE HYDROCHLORIDE 25 MG/1
25 TABLET, FILM COATED ORAL EVERY 8 HOURS SCHEDULED
Status: DISCONTINUED | OUTPATIENT
Start: 2023-03-01 | End: 2023-03-03 | Stop reason: HOSPADM

## 2023-03-01 RX ORDER — HYDRALAZINE HYDROCHLORIDE 25 MG/1
25 TABLET, FILM COATED ORAL EVERY 12 HOURS SCHEDULED
Qty: 90 TABLET | Refills: 3 | Status: SHIPPED | OUTPATIENT
Start: 2023-03-01

## 2023-03-01 RX ADMIN — HEPARIN SODIUM 5000 UNITS: 5000 INJECTION INTRAVENOUS; SUBCUTANEOUS at 20:13

## 2023-03-01 RX ADMIN — MIRTAZAPINE 7.5 MG: 15 TABLET, FILM COATED ORAL at 20:13

## 2023-03-01 RX ADMIN — Medication 3 MG: at 20:15

## 2023-03-01 RX ADMIN — EPOETIN ALFA-EPBX 10000 UNITS: 10000 INJECTION, SOLUTION INTRAVENOUS; SUBCUTANEOUS at 15:17

## 2023-03-01 RX ADMIN — MEMANTINE 10 MG: 10 TABLET ORAL at 20:14

## 2023-03-01 RX ADMIN — TRAZODONE HYDROCHLORIDE 25 MG: 50 TABLET ORAL at 20:14

## 2023-03-01 RX ADMIN — SODIUM CHLORIDE, PRESERVATIVE FREE 10 ML: 5 INJECTION INTRAVENOUS at 20:15

## 2023-03-01 RX ADMIN — ISOSORBIDE MONONITRATE 30 MG: 30 TABLET, EXTENDED RELEASE ORAL at 07:43

## 2023-03-01 RX ADMIN — MEMANTINE 10 MG: 10 TABLET ORAL at 07:43

## 2023-03-01 RX ADMIN — METOPROLOL SUCCINATE 25 MG: 25 TABLET, FILM COATED, EXTENDED RELEASE ORAL at 07:43

## 2023-03-01 RX ADMIN — ATORVASTATIN CALCIUM 80 MG: 80 TABLET, FILM COATED ORAL at 07:44

## 2023-03-01 RX ADMIN — HYDRALAZINE HYDROCHLORIDE 25 MG: 25 TABLET, FILM COATED ORAL at 20:14

## 2023-03-01 RX ADMIN — Medication 1000 UNITS: at 07:45

## 2023-03-01 RX ADMIN — HEPARIN SODIUM 5000 UNITS: 5000 INJECTION INTRAVENOUS; SUBCUTANEOUS at 06:11

## 2023-03-01 RX ADMIN — IODIXANOL 40 ML: 320 INJECTION, SOLUTION INTRAVASCULAR at 10:20

## 2023-03-01 RX ADMIN — SODIUM CHLORIDE, PRESERVATIVE FREE 10 ML: 5 INJECTION INTRAVENOUS at 07:44

## 2023-03-01 NOTE — PROGRESS NOTES
PRE-OP NOTE  Department of Surgery      Chief Complaint or Reason for Surgery: Left upper extremity swelling. Procedure: Right upper extremity fistulagram  Expected time: 3/1/2023, 9AM    Plan  1. Diet: NPO at midnight  2. IVF: Not indicated  3. Antibiotics: Currently on scheduled vancomycin  4. Labs to be drawn: Will follow up morning labs  5. Anesthesia: to see patient  6. Consent: Obtained and placed in the patient's chart  7. Pulmonary: CXR: 23, reviewed  8.  Cardiac: [id number], reviewed    Alanis Lara DO  23  10:19 PM

## 2023-03-01 NOTE — PROGRESS NOTES
[facility] NEPHROLOGY    [facility]              [phone]                       Interval History and Plan   Patient seen during dialysis  S/P AVF angiogram today. BP stable  Labs not checked. HD today per MWF schedule with 1- 2 liter UF as tolerated. Does not look volume overloaded. Increase Hydralazine dose and monitor BP  Continue Epogen with dialysis  Not on binders as phos is low  Renal diet. Communicated with [place] dialysis clinic team for IV Vancomycin per ID recommendations. D/W patient  D/W Dr Monico Chan. D/W dialysis nurse. Assessment:     Pt is a 80 yo male with pmhx of osteomyelitis s/p amputation, ESRD on HD, CAD, PAD, HTN, HLD, and vascular dementia who we were consulted for hemodialysis continuation. ESRD on HD MWF  Access: Right arm fistula. Right arm swelling. Chronic Anemia   DIMITRIOS with dialysis. Hypertension and Volume status  BP is now elevated side. Does not look overt volume overloaded. Left foot osteomyelitis  Group A Strep Bacteremia  Sepsis  Management per primary team.       [facility] Nephrology would like to thank Harshad Thomas MD   for opportunity to serve this patient      Please call with questions at-   24 Hrs Answering service [phone] or  7 am- 5 pm via Perfect serve or cell phone      CC/reason for consult :     Hemodialysis continuation     HPI :     Jennifer Tian is a 79 y.o. male with history of osteomyelitis, ESRD on HD, CAD, PAD, HTN, HLD, and vascular dementia who presented to the hospital on 2/20/2023 with shortness of breath during his hemodialysis session. Per daughter, patient had desatted to the [de-identified] during the session.  On admission, patient has been improving in respiratory status with normal saturations, but remains admitted due to acute concerns of osteomyelitis of the left great toe, sepsis (BC confirmed 2x for Group A strep), and significant arterial disease (recent arterial duplex showing 80% blockage of the left lower extremity). Chest X-ray was negative for acute concerns. Currently on cefepime (started on 2/20). Single dose of vancomycin given yesterday, with plans to start clindamycin today. Nephrology was consulted for continuation of dialysis. Last Echo in 2019 with 55% EF. Repeat Echo ordered during this admission. ROS:     positives in bold   Constitutional:  fever, chills, weakness, weight change, fatigue  Skin:  rash, pruritus, hair loss, bruising, dry skin, petechiae, left great toe wound   Head, Face, Neck   headaches, swelling,  cervical adenopathy  Respiratory: shortness of breath, cough, or wheezing  Cardiovascular: chest pain, palpitations, dizzy, edema  Gastrointestinal: nausea, vomiting, diarrhea, constipation,belly pain    Yellow skin, blood in stool  Musculoskeletal:  back pain, muscle weakness, gait problems,       joint pain or swelling. Genitourinary:  dysuria, poor urine flow, flank pain, blood in urine  Neurologic:  vertigo, TIA'S, syncope, seizures, focal weakness  Psychosocial:  insomnia, anxiety, or depression. Additional positive findings:                          All other remaining systems are negative.       PMH/PSH/SH/Family History:     Past Medical History:   Diagnosis Date    Amputated great toe, right (HCC)     CAD (coronary artery disease)     CVA (cerebral vascular accident) ([address]     3    Dementia ([address]     Depression     Diabetes ([address]     Diabetic polyneuropathy ([address]     ESRD on dialysis ([address]     monday wednesday friday    GERD (gastroesophageal reflux disease)     Hemiplegia and hemiparesis following cerebral infarction affecting right dominant side (HCC)     Hyperlipidemia     Hypertension     Pain syndrome, chronic     Peripheral vascular disease ([address]     [facility]    Secondary hyperparathyroidism [facility])        Past Surgical History:   Procedure Laterality Date    CARDIAC CATHETERIZATION      DIALYSIS FISTULA CREATION      FOOT DEBRIDEMENT Left 2/26/2023    INCISION AND DRAINAGE WITH DELAYED  PRIMARY CLOSURE WITH  WOUND VAC APPLICATION,  APPLICATION SKIN GRAFT SUBSTITUTE LEFT  LOWER EXTREMITY performed by Rafa Tinajero DPM at [place] [id number] Left 2/24/2023    LEFT FOOT TOTAL FIRST RAY AMPUTATION performed by Rafa Tinajero DPM at [facility]  Nw History     Socioeconomic History    Marital status:      Spouse name: Not on file    Number of children: Not on file    Years of education: Not on file    Highest education level: Not on file   Occupational History    Not on file   Tobacco Use    Smoking status: Former    Smokeless tobacco: Never   Vaping Use    Vaping Use: Never used   Substance and Sexual Activity    Alcohol use: No     Alcohol/week: 0.0 standard drinks    Drug use: No    Sexual activity: Not Currently   Other Topics Concern    Not on file   Social History Narrative    Not on file     Social Determinants of Health     Financial Resource Strain: Not on file   Food Insecurity: Not on file   Transportation Needs: Not on file   Physical Activity: Not on file   Stress: Not on file   Social Connections: Not on file   Intimate Partner Violence: Not on file   Housing Stability: Not on file           Problem Relation Age of Onset    Heart Disease Mother     Diabetes Mother     Dementia Mother     Diabetes Father     Heart Disease Father     Stroke Brother           Medication:     Scheduled Meds:   hydrALAZINE  25 mg Oral 2 times per day    isosorbide mononitrate  30 mg Oral Daily    vancomycin  750 mg IntraVENous Once per day on Mon Wed Fri    atorvastatin  80 mg Oral Daily    heparin (porcine)  5,000 Units SubCUTAneous 3 times per day    lidocaine  1 patch TransDERmal Daily    epoetin ramesh-epbx  10,000 Units IntraVENous Once per day on Mon Wed Fri    mirtazapine  7.5 mg Oral Nightly    traZODone  25 mg Oral Nightly    melatonin  3 mg Oral Nightly    sodium chloride flush  5-40 mL IntraVENous 2 times per day    aspirin  81 mg Oral Daily    memantine  10 mg Oral BID    metoprolol succinate  25 mg Oral Daily    pantoprazole  40 mg Oral QAM AC    Vitamin D  1,000 Units Oral Daily    sodium chloride flush  5-40 mL IntraVENous 2 times per day     Continuous Infusions:   sodium chloride      sodium chloride      sodium chloride       PRN Meds:.oxyCODONE **OR** oxyCODONE, sodium chloride, sodium chloride, sodium chloride flush, sodium chloride, ondansetron **OR** ondansetron, acetaminophen, nitroGLYCERIN, sodium chloride flush, sodium chloride, polyethylene glycol, perflutren lipid microspheres    Vitals :     Vitals:    03/01/23 0730   BP: (!) 151/80   Pulse: 70   Resp: 16   Temp: 97.3 °F (36.3 °C)   SpO2: 97%       I & O :       Intake/Output Summary (Last 24 hours) at 3/1/2023 1208  Last data filed at 2/28/2023 2004  Gross per 24 hour   Intake --   Output 0 ml   Net 0 ml          Physical Examination :     General appearance: Anxious- no, distressed- no  HEENT: Lips- normal, teeth- ok , oral mucosa- moist  Neck : Mass- no, appears symmetrical, JVD- not visible  Respiratory: Respiratory effort- normal effort, wheeze- no, crackles - no  Cardiovascular: Ausculation- No M/R/G, Edema none. Abdomen: visible mass- no, distention- no, scar- no, tenderness- no                            hepatosplenomegaly-  no  Musculoskeletal:  clubbing no,cyanosis- no , digital ischemia- no                           muscle strength- grossly normal , tone - grossly normal.  Skin: rashes- no , ulcers- yes, on left great toe but not visualized due to dressing, induration- no, tightening - no  Extremities: Right arm is swollen. No pain.    Psychiatric:  Judgement and insight- normal  CNS:        AAO X 3     LABS:     Recent Labs     02/27/23  0521 02/28/23  0651   WBC 11.2* 9.6   HGB 7.6* 7.9*   HCT 24.4* 25.1*    285       Recent Labs     02/27/23  0521 02/28/23  0651   * 135*   K 4.5 3.8   CL 98* 99   CO2 28 29   BUN 36* 23*   CREATININE 6.0* 4.9* GLUCOSE 126* 119*   MG 2.30 2.00   PHOS 3.0 2.8            Patient discuss with Dr. Tania Campbell  Internal Medicine, MS4      Medical Student Note edited and addended where appropriate. Courtney Albright MD  PGY-3 Internal Medicine  03/01/23     Patient was seen and examined and the case was discussed with the resident. He acted as my scribe. I agree with the assessment and plan.

## 2023-03-01 NOTE — PROGRESS NOTES
Podiatric Surgery Daily Progress Note  Alanna Liu      Subjective :     Patient is s/p left I&D, [facility], graft application, and wound vac application (DOS 0/24/35). Patient seen and examined this am at the bedside. Patient denies any acute overnight events. Review of Systems: A 12 point review of systems is unremarkable with the exception of the chief complaint. Patient specifically denies nausea, vomiting, fever, chills, shortness of breath, chest pain, abdominal pain, constipation, or difficulty urinating. Objective     BP (!) 164/77   Pulse 75   Temp 97.8 °F (36.6 °C) (Oral)   Resp 16   Ht 5' 7\" (1.702 m)   Wt 149 lb 4 oz (67.7 kg)   SpO2 98%   BMI 23.38 kg/m²     I/O:  Intake/Output Summary (Last 24 hours) at 3/1/2023 0532  Last data filed at 2/28/2023 2004  Gross per 24 hour   Intake --   Output 0 ml   Net 0 ml                Wt Readings from Last 3 Encounters:   02/28/23 149 lb 4 oz (67.7 kg)   02/20/23 153 lb 7 oz (69.6 kg)   12/09/22 156 lb 8 oz (71 kg)       LABS:    Recent Labs     02/27/23  0521 02/28/23  0651   WBC 11.2* 9.6   HGB 7.6* 7.9*   HCT 24.4* 25.1*    285        Recent Labs     02/28/23  0651   *   K 3.8   CL 99   CO2 29   PHOS 2.8   BUN 23*   CREATININE 4.9*        No results for input(s): PROT, INR, APTT in the last 72 hours. LOWER EXTREMITY EXAMINATION    Dressing to left LE intact. No strikethrough noted to the external dressing. No drainage noted to the internal layers of the dressing. VASCULAR: DP and PT pulses nonpalpable 0/4 b/l. Upon hand-held Doppler examination, left DP noted to be monophasic, left PT and right DP, PT are softly biphasic. CFT is less than 3 seconds to the  remaining digits of the left foot. Skin temperature is warm to cool from proximal to distal with mildly increased warmth to the left LE. No edema noted. No pain with calf compression b/l. NEUROLOGIC: Gross and epicritic sensation is intact b/l.  Protective sensation is diminished at all pedal sites b/l. DERMATOLOGIC:   Verbal consent obtained prior to photo taken:    Surgical incision noted to the medial aspect of the left lower extremity at site of total first ray amputation. Soft tissue deficit is noted to have an overlying biologic graft held in place with intact sutures. Evidence of incorporation of the graft. There is no surrounding edema, erythema. No underlying fluctuance, crepitus, purulent drainage expressed and no malodor appreciated. No discernible tunneling, tracking or probe to bone. MUSCULOSKELETAL: Muscle strength is 4/5 for all pedal groups tested. No pain on palpation appreciated Ankle joint ROM is decreased in dorsiflexion with the knee extended. History of left 1st ray amputation, 3rd digit amputation. IMAGING:  XR Left Foot 2/20/23  Narrative   History: Medial foot ulcer. 2 views of foot. FINDINGS: There is a medial ulceration. There is lysis of the medial base of the proximal phalanx. Lysis of the medial head of the first metatarsal suspicious for osteomyelitis. No soft tissue gas. Associated soft tissue swelling. Impression   1. Medial ulceration with lytic changes compatible with osteomyelitis of the base of the first proximal phalanx and distal first metatarsal.     MRI Left foot 2/21/23  Narrative   Patient: Lynne Rushing  Time Out: 07:20   Exam(s): MRI LEFT FOOT Without Contrast        EXAM:     MR Left Lower Extremity Without Intravenous Contrast, Foot       CLINICAL HISTORY:     concern for OM at first metatarsal head, base of proximal    phalanx. TECHNIQUE:     Multiplanar magnetic resonance images of the left foot without    intravenous contrast.       COMPARISON:     No relevant prior studies available. FINDINGS:   Images are degraded by motion artifact. Ulcer along the medial aspect of the first toe. Underlying    abscess.   Subcutaneous soft tissue edema and intramuscular edema    as can be seen in setting of diabetic neuropathic changes. No    soft tissue gas identified. Marrow signal abnormality within the    first distal phalanx consistent with acute osteomyelitis. No    septic arthritis. No acute fracture. Electronically signed by Liv Beatty MD on 02-21-23 at 0720       Impression   Acute osteomyelitis of the first distal phalanx. XR Left foot 2/24- post-op  Narrative   3 views of the left foot       HISTORY: Pain. Surgery. FINDINGS:       Amputation of the first ray. Surgical bandages in place. No definite immediate complication is identified. Impression   Postoperative changes as above. VL Arterial Duplex b/l LE 2/21  Impressions   Right Impression   The right ankle/brachial index is 1.09 (PT- 166 mmHg; DP- non-compressible). The right arm brachial pressure was not obtained due to AVF. There is normal, multiphasic flow identified in the common femoral artery,   suggesting no evidence of significant aorto-iliac inflow disease. Elevated velocities at the mid superficial femoral artery indicate a >50%   stenosis by velocity criteria (velocities went from 92 to 193 cm/s). There is atherosclerotic plaque involving the popliteal artery with velocities   consistent with <50% stenosis. Elevated velocities at the distal anterior tibial artery indicate a >50%   stenosis by velocity criteria (velocities went from 36 to 81 cm/s). Left Impression   The left ankle/brachial index is 0.74 (PT-112 mmHg; DP-110 mmHg). The left AMOS   was technically difficult to obtain due to patient movement. There is normal, multiphasic flow identified in the common femoral artery,   suggesting no evidence of significant aorto-iliac inflow disease. There is atherosclerotic plaque involving the superficial femoral artery with   velocities consistent with <50% stenosis.    The distal end of the popliteal artery appears to be occluded, and the   tibio-peroneal trunk appears to be occluded. There are multiple collateral   vessels noted in this area. The posterior tibial artery is occluded with retrograde trickle flow   demonstrated at the ankle area. The distal anterior tibial artery is occluded with reconstitution at the ankle   (34 cm/s). There are no previous studies for comparison. Conclusions        Summary        Normal right AMOS. Abnormal left AMOS in the range of moderate arterial insufficiency. Right mid-SFA stenosis of greater than 50% associated with nonocclusive    popliteal and tibial disease. Left popliteal and tibioperoneal trunk occlusions with associated posterior    tibial and anterior tibial artery occlusions. Signature    ------------------------------------------------------------------    Electronically signed by Allie Holt MD (Interpreting    physician) on 02/21/2023 at 03:56 PM       Final pathology:  A. Bone and soft tissue, left foot, excision:   Fragments of bone with acute osteomyelitis. Soft tissue with fibrinopurulent exudate. B.  First metatarsal of left foot, excision:   Acute osteomyelitis. Skin and soft tissue with fibrinopurulent exudate and necrosis. Margins: Free of acute inflammation. ASSESSMENT/PLAN  - s/p I&D, delayed primary closure, skin graft substitute application, wound vac application; left lower extremity (DOS 2/26/23)  - s/p First ray amputation; left lower extremity (DOS: 2/24/23)  - Osteomyelitis; 1st metatarsal, 1st proximal phalanx, 1st distal phalanx; left foot  - Full thickness ulceration; left foot;  Bee 3  - Peripheral vascular disease; b/l LE  - History of partial 1st ray and 3rd digit amputation; right foot    -Patient examined and evaluated at bedside   -Hypertensive otherwise VSS, no am labs, last cbc showed no leukocytosis noted (WBC 9.6)  -.1,   -Prealbumin 7.1; continue dietary nutritional supplements  -HbA1c 6.0%  -Wound culture- Strep Pyogenes, Staph Aureus, Haemophilus parainfluenzae   -Surgical path results noted above  -Blood culture x2- Strep pyogenes  -Imaging reviewed and impressions noted above  -Arterial Duplex b/l LE reviewed, impressions noted above  -Vascular surgery following; plan to take patient to the OR today for right upper extremity fistulogram.  -ID recs Vancomycin at HD through 4/7/23  -Wound VAC was reapplied and following application was noted to have excellent seal and continuous pressure at 125 mmHg. The wound VAC application consisted of Adaptic, black foam with overlying Tegaderm. A dressing was applied consisting of gauze, Kerlix, Ace.  -Nonweight bearing to the left LE  -No further surgical intervention from podiatric standpoint. DISPO: s/p I&D, delayed primary closure, application of skin graft substitute, wound vac application; left LE. Continue antibiotics per ID. ID recs Vanc at HD through 4/7/23. No further surgical intervention from podiatric standpoint. Will need outpatient wound VAC, will work with social work regarding supplies/placement. Patient okay to discharge from podiatric standpoint pending medical clearance and vascular intervention.     Discussed assessment and plan with Dr. Mateusz Leggett, Sandor Hansen DPM   Podiatric Resident PGY1  Pager [phone] or [place]  3/1/2023, 8:39 AM

## 2023-03-01 NOTE — PROGRESS NOTES
Vascular Surgery  Post-operative Note      Procedure(s) Performed: Right arm fistulagram    Subjective:   Patient was seen at dialysis this afternoon resting comfortably. Patient is afebrile and HDS. Patient denies any pain from puncture site. Objective:  Anesthesia type: MAC      I/O    Intra op    Post op     Fluids  0 mL 0 mL     EBL >3 mL 0 mL     Urine 0 mL 0 mL     Vitals:   Vitals:    02/28/23 2004 02/28/23 2145 03/01/23 0354 03/01/23 0730   BP: (!) 163/71  (!) 164/77 (!) 151/80   Pulse: 80 81 75 70   Resp: 18 18 16 16   Temp: 98.5 °F (36.9 °C)  97.8 °F (36.6 °C) 97.3 °F (36.3 °C)   TempSrc: Oral  Oral Oral   SpO2: 99% 97% 98% 97%   Weight:   151 lb 10.8 oz (68.8 kg)    Height:         Physical Exam:  Post-op vital signs: Stable   General appearance: Alert, no acute distress, grooming appropriate  Eyes: No scleral icterus, EOM grossly intact  Neck: Trachea midline, no JVD, no lymphadenopathy, neck supple  Chest/Lungs: Normal effort with no accessory muscle use on RA  Cardiovascular: RRR, well-perfused  Abdomen: Soft, non-tender, non-distended, no rebound, guarding, or rigidity. Skin: Warm and dry, no rashes  Extremities: Tegaderm noted to right wrist with no hematoma noted from puncture site. RUE fistula with palpable thrill and strong pulses. Wound vac in place on left LE with no leaks detected. Genitourinary: Grossly normal  Neuro: A&Ox3, no focal deficits, sensation intact    Assessment and Plan  This is a 79y.o. year old male status post right arm fistulagram secondary to ESRD. (3/1/23) POD0. Pain management:  Tylenol  PRN  Cardiovasc: Hemodynamically stable, will continue to monitor  Respiratory: IS ordered to bedside, encourage hourly IS and deep breathing, wean oxygen as tolerated  Diet: General diet  : Patient remains a void check - will follow up in 4 hours.     Ambulation: OOB to chair, encourage ambulation  Prophylaxis: SCDs, Heparin  Antibiotics: ID recommendations vancomycin at HD through 4/7/23  Wound: Local wound care    - Fistulagram performed without complications this AM  - Podiatry recommendations appreciated for left LE.   - Patient is okay for dispo per primary     Shay Van DPM  Podiatric Resident, PGY-2  Contact via [address] [place] [address]

## 2023-03-01 NOTE — BRIEF OP NOTE
Brief Postoperative Note      Patient: Arun Ham  YOB: 1955  MRN: [id number]    Date of Procedure: 3/1/2023    Pre-Op Diagnosis: ESRD    Post-Op Diagnosis: Same and Venous outflow stenosis right arm AVF       Procedure:    Ultrasound guided access right radial artery  Right arm fistulagram  BAlloon angioplasty peripheral venous segment -- 9 x 80mm Impact Admiral drug eluting balloon  Balloon angioplasty right brachiocephalic ETJH--70OZ x 17PL Impact Admiral balloon and 12mm x 40mm [place]  Balloon angioplasty right cephalic-subclavian vein junction 9mm Impact Admiral drug eluting balloon    Surgeon:  Ladarius Rodriguez MD     Assistant:  Niko Andre DPM       Monitored sedation     Estimated Blood Loss (mL): Minimal    Complications: None     Findings: >50% stenosis of the cephalic-subclavian junction, brachiocephalic vein and peripheral segment of the fistula. Significant residual stenosis of the brachiocephalic vein, though no waste apparent with angioplasty up to 12mm. The remaining stenoses responded well.       Electronically signed by Niko Andre DPM on 3/1/2023 at 2:13 PM

## 2023-03-01 NOTE — OP NOTE
Operative Note      Patient: Les Phelps  YOB: 1955  MRN: [id number]     Date of Procedure: 3/1/2023     Pre-Op Diagnosis: ESRD     Post-Op Diagnosis: Same and Venous outflow stenosis right arm AVF     Procedure:    Ultrasound guided access right radial artery  Right arm fistulagram  BAlloon angioplasty peripheral venous segment -- 9 x 80mm Impact Admiral drug eluting balloon  Balloon angioplasty right brachiocephalic FBXK--94FZ x 09KH Impact Admiral balloon and 12mm x 40mm [place]  Balloon angioplasty right cephalic-subclavian vein junction 9mm Impact Admiral drug eluting balloon     Surgeon:  Sangeeta Rosado MD     Assistant:  Dalila Hsieh DPM       Monitored sedation     Estimated Blood Loss (mL): Minimal     Complications: None        Findings: >50% stenosis of the cephalic-subclavian junction, brachiocephalic vein and peripheral segment of the fistula. Significant residual stenosis of the brachiocephalic vein, though no waste apparent with angioplasty up to 12mm. The remaining stenoses responded well. Details of Procedure: The patient was taken to the cardiac cath lab and placed in supine position. IV sedation was administered by the nursing team. The operative area was clipped, prepared and draped in sterile fashion. A dose of intravenous antibiotics was administered. A brief time out was performed per hospital protocol. The Right radial artery was accessed under ultrasound guidance with a micropuncture system. A 6 Polish Slender sheath was placed over a wire and a solution of heparin 2000 units, verapamil 50 mcg and nitroglycerin 200 mcg was instilled in the side port. Fistulagram was then performed from the arterial anastomosis to the central venous outflow. Pre-intervention Findings:    Patent right arm brachiocephalic AV fistula with the arterial anastomosis just above the elbow.   Long stenosis within the midportion of the peripheral segment, approximately 50% stenotic. Patent stent within the upper arm to axilla. Greater than 50% stenosis of the cephalic vein to subclavian vein junction  Greater than 50% stenosis of the right brachiocephalic vein. Therapeutic Intervention:  The patient was systemically heparinized with an additional 3000 units of IV heparin. An angled glidewire was used to cross the arterial anastomosis and passed out into the central venous outflow. The right brachiocephalic vein stenosis was treated first using a 10 mm x 40 mm Admiral drug-eluting balloon. There was greater than 50% residual stenosis and therefore this was repeated with the 10 mm balloon followed by a 12 mm x 40 mm [place] balloon. Again, there was some residual stenosis, approximately 50%, but brisk flow and large collaterals and therefore stent was not applied. Next, the cephalic vein to subclavian vein junction was addressed using a 9 mm Admiral drug-eluting balloon with prolonged inflation. Repeat imaging showed good result with minimal residual stenosis. Finally, the peripheral segment was treated with a 10 mm x 80 mm Admiral drug-eluting balloon and again there was good resolution with minimal residual stenosis. Post Therapeutic Findings:  Improved diameter of the AV fistula with wide patency and preservation of outflow. The Right radial sheath was removed and a TR band was placed for greater than one hour to ensure hemostasis. The patient tolerated the procedure well and was taken to the post-operative care unit in stable condition.      Danny Marshall MD     Electronically signed by Danny Marshall MD on 3/1/2023 at 1:39 PM

## 2023-03-01 NOTE — CARE COORDINATION
AZEB spoke with Staff RN re: DC order placed but Pt is still in HD. AZEB advised staff RN that [facility] will not be able to find an ambulance run at this time nor will [facility] be ready for Pt this evening. Admissions at [facility] is aware that Pt needs a wound vac and IV ABX at HD but these arrangements all need to be finalized tomorrow.  AZEB/KRYSTAL will follow up in AM.    DUSTIN Dickson  Case Management

## 2023-03-01 NOTE — PROGRESS NOTES
Vascular Surgery   Daily Progress Note  Patient: Jose Armando Mcallister      CC: RUE swelling over AV fistula     SUBJECTIVE:   Patient is afebrile and HDS. Patient is aware that he is going to the OR this morning for a fistulagram. Patient has no additional questions in regards to the operation. ROS:   A 14 point review of systems was conducted, significant findings as noted above. All other systems negative. OBJECTIVE:    PHYSICAL EXAM:    Vitals:    02/28/23 2004 02/28/23 2145 03/01/23 0354 03/01/23 0730   BP: (!) 163/71  (!) 164/77 (!) 151/80   Pulse: 80 81 75 70   Resp: 18 18 16 16   Temp: 98.5 °F (36.9 °C)  97.8 °F (36.6 °C) 97.3 °F (36.3 °C)   TempSrc: Oral  Oral Oral   SpO2: 99% 97% 98% 97%   Weight:   151 lb 10.8 oz (68.8 kg)    Height:         General appearance: alert, no acute distress  Eyes: no scleral icterus, EOM grossly intact  Neck: trachea midline, neck supple  Chest/Lungs: normal effort with no accessory muscle use on RA  Cardiovascular: RRR  Abdomen: Soft, non-tender, non-distended, no rebound, guarding, or rigidity. Skin: warm and dry, no rashes  Extremities: RUE mildly edematous from arm down to fingers when compared to the left side. Equal  strength, sensation intact, warm. RUE fistula with palpable thrill. Strong radial pulse. Wound vac in place on LLE with no leaks detected. Genitourinary: Grossly normal  Neuro: A&Ox3, no focal deficits, sensation intact    LABS:   Recent Labs     02/27/23  0521 02/28/23  0651   WBC 11.2* 9.6   HGB 7.6* 7.9*   HCT 24.4* 25.1*   MCV 86.1 86.1    285        Recent Labs     02/27/23  0521 02/28/23  0651   * 135*   K 4.5 3.8   CL 98* 99   CO2 28 29   PHOS 3.0 2.8   BUN 36* 23*   CREATININE 6.0* 4.9*      No results for input(s): AST, ALT, ALB, BILIDIR, BILITOT, ALKPHOS in the last 72 hours. No results for input(s): LIPASE, AMYLASE in the last 72 hours. No results for input(s): PROT, INR, APTT in the last 72 hours.    No results for input(s): CKTOTAL, CKMB, CKMBINDEX, TROPONINI in the last 72 hours.      ASSESSMENT & PLAN:   This is a 67 y.o. male with a PMHx of dementia, CAD, CVA, DM, ESRD on dialysis, GERD, Hemiplegia and hemiparesis 2/2 cerebral infarct, HLD, HTN, PVD, and hx of pedal amputations, LLE I&D with graft and WV placement. Vascular surgery was consulted due to concerns for RUE AV graft swelling     - Plan to take patient to the OR today for a right UE fistulagram   - Continue medical management per primary   - Podiatry recommendations appreciated for left LE    Daryn Amado DPM  Podiatric Resident, PGY-2  Contact via [employer]

## 2023-03-01 NOTE — PROGRESS NOTES
Comprehensive Nutrition Assessment    RECOMMENDATIONS:  PO Diet: Start 4CC diet (still NPO from earlier procedure- MD messaged and approved for diet adv)  ONS: Continue Torsten BID, start Nepro BID  Nutrition Education: Education not indicated     NUTRITION ASSESSMENT:   Nutritional summary & status: LOS. Pt w/ poor po throughout adm. EMR shows <25-50% meals consumed through adm. Pt s/p POD 0 Right arm fistulogram, balloon angioplasty, POD#3 debridement, delayed primary closure with wound VAC application and skin graft substitute application, POD#5 L foot total first ray amputation. MD has Torsten ordered TID- will modify to BID. Pt out of room during attempted encounter at HD. Adding renal ONS to promote intake. Will continue to monitor.    Admission/PMH: osteomyelitis of the right foot // s/p amputation of right great toe and third toe, ESRD on hemodialysis Monday Wednesday Friday, coronary artery disease, PAD, HTN, HLD, vascular dementia secondary to strokes    MALNUTRITION ASSESSMENT  Context of Malnutrition: Chronic Illness   Malnutrition Status: Insufficient data  Findings of the 6 clinical characteristics of malnutrition (Minimum of 2 out of 6 clinical characteristics is required to make the diagnosis of moderate or severe Protein Calorie Malnutrition based on AND/ASPEN Guidelines):  Energy Intake:  Unable to assess  Weight Loss:  Greater than 10% over 6 months   (-15% over 6 months)  Body Fat Loss:  Unable to assess     Muscle Mass Loss:  Unable to assess      NUTRITION DIAGNOSIS   Increased nutrient needs related to increase demand for energy/nutrients as evidenced by wounds    Nutrition Monitoring and Evaluation:   Food/Nutrient Intake Outcomes:  Food and Nutrient Intake, Supplement Intake  Physical Signs/Symptoms Outcomes:  Biochemical Data, Nutrition Focused Physical Findings, Weight     OBJECTIVE DATA: Significant to nutrition assessment  Nutrition Related Findings: Na 135; RUE +2 edema, LLE +1 edmea  Wounds: Multiple, Surgical Incision, Open Wounds  Nutrition Goals: PO intake 75% or greater, prior to discharge     CURRENT NUTRITION THERAPIES  Diet NPO Exceptions are: Sips of Water with Meds  PO Intake: 1-25%, 26-50%   PO Supplement Intake:Unable to assess  Additional Sources of Calories/IVF:n/a     ANTHROPOMETRICS  Current Height: 5' 7\" (170.2 cm)  Current Weight: 151 lb 10.8 oz (68.8 kg)    Admission weight: 51 lb 8 oz (23.4 kg)  Ideal Body Weight (IBW): 148 lbs  (67 kg)    Usual Bodyweight 155 lb (70.3 kg)   BMI: 23.8    COMPARATIVE STANDARDS  Energy (kcal):  1700-2000kcal (25-30kcal/kg CBW)     Protein (g):  88-102g (1.3-1.5g/d CBW)       Fluid (mL/day):  1ml/kcal or defer to MD    The patient will be monitored per nutrition standards of care. Consult dietitian if additional nutrition interventions are needed prior to RD reassessment.      Renato Cummins Anshul [address], [address]  [place]:  [phone]  Office:  [phone]

## 2023-03-01 NOTE — PROGRESS NOTES
Pt resting with no complaints throughout this shift. RR easy and unlabored. BP slightly elevated otherwise vitals stable. Wound vac remains in place on LLE, dressing CDI and elevated while in bed. Pt has been NPO since MN with plans for fistulagram this AM. Pt and daughter updated on care plan/status. Bed locked and in lowest position. Will continue to monitor.

## 2023-03-01 NOTE — BRIEF OP NOTE
Brief Postoperative Note      Patient: Bakari Danielle  YOB: 1955  MRN: [id number]    Date of Procedure: 3/1/2023    Pre-Op Diagnosis: ESRD    Post-Op Diagnosis: Same and Venous outflow stenosis right arm AVF    Procedure:    Ultrasound guided access right radial artery  Right arm fistulagram  BAlloon angioplasty peripheral venous segment -- 9 x 80mm Impact Admiral drug eluting balloon  Balloon angioplasty right brachiocephalic XTCE--84LX x 31EU Impact Admiral balloon and 12mm x 40mm [place]  Balloon angioplasty right cephalic-subclavian vein junction 9mm Impact Admiral drug eluting balloon    Surgeon:  Xiomy Ruiz MD    Assistant:  Jasno Marie DPM       Monitored sedation    Estimated Blood Loss (mL): Minimal    Complications: None      Findings: >50% stenosis of the cephalic-subclavian junction, brachiocephalic vein and peripheral segment of the fistula. Significant residual stenosis of the brachiocephalic vein, though no waste apparent with angioplasty up to 12mm. The remaining stenoses responded well.       Electronically signed by Xiomy Ruiz MD on 3/1/2023 at 10:55 AM

## 2023-03-01 NOTE — PROGRESS NOTES
Occupational/Physical Therapy  HOLD    Attempted to see pt for OT/PT treatment session this AM. Pt currently off floor for procedure. Will re-attempt OT/PT once medically appropriate.      [place], [place] [id number]    Michael Dang, PT, DPT

## 2023-03-01 NOTE — PROGRESS NOTES
Clinical Pharmacy Progress Note    Vancomycin - Management by Pharmacy    Consult Date(s): 2/20/23  Consulting Provider(s): Dr. Madeline Cummings / Plan  1)  LLE osteomyelitis with abscess - Vancomycin  Concurrent Antimicrobials: n/a  Day of Vanc Therapy:  #10  Current Dosing Method: Intermittent Dosing by Levels  Therapeutic Goal: Trough ~ 15 mg/L  Current Dose / Plan:   Pt is ESRD on HD Mon-Wed-Fri. Will dose Vancomycin intermittently. Continue 750mg IV 3x/week at HD on Mon-Wed-Fri. Next dose today with HD. Will plan to order level prior to HD on Friday if still here. Will continue to monitor clinical condition and make adjustments to regimen as appropriate. MRSA in wound culture has elevated BROCK = 2; may indicate higher risk of treatment failure. If not clinically improving, could consider changing to Daptomycin MWF with HD instead. ID is following - defer abx decisions to them. Please call with questions--  Thanks--  Sole Carmona, PharmD, BCPS, BCGP  [id number] ([facility])   3/1/2023 12:03 PM      Interval update:   Pt to have RUE fistulogram today per vascular surgery. Planning to continue IV Vanc at HD through 4/7/23. Subjective/Objective:   Jazmin Gu is a 79 y.o. male with a PMHx significant for ESRD on HD Mon-Wed-Fri, CAD, CVA, dementia, depression, DM, GERD, HTN, HLD, and PVD who is admitted with LLE osteomyelitis of 1st metatarsal, 1st proximal phalanx, and 1st distal phalanx with abscess. Pt is now s/p L foot 1st ray amputation (done 2/24) and s/p left foot I&D with delayed primary closure and wound vac placement (done 2/26). Pharmacy is consulted to dose Vancomycin.     Ht Readings from Last 1 Encounters:   02/20/23 5' 7\" (1.702 m)     Wt Readings from Last 1 Encounters:   03/01/23 151 lb 10.8 oz (68.8 kg)     Current & Prior Antimicrobial Regimen(s):  (2/20-2/21)   (2/20-2/21)  Vancomycin - Pharmacy to dose  Intermittent dosing (2/20-current)    Vancomycin Level(s) / Doses:    Date Vancomycin Level Vancomycin Dose   2/20  1750mg x1   2/21 21.9 mcg/mL --   2/22 16.9 mcg/mL 750mg x1   2/23  --   2/24 17 mcg/mL 750mg x1   2/25 -- --   2/26 -- --   2/27 18.1 mcg/mL 750mg x1   2/28  --   3/1       Cultures & Sensitivities:    Date Site Micro Susceptibility / Result   2/20 Blood x2 Group A Strep  Reliably sensitive to b-lactams, others   2/20 Foot wound Group A Strep  Staph aureus  H. influenzae Reliably sensitive to b-lactams, others  S=Vanc (BROCK=2), linezolid, daptomycin  Reliably sensitive to CTX, Amp, Bactrim   2/24 Wound - tissue Group A Strep  MRSA Reliably sensitive to b-lactams, others  S=Vanc (BROCK=2), linezolid, daptomycin     Recent Labs     02/27/23  0521 02/28/23  0651   CREATININE 6.0* 4.9*   BUN 36* 23*   WBC 11.2* 9.6       CrCl is not calculated 2/2 ESRD on HD

## 2023-03-01 NOTE — PROGRESS NOTES
Internal Medicine Progress Note    Patient Name: Mikal Hussein   Patient : 1955   Date: 3/1/2023   Admit Date: 2023     CC: Shortness of Breath       Subjective     Interval History:     No acute overnight events.    POD #0:  Right arm fistulogram, balloon angioplasty   POD #5:  L foot total first ray amputation  POD #3:  Further debridement, delayed primary closure with wound VAC application and skin graft substitute application   Afebrile, saturating well on RA  Hypertensive.  Will restart home hydralazine.  HD planned for today    Patient seen and examined at bedside.  Endorses hiccups and lightheadedness, unchanged from yesterday.  Denies headache, dizziness, ear fullness/pressure, rhinorrhea, sore throat, chest pain/pressure/tightness, dyspnea, abdominal pain, nausea, vomiting, chills, abdominal pain, leg/foot pain.          Objective     Vital Signs:  Patient Vitals for the past 8 hrs:   BP Temp Temp src Pulse Resp SpO2 Weight   23 0730 (!) 151/80 97.3 °F (36.3 °C) Oral 70 16 97 % --   23 0354 (!) 164/77 97.8 °F (36.6 °C) Oral 75 16 98 % 151 lb 10.8 oz (68.8 kg)         Physical Exam:  Constitutional: Pleasant, non-diaphoretic, elderly male, sitting semi-Fowlers in bed, in NAD, hiccupping.  HEENT:  NCAT.  Scleral icterus.  No conjunctival injection.  No rhinorrhea or epistaxis.   Cardiovascular:  No apparent JVD. Auscultation difficult due to frequent hiccups. Radial pulses 2+.  Capillary refill < 2 sec.  Pulmonary:  Able to speak in full sentences without frequent pauses. No perioral cyanosis.  Auscultation difficult due to frequent hiccups.  Abdomen: Non-distended.  Normoactive bowel sounds. Soft, no TTP.  Musculoskeletal:  Cachectic.  Right AV fistula. Some mild swelling of RUE, including forearm and hand, which are warm to the touch, capillary refill < 2 sec, and sensation intact. No lower extremity edema.  Left foot and ankle are covered in ACE wrap, which is clean, dry, and  intact. Skin:  Warm, dry, acyanotic. Psychological:  Cooperative. Normal speech, behavior, and thought content. Intake/Output Summary (Last 24 hours) at 3/1/2023 3714  Last data filed at 2/28/2023 2004  Gross per 24 hour   Intake --   Output 0 ml   Net 0 ml          Medications:   isosorbide mononitrate  30 mg Oral Daily    vancomycin  750 mg IntraVENous Once per day on Mon Wed Fri    atorvastatin  80 mg Oral Daily    heparin (porcine)  5,000 Units SubCUTAneous 3 times per day    lidocaine  1 patch TransDERmal Daily    epoetin ramesh-epbx  10,000 Units IntraVENous Once per day on Mon Wed Fri    mirtazapine  7.5 mg Oral Nightly    traZODone  25 mg Oral Nightly    melatonin  3 mg Oral Nightly    sodium chloride flush  5-40 mL IntraVENous 2 times per day    aspirin  81 mg Oral Daily    memantine  10 mg Oral BID    metoprolol succinate  25 mg Oral Daily    pantoprazole  40 mg Oral QAM AC    Vitamin D  1,000 Units Oral Daily    sodium chloride flush  5-40 mL IntraVENous 2 times per day       sodium chloride      sodium chloride      sodium chloride          Labs:  CBC:   Recent Labs     02/27/23  0521 02/28/23  0651   WBC 11.2* 9.6   HGB 7.6* 7.9*   HCT 24.4* 25.1*    285   MCV 86.1 86.1     Renal:    Recent Labs     02/27/23  0521 02/28/23  0651   * 135*   K 4.5 3.8   CL 98* 99   CO2 28 29   BUN 36* 23*   CREATININE 6.0* 4.9*   GLUCOSE 126* 119*   CALCIUM 8.5 8.3   MG 2.30 2.00   PHOS 3.0 2.8   ANIONGAP 9 7     Hepatic:   Recent Labs     02/27/23  0521 02/28/23  0651   LABALBU 2.1* 2.3*       INR:   No results for input(s): INR in the last 72 hours. Radiology:  VL Extremity Venous Right         XR CHEST PORTABLE   Final Result      Questionable minimal bibasilar atelectasis. XR FOOT LEFT (MIN 3 VIEWS)   Final Result      Postoperative changes as above.       VL DUP LOWER EXTREMITY ARTERIES BILATERAL   Final Result      MRI FOOT LEFT WO CONTRAST   Final Result   Acute osteomyelitis of the first distal phalanx. XR FOOT LEFT (MIN 3 VIEWS)   Final Result   1. Medial ulceration with lytic changes compatible with osteomyelitis of the base of the first proximal phalanx and distal first metatarsal.      XR CHEST PORTABLE   Final Result   Impression: No acute abnormality      IR ANGIOGRAM EXTREMITY LEFT    (Results Pending)   VL Extremity Venous Right    (Results Pending)       Assessment & Plan   80-year-old  male who presented to the ED from his SNF on 2/20/23 with dyspnea occurring during hemodialysis. Sepsis with S. pyogenes bacteremia - likely 2/2 acute osteomyelitis - afebrile  Acute MRSA, GAS osteomyelitis of left first ray s/p I&D, first ray amputation - no further surgical intervention per podiatry standpoint  Peripheral arterial disease - LLE AMOS 0.74  Follow[facility].  S/p amputation of right hallux and 4th digit 2/2 osteomyelitis. 2/20: Growth on BCx x2 and on PCR, prealbumin low at 7.1, ESR elevated at 117, CRP elevated at 282.1. ID consulted, recs appreciated   Continue vancomycin IV (2/20-), dosed by pharmacy. Will need 6 weeks of vancomycin 750 mg IV post-hemodialysis, on hemodialysis days only from 2/24 to 4/7  Repeat BCx (2/21): NGTD  OR Cx (2/24):  GAS, MRSA  TTE (2/21): No valvular vegetations  CBC w/diff daily  Podiatry and vascular surgery consulted   2/22:  LLE arteriogram  2/24:  L foot I&D with total first ray amputation  2/26: I&D, delayed primary closure, application of skin graft substitute, wound vac application of LLE - non definitive surgery, no further podiatry surgical interventions planned  NWB to LLE  Outpatient wound VAC  Continue aspirin and high-dose statin  PT/OT eval and tx  The patient is at high-risk for perioperative complications in an intermediate-risk procedure given multiple vascular comorbidities, including his cerebrovascular disease and elevated creatinine level.     G1DD - no signs of volume overload  2/20:  ProBNP 7611. Echo (2/21):  LVEF 55-60%, no regional WMA, G1DD, small circumferential pericardial effusion, PASP 28 mmHg. Right upper extremity, forearm, hand swelling 2/2 venous outflow stenosis of AVF  Vascular surgery consulted  3/01:  Right arm fistulogram, balloon angioplasty      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,W,F)  Hyperparathyroidism of ESRD  Normocytic anemia of ESRD  Nephrology consulted  HD today  BMP, Mg QD - replete to keep K > 4, Mg > 2  Avoid NSAIDs/nephrotoxins as patient still makes urine  Strict I/Os  Daily weights  Keep MAP > 65 mmHg  Procrit with dialysis    Pulmonary hypertension  Hypertension  CAD (LAD 15%, L Cx 15%, RCA 15-20% in 07/2016)  Continue ASA 81 mg QD  Continue home meds metoprolol succinate 25 mg QD  Restart isosorbide mononitrate 30 mg QD, hydralazine 25 mg BID    HLD - well-controlled  Lipid panel (2/22/23): Total 74, HDL 10, LDL 34, , VLDL 30. Continue home atorvastatin 40 mg QD    Vascular dementia  Hemiplegia   Palliative care consulted  Continue memantine 10 mg BID    Insomnia  Continue home meds melatonin 3 mg QHS, trazodone 25 mg QHS, mirtazapine 7.5 mg QHS PRN    GERD  Protonix QD      DVT PPx:  Heparin SQ, Protonix  Diet:  Diet NPO Exceptions are: Sips of Water with Meds   Code status:  DNR-CCA     ELOS:  5 days  Barriers to discharge:  sepsis bacteremia, acute OM  Disposition  - Preadmission: From [facility].  - Current:  [facility]  - Upon discharge:  OT 11/24, PT 7/24 on 2/27. Plan to return to SNF. Will discuss with attending physician Dr. Merrill Cooley MD.     Tammy Wilkerson.  Jamil Blanco MD, [address], [facility]  Internal Medicine, PGY-1

## 2023-03-01 NOTE — PROGRESS NOTES
Treatment time: 3 hrs    Net UF: 1000 ml    Pre weight: 69 kg  Post weight: 68 kg  EDW:     Access used: Rt AVF  Access function: Well tolerated, 300 BFR    Medications or blood products given: Retacrit 10,000 units    Regular outpatient schedule: MWF    Summary of response to treatment: Pt tolerated fair. BP soft. Pt remained stable throughout entire treatment. Copy of dialysis treatment record placed in chart, to be scanned into EMR.

## 2023-03-02 ENCOUNTER — APPOINTMENT (OUTPATIENT)
Dept: GENERAL RADIOLOGY | Age: 68
DRG: 853 | End: 2023-03-02
Payer: MEDICARE

## 2023-03-02 VITALS
TEMPERATURE: 97.4 F | DIASTOLIC BLOOD PRESSURE: 63 MMHG | RESPIRATION RATE: 16 BRPM | BODY MASS INDEX: 23.53 KG/M2 | WEIGHT: 149.91 LBS | HEIGHT: 67 IN | OXYGEN SATURATION: 100 % | HEART RATE: 77 BPM | SYSTOLIC BLOOD PRESSURE: 137 MMHG

## 2023-03-02 LAB
ALBUMIN SERPL-MCNC: 2.2 G/DL (ref 3.4–5)
ANION GAP SERPL CALCULATED.3IONS-SCNC: 10 MMOL/L (ref 3–16)
ATYPICAL LYMPHOCYTE RELATIVE PERCENT: 1 % (ref 0–6)
BASOPHILS ABSOLUTE: 0 K/UL (ref 0–0.2)
BASOPHILS RELATIVE PERCENT: 0 %
BUN BLDV-MCNC: 22 MG/DL (ref 7–20)
CALCIUM SERPL-MCNC: 8.4 MG/DL (ref 8.3–10.6)
CHLORIDE BLD-SCNC: 101 MMOL/L (ref 99–110)
CO2: 26 MMOL/L (ref 21–32)
CREAT SERPL-MCNC: 4.3 MG/DL (ref 0.8–1.3)
EOSINOPHILS ABSOLUTE: 0.1 K/UL (ref 0–0.6)
EOSINOPHILS RELATIVE PERCENT: 1 %
GFR SERPL CREATININE-BSD FRML MDRD: 14 ML/MIN/{1.73_M2}
GLUCOSE BLD-MCNC: 82 MG/DL (ref 70–99)
HCT VFR BLD CALC: 26.2 % (ref 40.5–52.5)
HEMOGLOBIN: 8 G/DL (ref 13.5–17.5)
HYPOCHROMIA: ABNORMAL
LYMPHOCYTES ABSOLUTE: 1 K/UL (ref 1–5.1)
LYMPHOCYTES RELATIVE PERCENT: 10 %
MACROCYTES: ABNORMAL
MAGNESIUM: 1.9 MG/DL (ref 1.8–2.4)
MCH RBC QN AUTO: 27.2 PG (ref 26–34)
MCHC RBC AUTO-ENTMCNC: 30.7 G/DL (ref 31–36)
MCV RBC AUTO: 88.7 FL (ref 80–100)
MONOCYTES ABSOLUTE: 0.4 K/UL (ref 0–1.3)
MONOCYTES RELATIVE PERCENT: 4 %
MYELOCYTE PERCENT: 1 %
NEUTROPHILS ABSOLUTE: 7.9 K/UL (ref 1.7–7.7)
NEUTROPHILS RELATIVE PERCENT: 83 %
NUCLEATED RED BLOOD CELLS: 1 /100 WBC
OVALOCYTES: ABNORMAL
PDW BLD-RTO: 18.4 % (ref 12.4–15.4)
PHOSPHORUS: 2.6 MG/DL (ref 2.5–4.9)
PLATELET # BLD: 256 K/UL (ref 135–450)
PMV BLD AUTO: 7.4 FL (ref 5–10.5)
POLYCHROMASIA: ABNORMAL
POTASSIUM SERPL-SCNC: 4.2 MMOL/L (ref 3.5–5.1)
RBC # BLD: 2.95 M/UL (ref 4.2–5.9)
SODIUM BLD-SCNC: 137 MMOL/L (ref 136–145)
WBC # BLD: 9.4 K/UL (ref 4–11)

## 2023-03-02 PROCEDURE — 85025 COMPLETE CBC W/AUTO DIFF WBC: CPT

## 2023-03-02 PROCEDURE — 74018 RADEX ABDOMEN 1 VIEW: CPT

## 2023-03-02 PROCEDURE — 6370000000 HC RX 637 (ALT 250 FOR IP)

## 2023-03-02 PROCEDURE — 1200000000 HC SEMI PRIVATE

## 2023-03-02 PROCEDURE — 2580000003 HC RX 258: Performed by: PODIATRIST

## 2023-03-02 PROCEDURE — 36415 COLL VENOUS BLD VENIPUNCTURE: CPT

## 2023-03-02 PROCEDURE — 6370000000 HC RX 637 (ALT 250 FOR IP): Performed by: STUDENT IN AN ORGANIZED HEALTH CARE EDUCATION/TRAINING PROGRAM

## 2023-03-02 PROCEDURE — 80069 RENAL FUNCTION PANEL: CPT

## 2023-03-02 PROCEDURE — 6370000000 HC RX 637 (ALT 250 FOR IP): Performed by: PODIATRIST

## 2023-03-02 PROCEDURE — 83735 ASSAY OF MAGNESIUM: CPT

## 2023-03-02 PROCEDURE — 6360000002 HC RX W HCPCS: Performed by: PODIATRIST

## 2023-03-02 RX ORDER — AMOXICILLIN 250 MG
2 CAPSULE ORAL DAILY PRN
Qty: 20 TABLET | Refills: 0 | Status: SHIPPED | OUTPATIENT
Start: 2023-03-02 | End: 2023-04-18

## 2023-03-02 RX ORDER — SENNA AND DOCUSATE SODIUM 50; 8.6 MG/1; MG/1
2 TABLET, FILM COATED ORAL ONCE
Status: DISCONTINUED | OUTPATIENT
Start: 2023-03-02 | End: 2023-03-02 | Stop reason: SDUPTHER

## 2023-03-02 RX ORDER — HEPARIN SODIUM 1000 [USP'U]/ML
60 INJECTION, SOLUTION INTRAVENOUS; SUBCUTANEOUS PRN
Status: CANCELLED | OUTPATIENT
Start: 2023-03-02

## 2023-03-02 RX ORDER — BISACODYL 10 MG
10 SUPPOSITORY, RECTAL RECTAL ONCE
Status: COMPLETED | OUTPATIENT
Start: 2023-03-02 | End: 2023-03-02

## 2023-03-02 RX ORDER — HEPARIN SODIUM 1000 [USP'U]/ML
30 INJECTION, SOLUTION INTRAVENOUS; SUBCUTANEOUS PRN
Status: CANCELLED | OUTPATIENT
Start: 2023-03-02

## 2023-03-02 RX ORDER — HEPARIN SODIUM 1000 [USP'U]/ML
60 INJECTION, SOLUTION INTRAVENOUS; SUBCUTANEOUS ONCE
Status: CANCELLED | OUTPATIENT
Start: 2023-03-02 | End: 2023-03-02

## 2023-03-02 RX ORDER — HEPARIN SODIUM 10000 [USP'U]/100ML
5-30 INJECTION, SOLUTION INTRAVENOUS CONTINUOUS
Status: CANCELLED | OUTPATIENT
Start: 2023-03-02

## 2023-03-02 RX ORDER — SENNA AND DOCUSATE SODIUM 50; 8.6 MG/1; MG/1
2 TABLET, FILM COATED ORAL ONCE
Status: COMPLETED | OUTPATIENT
Start: 2023-03-02 | End: 2023-03-02

## 2023-03-02 RX ORDER — BISACODYL 10 MG
10 SUPPOSITORY, RECTAL RECTAL ONCE
Status: DISCONTINUED | OUTPATIENT
Start: 2023-03-02 | End: 2023-03-02 | Stop reason: SDUPTHER

## 2023-03-02 RX ADMIN — HYDRALAZINE HYDROCHLORIDE 25 MG: 25 TABLET, FILM COATED ORAL at 06:18

## 2023-03-02 RX ADMIN — BISACODYL 10 MG: 10 SUPPOSITORY RECTAL at 22:04

## 2023-03-02 RX ADMIN — HEPARIN SODIUM 5000 UNITS: 5000 INJECTION INTRAVENOUS; SUBCUTANEOUS at 21:32

## 2023-03-02 RX ADMIN — METOPROLOL SUCCINATE 25 MG: 25 TABLET, FILM COATED, EXTENDED RELEASE ORAL at 10:43

## 2023-03-02 RX ADMIN — SENNOSIDES AND DOCUSATE SODIUM 2 TABLET: 50; 8.6 TABLET ORAL at 22:04

## 2023-03-02 RX ADMIN — Medication 1000 UNITS: at 10:44

## 2023-03-02 RX ADMIN — ASPIRIN 81 MG: 81 TABLET, COATED ORAL at 10:44

## 2023-03-02 RX ADMIN — TRAZODONE HYDROCHLORIDE 25 MG: 50 TABLET ORAL at 21:33

## 2023-03-02 RX ADMIN — SODIUM CHLORIDE, PRESERVATIVE FREE 10 ML: 5 INJECTION INTRAVENOUS at 21:34

## 2023-03-02 RX ADMIN — MIRTAZAPINE 7.5 MG: 15 TABLET, FILM COATED ORAL at 21:33

## 2023-03-02 RX ADMIN — Medication 3 MG: at 21:33

## 2023-03-02 RX ADMIN — ATORVASTATIN CALCIUM 80 MG: 80 TABLET, FILM COATED ORAL at 10:44

## 2023-03-02 RX ADMIN — HYDRALAZINE HYDROCHLORIDE 25 MG: 25 TABLET, FILM COATED ORAL at 21:33

## 2023-03-02 RX ADMIN — PANTOPRAZOLE SODIUM 40 MG: 40 TABLET, DELAYED RELEASE ORAL at 06:19

## 2023-03-02 RX ADMIN — HEPARIN SODIUM 5000 UNITS: 5000 INJECTION INTRAVENOUS; SUBCUTANEOUS at 06:18

## 2023-03-02 RX ADMIN — SODIUM CHLORIDE, PRESERVATIVE FREE 10 ML: 5 INJECTION INTRAVENOUS at 10:44

## 2023-03-02 RX ADMIN — ISOSORBIDE MONONITRATE 30 MG: 30 TABLET, EXTENDED RELEASE ORAL at 10:44

## 2023-03-02 RX ADMIN — MEMANTINE 10 MG: 10 TABLET ORAL at 21:34

## 2023-03-02 RX ADMIN — MEMANTINE 10 MG: 10 TABLET ORAL at 10:44

## 2023-03-02 ASSESSMENT — PAIN SCALES - GENERAL
PAINLEVEL_OUTOF10: 0
PAINLEVEL_OUTOF10: 0

## 2023-03-02 NOTE — CARE COORDINATION
Case Management Assessment            Discharge Note                    Date / Time of Note: 3/2/2023 2:12 PM                  Discharge Note Completed by: Sai Kothari RN    Patient Name: Chiquita Chappell   YOB: 1955  Diagnosis: Osteomyelitis ([facility] [M86.9]  ESRD on dialysis ([facility] [address] [facility] [N18.6, Z99.2]  Osteomyelitis of left foot, unspecified type [facility]) [M86.9]   Date / Time: 2/20/2023 10:01 AM    Current PCP: No primary care provider on file. Celena Crowe, [address] [place] [address]     [phone]       Clinic patient: No    Hospitalization in the last 30 days: No    Advance Directives:  Code Status: DNR-CCA  [facility] DNR form completed and on chart: Yes    Financial:  Payor: MEDICARE / Plan: MEDICARE PART A AND B / Product Type: *No Product type* /      Pharmacy:    [address] [place] [address]/ Harish Hancock   [address] [address]  Phone: [phone] Fax: [phone]      Assistance purchasing medications?: Potential Assistance Purchasing Medications: No  Assistance provided by Case Management: None at this time    Does patient want to participate in local refill/ meds to beds program?: No    Meds To Beds General Rules:  1. Can ONLY be done Monday- Friday between 8:30am-5pm  2. Prescription(s) must be in pharmacy by 3pm to be filled same day  3. Copy of patient's insurance/ prescription drug card and patient face sheet must be sent along with the prescription(s)  4. Cost of Rx cannot be added to hospital bill. If financial assistance is needed, please contact unit  or ;  or  CANNOT provide pharmacy voucher for patients co-pays  5.  Patients can then  the prescription on their way out of the hospital at discharge, or pharmacy can deliver to the bedside if staff is available. (payment due at time of pick-up or delivery - cash, check, or card accepted)     Able to afford home medications/ co-pay costs: SNF/LTC    ADLS:  Current PT AM-PAC Score: 7 /24  Current OT AM-PAC Score: 11 /24      DISCHARGE Disposition: [place] (LTC): [place]  Phone: [phone]  Fax: [phone]    LOC at discharge: [place] [facility] Completed: Yes    Notification completed in HENS/PAS?:  Not Applicable    IMM Completed:   Yes, Case management has presented and reviewed IMM letter #2 to the patient and/or family/ POA. Patient and/or family/POA verbalized understanding of their medicare rights and appeal process if needed. Patient and/or family/POA has signed, initialed and placed today's date (3-2-23) and time [phone]) on IMM letter #2 on the the appropriate lines. Patient and/or family/POA, copy of letter offered and they are aware that this original copy of IMM letter #2 is available prior to discharge from the paper chart on the unit. Electronic documentation has been entered into epic for IMM letter #2 and original paper copy has been added to the paper chart at the nurses station. Transportation:  Transportation PLAN for discharge: EMS transportation   Mode of Transport: Ambulance stretcher - BLS  Reason for medical transport: Severe muscular weakness and de-conditioned state due to CVA rt weakness-hemiplegia and requires ambulance transport due to left foot decubitis  Name of [address]: [address]  Phone: [phone]  Time of Transport: 1800    Transport form completed: Yes    Dialysis:  Dialysis patient: Yes    Dialysis Center:  Not Applicable    Inpatient dialysis at [facility]          Additional CM Notes: patient to [facility] today with IV abx for administration in HD and with wound vac to left foot. Patient will be sent WTD drsg and wound vac will be attached again once patient is at [facility]. Confirmed that wound vac is at [facility]. Called wife and let her know that patient will be discharged and she gave verbal signature for IMM.      The Plan for Transition of Care is related to the following treatment goals of Osteomyelitis ([facility] [address] [M86.9]  ESRD on dialysis ([facility] [address] [N18.6, Z99.2]  Osteomyelitis of left foot, unspecified type ([facility] [address] [M86.9]    The Patient and/or patient representative Carmelita Sneed and his family were provided with a choice of provider and agrees with the discharge plan Yes    Freedom of choice list was provided with basic dialogue that supports the patient's individualized plan of care/goals and shares the quality data associated with the providers.  Yes    Care Transitions patient: No    Natalya Charles RN  [facility] [email or web address]  Case Management Department  Ph: [phone]

## 2023-03-02 NOTE — PROGRESS NOTES
Pt resting with no complaints at this time. RR easy and unlabored. VSS. LLE wound vac remains in place without any issues over night, dressing CDI, extremity elevated up on pillows. Tegaderm remains on R wrist with no drainage or hematoma noted. R AVF thrill and bruit present. Bed locked and in lowest position. Plan for discharge this AM. Denies any needs at this time. Will continue to monitor.

## 2023-03-02 NOTE — PROGRESS NOTES
Internal Medicine Progress Note    Patient Name: Fe Jerez   Patient : 1955   Date: 3/2/2023   Admit Date: 2023     CC: Shortness of Breath       Subjective     Interval History:     No acute overnight events. POD #1:  Right arm fistulogram, balloon angioplasty. His hand and arm look much less swollen compared to yesterday. Afebrile, saturating well on RA    Patient seen and examined at bedside. He reports feeling \"great\". He reports increased flexibility/movement in his right hand. Denies headache, dizziness, ear fullness/pressure, rhinorrhea, sore throat, chest pain/pressure/tightness, dyspnea, abdominal pain, nausea, vomiting, chills, abdominal pain, leg/foot pain. Objective     Vital Signs:  Patient Vitals for the past 8 hrs:   BP Temp Temp src Pulse Resp SpO2   23 0616 138/66 -- -- 82 -- --   23 0242 (!) 151/69 97.9 °F (36.6 °C) Oral 80 18 98 %         Physical Exam:  Constitutional: Pleasant, non-diaphoretic, elderly male, sitting semi-Fowlers in bed, in NAD. HEENT:  NCAT. Scleral icterus. No conjunctival injection. No rhinorrhea or epistaxis. Cardiovascular:  No apparent JVD.  RRR. No murmurs, rubs, or gallops. Radial pulses 2+. Capillary refill < 2 sec. Pulmonary:  Able to speak in full sentences without frequent pauses. CTAB. No perioral cyanosis. Abdomen: Non-distended. Normoactive bowel sounds. Soft, no TTP. Musculoskeletal:  Cachectic. Right AV fistula. Some mild swelling of RUE, including forearm and hand, which are warm to the touch, capillary refill < 2 sec, and sensation intact; dressing is clean, dry, and intact. No lower extremity edema. Left foot and ankle are covered in ACE wrap, which is clean, dry, and intact. Skin:  Warm, dry, acyanotic. Psychological:  Cooperative. Normal speech, behavior, and thought content.      No intake or output data in the 24 hours ending 23 0801         Medications:   hydrALAZINE  25 mg Oral 3 times per day    isosorbide mononitrate  30 mg Oral Daily    vancomycin  750 mg IntraVENous Once per day on Mon Wed Fri    atorvastatin  80 mg Oral Daily    heparin (porcine)  5,000 Units SubCUTAneous 3 times per day    lidocaine  1 patch TransDERmal Daily    epoetin ramesh-epbx  10,000 Units IntraVENous Once per day on Mon Wed Fri    mirtazapine  7.5 mg Oral Nightly    traZODone  25 mg Oral Nightly    melatonin  3 mg Oral Nightly    sodium chloride flush  5-40 mL IntraVENous 2 times per day    aspirin  81 mg Oral Daily    memantine  10 mg Oral BID    metoprolol succinate  25 mg Oral Daily    pantoprazole  40 mg Oral QAM AC    Vitamin D  1,000 Units Oral Daily    sodium chloride flush  5-40 mL IntraVENous 2 times per day       sodium chloride      sodium chloride      sodium chloride          Labs:  CBC:   Recent Labs     02/28/23  0651 03/01/23  1410 03/02/23  0641   WBC 9.6 8.0 9.4   HGB 7.9* 7.3* 8.0*   HCT 25.1* 23.5* 26.2*    272 256   MCV 86.1 86.0 88.7     Renal:    Recent Labs     02/28/23  0651 03/01/23  1410   * 134*   K 3.8 4.1   CL 99 97*   CO2 29 27   BUN 23* 39*   CREATININE 4.9* 5.7*   GLUCOSE 119* 108*   CALCIUM 8.3 8.7   MG 2.00 2.00   PHOS 2.8 3.2   ANIONGAP 7 10     Hepatic:   Recent Labs     02/28/23  0651 03/01/23  1410   LABALBU 2.3* 2.2*       INR:   No results for input(s): INR in the last 72 hours. Radiology:  VL Extremity Venous Right   Final Result      XR CHEST PORTABLE   Final Result      Questionable minimal bibasilar atelectasis. XR FOOT LEFT (MIN 3 VIEWS)   Final Result      Postoperative changes as above. VL DUP LOWER EXTREMITY ARTERIES BILATERAL   Final Result      MRI FOOT LEFT WO CONTRAST   Final Result   Acute osteomyelitis of the first distal phalanx. XR FOOT LEFT (MIN 3 VIEWS)   Final Result   1.  Medial ulceration with lytic changes compatible with osteomyelitis of the base of the first proximal phalanx and distal first metatarsal.      XR CHEST PORTABLE   Final Result   Impression: No acute abnormality      IR ANGIOGRAM EXTREMITY LEFT    (Results Pending)   VL Extremity Venous Right    (Results Pending)   IR ANGIOGRAM EXTREMITY RIGHT    (Results Pending)       Assessment & Plan   71-year-old  male who presented to the ED from his SNF on 2/20/23 with dyspnea occurring during hemodialysis. S. pyogenes bacteremia - likely 2/2 acute osteomyelitis - afebrile  Acute MRSA, GAS osteomyelitis of left first ray s/p I&D, first ray amputation - no further surgical intervention per podiatry standpoint  Peripheral arterial disease - LLE AMOS 0.74  Follows with vascular surgery at [facility].  S/p amputation of right hallux and 4th digit 2/2 osteomyelitis. 2/20: Growth on BCx x2 and on PCR, prealbumin low at 7.1, ESR elevated at 117, CRP elevated at 282.1. ID consulted, recs appreciated   Continue vancomycin IV (2/20-), dosed by pharmacy. Will need 6 weeks of vancomycin 750 mg IV post-hemodialysis, on hemodialysis days only from 2/24 to 4/7  Repeat BCx (2/21): NGTD  OR Cx (2/24):  GAS, MRSA  TTE (2/21): No valvular vegetations  CBC w/diff daily  Podiatry and vascular surgery consulted   2/22:  LLE arteriogram  2/24:  L foot I&D with total first ray amputation  2/26: I&D, delayed primary closure, application of skin graft substitute, wound vac application of LLE - non definitive surgery, no further podiatry surgical interventions planned  NWB to LLE  Outpatient wound VAC  Continue aspirin and high-dose statin  PT/OT eval and tx  The patient is at high-risk for perioperative complications in an intermediate-risk procedure given multiple vascular comorbidities, including his cerebrovascular disease and elevated creatinine level. G1DD - no signs of volume overload  2/20:  ProBNP 7611. Echo (2/21):  LVEF 55-60%, no regional WMA, G1DD, small circumferential pericardial effusion, PASP 28 mmHg.     Right upper extremity, forearm, hand swelling 2/2 venous outflow stenosis of AVF - improvement in swelling  Vascular surgery consulted  3/01:  Right arm fistulogram, balloon angioplasty      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,W,F)  Hyperparathyroidism of ESRD  Normocytic anemia of ESRD  Nephrology consulted  HD today  BMP, Mg QD - replete to keep K > 4, Mg > 2  Avoid NSAIDs/nephrotoxins as patient still makes urine  Strict I/Os  Daily weights  Keep MAP > 65 mmHg  Procrit with dialysis    Pulmonary hypertension  Hypertension  CAD (LAD 15%, L Cx 15%, RCA 15-20% in 07/2016)  ASA 81 mg QD  Metoprolol succinate 25 mg QD  Isosorbide mononitrate 30 mg QD  Hydralazine 25 mg TID    HLD - well-controlled  Lipid panel (2/22/23): Total 74, HDL 10, LDL 34, , VLDL 30. Continue home atorvastatin 40 mg QD    Vascular dementia  Hemiplegia   Palliative care consulted  Continue memantine 10 mg BID    Insomnia  Continue home meds melatonin 3 mg QHS, trazodone 25 mg QHS, mirtazapine 7.5 mg QHS PRN    GERD  Protonix QD      DVT PPx:  Heparin SQ, Protonix  Diet:  ADULT DIET; Regular; 4 carb choices (60 gm/meal)  ADULT ORAL NUTRITION SUPPLEMENT; Breakfast, Dinner; Wound Healing Oral Supplement  ADULT ORAL NUTRITION SUPPLEMENT; Breakfast, Lunch; Renal Oral Supplement   Code status:  DNR-CCA     ELOS:  5 days  Barriers to discharge:  sepsis bacteremia, acute OM  Disposition  - Preadmission: From [facility].  - Current:  [facility]  - Upon discharge:  OT 11/24, PT 7/24 on 2/27. Plan to return to SNF. Will discuss with attending physician Dr. Glenis Suárez MD.     Guerita Gabriel.  Soto Sanchez MD, Natan Monsivais [address], [facility]  Internal Medicine, PGY-1

## 2023-03-02 NOTE — PLAN OF CARE
Problem: Skin/Tissue Integrity  Goal: Absence of new skin breakdown  Description: 1. Monitor for areas of redness and/or skin breakdown  2. Assess vascular access sites hourly  3. Every 4-6 hours minimum:  Change oxygen saturation probe site  4. Every 4-6 hours:  If on nasal continuous positive airway pressure, respiratory therapy assess nares and determine need for appliance change or resting period.   Outcome: Adequate for Discharge     Problem: Respiratory - Adult  Goal: Achieves optimal ventilation and oxygenation  Outcome: Adequate for Discharge     Problem: Discharge Planning  Goal: Discharge to home or other facility with appropriate resources  Outcome: Adequate for Discharge     Problem: Pain  Goal: Verbalizes/displays adequate comfort level or baseline comfort level  Outcome: Adequate for Discharge

## 2023-03-02 NOTE — PROGRESS NOTES
[facility] NEPHROLOGY    [facility]              [phone]                       Interval History and Plan   Patient seen at bedside  Comfortable and denied any complaints. S/P AVF angiogram and balloon angioplasty of stenosis  BP stable  Lytes stable. HD tomorrow per MWF schedule Does not look volume overloaded. Continue current BP medications. Continue Epogen with dialysis  Not on binders as phos is low  Renal diet. Communicated with [place] dialysis clinic team for IV Vancomycin per ID recommendations. Plan for D/C noted    D/W patient  D/W Dr Jamaal Jordan. Assessment:     Pt is a 78 yo male with pmhx of osteomyelitis s/p amputation, ESRD on HD, CAD, PAD, HTN, HLD, and vascular dementia who we were consulted for hemodialysis continuation. ESRD on HD MWF  Access: Right arm fistula. Right arm swelling due to AVF stenosis S/P angioplasty. Chronic Anemia   DIMITRIOS with dialysis. Hypertension and Volume status  BP is now elevated side. Does not look overt volume overloaded. Left foot osteomyelitis  Group A Strep Bacteremia  Sepsis  Management per primary team.       [facility] Nephrology would like to thank Deo Cr MD   for opportunity to serve this patient      Please call with questions at-   24 Hrs Answering service [phone] or  7 am- 5 pm via Perfect serve or cell phone      CC/reason for consult :     Hemodialysis continuation     HPI :     Ulices Patel is a 79 y.o. male with history of osteomyelitis, ESRD on HD, CAD, PAD, HTN, HLD, and vascular dementia who presented to the hospital on 2/20/2023 with shortness of breath during his hemodialysis session. Per daughter, patient had desatted to the [de-identified] during the session.  On admission, patient has been improving in respiratory status with normal saturations, but remains admitted due to acute concerns of osteomyelitis of the left great toe, sepsis (BC confirmed 2x for Group A strep), and significant arterial disease (recent arterial duplex showing 80% blockage of the left lower extremity). Chest X-ray was negative for acute concerns. Currently on cefepime (started on 2/20). Single dose of vancomycin given yesterday, with plans to start clindamycin today. Nephrology was consulted for continuation of dialysis. Last Echo in 2019 with 55% EF. Repeat Echo ordered during this admission. ROS:     positives in bold   Constitutional:  fever, chills, weakness, weight change, fatigue  Skin:  rash, pruritus, hair loss, bruising, dry skin, petechiae, left great toe wound   Head, Face, Neck   headaches, swelling,  cervical adenopathy  Respiratory: shortness of breath, cough, or wheezing  Cardiovascular: chest pain, palpitations, dizzy, edema  Gastrointestinal: nausea, vomiting, diarrhea, constipation,belly pain    Yellow skin, blood in stool  Musculoskeletal:  back pain, muscle weakness, gait problems,       joint pain or swelling. Genitourinary:  dysuria, poor urine flow, flank pain, blood in urine  Neurologic:  vertigo, TIA'S, syncope, seizures, focal weakness  Psychosocial:  insomnia, anxiety, or depression. Additional positive findings:                          All other remaining systems are negative.       PMH/PSH/SH/Family History:     Past Medical History:   Diagnosis Date    Amputated great toe, right (HCC)     CAD (coronary artery disease)     CVA (cerebral vascular accident) ([address]     3    Dementia ([address]     Depression     Diabetes ([address]     Diabetic polyneuropathy ([address]     ESRD on dialysis ([address]     monday wednesday friday    GERD (gastroesophageal reflux disease)     Hemiplegia and hemiparesis following cerebral infarction affecting right dominant side (HCC)     Hyperlipidemia     Hypertension     Pain syndrome, chronic     Peripheral vascular disease ([address]     [facility]    Secondary hyperparathyroidism [facility])        Past Surgical History:   Procedure Laterality Date    CARDIAC CATHETERIZATION      DIALYSIS FISTULA CREATION      FOOT DEBRIDEMENT Left 2/26/2023    INCISION AND DRAINAGE WITH DELAYED  PRIMARY CLOSURE WITH  WOUND VAC APPLICATION,  APPLICATION SKIN GRAFT SUBSTITUTE LEFT  LOWER EXTREMITY performed by Caty Flannery DPM at [place] [id number] Left 2/24/2023    LEFT FOOT TOTAL FIRST RAY AMPUTATION performed by Caty Flannery DPM at [facility] [address] History     Socioeconomic History    Marital status:      Spouse name: Not on file    Number of children: Not on file    Years of education: Not on file    Highest education level: Not on file   Occupational History    Not on file   Tobacco Use    Smoking status: Former    Smokeless tobacco: Never   Vaping Use    Vaping Use: Never used   Substance and Sexual Activity    Alcohol use: No     Alcohol/week: 0.0 standard drinks    Drug use: No    Sexual activity: Not Currently   Other Topics Concern    Not on file   Social History Narrative    Not on file     Social Determinants of Health     Financial Resource Strain: Not on file   Food Insecurity: Not on file   Transportation Needs: Not on file   Physical Activity: Not on file   Stress: Not on file   Social Connections: Not on file   Intimate Partner Violence: Not on file   Housing Stability: Not on file           Problem Relation Age of Onset    Heart Disease Mother     Diabetes Mother     Dementia Mother     Diabetes Father     Heart Disease Father     Stroke Brother           Medication:     Scheduled Meds:   hydrALAZINE  25 mg Oral 3 times per day    isosorbide mononitrate  30 mg Oral Daily    vancomycin  750 mg IntraVENous Once per day on Mon Wed Fri    atorvastatin  80 mg Oral Daily    heparin (porcine)  5,000 Units SubCUTAneous 3 times per day    lidocaine  1 patch TransDERmal Daily    epoetin ramesh-epbx  10,000 Units IntraVENous Once per day on Mon Wed Fri    mirtazapine  7.5 mg Oral Nightly    traZODone  25 mg Oral Nightly    melatonin  3 mg Oral Nightly    sodium chloride flush  5-40 mL IntraVENous 2 times per day    aspirin  81 mg Oral Daily    memantine  10 mg Oral BID    metoprolol succinate  25 mg Oral Daily    pantoprazole  40 mg Oral QAM AC    Vitamin D  1,000 Units Oral Daily    sodium chloride flush  5-40 mL IntraVENous 2 times per day     Continuous Infusions:   sodium chloride      sodium chloride      sodium chloride       PRN Meds:.oxyCODONE **OR** oxyCODONE, sodium chloride, sodium chloride, sodium chloride flush, sodium chloride, ondansetron **OR** ondansetron, acetaminophen, nitroGLYCERIN, sodium chloride flush, sodium chloride, polyethylene glycol, perflutren lipid microspheres    Vitals :     Vitals:    03/02/23 1030   BP: (!) 141/72   Pulse: 87   Resp: 16   Temp: 96.8 °F (36 °C)   SpO2: 96%       I & O :     No intake or output data in the 24 hours ending 03/02/23 1228       Physical Examination :     General appearance: Anxious- no, distressed- no  HEENT: Lips- normal, teeth- ok , oral mucosa- moist  Neck : Mass- no, appears symmetrical, JVD- not visible  Respiratory: Respiratory effort- normal effort, wheeze- no, crackles - no  Cardiovascular: Ausculation- No M/R/G, Edema none. Abdomen: visible mass- no, distention- no, scar- no, tenderness- no                            hepatosplenomegaly-  no  Musculoskeletal:  clubbing no,cyanosis- no , digital ischemia- no                           muscle strength- grossly normal , tone - grossly normal.  Skin: rashes- no , ulcers- yes, on left great toe but not visualized due to dressing, induration- no, tightening - no  Extremities: Right arm is swollen. No pain.    Psychiatric:  Judgement and insight- normal  CNS:        AAO X 3     LABS:     Recent Labs     02/28/23  0651 03/01/23  1410 03/02/23  0641   WBC 9.6 8.0 9.4   HGB 7.9* 7.3* 8.0*   HCT 25.1* 23.5* 26.2*    272 256       Recent Labs     02/28/23  0651 03/01/23  1410 03/02/23  0641   * 134* 137   K 3.8 4.1 4.2   CL 99 97* 101   CO2 29 27 26 BUN 23* 39* 22*   CREATININE 4.9* 5.7* 4.3*   GLUCOSE 119* 108* 82   MG 2.00 2.00 1.90   PHOS 2.8 3.2 2.6

## 2023-03-02 NOTE — PROGRESS NOTES
Phoned transport company listed in CM note to reschedule transport pending test results. New pickup time is now 2300 this evening. If transport needs to be cancelled, may cancel by calling the same number. Update: Spouse and daughter notified of change in transport time. Report updated to facility as well. Test results complete, doctor stated pt safe to discharge.

## 2023-03-02 NOTE — CARE COORDINATION
To [facility] today by [id number] [facility] ambulance stretcher transport at 6:00 pm.   Nurse Report: [phone]  Fax: [phone]- discharge packet has been faxed to [facility].    Electronically signed by Greg Sen RN on 3/2/2023 at 1:20 PM

## 2023-03-02 NOTE — PROGRESS NOTES
Vascular Surgery   Daily Progress Note  Patient: Dennise Mckeon      CC: RUE swelling over AV fistula     SUBJECTIVE:   Patient is afebrile and HDS with no acute overnight events. Patient is s/p right arm fistulagram (3/1) with successful HD last night. Patient denies any pain from right UE surgical site. ROS:   A 14 point review of systems was conducted, significant findings as noted above. All other systems negative. OBJECTIVE:    PHYSICAL EXAM:    Vitals:    03/01/23 1809 03/01/23 2008 03/02/23 0242 03/02/23 0616   BP: (!) 144/81 (!) 173/76 (!) 151/69 138/66   Pulse: 77 87 80 82   Resp: 16 18 18    Temp: 97 °F (36.1 °C) 98 °F (36.7 °C) 97.9 °F (36.6 °C)    TempSrc: Oral Oral Oral    SpO2: 96% 97% 98%    Weight:       Height:         General appearance: Alert, no acute distress, grooming appropriate  Neuro: A&Ox3, no focal deficits, sensation intact  Eyes: No scleral icterus, EOM grossly intact  Neck: Trachea midline, no JVD, no lymphadenopathy, neck supple  Chest/Lungs: Normal effort with no accessory muscle use on RA  Cardiovascular: RRR, well-perfused  Abdomen: Soft, non-tender, non-distended, no rebound, guarding, or rigidity. Skin: Warm and dry, no rashes  Extremities: Tegaderm noted to right wrist with no hematoma noted from puncture site. Tape and gauze noted to right arm with no strikethrough noted. RUE fistula with palpable thrill and strong pulses. Wound vac in place on left LE with no leaks detected. Genitourinary: Grossly normal    LABS:   Recent Labs     02/28/23  0651 03/01/23  1410   WBC 9.6 8.0   HGB 7.9* 7.3*   HCT 25.1* 23.5*   MCV 86.1 86.0    272        Recent Labs     02/28/23  0651 03/01/23  1410   * 134*   K 3.8 4.1   CL 99 97*   CO2 29 27   PHOS 2.8 3.2   BUN 23* 39*   CREATININE 4.9* 5.7*      No results for input(s): AST, ALT, ALB, BILIDIR, BILITOT, ALKPHOS in the last 72 hours. No results for input(s): LIPASE, AMYLASE in the last 72 hours.    No results for input(s): PROT, INR, APTT in the last 72 hours. No results for input(s): CKTOTAL, CKMB, CKMBINDEX, TROPONINI in the last 72 hours. ASSESSMENT & PLAN:   This is a 79 y.o. male with a PMHx of dementia, CAD, CVA, DM, ESRD on dialysis, GERD, Hemiplegia and hemiparesis 2/2 cerebral infarct, HLD, HTN, PVD, and hx of pedal amputations, LLE I&D with graft and WV placement.  Vascular surgery was consulted due to concerns for RUE AV graft swelling     - Successful HD last night through right AV fistula   - Podiatry recommendations appreciated for left LE  - Dispo and medical management per primary     Carolina Alcocer DPM  Podiatric Resident, PGY-2  Contact via [address] [place] [address]

## 2023-03-02 NOTE — PROGRESS NOTES
Podiatric Surgery Daily Progress Note  Nirmal Agrawal      Subjective :     Patient is s/p left I&D, [facility], graft application, and wound vac application (DOS 8/65/43). Patient seen and examined this am at the bedside. Patient denies any acute overnight events. Review of Systems: A 12 point review of systems is unremarkable with the exception of the chief complaint. Patient specifically denies nausea, vomiting, fever, chills, shortness of breath, chest pain, abdominal pain, constipation, or difficulty urinating. Objective     /66   Pulse 82   Temp 97.9 °F (36.6 °C) (Oral)   Resp 18   Ht 5' 7\" (1.702 m)   Wt 149 lb 14.6 oz (68 kg)   SpO2 98%   BMI 23.48 kg/m²     I/O:No intake or output data in the 24 hours ending 03/02/23 0957               Wt Readings from Last 3 Encounters:   03/01/23 149 lb 14.6 oz (68 kg)   02/20/23 153 lb 7 oz (69.6 kg)   12/09/22 156 lb 8 oz (71 kg)       LABS:    Recent Labs     03/01/23  1410 03/02/23  0641   WBC 8.0 9.4   HGB 7.3* 8.0*   HCT 23.5* 26.2*    256        Recent Labs     03/02/23  0641      K 4.2      CO2 26   PHOS 2.6   BUN 22*   CREATININE 4.3*        No results for input(s): PROT, INR, APTT in the last 72 hours. LOWER EXTREMITY EXAMINATION    Wound vac to LLE lower extremity left clean, dry, and intact. Excellent seal noted with suction set to 125 mmHg. Approximately 20cc of sanguinous drainage noted to cannister. CFT brisk to digits bilateral.  Sensation diminished to digits bilateral consistent with post-operative state. No pain with calf compression bilateral.  Patient able to perform active range of motion to digits bilateral.     IMAGING:  XR Left Foot 2/20/23  Narrative   History: Medial foot ulcer. 2 views of foot. FINDINGS: There is a medial ulceration. There is lysis of the medial base of the proximal phalanx. Lysis of the medial head of the first metatarsal suspicious for osteomyelitis.  No soft tissue gas. Associated soft tissue swelling. Impression   1. Medial ulceration with lytic changes compatible with osteomyelitis of the base of the first proximal phalanx and distal first metatarsal.     MRI Left foot 2/21/23  Narrative   Patient: Kaitlynn Cornelius  Time Out: 07:20   Exam(s): MRI LEFT FOOT Without Contrast        EXAM:     MR Left Lower Extremity Without Intravenous Contrast, Foot       CLINICAL HISTORY:     concern for OM at first metatarsal head, base of proximal    phalanx. TECHNIQUE:     Multiplanar magnetic resonance images of the left foot without    intravenous contrast.       COMPARISON:     No relevant prior studies available. FINDINGS:   Images are degraded by motion artifact. Ulcer along the medial aspect of the first toe. Underlying    abscess. Subcutaneous soft tissue edema and intramuscular edema    as can be seen in setting of diabetic neuropathic changes. No    soft tissue gas identified. Marrow signal abnormality within the    first distal phalanx consistent with acute osteomyelitis. No    septic arthritis. No acute fracture. Electronically signed by Radha Jordan MD on 02-21-23 at 0720       Impression   Acute osteomyelitis of the first distal phalanx. XR Left foot 2/24- post-op  Narrative   3 views of the left foot       HISTORY: Pain. Surgery. FINDINGS:       Amputation of the first ray. Surgical bandages in place. No definite immediate complication is identified. Impression   Postoperative changes as above. VL Arterial Duplex b/l LE 2/21  Impressions   Right Impression   The right ankle/brachial index is 1.09 (PT- 166 mmHg; DP- non-compressible). The right arm brachial pressure was not obtained due to AVF. There is normal, multiphasic flow identified in the common femoral artery,   suggesting no evidence of significant aorto-iliac inflow disease.    Elevated velocities at the mid superficial femoral artery indicate a >50% stenosis by velocity criteria (velocities went from 92 to 193 cm/s). There is atherosclerotic plaque involving the popliteal artery with velocities   consistent with <50% stenosis. Elevated velocities at the distal anterior tibial artery indicate a >50%   stenosis by velocity criteria (velocities went from 36 to 81 cm/s). Left Impression   The left ankle/brachial index is 0.74 (PT-112 mmHg; DP-110 mmHg). The left AMOS   was technically difficult to obtain due to patient movement. There is normal, multiphasic flow identified in the common femoral artery,   suggesting no evidence of significant aorto-iliac inflow disease. There is atherosclerotic plaque involving the superficial femoral artery with   velocities consistent with <50% stenosis. The distal end of the popliteal artery appears to be occluded, and the   tibio-peroneal trunk appears to be occluded. There are multiple collateral   vessels noted in this area. The posterior tibial artery is occluded with retrograde trickle flow   demonstrated at the ankle area. The distal anterior tibial artery is occluded with reconstitution at the ankle   (34 cm/s). There are no previous studies for comparison. Conclusions        Summary        Normal right AMOS. Abnormal left AMOS in the range of moderate arterial insufficiency. Right mid-SFA stenosis of greater than 50% associated with nonocclusive    popliteal and tibial disease. Left popliteal and tibioperoneal trunk occlusions with associated posterior    tibial and anterior tibial artery occlusions. Signature    ------------------------------------------------------------------    Electronically signed by Anastasiia Cabello MD (Interpreting    physician) on 02/21/2023 at 03:56 PM       Final pathology:  A. Bone and soft tissue, left foot, excision:   Fragments of bone with acute osteomyelitis. Soft tissue with fibrinopurulent exudate.      B.  First metatarsal of left foot, excision:   Acute osteomyelitis. Skin and soft tissue with fibrinopurulent exudate and necrosis. Margins: Free of acute inflammation. ASSESSMENT/PLAN  - s/p I&D, delayed primary closure, skin graft substitute application, wound vac application; left lower extremity (DOS 2/26/23)  - s/p First ray amputation; left lower extremity (DOS: 2/24/23)  - Previous full thickness ulceration; left foot; Bee 3  - Peripheral vascular disease; b/l LE  - History of partial 1st ray and 3rd digit amputation; right foot    -Patient examined and evaluated at bedside   -All VSS, No leukocytosis noted (WBC 9.4)  -.1,   -Prealbumin 7.1; continue dietary nutritional supplements  -HbA1c 6.0%  -Wound culture- Strep Pyogenes, Staph Aureus, Haemophilus parainfluenzae   -Surgical path results noted above  -Blood culture x2- Strep pyogenes  -Imaging reviewed and impressions noted above  -Arterial Duplex b/l LE reviewed, impressions noted above  -ID recs Vancomycin at HD through 4/7/23  -Wound vac to LLE lower extremity left clean, dry, and intact. Excellent seal noted with suction set to 125 mmHg. Approximately 20cc of sanguinous drainage noted to cannister.  -Will need wound vac outpatient; social work aware  -Nonweight bearing to the left LE  -No further surgical intervention from podiatric standpoint. DISPO: s/p I&D, delayed primary closure, application of skin graft substitute, wound vac application; left LE (DOS 2/26/23). Continue antibiotics per ID. ID recs Vanc at HD through 4/7/23. No further surgical intervention from podiatric standpoint.  Patient okay to discharge from podiatric standpoint pending medical clearance    Discussed assessment and plan with Dr. James Tang, Lawson Glass, ALANNA  Podiatric Resident PGY-1  Pager [phone] or Perfect [employer]

## 2023-03-03 ENCOUNTER — TELEPHONE (OUTPATIENT)
Dept: INFECTIOUS DISEASES | Age: 68
End: 2023-03-03

## 2023-03-03 ENCOUNTER — CLINICAL DOCUMENTATION (OUTPATIENT)
Dept: INFECTIOUS DISEASES | Age: 68
End: 2023-03-03

## 2023-03-03 DIAGNOSIS — R78.81 STREPTOCOCCAL BACTEREMIA: Primary | ICD-10-CM

## 2023-03-03 DIAGNOSIS — B95.5 STREPTOCOCCAL BACTEREMIA: Primary | ICD-10-CM

## 2023-03-03 NOTE — PROGRESS NOTES
Dr. Mejia has placed a referral order for pharmacist to manage Outpatient Parental Antimicrobial Therapy (OPAT) pursuant the ID Collaborative Practice Agreement. Patient and/or caregiver has verbally consented to have drug therapy managed by a pharmacist. The benefits and risks of complex antimicrobial therapy including drug-specific adverse reactions and necessary follow-up were discussed with patient and/or caregiver and they are amenable to OPAT and pharmacist management. Pertinent PMH and HPI:  PMH CAD, NSTEMI, PAD, ESRD on HD, stroke, dementia  Presents from SNF for dyspnea during HD    Pertinent Objective Data: Wt Readings from Last 1 Encounters:   23 149 lb 14.6 oz (68 kg)      BMI Readings from Last 1 Encounters:   23 23.48 kg/m²      Serum creatinine: 4.3 mg/dL (H) 23 0641  Estimated creatinine clearance: 16 mL/min (A)  HD MWF    Lab Results   Component Value Date    .1 (H) 2023       Lab Results   Component Value Date    SEDRATE 117 (H) 2023       Imagin/21 MRI L foot:  Acute osteomyelitis of the first distal phalanx.  duplex:   Normal right AMOS. Abnormal left AMOS in the range of moderate arterial insufficiency. Right mid-SFA stenosis of greater than 50% associated with nonocclusive    popliteal and tibial disease. Left popliteal and tibioperoneal trunk occlusions with associated posterior    tibial and anterior tibial artery occlusions. Micro:    foot wound: GAS, MRSA, HFlu   blood: GAS   blood: NGTD   wound tissue: GAS, MRSA    OPAT Orders:  Diagnosis  Bacteremia  L foot OM s/p first ray amp on  and wound vac placement on     FINAL DIAGNOSIS:     A. Bone and soft tissue, left foot, excision:   Fragments of bone with acute osteomyelitis. Soft tissue with fibrinopurulent exudate. B.  First metatarsal of left foot, excision:   Acute osteomyelitis.    Skin and soft tissue with fibrinopurulent exudate and necrosis. Margins: Free of acute inflammation. Antimicrobial Regimen and Projected Term Date IV vanc 750 mg post-HD MWF- 4/7  (6 weeks)   Weekly Lab Monitoring CBCwDiff, CMP, CRP, ESR, and Vanc Trough   Will determine if HD will manage levels or if we are responsible    Any additional imaging or data needed prior to term? None   Any follow up in ID clinic? None   OPAT Access/Additional LDA HD fistula    Disposition  [place] for long term care   HD [facility]F at [facility]? Lab and medication orders have been placed. Clinical Pharmacist will review patient weekly or as needed and make recommendations regarding the above therapy plan.      Thank you,  Suzanne Lazaro, PharmD, [facility], [place] Infectious Disease  Office Phone: [phone] (also available on [employer])  Fax: [phone]    For Pharmacy Admin Tracking Only    Program: Medical Group  CPA in place: Yes  Time Spent (min): 20

## 2023-03-03 NOTE — TELEPHONE ENCOUNTER
Spoke with Balwinder Laurent nurse at [facility], all OPAT orders verified. She states they will manage vancomycin levels. Per JUDIT Chavez at [facility], they will draw remainder of weekly labs and fax to office.

## 2023-03-03 NOTE — Clinical Note
I was under impression he was getting HD at [facility] too?? Also can we see if I am managing levels?

## 2023-03-03 NOTE — TELEPHONE ENCOUNTER
LMOM for charge nurse line at [facility] to verify OPAT orders. Spoke with JUDIT Delong at [facility] who states they can draw weekly labs if unable to obtain at [facility].

## 2023-03-06 LAB
FUNGUS (MYCOLOGY) CULTURE: NORMAL
FUNGUS STAIN: NORMAL

## 2023-03-07 LAB
AFB CULTURE (MYCOBACTERIA): NORMAL
AFB SMEAR: NORMAL

## 2023-03-08 ENCOUNTER — TELEPHONE (OUTPATIENT)
Dept: INFECTIOUS DISEASES | Age: 68
End: 2023-03-08

## 2023-03-08 NOTE — TELEPHONE ENCOUNTER
Spoke with  Aaron Gutiérrez at [facility], nurse is not available at this time.  LM to return call regarding need for weekly labs results

## 2023-03-20 LAB
FUNGUS SPEC CULT: NORMAL
LOEFFLER MB STN SPEC: NORMAL

## 2023-03-21 LAB
ACID FAST STN SPEC QL: NORMAL
MYCOBACTERIUM SPEC CULT: NORMAL

## 2023-03-27 LAB
FUNGUS SPEC CULT: NORMAL
LOEFFLER MB STN SPEC: NORMAL

## 2023-03-28 ENCOUNTER — HOSPITAL ENCOUNTER (OUTPATIENT)
Dept: WOUND CARE | Age: 68
Discharge: HOME | End: 2023-03-28
Payer: MEDICARE

## 2023-03-28 VITALS
HEART RATE: 75 BPM | DIASTOLIC BLOOD PRESSURE: 75 MMHG | SYSTOLIC BLOOD PRESSURE: 157 MMHG | RESPIRATION RATE: 16 BRPM | TEMPERATURE: 97.3 F

## 2023-03-28 LAB
ACID FAST STN SPEC QL: NORMAL
ALBUMIN SERPL-MCNC: ABNORMAL G/DL
ALP BLD-CCNC: ABNORMAL U/L
ALT SERPL-CCNC: ABNORMAL U/L
ANION GAP SERPL CALCULATED.3IONS-SCNC: ABNORMAL MMOL/L
AST SERPL-CCNC: ABNORMAL U/L
BASOPHILS ABSOLUTE: 0.1 /ΜL
BASOPHILS RELATIVE PERCENT: 2.7 %
BILIRUB SERPL-MCNC: ABNORMAL MG/DL
BUN BLDV-MCNC: 21 MG/DL
C-REACTIVE PROTEIN: 15.2
CALCIUM SERPL-MCNC: 7.9 MG/DL
CHLORIDE BLD-SCNC: 100 MMOL/L
CO2: 37 MMOL/L
CREAT SERPL-MCNC: 4.2 MG/DL
EGFR: 17
EOSINOPHILS ABSOLUTE: 0.1 /ΜL
EOSINOPHILS RELATIVE PERCENT: 2.3 %
GLUCOSE BLD-MCNC: 145 MG/DL
HCT VFR BLD CALC: 25.3 % (ref 41–53)
HEMOGLOBIN: 7.9 G/DL (ref 13.5–17.5)
LYMPHOCYTES ABSOLUTE: 0.9 /ΜL
LYMPHOCYTES RELATIVE PERCENT: 20.3 %
MCH RBC QN AUTO: 27.2 PG
MCHC RBC AUTO-ENTMCNC: 31.4 G/DL
MCV RBC AUTO: 86.7 FL
MONOCYTES ABSOLUTE: 0.4 /ΜL
MONOCYTES RELATIVE PERCENT: 8.5 %
MYCOBACTERIUM SPEC CULT: NORMAL
NEUTROPHILS ABSOLUTE: 2.9 /ΜL
NEUTROPHILS RELATIVE PERCENT: 66.2 %
PDW BLD-RTO: 19.7 %
PLATELET # BLD: 211 K/ΜL
PMV BLD AUTO: 8.7 FL
POTASSIUM SERPL-SCNC: 4.1 MMOL/L
RBC # BLD: 2.91 10^6/ΜL
SEDIMENTATION RATE, ERYTHROCYTE: 20
SODIUM BLD-SCNC: 143 MMOL/L
TOTAL PROTEIN: ABNORMAL
WBC # BLD: 4.4 10^3/ML

## 2023-03-28 PROCEDURE — 99214 OFFICE O/P EST MOD 30 MIN: CPT

## 2023-03-28 RX ORDER — LISINOPRIL 10 MG/1
10 TABLET ORAL DAILY
COMMUNITY

## 2023-03-28 NOTE — PROGRESS NOTES
vitamin D 25 MCG (1000 UT) CAPS Take 1 capsule by mouth daily      mirtazapine (REMERON) 15 MG tablet Take 7.5 mg by mouth nightly      ondansetron (ZOFRAN) 4 MG tablet Take 4 mg by mouth every 8 hours as needed for Nausea or Vomiting      atorvastatin (LIPITOR) 40 MG tablet TAKE 1 TABLET BY MOUTH EVERYDAY AT BEDTIME 90 tablet 3    memantine (NAMENDA) 10 MG tablet TAKE 1 TABLET BY MOUTH TWICE A DAY 60 tablet 5    nitroGLYCERIN (NITROSTAT) 0.4 MG SL tablet Place 1 tablet under the tongue every 5 minutes as needed for Chest pain 25 tablet 3    aspirin 81 MG chewable tablet Take 81 mg by mouth daily       No current facility-administered medications on file prior to encounter. REVIEW OF SYSTEMS  Review of Systems    Pertinent items are noted in HPI. Objective:      BP (!) 157/75   Pulse 75   Temp 97.3 °F (36.3 °C) (Temporal)   Resp 16     Wt Readings from Last 3 Encounters:   03/01/23 149 lb 14.6 oz (68 kg)   02/20/23 153 lb 7 oz (69.6 kg)   12/09/22 156 lb 8 oz (71 kg)       PHYSICAL EXAM  Physical Exam    Patient has nonpalpable pedal pulses with a biphasic dorsalis pedis and posterior tibial pulse noted on the left lower extremity. He has absent pedal hair growth. Patient's protective sensation is absent at all sites tested bilaterally. There is an ulceration noted from a previous open first ray amputation on the left foot with a bioengineered skin substitute applied. Edema is appropriate for the level of surgical intervention. There is no erythema, warmth or fluctuance associated with this. Patient has had a previous skin graft noted on an ulceration on the first ray on the right foot. Assessment:        Problem List Items Addressed This Visit    None       Plan:   E/M x30 minutes. Patient was patient of Dr. William Mccullough. Patient is currently staying at an extended care facility in [facility] and would like to follow-up at the wound care center as it is only 5 minutes away.   When he returns

## 2023-03-28 NOTE — DISCHARGE INSTRUCTIONS
[facility] Physician Orders and Discharge Instructions  [address] [facility]   [address] [address] [facility]. [place]  Telephone: [phone]    NAME:  Paul Darby  YOB: 1955  MEDICAL RECORD NUMBER:  [id number]  DATE: 3/28/2023    Return Appointment:  Return Appointment: With Dr Juan Frost  in  1 [facility]). Schedule weekly for the rest of the month? No    No future appointments. **Please make follow up appointment with Dr Ellyn Clark: [facility] Phone: [phone] Fax: [address] [place] [address] Street at Assisted Living: n/a    Change dressing?: Yes    Wound Cleansin.9% normal saline    Yessenia Wound Topical Treatments: No-Sting barrier film         [x]Negative Pressure:           Wound Location: Left Foot  [x]Wound clinic to apply saline wet to dry dressing until home care to place wound vac on. Use No-Sting Barrier film to yessenia wound with each dressing change (DO NOT USE if Dermatac Drape from [employer] is used). Pressure @ 125 mmHg continuous using black foam.     []Apply White foam on exposed bone and/or tunnels:   Change dressing: Monday/Wednesday/Friday  Make sure that tubing is not against the skin by using gauze and/or Kerlix. Cover with gentle ace wrap     Edema Control:  Elevate leg(s) above the level of the heart for 30 minutes 4-5 times a day and/or when sitting. Avoid prolonged standing in one place. Patient may participate in therapy with the following restrictions for off-Loading:   [x]Off Loading(Pressure Reduction) When: Walking  Weight Bearing Status: Total Non-Weight bearing Left; Offloading devices: Walker and Wheelchair    Dietary:   Important dietary reminders:  1. Increase Protein intake (i.e. Lean meats, fish, eggs, legumes, and yogurt)  2. No added salt  3.  If diabetic, follow a diabetic diet and check glucose prior to meals or as instructed by your physician.    [] SNF:

## 2023-03-29 ENCOUNTER — TELEPHONE (OUTPATIENT)
Dept: INFECTIOUS DISEASES | Age: 68
End: 2023-03-29

## 2023-03-29 DIAGNOSIS — B95.5 STREPTOCOCCAL BACTEREMIA: ICD-10-CM

## 2023-03-29 DIAGNOSIS — R78.81 STREPTOCOCCAL BACTEREMIA: ICD-10-CM

## 2023-03-31 ENCOUNTER — TELEPHONE (OUTPATIENT)
Dept: WOUND CARE | Age: 68
End: 2023-03-31

## 2023-03-31 NOTE — FLOWSHEET NOTE
03/31/23 1028   Wound 03/28/23 Foot Lateral;Left #1   Date First Assessed/Time First Assessed: 03/28/23 1509   Present on Hospital Admission: Yes  Wound Approximate Age at First Assessment (Weeks): (c)   Location: Foot  Wound Location Orientation: Lateral;Left  Wound Description (Comments): #1   Dressing/Treatment Moist to dry   Wound Length (cm) 7 cm   Wound Width (cm) 3.7 cm   Wound Depth (cm) 0.1 cm   Wound Surface Area (cm^2) 25.9 cm^2   Change in Wound Size % (l*w) 0   Wound Volume (cm^3) 2.59 cm^3   Wound Healing % 0

## 2023-04-04 ENCOUNTER — TELEPHONE (OUTPATIENT)
Dept: WOUND CARE | Age: 68
End: 2023-04-04

## 2023-04-04 ENCOUNTER — HOSPITAL ENCOUNTER (OUTPATIENT)
Dept: WOUND CARE | Age: 68
Discharge: HOME | End: 2023-04-04
Payer: MEDICARE

## 2023-04-04 VITALS
SYSTOLIC BLOOD PRESSURE: 157 MMHG | HEART RATE: 74 BPM | TEMPERATURE: 97 F | RESPIRATION RATE: 16 BRPM | DIASTOLIC BLOOD PRESSURE: 74 MMHG

## 2023-04-04 DIAGNOSIS — L97.422 ULCER OF LEFT HEEL, WITH FAT LAYER EXPOSED (HCC): Primary | ICD-10-CM

## 2023-04-04 LAB
ACID FAST STN SPEC QL: NORMAL
MYCOBACTERIUM SPEC CULT: NORMAL

## 2023-04-04 PROCEDURE — 99213 OFFICE O/P EST LOW 20 MIN: CPT

## 2023-04-04 RX ORDER — LIDOCAINE 40 MG/G
CREAM TOPICAL ONCE
OUTPATIENT
Start: 2023-04-04 | End: 2023-04-04

## 2023-04-04 RX ORDER — GINSENG 100 MG
CAPSULE ORAL ONCE
OUTPATIENT
Start: 2023-04-04 | End: 2023-04-04

## 2023-04-04 RX ORDER — BACITRACIN, NEOMYCIN, POLYMYXIN B 400; 3.5; 5 [USP'U]/G; MG/G; [USP'U]/G
OINTMENT TOPICAL ONCE
OUTPATIENT
Start: 2023-04-04 | End: 2023-04-04

## 2023-04-04 RX ORDER — BACITRACIN ZINC AND POLYMYXIN B SULFATE 500; 1000 [USP'U]/G; [USP'U]/G
OINTMENT TOPICAL ONCE
OUTPATIENT
Start: 2023-04-04 | End: 2023-04-04

## 2023-04-04 RX ORDER — BETAMETHASONE DIPROPIONATE 0.05 %
OINTMENT (GRAM) TOPICAL ONCE
OUTPATIENT
Start: 2023-04-04 | End: 2023-04-04

## 2023-04-04 RX ORDER — LIDOCAINE HYDROCHLORIDE 20 MG/ML
JELLY TOPICAL ONCE
OUTPATIENT
Start: 2023-04-04 | End: 2023-04-04

## 2023-04-04 RX ORDER — LIDOCAINE 50 MG/G
OINTMENT TOPICAL ONCE
OUTPATIENT
Start: 2023-04-04 | End: 2023-04-04

## 2023-04-04 RX ORDER — GENTAMICIN SULFATE 1 MG/G
OINTMENT TOPICAL ONCE
OUTPATIENT
Start: 2023-04-04 | End: 2023-04-04

## 2023-04-04 RX ORDER — LIDOCAINE HYDROCHLORIDE 40 MG/ML
SOLUTION TOPICAL ONCE
OUTPATIENT
Start: 2023-04-04 | End: 2023-04-04

## 2023-04-04 RX ORDER — CLOBETASOL PROPIONATE 0.5 MG/G
OINTMENT TOPICAL ONCE
OUTPATIENT
Start: 2023-04-04 | End: 2023-04-04

## 2023-04-04 NOTE — DISCHARGE INSTRUCTIONS
Please have dietician evaluate patient for nutritional needs    Dietary Supplements: [] Torsten  [] 30ml ProMod/ProStat [] 60ml Expedite [] Other:   Take supplements twice a day or as directed as followed: Your nurse  is:  Hailey Ann     Electronically signed by Alexsander Mahajan RN on 4/4/2023 at 1:48 PM     [facility] Information: Should you experience any significant changes in your wound(s) or have questions about your wound care, please contact the [address] [place] [address] at [phone]. Hours of operation  Mon:  8AM - 2PM  Tue: 11AM - 5PM  Wed: CLOSED  Thur: 8AM - 4:30PM  Fri:  8AM - 4:30PM    Please give us 24-48 business hours to return your call. These hours of operation are subject to change. The office is closed on all major holidays. If you need help with your wounds and cannot wait until we are available, contact your PCP or go to your preferred emergency room.      Physician Signature:_______________________    Date: ___________ Time:  ____________        [] Patient unable to sign Discharge Instructions given to ECF/Transportation/POA

## 2023-04-04 NOTE — PROGRESS NOTES
[address]   Progress Note and Procedure Note      Mai Matos  MEDICAL RECORD NUMBER:  [id number]  AGE: 79 y.o. GENDER: male  : 1955  EPISODE DATE:  2023    Subjective:     Chief Complaint   Patient presents with    Wound Check     Left foot         HISTORY of PRESENT ILLNESS HPI     Mai Matos is a 79 y.o. male who presents today for wound/ulcer evaluation. History of Wound Context: Patient presents today for initial evaluation after undergoing an incision and drainage on his left foot with first ray resection. He relates he has been getting antibiotics with dialysis and has been having wound VAC therapy to his ulceration. Patient has previously been revascularized by Dr. Sunny Levi.   Wound/Ulcer Pain Timing/Severity: none  Quality of pain: N/A  Severity:  0 / 10   Modifying Factors: None  Associated Signs/Symptoms: none    Ulcer Identification:  Ulcer Type: arterial and diabetic    Contributing Factors: diabetes, arterial insufficiency, and immunosuppression    Acute Wound: N/A    PAST MEDICAL HISTORY        Diagnosis Date    Amputated great toe, right (HCC)     CAD (coronary artery disease)     CVA (cerebral vascular accident) ([address]     3    Dementia ([address]     Depression     Diabetes ([address]     Diabetic polyneuropathy ([address]     ESRD on dialysis ([address]         GERD (gastroesophageal reflux disease)     Hemiplegia and hemiparesis following cerebral infarction affecting right dominant side (HCC)     Hyperlipidemia     Hypertension     Pain syndrome, chronic     Peripheral vascular disease ([address]     [facility]    Secondary hyperparathyroidism [facility])        PAST SURGICAL HISTORY    Past Surgical History:   Procedure Laterality Date    CARDIAC CATHETERIZATION      DIALYSIS FISTULA CREATION      FOOT DEBRIDEMENT Left 2023    INCISION AND DRAINAGE WITH DELAYED  PRIMARY CLOSURE WITH  WOUND VAC APPLICATION,  APPLICATION SKIN

## 2023-04-04 NOTE — TELEPHONE ENCOUNTER
Spoke with Joselin and clarified orders that pt is to be on wound vac and changed 3x per wk. Order repeated back and verified.

## 2023-04-05 ENCOUNTER — TELEPHONE (OUTPATIENT)
Dept: INFECTIOUS DISEASES | Age: 68
End: 2023-04-05

## 2023-04-05 LAB
ALBUMIN SERPL-MCNC: 2.9 G/DL
ALP BLD-CCNC: ABNORMAL U/L
ALT SERPL-CCNC: ABNORMAL U/L
ANION GAP SERPL CALCULATED.3IONS-SCNC: ABNORMAL MMOL/L
AST SERPL-CCNC: ABNORMAL U/L
BASOPHILS ABSOLUTE: ABNORMAL
BASOPHILS RELATIVE PERCENT: 0.6 %
BILIRUB SERPL-MCNC: ABNORMAL MG/DL
BUN BLDV-MCNC: 32 MG/DL
CALCIUM SERPL-MCNC: 8.3 MG/DL
CHLORIDE BLD-SCNC: 103 MMOL/L
CO2: ABNORMAL
CREAT SERPL-MCNC: 5.36 MG/DL
EGFR: ABNORMAL
EOSINOPHILS ABSOLUTE: ABNORMAL
EOSINOPHILS RELATIVE PERCENT: 1.6 %
GLUCOSE BLD-MCNC: 141 MG/DL
HCT VFR BLD CALC: 32.7 % (ref 41–53)
HEMOGLOBIN: 9.8 G/DL (ref 13.5–17.5)
LYMPHOCYTES ABSOLUTE: ABNORMAL
LYMPHOCYTES RELATIVE PERCENT: 15.6 %
MCH RBC QN AUTO: 27.6 PG
MCHC RBC AUTO-ENTMCNC: 29.9 G/DL
MCV RBC AUTO: 92 FL
MONOCYTES ABSOLUTE: ABNORMAL
MONOCYTES RELATIVE PERCENT: 6.2 %
NEUTROPHILS ABSOLUTE: ABNORMAL
NEUTROPHILS RELATIVE PERCENT: 74.8 %
PDW BLD-RTO: 16.6 %
PLATELET # BLD: 202 K/ΜL
PMV BLD AUTO: ABNORMAL FL
POTASSIUM SERPL-SCNC: 4.9 MMOL/L
RBC # BLD: 3.55 10^6/ΜL
SODIUM BLD-SCNC: 139 MMOL/L
TOTAL PROTEIN: 6
WBC # BLD: 5.3 10^3/ML

## 2023-04-05 NOTE — TELEPHONE ENCOUNTER
LMOM on \"floor\" phone at [facility]  Dr Mejia name, specialty, affiliation  Pt name   Need to extend IV ATB tx  My number

## 2023-04-05 NOTE — TELEPHONE ENCOUNTER
Reviewed case with Dr. Mejia and Dr. Gasper Griffin. Still awaiting labs this week but due to ongoing ulcerations and vac, will extend IV abx through 4/21.     Yrn Ponce, PharmD, [address] [place], [place] Infectious Disease  Phone: [phone] (also available on [employer])  Fax: [phone]    For Pharmacy [facility]  [address]  Only    Program: [id number] [place] [address] Avenue in place: Yes  Intervention Detail: New Rx: 1, reason: Needs Additional Therapy  Time Spent (min): 10

## 2023-04-11 LAB
ACID FAST STN SPEC QL: NORMAL
MYCOBACTERIUM SPEC CULT: NORMAL

## 2023-04-17 LAB — VANCOMYCIN TROUGH: 16.2

## 2023-04-18 ENCOUNTER — HOSPITAL ENCOUNTER (OUTPATIENT)
Dept: WOUND CARE | Age: 68
Discharge: HOME | End: 2023-04-18
Payer: MEDICARE

## 2023-04-18 VITALS
RESPIRATION RATE: 18 BRPM | TEMPERATURE: 97.3 F | DIASTOLIC BLOOD PRESSURE: 67 MMHG | SYSTOLIC BLOOD PRESSURE: 152 MMHG | HEART RATE: 71 BPM

## 2023-04-18 DIAGNOSIS — L97.422 ULCER OF LEFT HEEL, WITH FAT LAYER EXPOSED (HCC): Primary | ICD-10-CM

## 2023-04-18 LAB
ALBUMIN SERPL-MCNC: ABNORMAL G/DL
ALP BLD-CCNC: ABNORMAL U/L
ALT SERPL-CCNC: ABNORMAL U/L
ANION GAP SERPL CALCULATED.3IONS-SCNC: ABNORMAL MMOL/L
AST SERPL-CCNC: ABNORMAL U/L
BASOPHILS ABSOLUTE: 0.1 /ΜL
BASOPHILS RELATIVE PERCENT: 2.4 %
BILIRUB SERPL-MCNC: ABNORMAL MG/DL
BUN BLDV-MCNC: 34 MG/DL
C-REACTIVE PROTEIN: 29.4
CALCIUM SERPL-MCNC: 7.5 MG/DL
CHLORIDE BLD-SCNC: 103 MMOL/L
CO2: ABNORMAL
CREAT SERPL-MCNC: 4.8 MG/DL
EGFR: 15
EOSINOPHILS ABSOLUTE: 0.1 /ΜL
EOSINOPHILS RELATIVE PERCENT: 2 %
GLUCOSE BLD-MCNC: 85 MG/DL
HCT VFR BLD CALC: 32.5 % (ref 41–53)
HEMOGLOBIN: 10 G/DL (ref 13.5–17.5)
LYMPHOCYTES ABSOLUTE: 0.8 /ΜL
LYMPHOCYTES RELATIVE PERCENT: 18.2 %
MCH RBC QN AUTO: 25.5 PG
MCHC RBC AUTO-ENTMCNC: 30.6 G/DL
MCV RBC AUTO: 83.3 FL
MONOCYTES ABSOLUTE: 0.4 /ΜL
MONOCYTES RELATIVE PERCENT: 9.5 %
NEUTROPHILS ABSOLUTE: 2.9 /ΜL
NEUTROPHILS RELATIVE PERCENT: 67.9 %
PDW BLD-RTO: 17.2 %
PLATELET # BLD: 164 K/ΜL
PMV BLD AUTO: 8.2 FL
POTASSIUM SERPL-SCNC: 4.7 MMOL/L
RBC # BLD: 3.9 10^6/ΜL
SEDIMENTATION RATE, ERYTHROCYTE: 38
SODIUM BLD-SCNC: 141 MMOL/L
TOTAL PROTEIN: ABNORMAL
WBC # BLD: 4.3 10^3/ML

## 2023-04-18 PROCEDURE — 6370000000 HC RX 637 (ALT 250 FOR IP): Performed by: PODIATRIST

## 2023-04-18 PROCEDURE — 99213 OFFICE O/P EST LOW 20 MIN: CPT

## 2023-04-18 RX ORDER — ACETAMINOPHEN 500 MG
500 TABLET ORAL EVERY 6 HOURS PRN
COMMUNITY

## 2023-04-18 RX ORDER — LIDOCAINE 50 MG/G
OINTMENT TOPICAL ONCE
OUTPATIENT
Start: 2023-04-18 | End: 2023-04-18

## 2023-04-18 RX ORDER — LIDOCAINE HYDROCHLORIDE 20 MG/ML
JELLY TOPICAL ONCE
OUTPATIENT
Start: 2023-04-18 | End: 2023-04-18

## 2023-04-18 RX ORDER — GENTAMICIN SULFATE 1 MG/G
OINTMENT TOPICAL ONCE
OUTPATIENT
Start: 2023-04-18 | End: 2023-04-18

## 2023-04-18 RX ORDER — BACITRACIN ZINC AND POLYMYXIN B SULFATE 500; 1000 [USP'U]/G; [USP'U]/G
OINTMENT TOPICAL ONCE
OUTPATIENT
Start: 2023-04-18 | End: 2023-04-18

## 2023-04-18 RX ORDER — GINSENG 100 MG
CAPSULE ORAL ONCE
OUTPATIENT
Start: 2023-04-18 | End: 2023-04-18

## 2023-04-18 RX ORDER — LIDOCAINE HYDROCHLORIDE 40 MG/ML
SOLUTION TOPICAL ONCE
Status: COMPLETED | OUTPATIENT
Start: 2023-04-18 | End: 2023-04-18

## 2023-04-18 RX ORDER — LIDOCAINE HYDROCHLORIDE 40 MG/ML
SOLUTION TOPICAL ONCE
OUTPATIENT
Start: 2023-04-18 | End: 2023-04-18

## 2023-04-18 RX ORDER — CLOBETASOL PROPIONATE 0.5 MG/G
OINTMENT TOPICAL ONCE
OUTPATIENT
Start: 2023-04-18 | End: 2023-04-18

## 2023-04-18 RX ORDER — BETAMETHASONE DIPROPIONATE 0.05 %
OINTMENT (GRAM) TOPICAL ONCE
OUTPATIENT
Start: 2023-04-18 | End: 2023-04-18

## 2023-04-18 RX ORDER — BACITRACIN, NEOMYCIN, POLYMYXIN B 400; 3.5; 5 [USP'U]/G; MG/G; [USP'U]/G
OINTMENT TOPICAL ONCE
OUTPATIENT
Start: 2023-04-18 | End: 2023-04-18

## 2023-04-18 RX ORDER — LIDOCAINE 40 MG/G
CREAM TOPICAL ONCE
OUTPATIENT
Start: 2023-04-18 | End: 2023-04-18

## 2023-04-18 RX ADMIN — LIDOCAINE HYDROCHLORIDE: 40 SOLUTION TOPICAL at 13:29

## 2023-04-18 NOTE — PROGRESS NOTES
my examination of this patient's wound(s)/ulcer(s) today, debridement is required to promote healing and evaluate the wound base. Performed by: Serenity Urrutia DPM    Consent obtained:  Yes    Time out taken:  Yes    Pain Control: Anesthetic  Anesthetic: 4% Lidocaine Liquid Topical       Debridement: Excisional Debridement    Using curette the wound(s)/ulcer(s) was/were debrided down through and including the removal of epidermis, dermis, and subcutaneous tissue. Devitalized Tissue Debrided:  fibrin and slough    Pre Debridement Measurements:  Are located in the Wound/Ulcer Documentation Flow Sheet    Diabetic/Pressure/Non Pressure Ulcers only:  Ulcer: Diabetic ulcer, fat layer exposed     Wound/Ulcer #: 1    Post Debridement Measurements:  Wound/Ulcer Descriptions are Pre Debridement except measurements:    Negative Pressure Wound Therapy Foot Anterior; Left (Active)   Number of days: 51       Wound 03/28/23 Foot Lateral;Left #1 (Active)   Wound Image   03/28/23 1510   Wound Etiology Diabetic Bee 3 03/28/23 1510   Wound Cleansed Cleansed with saline 04/18/23 1321   Dressing/Treatment Moist to dry 04/18/23 1353   Offloading for Diabetic Foot Ulcers Offloading ordered;Post op shoe 04/18/23 1353   Wound Length (cm) 5.7 cm 04/18/23 1321   Wound Width (cm) 1.5 cm 04/18/23 1321   Wound Depth (cm) 0.2 cm 04/18/23 1321   Wound Surface Area (cm^2) 8.55 cm^2 04/18/23 1321   Change in Wound Size % (l*w) 66.99 04/18/23 1321   Wound Volume (cm^3) 1.71 cm^3 04/18/23 1321   Wound Healing % 34 04/18/23 1321   Post-Procedure Length (cm) 5.8 cm 04/18/23 1340   Post-Procedure Width (cm) 1.6 cm 04/18/23 1340   Post-Procedure Depth (cm) 0.4 cm 04/18/23 1340   Post-Procedure Surface Area (cm^2) 9.28 cm^2 04/18/23 1340   Post-Procedure Volume (cm^3) 3.712 cm^3 04/18/23 1340   Distance Tunneling (cm) 0 cm 04/18/23 1321   Tunneling Position ___ O'Clock 0 04/18/23 1321   Undermining Starts ___ O'Clock 0 04/18/23 1321   Undermining

## 2023-04-18 NOTE — DISCHARGE INSTRUCTIONS
215 [facility] Physician Orders and Discharge Instructions  302 [facility]   [address] [address] [facility]. [place]. [address]  Telephone: [phone]    NAME:  Angelica Hayes  YOB: 1955  MEDICAL RECORD NUMBER:  [id number]  DATE: 2023    Return Appointment:  Return Appointment: With Dr Branden Hannah  in  1 [facility]). Schedule weekly for the rest of the month? Yes    No future appointments. [facility]: [facility] Phone: [phone] Fax: [facility] 5 at Assisted Living: n/a     Change dressing?: Yes     Wound Cleansin.9% normal saline     Yessenia Wound Topical Treatments: No-Sting barrier film                      [x]Negative Pressure:           Wound Location: Left Foot     Use No-Sting Barrier film to yessenia wound with each dressing change (DO NOT USE if Dermatac Drape from [employer] is used). Pressure @ 125 mmHg continuous using black foam.                       []Apply White foam on exposed bone and/or tunnels:     Change dressing: Monday/Wednesday/Friday  Make sure that tubing is not against the skin by using gauze and/or Kerlix. Cover with gentle ace wrap  [x]Wound clinic to apply saline wet to dry dressing until home care to place wound vac on. Edema Control:  Elevate leg(s) above the level of the heart for 30 minutes 4-5 times a day and/or when sitting. Avoid prolonged standing in one place. Patient may participate in therapy with the following restrictions for off-Loading:   [x]Off Loading(Pressure Reduction) When: Walking  Weight Bearing Status: Total Non-Weight bearing Left; Offloading devices: Walker and Wheelchair    Dietary:   Important dietary reminders:  1. Increase Protein intake (i.e. Lean meats, fish, eggs, legumes, and yogurt)  2. No added salt  3.  If diabetic, follow a diabetic diet and check glucose prior to meals or as instructed by your physician.    []

## 2023-04-19 ENCOUNTER — TELEPHONE (OUTPATIENT)
Dept: INFECTIOUS DISEASES | Age: 68
End: 2023-04-19

## 2023-04-19 DIAGNOSIS — R78.81 STREPTOCOCCAL BACTEREMIA: ICD-10-CM

## 2023-04-19 DIAGNOSIS — B95.5 STREPTOCOCCAL BACTEREMIA: ICD-10-CM

## 2023-04-19 NOTE — TELEPHONE ENCOUNTER
Spoke with charge nurse Sandie Briones at [facility] requesting labs, she will fax to office now.  Spoke with  Maty Buckner at [facility], she states nurse is unavailable, left message requesting lab results, office contact info provided

## 2023-04-19 NOTE — TELEPHONE ENCOUNTER
Pt has been on IV vanc with HD for GAS/MRSA L foot osteo for ~8 weeks. This ID office is not managing vanc doses/levels. Still with significant ulceration + wound vac in place. Inflammatory markers are still not back to normal (although significantly down from hospitalization). [address] does note that there is no overt infectious signs present. Pt will be hopefully be getting a skin substitute in 1-2 weeks. Continue IV vanc until 5/5 for now.     Fred Wade, PharmD, [address] [place], [place] Infectious Disease  Phone: [phone] (also available on [employer])  Fax: [phone]    For Pharmacy [address] Only    Program: Medical Group  CPA in place: Yes  Intervention Detail: New Rx: 1, reason: Needs Additional Therapy  Time Spent (min): 15

## 2023-04-20 ENCOUNTER — TELEPHONE (OUTPATIENT)
Dept: WOUND CARE | Age: 68
End: 2023-04-20

## 2023-04-25 ENCOUNTER — HOSPITAL ENCOUNTER (OUTPATIENT)
Dept: WOUND CARE | Age: 68
Discharge: HOME | End: 2023-04-25
Payer: MEDICARE

## 2023-04-25 VITALS — TEMPERATURE: 98 F

## 2023-04-25 DIAGNOSIS — L97.422 ULCER OF LEFT HEEL, WITH FAT LAYER EXPOSED (HCC): Primary | ICD-10-CM

## 2023-04-25 PROCEDURE — 11042 DBRDMT SUBQ TIS 1ST 20SQCM/<: CPT

## 2023-04-25 PROCEDURE — 6370000000 HC RX 637 (ALT 250 FOR IP): Performed by: PODIATRIST

## 2023-04-25 RX ORDER — LIDOCAINE HYDROCHLORIDE 40 MG/ML
SOLUTION TOPICAL ONCE
Status: COMPLETED | OUTPATIENT
Start: 2023-04-25 | End: 2023-04-25

## 2023-04-25 RX ORDER — GENTAMICIN SULFATE 1 MG/G
OINTMENT TOPICAL ONCE
OUTPATIENT
Start: 2023-04-25 | End: 2023-04-25

## 2023-04-25 RX ORDER — BACITRACIN ZINC AND POLYMYXIN B SULFATE 500; 1000 [USP'U]/G; [USP'U]/G
OINTMENT TOPICAL ONCE
OUTPATIENT
Start: 2023-04-25 | End: 2023-04-25

## 2023-04-25 RX ORDER — BACITRACIN, NEOMYCIN, POLYMYXIN B 400; 3.5; 5 [USP'U]/G; MG/G; [USP'U]/G
OINTMENT TOPICAL ONCE
OUTPATIENT
Start: 2023-04-25 | End: 2023-04-25

## 2023-04-25 RX ORDER — BETAMETHASONE DIPROPIONATE 0.05 %
OINTMENT (GRAM) TOPICAL ONCE
OUTPATIENT
Start: 2023-04-25 | End: 2023-04-25

## 2023-04-25 RX ORDER — LIDOCAINE HYDROCHLORIDE 20 MG/ML
JELLY TOPICAL ONCE
OUTPATIENT
Start: 2023-04-25 | End: 2023-04-25

## 2023-04-25 RX ORDER — LIDOCAINE 40 MG/G
CREAM TOPICAL ONCE
OUTPATIENT
Start: 2023-04-25 | End: 2023-04-25

## 2023-04-25 RX ORDER — LIDOCAINE 50 MG/G
OINTMENT TOPICAL ONCE
OUTPATIENT
Start: 2023-04-25 | End: 2023-04-25

## 2023-04-25 RX ORDER — LIDOCAINE HYDROCHLORIDE 40 MG/ML
SOLUTION TOPICAL ONCE
OUTPATIENT
Start: 2023-04-25 | End: 2023-04-25

## 2023-04-25 RX ORDER — CLOBETASOL PROPIONATE 0.5 MG/G
OINTMENT TOPICAL ONCE
OUTPATIENT
Start: 2023-04-25 | End: 2023-04-25

## 2023-04-25 RX ORDER — GINSENG 100 MG
CAPSULE ORAL ONCE
OUTPATIENT
Start: 2023-04-25 | End: 2023-04-25

## 2023-04-25 RX ADMIN — LIDOCAINE HYDROCHLORIDE: 40 SOLUTION TOPICAL at 13:21

## 2023-04-25 NOTE — DISCHARGE INSTRUCTIONS
215 [facility] Physician Orders and Discharge Instructions  302 [facility]   [address] [address] [facility]. [place]. [address]  Telephone: [phone]    NAME:  Charlean Kawasaki  YOB: 1955  MEDICAL RECORD NUMBER:  [id number]  DATE: 2023    Return Appointment:  Return Appointment: With Dr Tiffanie Leiva  in  1 [facility]). Schedule weekly for the rest of the month? Yes    No future appointments. [facility]: [facility] Phone: [phone] Fax: [facility] 5 at Assisted Living: n/a     Change dressing?: Yes     Wound Cleansin.9% normal saline     Yessenia Wound Topical Treatments: No-Sting barrier film                      [x]Negative Pressure:           Wound Location: Left Foot     Use No-Sting Barrier film to yessenia wound with each dressing change (DO NOT USE if Dermatac Drape from "[employer]" is used). Pressure @ 125 mmHg continuous using black foam.                         Change dressing: Monday/Wednesday/Friday  Make sure that tubing is not against the skin by using gauze and/or Kerlix. Cover with gentle ace wrap  [x]Wound clinic to apply saline wet to dry dressing until home care to place wound vac on. Edema Control:  Elevate leg(s) above the level of the heart for 30 minutes 4-5 times a day and/or when sitting. Avoid prolonged standing in one place. Patient may participate in therapy with the following restrictions for off-Loading:   [x]Off Loading(Pressure Reduction) When: Walking  Weight Bearing Status: Total Non-Weight bearing Left; Offloading devices: Walker and Wheelchair    Dietary:   Important dietary reminders:  1. Increase Protein intake (i.e. Lean meats, fish, eggs, legumes, and yogurt)  2. No added salt  3.  If diabetic, follow a diabetic diet and check glucose prior to meals or as instructed by your physician.    [] SNF: Please have dietician evaluate patient for

## 2023-04-25 NOTE — PROGRESS NOTES
[address]   Progress Note and Procedure Note      Ida Edwards  MEDICAL RECORD NUMBER:  [id number]  AGE: 79 y.o. GENDER: male  : 1955  EPISODE DATE:  2023    Subjective:     Chief Complaint   Patient presents with    Wound Check     Left foot         HISTORY of PRESENT ILLNESS HPI     Ida Edwards is a 79 y.o. male who presents today for wound/ulcer evaluation. History of Wound Context: Patient presents today for initial evaluation after undergoing an incision and drainage on his left foot with first ray resection. He relates he has been getting antibiotics with dialysis and has been having wound VAC therapy to his ulceration. Patient has previously been revascularized by Dr. Jay Jackman.   Wound/Ulcer Pain Timing/Severity: none  Quality of pain: N/A  Severity:  0 / 10   Modifying Factors: None  Associated Signs/Symptoms: none    Ulcer Identification:  Ulcer Type: arterial and diabetic    Contributing Factors: diabetes, arterial insufficiency, and immunosuppression    Acute Wound: N/A    PAST MEDICAL HISTORY        Diagnosis Date    Amputated great toe, right (HCC)     CAD (coronary artery disease)     CVA (cerebral vascular accident) ([address]     3    Dementia ([address]     Depression     Diabetes ([address]     Diabetic polyneuropathy ([address]     ESRD on dialysis ([address]         GERD (gastroesophageal reflux disease)     Hemiplegia and hemiparesis following cerebral infarction affecting right dominant side (HCC)     Hyperlipidemia     Hypertension     Pain syndrome, chronic     Peripheral vascular disease ([address]     [facility]    Secondary hyperparathyroidism [facility])        PAST SURGICAL HISTORY    Past Surgical History:   Procedure Laterality Date    CARDIAC CATHETERIZATION      DIALYSIS FISTULA CREATION      FOOT DEBRIDEMENT Left 2023    INCISION AND DRAINAGE WITH DELAYED  PRIMARY CLOSURE WITH  WOUND VAC APPLICATION,  APPLICATION SKIN

## 2023-04-25 NOTE — CARE COORDINATION
Insurance Verification Request      Ordering Center: The [address] [place] [address] [address]. [address] [facility] Phone Number: [phone]  Fax Number: [phone]    Facility NPI: [id number]  Facility Tax ID [id number]    Provider Information:      Provider's Name: Dr Marshall Roland: Yolanda Lira DPM NPI: [id number]     Patient Information:     Ry Humphries [address]   [phone]   : 1955  AGE: 79 y.o. GENDER: male   TODAYS DATE:  2023    Insurance:     PRIMARY INSURANCE:  Plan: MEDICARE PART A AND B  Coverage: MEDICARE  Effective Date: 2012  Group Number: [unfilled]  Subscriber Number: [id number] - (Medicare)    Payer/Plan Subscr  Sex Relation Sub. Ins. ID Effective Group Num   1. [address] 1955 Male Self [id number]                                    [address] [address]   2.  [facility] 1955 Male Self [id number]                                   [address] [address]       Application/product Information:     Benefit Verification Request:    Reason for Request: New Wound    [employer] FAX# [phone]    Face/Hands/Feet 51902 (1st 25 sq cm)    Epifix and Epicord    Has patient been treated with any other Skin Substitute on this Wound:     Yes Name of the product or produces : graftjacket, Number of previous applications : 1, Wound size : 8.5 sq/cm, Number of wounds :1,  Location of wound : Left Foot, Duration of wound :> 30 days, Estimated number of applications : 7     If yes, How many previous applications: 1    WOUND #: 1    Total Square CM: 8.5    Procedure setting: Hospital Outpatient Department    Is the Patient currently in a skilled nursing facility: Yes Patient is residing in a nursing facility for under 100 days    Is the wound work related: No    Procedure date: 2023    Patient Information:     Additional Dx Codes: L97.42    Problem List Items

## 2023-05-02 ENCOUNTER — TELEPHONE (OUTPATIENT)
Dept: INFECTIOUS DISEASES | Age: 68
End: 2023-05-02

## 2023-05-02 ENCOUNTER — HOSPITAL ENCOUNTER (OUTPATIENT)
Dept: WOUND CARE | Age: 68
Discharge: HOME | End: 2023-05-02
Payer: MEDICARE

## 2023-05-02 VITALS
TEMPERATURE: 97 F | RESPIRATION RATE: 16 BRPM | SYSTOLIC BLOOD PRESSURE: 120 MMHG | DIASTOLIC BLOOD PRESSURE: 51 MMHG | HEART RATE: 99 BPM

## 2023-05-02 DIAGNOSIS — L97.422 ULCER OF LEFT HEEL, WITH FAT LAYER EXPOSED (HCC): Primary | ICD-10-CM

## 2023-05-02 LAB
ALBUMIN SERPL-MCNC: ABNORMAL G/DL
ALP BLD-CCNC: ABNORMAL U/L
ALT SERPL-CCNC: ABNORMAL U/L
ANION GAP SERPL CALCULATED.3IONS-SCNC: ABNORMAL MMOL/L
AST SERPL-CCNC: ABNORMAL U/L
BASOPHILS ABSOLUTE: 0.1 /ΜL
BASOPHILS RELATIVE PERCENT: 1.1 %
BILIRUB SERPL-MCNC: ABNORMAL MG/DL
BUN BLDV-MCNC: 30 MG/DL
C-REACTIVE PROTEIN: 74.6
CALCIUM SERPL-MCNC: 7.2 MG/DL
CHLORIDE BLD-SCNC: 99 MMOL/L
CO2: 27 MMOL/L
CREAT SERPL-MCNC: 5.1 MG/DL
EGFR: 14
EOSINOPHILS ABSOLUTE: 0.1 /ΜL
EOSINOPHILS RELATIVE PERCENT: 2.4 %
GLUCOSE BLD-MCNC: 116 MG/DL
HCT VFR BLD CALC: 33.3 % (ref 41–53)
HEMOGLOBIN: 10.4 G/DL (ref 13.5–17.5)
LYMPHOCYTES ABSOLUTE: 0.9 /ΜL
LYMPHOCYTES RELATIVE PERCENT: 16.4 %
MCH RBC QN AUTO: 24.9 PG
MCHC RBC AUTO-ENTMCNC: 31.3 G/DL
MCV RBC AUTO: 79.5 FL
MONOCYTES ABSOLUTE: 0.5 /ΜL
MONOCYTES RELATIVE PERCENT: 9.9 %
NEUTROPHILS ABSOLUTE: 38 /ΜL
NEUTROPHILS RELATIVE PERCENT: 70.2 %
PDW BLD-RTO: 18 %
PLATELET # BLD: 161 K/ΜL
PMV BLD AUTO: 8.8 FL
POTASSIUM SERPL-SCNC: 4.8 MMOL/L
RBC # BLD: 4.18 10^6/ΜL
SEDIMENTATION RATE, ERYTHROCYTE: 46
SODIUM BLD-SCNC: 138 MMOL/L
TOTAL PROTEIN: ABNORMAL
WBC # BLD: 5.4 10^3/ML

## 2023-05-02 PROCEDURE — 11042 DBRDMT SUBQ TIS 1ST 20SQCM/<: CPT

## 2023-05-02 PROCEDURE — 6370000000 HC RX 637 (ALT 250 FOR IP): Performed by: PODIATRIST

## 2023-05-02 RX ORDER — LIDOCAINE 40 MG/G
CREAM TOPICAL ONCE
OUTPATIENT
Start: 2023-05-02 | End: 2023-05-02

## 2023-05-02 RX ORDER — LIDOCAINE 50 MG/G
OINTMENT TOPICAL ONCE
OUTPATIENT
Start: 2023-05-02 | End: 2023-05-02

## 2023-05-02 RX ORDER — LIDOCAINE HYDROCHLORIDE 20 MG/ML
JELLY TOPICAL ONCE
OUTPATIENT
Start: 2023-05-02 | End: 2023-05-02

## 2023-05-02 RX ORDER — GINSENG 100 MG
CAPSULE ORAL ONCE
OUTPATIENT
Start: 2023-05-02 | End: 2023-05-02

## 2023-05-02 RX ORDER — BACITRACIN ZINC AND POLYMYXIN B SULFATE 500; 1000 [USP'U]/G; [USP'U]/G
OINTMENT TOPICAL ONCE
OUTPATIENT
Start: 2023-05-02 | End: 2023-05-02

## 2023-05-02 RX ORDER — CLOBETASOL PROPIONATE 0.5 MG/G
OINTMENT TOPICAL ONCE
OUTPATIENT
Start: 2023-05-02 | End: 2023-05-02

## 2023-05-02 RX ORDER — BACITRACIN, NEOMYCIN, POLYMYXIN B 400; 3.5; 5 [USP'U]/G; MG/G; [USP'U]/G
OINTMENT TOPICAL ONCE
OUTPATIENT
Start: 2023-05-02 | End: 2023-05-02

## 2023-05-02 RX ORDER — LIDOCAINE HYDROCHLORIDE 40 MG/ML
SOLUTION TOPICAL ONCE
OUTPATIENT
Start: 2023-05-02 | End: 2023-05-02

## 2023-05-02 RX ORDER — GENTAMICIN SULFATE 1 MG/G
OINTMENT TOPICAL ONCE
OUTPATIENT
Start: 2023-05-02 | End: 2023-05-02

## 2023-05-02 RX ORDER — BETAMETHASONE DIPROPIONATE 0.05 %
OINTMENT (GRAM) TOPICAL ONCE
OUTPATIENT
Start: 2023-05-02 | End: 2023-05-02

## 2023-05-02 RX ORDER — LIDOCAINE HYDROCHLORIDE 40 MG/ML
SOLUTION TOPICAL ONCE
Status: COMPLETED | OUTPATIENT
Start: 2023-05-02 | End: 2023-05-02

## 2023-05-02 RX ADMIN — LIDOCAINE HYDROCHLORIDE: 40 SOLUTION TOPICAL at 13:55

## 2023-05-02 NOTE — DISCHARGE INSTRUCTIONS
215 [facility] Physician Orders and Discharge Instructions  [address] [place] [address] [address] [facility]. [place]. [address]  Telephone: [phone]    NAME:  Darby Busby  YOB: 1955  MEDICAL RECORD NUMBER:  [id number]  DATE: 5/2/2023    Return Appointment:  Return Appointment: With Dr Brandy De Jesus  in  1 [facility]). Schedule weekly for the rest of the month? Yes    No future appointments. Skilled Nursing Facility: [place] [address] [phone]  Fax: [phone]    Change dressing?: Yes    Wound Cleansing: Vashe wash - Apply enough Vashe to soak a piece of gauze and place on wound bed for 5-10 minutes. No need to rinse after soaking. Yessenia Wound Topical Treatments: No-Sting barrier film       Dressings:           Wound Location: Left Foot     Primary Dressing to wound bed: Solution: Vashe Wash solution moistened gauze - may substitute with quarter strength Dakins solution if Vashe not available     Cover and Secure with:  4X4 gauze pad and Bulky roll gauze     Change dressing:  Twice a day                  []Negative Pressure:           Wound Location: Left Foot **VAC VACATION FOR 1 WEEK**     Use No-Sting Barrier film to yessenia wound with each dressing change (DO NOT USE if Dermatac Drape from [employer] is used). Pressure @ 125 mmHg continuous using black foam.                         Change dressing: Monday/Wednesday/Friday  Make sure that tubing is not against the skin by using gauze and/or Kerlix. Cover with gentle ace wrap  [x]Wound clinic to apply saline wet to dry dressing until home care to place wound vac on. Edema Control:  Elevate leg(s) above the level of the heart for 30 minutes 4-5 times a day and/or when sitting. Avoid prolonged standing in one place.                  Patient may participate in therapy with the following restrictions for off-Loading:   [x]Off Loading(Pressure Reduction) When: Walking  Weight Bearing

## 2023-05-02 NOTE — PROGRESS NOTES
above the level of the heart for 30 minutes 4-5 times a day and/or when sitting. Avoid prolonged standing in one place. Patient may participate in therapy with the following restrictions for off-Loading:   [x]Off Loading(Pressure Reduction) When: Walking  Weight Bearing Status: Total Non-Weight bearing Left; Offloading devices: Walker and Wheelchair    Dietary:   Important dietary reminders:  1. Increase Protein intake (i.e. Lean meats, fish, eggs, legumes, and yogurt)  2. No added salt  3. If diabetic, follow a diabetic diet and check glucose prior to meals or as instructed by your physician.    [] SNF: Please have dietician evaluate patient for nutritional needs    Dietary Supplements: [] Sheyla [place]  [] 30ml ProMod/ProStat [] 60ml Expedite [] Other:   Take supplements twice a day or as directed as followed: Your nurse  is:  Vivienne Dickerson     Electronically signed by Hood Feliz RN on 5/2/2023 at 2:05 PM     [address]: Should you experience any significant changes in your wound(s) or have questions about your wound care, please contact the [address] [place] [address] at [phone]. Hours of operation  Mon:  8AM - 2PM  Tue: 11AM - 5PM  Wed: CLOSED  Thur: 8AM - 4:30PM  Fri:  8AM - 4:30PM    Please give us 24-48 business hours to return your call. These hours of operation are subject to change. The office is closed on all major holidays. If you need help with your wounds and cannot wait until we are available, contact your PCP or go to your preferred emergency room.      Physician Signature:_______________________    Date: ___________ Time:  ____________        [] Patient unable to sign Discharge Instructions given to ECF/Transportation/POA          Electronically signed by Reji Ott DPM on 5/2/2023 at 2:33 PM

## 2023-05-02 NOTE — PRE-CERTIFICATION NOTE
Insurance Verification Request      Ordering Center: The [address] [place] [address] [facility]. [address] [facility] Phone Number: [phone]  Fax Number: [phone]    Facility NPI: [id number]  Facility Tax ID [id number]    Provider Information:      Provider's Name: Dr Hensley Clarity: Lily Marroquin DPM NPI: [id number]      Patient Information:     Fab Humphries [facility] [facility]   [phone]   : 1955  AGE: 79 y.o. GENDER: male   TODAYS DATE:  2023    Insurance:     PRIMARY INSURANCE:  Plan: MEDICARE PART A AND B  Coverage: MEDICARE  Effective Date: 2012  Group Number: [unfilled]  Subscriber Number: [id number] - (Medicare)    Payer/Plan Subscr  Sex Relation Sub. Ins. ID Effective Group Num   1. [address] [facility] 1955 Male Self [id number]                                    [address] [address]   2.  [address] [facility] 1955 Male Self [id number]                                   [address] [facility]       Application/product Information:     Benefit Verification Request:    Reason for Request: New Wound    [employer] FAX# [phone]    Face/Hands/Feet 96464 (1st 25 sq cm)    [employer]    Has patient been treated with any other Skin Substitute on this Wound:     Yes Name of the product or produces :Ivanna Prolayer , Number of previous applications :1, Wound size : 24.5 sq/cm, Number of wounds :1,  Location of wound : left foot, Duration of wound :18 weeks, Estimated number of applications : 8     If yes, How many previous applications: 1    WOUND #: 1    Total Square CM: 12    Procedure setting: Hospital Outpatient Department    Is the Patient currently in a skilled nursing facility: Yes Patient is residing in a nursing facility for over 100 days    Is the wound work related: No    Procedure date: 2023    Patient Information:     Additional Dx Codes: H48.784,

## 2023-05-03 ENCOUNTER — TELEPHONE (OUTPATIENT)
Dept: INFECTIOUS DISEASES | Age: 68
End: 2023-05-03

## 2023-05-03 DIAGNOSIS — R78.81 STREPTOCOCCAL BACTEREMIA: ICD-10-CM

## 2023-05-03 DIAGNOSIS — B95.5 STREPTOCOCCAL BACTEREMIA: ICD-10-CM

## 2023-05-03 NOTE — TELEPHONE ENCOUNTER
Spoke with nurse Emeka Bhakta at [facility] requesting weekly labs, forwarded to nurse MADELINE and she states she will advise his nurse to check message

## 2023-05-04 ENCOUNTER — CLINICAL DOCUMENTATION (OUTPATIENT)
Dept: INFECTIOUS DISEASES | Age: 68
End: 2023-05-04

## 2023-05-04 ENCOUNTER — TELEPHONE (OUTPATIENT)
Dept: INFECTIOUS DISEASES | Age: 68
End: 2023-05-04

## 2023-05-04 NOTE — TELEPHONE ENCOUNTER
Spoke with nurse Sumi Jones at [facility] and advised of new term date 5/19 and she v/u

## 2023-05-04 NOTE — PROGRESS NOTES
Discussed case with podiatry and Dr. Mejia. Unfortunately pt continues with wound on L foot. Wound is looking good but still slightly malodorous and inflammatory makers still elevated. Pt is on vac holiday this week and plan is for skin sub but still pending insurance. No good PO options for GAS and MRSA in hd pt. Continue x 2 more weeks until 5/19. Might be able to end early pending podiatry and labs.      Velasquez Hollis, PharmD, [facility] [place], [place] Infectious Disease  Phone: [phone] (also available on [employer])  Fax: [phone]    For Pharmacy [address]    Program: Medical Group  CPA in place: Yes  Intervention Detail: New Rx: 1, reason: Needs Additional Therapy  Time Spent (min): 15

## 2023-05-09 ENCOUNTER — TELEPHONE (OUTPATIENT)
Dept: WOUND CARE | Age: 68
End: 2023-05-09

## 2023-05-09 DIAGNOSIS — L97.422 ULCER OF LEFT HEEL, WITH FAT LAYER EXPOSED (HCC): Primary | ICD-10-CM

## 2023-05-16 ENCOUNTER — TELEPHONE (OUTPATIENT)
Dept: WOUND CARE | Age: 68
End: 2023-05-16

## 2023-05-16 ENCOUNTER — TELEPHONE (OUTPATIENT)
Dept: INFECTIOUS DISEASES | Age: 68
End: 2023-05-16

## 2023-05-16 DIAGNOSIS — L97.422 ULCER OF LEFT HEEL, WITH FAT LAYER EXPOSED (HCC): Primary | ICD-10-CM

## 2023-05-16 LAB
ALBUMIN SERPL-MCNC: ABNORMAL G/DL
ALP BLD-CCNC: ABNORMAL U/L
ALT SERPL-CCNC: ABNORMAL U/L
ANION GAP SERPL CALCULATED.3IONS-SCNC: ABNORMAL MMOL/L
AST SERPL-CCNC: ABNORMAL U/L
BASOPHILS ABSOLUTE: 0.1 /ΜL
BASOPHILS RELATIVE PERCENT: 1.3 %
BILIRUB SERPL-MCNC: ABNORMAL MG/DL
BUN BLDV-MCNC: 26 MG/DL
C-REACTIVE PROTEIN: 41.3
CALCIUM SERPL-MCNC: 7.8 MG/DL
CHLORIDE BLD-SCNC: 95 MMOL/L
CO2: 31 MMOL/L
CREAT SERPL-MCNC: 5.5 MG/DL
EGFR: 13
EOSINOPHILS ABSOLUTE: 0.1 /ΜL
EOSINOPHILS RELATIVE PERCENT: 2.4 %
GLUCOSE BLD-MCNC: 93 MG/DL
HCT VFR BLD CALC: 36.6 % (ref 41–53)
HEMOGLOBIN: 11.4 G/DL (ref 13.5–17.5)
LYMPHOCYTES ABSOLUTE: 0.9 /ΜL
LYMPHOCYTES RELATIVE PERCENT: 20.8 %
MCH RBC QN AUTO: 24.5 PG
MCHC RBC AUTO-ENTMCNC: 31.2 G/DL
MCV RBC AUTO: 76.5 FL
MONOCYTES ABSOLUTE: 0.4 /ΜL
MONOCYTES RELATIVE PERCENT: 8.3 %
NEUTROPHILS ABSOLUTE: 2.9 /ΜL
NEUTROPHILS RELATIVE PERCENT: 67.2 %
PDW BLD-RTO: 18.4 %
PLATELET # BLD: 162 K/ΜL
PMV BLD AUTO: 8.4 FL
POTASSIUM SERPL-SCNC: 4.8 MMOL/L
RBC # BLD: 4.56 10^6/ΜL
SEDIMENTATION RATE, ERYTHROCYTE: 36
SODIUM BLD-SCNC: 139 MMOL/L
TOTAL PROTEIN: ABNORMAL
WBC # BLD: 4.4 10^3/ML

## 2023-05-16 NOTE — TELEPHONE ENCOUNTER
Returned call and spoke with Braxton German who is managing pt's IV abx coincided with pts wound care. Informed her that pt has not been back to [facility] for past 2 wks and so wound assessment and plan of care has not been able to be updated by Dr Rolando Adorno. Per Nina pt's abx will continue until further assessment can be done. Will have [address] Oneal Dominguez contact the SNF to get f/u appt's made for the next 4 weeks.

## 2023-05-16 NOTE — TELEPHONE ENCOUNTER
Spoke with Vandana Peters at [facility] and advised of pharmacist note to extend.   She verbalized understanding

## 2023-05-16 NOTE — TELEPHONE ENCOUNTER
Due to end prolonged course of IV vanc on 5/19 however still in \"limbo\" with definitive podiatry plan. Pt has missed last two podiatry appts due to lack of communication/transportation/cooperation with SNF. [address] is equally as frustrated with care. Pt is currently still on vac holiday due to maceration at last appt. Wound is kept clean with dakins. Has approvals for two different skin subs. One is more robust and ideal for pt but needs to be kept frozen. [address] RN is hesitant to order for next Tues appt as if he no shows again, will go to waste and cannot be kept frozen. Other is less ideal but can be ordered and kept on shelf. Please get labs for this patient as we want to see inflammatory markers and ensure safety on prolonged course of vanc with HD. Also encourage appropriate f/u with podiatry. Extend again until 5/31 as there are no ideal PO options for this patient. We will reconvene with podiatry next Tuesday.      Minerva Aparicio, PharmD, [facility] [place], [place] Infectious Disease  Phone: [phone] (also available on [employer])  Fax: [phone]    For Pharmacy [address] Only    Program: Medical Group  CPA in place: Yes  Intervention Detail: New Rx: 1, reason: Needs Additional Therapy  Time Spent (min): 15

## 2023-05-17 DIAGNOSIS — R78.81 STREPTOCOCCAL BACTEREMIA: ICD-10-CM

## 2023-05-17 DIAGNOSIS — B95.5 STREPTOCOCCAL BACTEREMIA: ICD-10-CM

## 2023-05-17 NOTE — TELEPHONE ENCOUNTER
Spoke with  Chung Roth who states nurse is unavailable, requesting weekly lab results and call back to this office

## 2023-05-17 NOTE — TELEPHONE ENCOUNTER
Spoke with nurse, Ms Salinas Curry and asked for weekly lab results to be faxed. Murali fax number.   She verbalized understanding

## 2023-05-23 ENCOUNTER — HOSPITAL ENCOUNTER (OUTPATIENT)
Dept: WOUND CARE | Age: 68
Discharge: HOME | End: 2023-05-23
Payer: MEDICARE

## 2023-05-23 VITALS
TEMPERATURE: 96 F | HEART RATE: 75 BPM | DIASTOLIC BLOOD PRESSURE: 71 MMHG | SYSTOLIC BLOOD PRESSURE: 121 MMHG | RESPIRATION RATE: 16 BRPM

## 2023-05-23 DIAGNOSIS — L97.422 ULCER OF LEFT HEEL, WITH FAT LAYER EXPOSED (HCC): Primary | ICD-10-CM

## 2023-05-23 LAB
BASOPHILS ABSOLUTE: 0.1 /ΜL
BASOPHILS RELATIVE PERCENT: 1.8 %
BUN BLDV-MCNC: 18 MG/DL
C-REACTIVE PROTEIN: 38.8
CALCIUM SERPL-MCNC: 7.8 MG/DL
CHLORIDE BLD-SCNC: 98 MMOL/L
CO2: 30 MMOL/L
CREAT SERPL-MCNC: 4.9 MG/DL
EGFR: 14
EOSINOPHILS ABSOLUTE: 0.1 /ΜL
EOSINOPHILS RELATIVE PERCENT: 1.9 %
GLUCOSE BLD-MCNC: 91 MG/DL
HCT VFR BLD CALC: 36.2 % (ref 41–53)
HEMOGLOBIN: 11.1 G/DL (ref 13.5–17.5)
LYMPHOCYTES ABSOLUTE: 0.9 /ΜL
LYMPHOCYTES RELATIVE PERCENT: 20.1 %
MCH RBC QN AUTO: 23.7 PG
MCHC RBC AUTO-ENTMCNC: 30.6 G/DL
MCV RBC AUTO: 77.2 FL
MONOCYTES ABSOLUTE: 0.4 /ΜL
MONOCYTES RELATIVE PERCENT: 8.7 %
NEUTROPHILS ABSOLUTE: 3.2 /ΜL
NEUTROPHILS RELATIVE PERCENT: 67.5 %
PLATELET # BLD: 126 K/ΜL
PMV BLD AUTO: 8.4 FL
POTASSIUM SERPL-SCNC: 6.3 MMOL/L
RBC # BLD: 4.68 10^6/ΜL
SEDIMENTATION RATE, ERYTHROCYTE: 40
SODIUM BLD-SCNC: 137 MMOL/L
WBC # BLD: 4.7 10^3/ML

## 2023-05-23 PROCEDURE — 11042 DBRDMT SUBQ TIS 1ST 20SQCM/<: CPT

## 2023-05-23 PROCEDURE — 15275 SKIN SUB GRAFT FACE/NK/HF/G: CPT

## 2023-05-23 PROCEDURE — 6370000000 HC RX 637 (ALT 250 FOR IP): Performed by: PODIATRIST

## 2023-05-23 RX ORDER — BACITRACIN ZINC AND POLYMYXIN B SULFATE 500; 1000 [USP'U]/G; [USP'U]/G
OINTMENT TOPICAL ONCE
OUTPATIENT
Start: 2023-05-23 | End: 2023-05-23

## 2023-05-23 RX ORDER — CLOBETASOL PROPIONATE 0.5 MG/G
OINTMENT TOPICAL ONCE
OUTPATIENT
Start: 2023-05-23 | End: 2023-05-23

## 2023-05-23 RX ORDER — LIDOCAINE HYDROCHLORIDE 40 MG/ML
SOLUTION TOPICAL ONCE
Status: COMPLETED | OUTPATIENT
Start: 2023-05-23 | End: 2023-05-23

## 2023-05-23 RX ORDER — BETAMETHASONE DIPROPIONATE 0.05 %
OINTMENT (GRAM) TOPICAL ONCE
OUTPATIENT
Start: 2023-05-23 | End: 2023-05-23

## 2023-05-23 RX ORDER — BACITRACIN, NEOMYCIN, POLYMYXIN B 400; 3.5; 5 [USP'U]/G; MG/G; [USP'U]/G
OINTMENT TOPICAL ONCE
OUTPATIENT
Start: 2023-05-23 | End: 2023-05-23

## 2023-05-23 RX ORDER — LIDOCAINE 50 MG/G
OINTMENT TOPICAL ONCE
OUTPATIENT
Start: 2023-05-23 | End: 2023-05-23

## 2023-05-23 RX ORDER — LIDOCAINE 40 MG/G
CREAM TOPICAL ONCE
OUTPATIENT
Start: 2023-05-23 | End: 2023-05-23

## 2023-05-23 RX ORDER — LIDOCAINE HYDROCHLORIDE 20 MG/ML
JELLY TOPICAL ONCE
OUTPATIENT
Start: 2023-05-23 | End: 2023-05-23

## 2023-05-23 RX ORDER — GINSENG 100 MG
CAPSULE ORAL ONCE
OUTPATIENT
Start: 2023-05-23 | End: 2023-05-23

## 2023-05-23 RX ORDER — GENTAMICIN SULFATE 1 MG/G
OINTMENT TOPICAL ONCE
OUTPATIENT
Start: 2023-05-23 | End: 2023-05-23

## 2023-05-23 RX ORDER — LIDOCAINE HYDROCHLORIDE 40 MG/ML
SOLUTION TOPICAL ONCE
OUTPATIENT
Start: 2023-05-23 | End: 2023-05-23

## 2023-05-23 RX ADMIN — LIDOCAINE HYDROCHLORIDE: 40 SOLUTION TOPICAL at 13:07

## 2023-05-23 NOTE — PLAN OF CARE
EpiFix/EpiCord Treatment Note      Goal:  Patient will receive safe and proper application of skin substitute. Patient will comply with caring for dressing, offloading and reporting complications. Expiration date checked immediately prior to use. Package intact prior to use and no damage noted. Epifix was removed from protective sterile packaging and prepared and applied by provider per  instructions and then applied to Ulcer #: 1 and affixed with steri-strips and nonadherent. Secondary dressing applied by nursing as ordered. Patient/caregiver was instructed not to remove dressing and to keep it clean and dry. See AVS for further instructions given to patient/caregiver. EpiFix/Epicord may be applied a total of 10 times per wound over a 12 week period. Additionally EpiFix/Epicord may only be used every 12 months per wound. Date of first application of Epifix for this current wound is May 23, 2023.        Application # 1            Guidelines followed     Electronically signed by Adonay Colbert RN on 5/23/2023 at 1:51 PM

## 2023-05-23 NOTE — PROGRESS NOTES
LEFT FOOT TOTAL FIRST RAY AMPUTATION performed by Pedro Gonsales DPM at [facility] HISTORY    Family History   Problem Relation Age of Onset    Heart Disease Mother     Diabetes Mother     Dementia Mother     Diabetes Father     Heart Disease Father     Stroke Brother        SOCIAL HISTORY    Social History     Tobacco Use    Smoking status: Former    Smokeless tobacco: Never   Vaping Use    Vaping Use: Never used   Substance Use Topics    Alcohol use: No     Alcohol/week: 0.0 standard drinks    Drug use: No       ALLERGIES    Allergies   Allergen Reactions    Pcn [Penicillins] Hives and Other (See Comments)     Unknown reaction; patient has tolerated cephalexin in 09/2016 per chart review as of 9/12/22.     Lisinopril Other (See Comments)     Hiccups-pt family does not want pt receiving medication    Tilactase        MEDICATIONS    Current Outpatient Medications on File Prior to Encounter   Medication Sig Dispense Refill    potassium phosphate, monobasic, (K-PHOS) 500 MG tablet Take 0.5 tablets by mouth 2 times daily      acetaminophen (TYLENOL) 500 MG tablet Take 1 tablet by mouth every 6 hours as needed for Pain      vancomycin (VANCOCIN) IVPB Infuse 250 mLs intravenously three times a week 750 mg At dialysis through 5/19      lisinopril (PRINIVIL;ZESTRIL) 10 MG tablet Take 1 tablet by mouth daily      hydrALAZINE (APRESOLINE) 25 MG tablet Take 1 tablet by mouth every 12 hours 90 tablet 3    tamsulosin (FLOMAX) 0.4 MG capsule Take 1 capsule by mouth daily      mineral oil-hydrophilic petrolatum (AQUAPHOR) ointment Apply topically As directed      traZODone (DESYREL) 50 MG tablet Take 0.5 tablets by mouth at bedtime      metoprolol succinate (TOPROL XL) 25 MG extended release tablet Take 1 tablet by mouth daily      isosorbide mononitrate (IMDUR) 30 MG extended release tablet Take 1 tablet by mouth daily      melatonin 3 MG TABS tablet Take 1 tablet by mouth nightly      omeprazole (PRILOSEC) 10 MG delayed

## 2023-05-23 NOTE — DISCHARGE INSTRUCTIONS
Wound Location: Left Foot  - Discontinue wound vac    Patient may participate in therapy with the following restrictions for off-Loading:   [x]Off Loading(Pressure Reduction) When: Walking  Weight Bearing Status: Total Non-Weight bearing Left; Offloading devices: Wheelchair    Dietary:   Important dietary reminders:  1. Increase Protein intake (i.e. Lean meats, fish, eggs, legumes, and yogurt)  2. No added salt  3. If diabetic, follow a diabetic diet and check glucose prior to meals or as instructed by your physician.    [] SNF: Please have dietician evaluate patient for nutritional needs    Dietary Supplements: [] Zada Quivers  [] 30ml ProMod/ProStat [] 60ml Expedite [] Other:   Take supplements twice a day or as directed as followed: Your nurse  is:  Claude Martinez     Electronically signed by Dennie Falco, RN on 5/23/2023 at 2 [facility] Information: Should you experience any significant changes in your wound(s) or have questions about your wound care, please contact the [address] [place] [address] at [phone]. Hours of operation  Mon:  8AM - 2PM  Tue: 11AM - 5PM  Wed: CLOSED  Thur: 8AM - 4:30PM  Fri:  8AM - 4:30PM    Please give us 24-48 business hours to return your call. These hours of operation are subject to change. The office is closed on all major holidays. If you need help with your wounds and cannot wait until we are available, contact your PCP or go to your preferred emergency room.      Physician Signature:_______________________    Date: ___________ Time:  ____________        [] Patient unable to sign Discharge Instructions given to ECF/Transportation/POA

## 2023-05-24 ENCOUNTER — TELEPHONE (OUTPATIENT)
Dept: INFECTIOUS DISEASES | Age: 68
End: 2023-05-24

## 2023-05-24 DIAGNOSIS — B95.5 STREPTOCOCCAL BACTEREMIA: ICD-10-CM

## 2023-05-24 DIAGNOSIS — R78.81 STREPTOCOCCAL BACTEREMIA: ICD-10-CM

## 2023-05-24 NOTE — TELEPHONE ENCOUNTER
Spoke with Nellie Zaman at [employer] and advised of term date for vanc on 5/31 and he v/u. Spoke with Talya Hartman at [facility] and advised of above and DC weekly labs and they v/u.

## 2023-05-24 NOTE — TELEPHONE ENCOUNTER
Spoke with nurse Mrs. Anna Peacock at [facility] requesting weekly lab results, she states she will fax now

## 2023-05-24 NOTE — TELEPHONE ENCOUNTER
Reviewed labs and [facility] note. Pt started with first application of skin sub yesterday. Okay to end IV vanc on 5/31 as planned. Note hyperK was prior to being dialyzed.      Charlotte Lea, PharmD, [facility] [place], [place] Infectious Disease  Phone: [phone] (also available on [employer])  Fax: [phone]    For Pharmacy [facility]  8Th  Only    Program: [id number] [place] [address] Avenue in place: Yes  Intervention Detail: Discontinued Rx: 1, reason: Therapy Complete  Time Spent (min): 15

## 2023-05-30 ENCOUNTER — HOSPITAL ENCOUNTER (OUTPATIENT)
Dept: WOUND CARE | Age: 68
Discharge: HOME | End: 2023-05-30
Payer: MEDICARE

## 2023-05-30 VITALS
DIASTOLIC BLOOD PRESSURE: 72 MMHG | HEART RATE: 80 BPM | RESPIRATION RATE: 18 BRPM | TEMPERATURE: 98.4 F | SYSTOLIC BLOOD PRESSURE: 110 MMHG

## 2023-05-30 DIAGNOSIS — L97.422 ULCER OF LEFT HEEL, WITH FAT LAYER EXPOSED (HCC): Primary | ICD-10-CM

## 2023-05-30 PROCEDURE — 6370000000 HC RX 637 (ALT 250 FOR IP): Performed by: PODIATRIST

## 2023-05-30 PROCEDURE — 11042 DBRDMT SUBQ TIS 1ST 20SQCM/<: CPT

## 2023-05-30 PROCEDURE — 15275 SKIN SUB GRAFT FACE/NK/HF/G: CPT

## 2023-05-30 RX ORDER — BACITRACIN, NEOMYCIN, POLYMYXIN B 400; 3.5; 5 [USP'U]/G; MG/G; [USP'U]/G
OINTMENT TOPICAL ONCE
OUTPATIENT
Start: 2023-05-30 | End: 2023-05-30

## 2023-05-30 RX ORDER — BACITRACIN ZINC AND POLYMYXIN B SULFATE 500; 1000 [USP'U]/G; [USP'U]/G
OINTMENT TOPICAL ONCE
OUTPATIENT
Start: 2023-05-30 | End: 2023-05-30

## 2023-05-30 RX ORDER — LIDOCAINE HYDROCHLORIDE 40 MG/ML
SOLUTION TOPICAL ONCE
Status: COMPLETED | OUTPATIENT
Start: 2023-05-30 | End: 2023-05-30

## 2023-05-30 RX ORDER — LIDOCAINE HYDROCHLORIDE 20 MG/ML
JELLY TOPICAL ONCE
OUTPATIENT
Start: 2023-05-30 | End: 2023-05-30

## 2023-05-30 RX ORDER — GENTAMICIN SULFATE 1 MG/G
OINTMENT TOPICAL ONCE
OUTPATIENT
Start: 2023-05-30 | End: 2023-05-30

## 2023-05-30 RX ORDER — CLOBETASOL PROPIONATE 0.5 MG/G
OINTMENT TOPICAL ONCE
OUTPATIENT
Start: 2023-05-30 | End: 2023-05-30

## 2023-05-30 RX ORDER — LIDOCAINE 40 MG/G
CREAM TOPICAL ONCE
OUTPATIENT
Start: 2023-05-30 | End: 2023-05-30

## 2023-05-30 RX ORDER — GINSENG 100 MG
CAPSULE ORAL ONCE
OUTPATIENT
Start: 2023-05-30 | End: 2023-05-30

## 2023-05-30 RX ORDER — LIDOCAINE 50 MG/G
OINTMENT TOPICAL ONCE
OUTPATIENT
Start: 2023-05-30 | End: 2023-05-30

## 2023-05-30 RX ORDER — LIDOCAINE HYDROCHLORIDE 40 MG/ML
SOLUTION TOPICAL ONCE
OUTPATIENT
Start: 2023-05-30 | End: 2023-05-30

## 2023-05-30 RX ORDER — BETAMETHASONE DIPROPIONATE 0.05 %
OINTMENT (GRAM) TOPICAL ONCE
OUTPATIENT
Start: 2023-05-30 | End: 2023-05-30

## 2023-05-30 RX ADMIN — LIDOCAINE HYDROCHLORIDE: 40 SOLUTION TOPICAL at 13:30

## 2023-05-30 NOTE — DISCHARGE INSTRUCTIONS
215 [facility] Physician Orders and Discharge Instructions  [address] [address] [facility] 103  Telephone: [phone]    NAME:  Conner Tejada  YOB: 1955  MEDICAL RECORD NUMBER:  [id number]  DATE: 5/30/2023    Return Appointment:  Return Appointment: With Dr Meg Cabrera  in  1 [facility]). Schedule weekly for the rest of the month? Yes    Future Appointments   Date Time Provider Kayla Vaca   6/6/2023  1:15 PM Tacy Krill, DPM [facility]   6/20/2023  1:15 PM Tacy Krill, DPM [facility]   6/27/2023  1:15 PM Tacy Krill, DPM [address]: [facility] Phone: [phone] Fax: [phone]     Change dressing?: No     Wound Cleansing: No need to wash. Leave dressing in place. Yessenia Wound Topical Treatments: No-Sting barrier film and  - Done in clinic only                   Dressings:                  Wound Location: Left Foot                Primary Dressing to wound bed: Hydrogel - small dab          Cover and Secure with:        4X4 gauze pad, ABD pad, Bulky roll gauze, and Coban - football style              Change dressing: DO NOT CHANGE        [x]Application of a biologic skin substitute was done in the wound care center on 5/30/2023:   Epifix Wound Location: Left Foot     This is a highly specialized wound care dressing that is intended to enhance your own bodies ability to increase growth factors and other healing abilities. Grafts are held into place by steri strips or staples and covered with a non adherent dressing. Please leave the dressing clean, dry and intact unless otherwise instructed by your physician below:     [] Skilled nursing to change dressing:     [] Once a week: Change outer dressing only (see above), leaving mepitel and steri strips/staples/stitches in place.   [x] Do Not Change Dressing              [] Other:

## 2023-05-30 NOTE — PLAN OF CARE
EpiFix/EpiCord Treatment Note      Goal:  Patient will receive safe and proper application of skin substitute. Patient will comply with caring for dressing, offloading and reporting complications. Expiration date checked immediately prior to use. Package intact prior to use and no damage noted. Epifix was removed from protective sterile packaging and prepared and applied by provider per  instructions and then applied to Ulcer #: 1 and affixed with steri-strips and nonadherent. Secondary dressing applied by nursing as ordered. Patient/caregiver was instructed not to remove dressing and to keep it clean and dry. See AVS for further instructions given to patient/caregiver. EpiFix/Epicord may be applied a total of 10 times per wound over a 12 week period. Additionally EpiFix/Epicord may only be used every 12 months per wound. Date of first application of Epifix for this current wound is May 23, 2023.        Application # 2            Guidelines followed     Electronically signed by Opal Nelson RN on 5/30/2023 at 1:53 PM

## 2023-05-30 NOTE — PROGRESS NOTES
Reappoint 1 week for follow-up    Discharge [address] [facility] Physician Orders and Discharge Instructions  [address] [address] [address] [address] [address] [place] [address]  Telephone: [phone]    NAME:  Gurpreet Leiva  YOB: 1955  MEDICAL RECORD NUMBER:  [id number]  DATE: 5/30/2023    Return Appointment:  Return Appointment: With Dr Serenity Urrutia  in  1 [facility]). Schedule weekly for the rest of the month? Yes    Future Appointments   Date Time Provider Kayla Vaca   6/6/2023  1:15 PM Carole Guevara, ALANNA LONGHZ WOUND [facility]   6/20/2023  1:15 PM ALANNA Burden WOUND [facility]   6/27/2023  1:15 PM Carole Guevara, ALANNA [address]: [facility] Phone: [phone] Fax: [phone]     Change dressing?: No     Wound Cleansing: No need to wash. Leave dressing in place. Yessenia Wound Topical Treatments: No-Sting barrier film and  - Done in clinic only                   Dressings:                  Wound Location: Left Foot                Primary Dressing to wound bed: Hydrogel - small dab          Cover and Secure with:        4X4 gauze pad, ABD pad, Bulky roll gauze, and Coban - football style              Change dressing: DO NOT CHANGE        [x]Application of a biologic skin substitute was done in the wound care center on 5/30/2023:   Epifix Wound Location: Left Foot     This is a highly specialized wound care dressing that is intended to enhance your own bodies ability to increase growth factors and other healing abilities. Grafts are held into place by steri strips or staples and covered with a non adherent dressing.  Please leave the dressing clean, dry and intact unless otherwise instructed by your physician below:     [] Skilled nursing to change dressing:     [] Once a week: Change outer dressing only (see above), leaving mepitel and steri strips/staples/stitches in

## 2023-06-06 ENCOUNTER — HOSPITAL ENCOUNTER (OUTPATIENT)
Dept: WOUND CARE | Age: 68
Discharge: HOME | End: 2023-06-06
Payer: MEDICARE

## 2023-06-06 VITALS
SYSTOLIC BLOOD PRESSURE: 151 MMHG | HEART RATE: 66 BPM | RESPIRATION RATE: 16 BRPM | TEMPERATURE: 97 F | DIASTOLIC BLOOD PRESSURE: 77 MMHG

## 2023-06-06 DIAGNOSIS — L97.422 ULCER OF LEFT HEEL, WITH FAT LAYER EXPOSED (HCC): Primary | ICD-10-CM

## 2023-06-06 PROCEDURE — 15275 SKIN SUB GRAFT FACE/NK/HF/G: CPT

## 2023-06-06 PROCEDURE — 11042 DBRDMT SUBQ TIS 1ST 20SQCM/<: CPT

## 2023-06-06 PROCEDURE — 6370000000 HC RX 637 (ALT 250 FOR IP): Performed by: PODIATRIST

## 2023-06-06 RX ORDER — LIDOCAINE HYDROCHLORIDE 20 MG/ML
JELLY TOPICAL ONCE
OUTPATIENT
Start: 2023-06-06 | End: 2023-06-06

## 2023-06-06 RX ORDER — LIDOCAINE HYDROCHLORIDE 40 MG/ML
SOLUTION TOPICAL ONCE
Status: COMPLETED | OUTPATIENT
Start: 2023-06-06 | End: 2023-06-06

## 2023-06-06 RX ORDER — GENTAMICIN SULFATE 1 MG/G
OINTMENT TOPICAL ONCE
OUTPATIENT
Start: 2023-06-06 | End: 2023-06-06

## 2023-06-06 RX ORDER — LIDOCAINE 40 MG/G
CREAM TOPICAL ONCE
OUTPATIENT
Start: 2023-06-06 | End: 2023-06-06

## 2023-06-06 RX ORDER — GINSENG 100 MG
CAPSULE ORAL ONCE
OUTPATIENT
Start: 2023-06-06 | End: 2023-06-06

## 2023-06-06 RX ORDER — BETAMETHASONE DIPROPIONATE 0.05 %
OINTMENT (GRAM) TOPICAL ONCE
OUTPATIENT
Start: 2023-06-06 | End: 2023-06-06

## 2023-06-06 RX ORDER — LIDOCAINE 50 MG/G
OINTMENT TOPICAL ONCE
OUTPATIENT
Start: 2023-06-06 | End: 2023-06-06

## 2023-06-06 RX ORDER — BACITRACIN ZINC AND POLYMYXIN B SULFATE 500; 1000 [USP'U]/G; [USP'U]/G
OINTMENT TOPICAL ONCE
OUTPATIENT
Start: 2023-06-06 | End: 2023-06-06

## 2023-06-06 RX ORDER — CLOBETASOL PROPIONATE 0.5 MG/G
OINTMENT TOPICAL ONCE
OUTPATIENT
Start: 2023-06-06 | End: 2023-06-06

## 2023-06-06 RX ORDER — LIDOCAINE HYDROCHLORIDE 40 MG/ML
SOLUTION TOPICAL ONCE
OUTPATIENT
Start: 2023-06-06 | End: 2023-06-06

## 2023-06-06 RX ORDER — IBUPROFEN 200 MG
TABLET ORAL ONCE
OUTPATIENT
Start: 2023-06-06 | End: 2023-06-06

## 2023-06-06 RX ADMIN — LIDOCAINE HYDROCHLORIDE: 40 SOLUTION TOPICAL at 13:44

## 2023-06-06 NOTE — DISCHARGE INSTRUCTIONS
215 [facility] Physician Orders and Discharge Instructions  [address] [address] [address] [address] [facility]. [facility]. [place]  Telephone: [phone]    NAME:  Yung Chester  YOB: 1955  MEDICAL RECORD NUMBER:  [id number]  DATE: 6/6/2023    Return Appointment:  Return Appointment: With Dr. Venkata Roy  in  1 Week(s). Schedule weekly for the rest of the month? Yes    Future Appointments   Date Time Provider Kayla Vaca   6/20/2023  1:15 PM Tresa Davidson [facility] WOUND [facility]   6/27/2023  1:15 PM Iftikhar Aguilar DPM [facility] Phone: [phone] Fax: [phone]     Change dressing?: No     Wound Cleansing: No need to wash. Leave dressing in place. Yessenia Wound Topical Treatments: No-Sting barrier film and  - Done in clinic only                   Dressings:                  Wound Location: Left Foot                Primary Dressing to wound bed: Hydrogel - small dab          Cover and Secure with:        4X4 gauze pad, ABD pad, Bulky roll gauze, and Coban - football style              Change dressing: DO NOT CHANGE        [x]Application of a biologic skin substitute was done in the wound care center on 5/30/2023:   Epifix Wound Location: Left Foot     This is a highly specialized wound care dressing that is intended to enhance your own bodies ability to increase growth factors and other healing abilities. Grafts are held into place by steri strips or staples and covered with a non adherent dressing. Please leave the dressing clean, dry and intact unless otherwise instructed by your physician below:     [] Skilled nursing to change dressing:     [] Once a week: Change outer dressing only (see above), leaving mepitel and steri strips/staples/stitches in place.   [x] Do Not Change Dressing              [] Other:                               Patient may participate in therapy with the following

## 2023-06-06 NOTE — PROGRESS NOTES
[address]   [address] [facility] [place]  Telephone: [phone]    NAME:  Joann Cooley  YOB: 1955  MEDICAL RECORD NUMBER:  [id number]  DATE: 6/6/2023    Return Appointment:  Return Appointment: With Dr. Drew Molina  in  1 Week(s). Schedule weekly for the rest of the month? Yes    Future Appointments   Date Time Provider Kayla Vaca   6/20/2023  1:15 PM Tresa Kumar [facility] WOUND [facility]   6/27/2023  1:15 PM Patricia Hussein DPM [facility]: [facility] Phone: [phone] Fax: [phone]     Change dressing?: No     Wound Cleansing: No need to wash. Leave dressing in place. Yessenia Wound Topical Treatments: No-Sting barrier film and  - Done in clinic only                   Dressings:                  Wound Location: Left Foot                Primary Dressing to wound bed: Hydrogel - small dab          Cover and Secure with:        4X4 gauze pad, ABD pad, Bulky roll gauze, and Coban - football style              Change dressing: DO NOT CHANGE        [x]Application of a biologic skin substitute was done in the wound care center on 5/30/2023:   Epifix Wound Location: Left Foot     This is a highly specialized wound care dressing that is intended to enhance your own bodies ability to increase growth factors and other healing abilities. Grafts are held into place by steri strips or staples and covered with a non adherent dressing. Please leave the dressing clean, dry and intact unless otherwise instructed by your physician below:     [] Skilled nursing to change dressing:     [] Once a week: Change outer dressing only (see above), leaving mepitel and steri strips/staples/stitches in place.   [x] Do Not Change Dressing              [] Other:                               Patient may participate in therapy with the following restrictions for off-Loading:   [x]Off

## 2023-06-06 NOTE — PLAN OF CARE
EpiFix/EpiCord Treatment Note      Goal:  Patient will receive safe and proper application of skin substitute. Patient will comply with caring for dressing, offloading and reporting complications. Expiration date checked immediately prior to use. Package intact prior to use and no damage noted. Epifix was removed from protective sterile packaging and prepared and applied by provider per  instructions and then applied to Ulcer #: 1 and affixed with steri-strips and nonadherent. Secondary dressing applied by nursing as ordered. Patient/caregiver was instructed not to remove dressing and to keep it clean and dry. See AVS for further instructions given to patient/caregiver. EpiFix/Epicord may be applied a total of 10 times per wound over a 12 week period. Additionally EpiFix/Epicord may only be used every 12 months per wound. Date of first application of Epifix for this current wound is May 23, 2023.        Application # 3            Guidelines followed     Electronically signed by Matthew Veliz RN on 6/6/2023 at 2:18 PM

## 2023-06-20 ENCOUNTER — HOSPITAL ENCOUNTER (OUTPATIENT)
Dept: WOUND CARE | Age: 68
Discharge: HOME | End: 2023-06-20
Payer: MEDICARE

## 2023-06-20 VITALS
RESPIRATION RATE: 18 BRPM | TEMPERATURE: 97.1 F | SYSTOLIC BLOOD PRESSURE: 106 MMHG | HEART RATE: 68 BPM | DIASTOLIC BLOOD PRESSURE: 60 MMHG

## 2023-06-20 DIAGNOSIS — L97.422 ULCER OF LEFT HEEL, WITH FAT LAYER EXPOSED (HCC): Primary | ICD-10-CM

## 2023-06-20 PROCEDURE — 11042 DBRDMT SUBQ TIS 1ST 20SQCM/<: CPT

## 2023-06-20 PROCEDURE — 88305 TISSUE EXAM BY PATHOLOGIST: CPT

## 2023-06-20 PROCEDURE — 6370000000 HC RX 637 (ALT 250 FOR IP): Performed by: PODIATRIST

## 2023-06-20 PROCEDURE — 11044 DBRDMT BONE 1ST 20 SQ CM/<: CPT

## 2023-06-20 RX ORDER — CLOBETASOL PROPIONATE 0.5 MG/G
OINTMENT TOPICAL ONCE
OUTPATIENT
Start: 2023-06-20 | End: 2023-06-20

## 2023-06-20 RX ORDER — GENTAMICIN SULFATE 1 MG/G
OINTMENT TOPICAL ONCE
OUTPATIENT
Start: 2023-06-20 | End: 2023-06-20

## 2023-06-20 RX ORDER — LIDOCAINE HYDROCHLORIDE 40 MG/ML
SOLUTION TOPICAL ONCE
Status: COMPLETED | OUTPATIENT
Start: 2023-06-20 | End: 2023-06-20

## 2023-06-20 RX ORDER — GINSENG 100 MG
CAPSULE ORAL ONCE
OUTPATIENT
Start: 2023-06-20 | End: 2023-06-20

## 2023-06-20 RX ORDER — LIDOCAINE HYDROCHLORIDE 20 MG/ML
JELLY TOPICAL ONCE
OUTPATIENT
Start: 2023-06-20 | End: 2023-06-20

## 2023-06-20 RX ORDER — LIDOCAINE 40 MG/G
CREAM TOPICAL ONCE
OUTPATIENT
Start: 2023-06-20 | End: 2023-06-20

## 2023-06-20 RX ORDER — BETAMETHASONE DIPROPIONATE 0.05 %
OINTMENT (GRAM) TOPICAL ONCE
OUTPATIENT
Start: 2023-06-20 | End: 2023-06-20

## 2023-06-20 RX ORDER — IBUPROFEN 200 MG
TABLET ORAL ONCE
OUTPATIENT
Start: 2023-06-20 | End: 2023-06-20

## 2023-06-20 RX ORDER — BACITRACIN ZINC AND POLYMYXIN B SULFATE 500; 1000 [USP'U]/G; [USP'U]/G
OINTMENT TOPICAL ONCE
OUTPATIENT
Start: 2023-06-20 | End: 2023-06-20

## 2023-06-20 RX ORDER — LIDOCAINE HYDROCHLORIDE 40 MG/ML
SOLUTION TOPICAL ONCE
OUTPATIENT
Start: 2023-06-20 | End: 2023-06-20

## 2023-06-20 RX ORDER — LIDOCAINE 50 MG/G
OINTMENT TOPICAL ONCE
OUTPATIENT
Start: 2023-06-20 | End: 2023-06-20

## 2023-06-20 RX ADMIN — LIDOCAINE HYDROCHLORIDE: 40 SOLUTION TOPICAL at 13:29

## 2023-06-20 NOTE — DISCHARGE INSTRUCTIONS
[address] [place] [address] Physician Orders and Discharge Instructions  [address] [address] [address]. [place]. 103  Telephone: [phone]    NAME:  Gurpreet Leiva  YOB: 1955  MEDICAL RECORD NUMBER:  [id number]  DATE: 2023    Return Appointment:  Return Appointment: With Dr Serenity Urrutia  in  1 [facility]). Schedule weekly for the rest of the month? Yes    Future Appointments   Date Time Provider Kayla Vaca   2023  1:15 PM Carole Guevara DPM [facility]OUND [facility]      **A specimen from the wound was taken and sent to pathology**     [facility]: [facility] Phone: [phone] Fax: [address] [place] [address] Street at Assisted Living: n/a    Change dressing?: Yes    Wound Cleansin.9% normal saline    Yessenia Wound Topical Treatments: No-Sting barrier film       Dressings:           Wound Location: Left Foot     Primary Dressing to wound bed: Collagen (I.e. Puracol)      Cover and Secure with:  4X4 gauze pad and Bulky roll gauze     Change dressing: Daily    Patient may participate in therapy with the following restrictions for off-Loading:   [x]Off Loading(Pressure Reduction) When: Walking  Weight Bearing Status: Partial Weight bearing Left:  Transfers Only;  Offloading devices: Walker and Wheelchair  []No Offloading required    Dietary:   Important dietary reminders:  1. Increase Protein intake (i.e. Lean meats, fish, eggs, legumes, and yogurt)  2. No added salt  3. If diabetic, follow a diabetic diet and check glucose prior to meals or as instructed by your physician.    [] SNF: Please have dietician evaluate patient for nutritional needs    Dietary Supplements: [] Marzena Skillern  [] 30ml ProMod/ProStat [] 60ml Expedite [] Other:   Take supplements twice a day or as directed as followed:        Your nurse  is:  Akua Olivia     Electronically signed by Nadeem Porras RN on 2023 at 2:29 PM     [address] [place] [address]

## 2023-06-20 NOTE — PROGRESS NOTES
[address]   Progress Note and Procedure Note      Pippa Varner  MEDICAL RECORD NUMBER:  [id number]  AGE: 79 y.o. GENDER: male  : 1955  EPISODE DATE:  2023    Subjective:     Chief Complaint   Patient presents with    Wound Check     Left foot         HISTORY of PRESENT ILLNESS HPI     Pippa Varner is a 79 y.o. male who presents today for wound/ulcer evaluation. History of Wound Context: Patient presents today for initial evaluation after undergoing an incision and drainage on his left foot with first ray resection. Patient has previously been revascularized by Dr. Yasmin Cowden. Patient relates his dressing was left clean dry and intact.   Wound/Ulcer Pain Timing/Severity: none  Quality of pain: N/A  Severity:  0 / 10   Modifying Factors: None  Associated Signs/Symptoms: none    Ulcer Identification:  Ulcer Type: arterial and diabetic    Contributing Factors: diabetes, arterial insufficiency, and immunosuppression    Acute Wound: N/A    PAST MEDICAL HISTORY        Diagnosis Date    Amputated great toe, right (HCC)     CAD (coronary artery disease)     CVA (cerebral vascular accident) ([address]     3    Dementia ([address]     Depression     Diabetes ([address]     Diabetic polyneuropathy ([address]     ESRD on dialysis ([address]         GERD (gastroesophageal reflux disease)     Hemiplegia and hemiparesis following cerebral infarction affecting right dominant side (HCC)     Hyperlipidemia     Hypertension     Pain syndrome, chronic     Peripheral vascular disease ([address]     [facility]    Secondary hyperparathyroidism [facility])        PAST SURGICAL HISTORY    Past Surgical History:   Procedure Laterality Date    CARDIAC CATHETERIZATION      DIALYSIS FISTULA CREATION      FOOT DEBRIDEMENT Left 2023    INCISION AND DRAINAGE WITH DELAYED  PRIMARY CLOSURE WITH  WOUND VAC APPLICATION,  APPLICATION SKIN GRAFT SUBSTITUTE LEFT  LOWER EXTREMITY performed by Ann Salter

## 2023-07-11 ENCOUNTER — HOSPITAL ENCOUNTER (OUTPATIENT)
Dept: WOUND CARE | Age: 68
Discharge: HOME | End: 2023-07-11
Payer: MEDICARE

## 2023-07-11 VITALS
RESPIRATION RATE: 18 BRPM | TEMPERATURE: 98.1 F | SYSTOLIC BLOOD PRESSURE: 126 MMHG | DIASTOLIC BLOOD PRESSURE: 80 MMHG | HEART RATE: 81 BPM

## 2023-07-11 DIAGNOSIS — L97.422 ULCER OF LEFT HEEL, WITH FAT LAYER EXPOSED (HCC): Primary | ICD-10-CM

## 2023-07-11 PROCEDURE — 11044 DBRDMT BONE 1ST 20 SQ CM/<: CPT

## 2023-07-11 PROCEDURE — 6370000000 HC RX 637 (ALT 250 FOR IP): Performed by: PODIATRIST

## 2023-07-11 RX ORDER — BETAMETHASONE DIPROPIONATE 0.05 %
OINTMENT (GRAM) TOPICAL ONCE
OUTPATIENT
Start: 2023-07-11 | End: 2023-07-11

## 2023-07-11 RX ORDER — LIDOCAINE HYDROCHLORIDE 20 MG/ML
JELLY TOPICAL ONCE
OUTPATIENT
Start: 2023-07-11 | End: 2023-07-11

## 2023-07-11 RX ORDER — GENTAMICIN SULFATE 1 MG/G
OINTMENT TOPICAL ONCE
OUTPATIENT
Start: 2023-07-11 | End: 2023-07-11

## 2023-07-11 RX ORDER — LIDOCAINE 40 MG/G
CREAM TOPICAL ONCE
OUTPATIENT
Start: 2023-07-11 | End: 2023-07-11

## 2023-07-11 RX ORDER — GINSENG 100 MG
CAPSULE ORAL ONCE
OUTPATIENT
Start: 2023-07-11 | End: 2023-07-11

## 2023-07-11 RX ORDER — CLOBETASOL PROPIONATE 0.5 MG/G
OINTMENT TOPICAL ONCE
OUTPATIENT
Start: 2023-07-11 | End: 2023-07-11

## 2023-07-11 RX ORDER — LIDOCAINE 50 MG/G
OINTMENT TOPICAL ONCE
OUTPATIENT
Start: 2023-07-11 | End: 2023-07-11

## 2023-07-11 RX ORDER — LIDOCAINE HYDROCHLORIDE 40 MG/ML
SOLUTION TOPICAL ONCE
OUTPATIENT
Start: 2023-07-11 | End: 2023-07-11

## 2023-07-11 RX ORDER — LIDOCAINE HYDROCHLORIDE 40 MG/ML
SOLUTION TOPICAL ONCE
Status: COMPLETED | OUTPATIENT
Start: 2023-07-11 | End: 2023-07-11

## 2023-07-11 RX ORDER — IBUPROFEN 200 MG
TABLET ORAL ONCE
OUTPATIENT
Start: 2023-07-11 | End: 2023-07-11

## 2023-07-11 RX ORDER — BACITRACIN ZINC AND POLYMYXIN B SULFATE 500; 1000 [USP'U]/G; [USP'U]/G
OINTMENT TOPICAL ONCE
OUTPATIENT
Start: 2023-07-11 | End: 2023-07-11

## 2023-07-11 RX ADMIN — LIDOCAINE HYDROCHLORIDE: 40 SOLUTION TOPICAL at 14:09

## 2023-07-11 NOTE — DISCHARGE INSTRUCTIONS
411 [facility] Physician Orders and Discharge Instructions  [address] [place] [address]. [place] [address]  Telephone: [phone]    NAME:  Bradley Velazquez  YOB: 1955  MEDICAL RECORD NUMBER:  [id number]  DATE: 2023    Return Appointment:  Return Appointment: With Dr Aaron Urbina  in  1 [facility]). Schedule weekly for the rest of the month? Yes    Future Appointments   Date Time Provider [address]   2023  1:15 PM Neisha Escobar, DPM [facility]   2023  1:15 PM Daylin Freeman, DPM [facility]      **X-Ray of LEFT FOOT - 3 VIEWS** Please fax results to [address] [place], [place] [address]d: [facility] Phone: [phone] Fax: [phone]    Change dressing?: Yes    Wound Cleansin.9% normal saline    Yessenia Wound Topical Treatments: Moisturizing lotion       Dressings:           Wound Location: Left Foot     Primary Dressing to wound bed: Solution: Dakin's solution quarter strength (0.125%) moistened gauze      Cover and Secure with:  4X4 gauze pad     Change dressing: Daily        Patient may participate in therapy with the following restrictions for off-Loading:   [x]Off Loading(Pressure Reduction) When: Walking  Weight Bearing Status: Partial Weight bearing Left:  Transfers Only;  Offloading devices: Post op shoe/ Surgical shoe: Left, Walker, and Wheelchair  []No Offloading required    Dietary:   Important dietary reminders:  1. Increase Protein intake (i.e. Lean meats, fish, eggs, legumes, and yogurt)  2. No added salt  3. If diabetic, follow a diabetic diet and check glucose prior to meals or as instructed by your physician.    [] SNF: Please have dietician evaluate patient for nutritional needs    Dietary Supplements: [] Kristen Blood  [] 30ml ProMod/ProStat [] 60ml Expedite [] Other:   Take supplements twice a day or as directed as followed:        Your nurse  is:  Sylvia Aguilar

## 2023-07-11 NOTE — PROGRESS NOTES
1402   Wound Thickness Description not for Pressure Injury Full thickness 07/11/23 1402   Number of days: 105          Total Surface Area Debrided:  9 sq cm     Estimated Blood Loss:  Minimal    Hemostasis Achieved:  by pressure    Procedural Pain:  0  / 10     Post Procedural Pain:  0 / 10     Response to treatment:  Well tolerated by patient. The excised tissue was sent to pathology to assess for OM. Assessment:        Problem List Items Addressed This Visit       Ulcer of left heel, with fat layer exposed ([address]) - Primary    Relevant Orders    Initiate Outpatient Wound Care Protocol          Plan:   E/M x30 minutes. Patient was patient of Dr. Cleven Klinefelter. Patient is currently staying at an extended care facility in [facility] and would like to follow-up at the wound care center as it is only 5 minutes away. When he returns home he will follow-up with Dr. Claudetta Campanile as the patient lives in [facility]. Continue local wound care. There are no local signs of infection, except for some malodor associated with the ulceration. We will begin daily dressing changes with Dakin's solution to decontaminate the area. Continue strict nonweightbearing on the left lower extremity. Treatment Note please see attached Discharge Instructions    Written patient dismissal instructions given to patient and signed by patient or POA. Reappoint 1 week for follow-up    Discharge [address]  Physician Orders and Discharge Instructions  [address] [place] [address]. [place] [address]  Telephone: [phone]    NAME:  Larry Morrow  YOB: 1955  MEDICAL RECORD NUMBER:  [id number]  DATE: 7/11/2023    Return Appointment:  Return Appointment: With Dr Gilberto Helton  in  1 [facility]). Schedule weekly for the rest of the month?  Yes    Future Appointments   Date Time Provider [facility] [address]   7/18/2023  1:15 PM ALANNA Rojas

## 2023-07-18 ENCOUNTER — HOSPITAL ENCOUNTER (OUTPATIENT)
Dept: WOUND CARE | Age: 68
Discharge: HOME | End: 2023-07-18
Payer: MEDICARE

## 2023-07-18 ENCOUNTER — TELEPHONE (OUTPATIENT)
Dept: WOUND CARE | Age: 68
End: 2023-07-18

## 2023-07-18 VITALS
SYSTOLIC BLOOD PRESSURE: 154 MMHG | HEART RATE: 66 BPM | TEMPERATURE: 97.8 F | RESPIRATION RATE: 18 BRPM | DIASTOLIC BLOOD PRESSURE: 67 MMHG

## 2023-07-18 DIAGNOSIS — L97.422 ULCER OF LEFT HEEL, WITH FAT LAYER EXPOSED (HCC): Primary | ICD-10-CM

## 2023-07-18 PROCEDURE — 11044 DBRDMT BONE 1ST 20 SQ CM/<: CPT

## 2023-07-18 PROCEDURE — 6370000000 HC RX 637 (ALT 250 FOR IP): Performed by: PODIATRIST

## 2023-07-18 RX ORDER — LIDOCAINE HYDROCHLORIDE 20 MG/ML
JELLY TOPICAL ONCE
OUTPATIENT
Start: 2023-07-18 | End: 2023-07-18

## 2023-07-18 RX ORDER — BETAMETHASONE DIPROPIONATE 0.05 %
OINTMENT (GRAM) TOPICAL ONCE
OUTPATIENT
Start: 2023-07-18 | End: 2023-07-18

## 2023-07-18 RX ORDER — IBUPROFEN 200 MG
TABLET ORAL ONCE
OUTPATIENT
Start: 2023-07-18 | End: 2023-07-18

## 2023-07-18 RX ORDER — LIDOCAINE HYDROCHLORIDE 40 MG/ML
SOLUTION TOPICAL ONCE
Status: COMPLETED | OUTPATIENT
Start: 2023-07-18 | End: 2023-07-18

## 2023-07-18 RX ORDER — BACITRACIN ZINC AND POLYMYXIN B SULFATE 500; 1000 [USP'U]/G; [USP'U]/G
OINTMENT TOPICAL ONCE
OUTPATIENT
Start: 2023-07-18 | End: 2023-07-18

## 2023-07-18 RX ORDER — LIDOCAINE HYDROCHLORIDE 40 MG/ML
SOLUTION TOPICAL ONCE
OUTPATIENT
Start: 2023-07-18 | End: 2023-07-18

## 2023-07-18 RX ORDER — CLOBETASOL PROPIONATE 0.5 MG/G
OINTMENT TOPICAL ONCE
OUTPATIENT
Start: 2023-07-18 | End: 2023-07-18

## 2023-07-18 RX ORDER — GENTAMICIN SULFATE 1 MG/G
OINTMENT TOPICAL ONCE
OUTPATIENT
Start: 2023-07-18 | End: 2023-07-18

## 2023-07-18 RX ORDER — LIDOCAINE 40 MG/G
CREAM TOPICAL ONCE
OUTPATIENT
Start: 2023-07-18 | End: 2023-07-18

## 2023-07-18 RX ORDER — GINSENG 100 MG
CAPSULE ORAL ONCE
OUTPATIENT
Start: 2023-07-18 | End: 2023-07-18

## 2023-07-18 RX ORDER — LIDOCAINE 50 MG/G
OINTMENT TOPICAL ONCE
OUTPATIENT
Start: 2023-07-18 | End: 2023-07-18

## 2023-07-18 RX ADMIN — LIDOCAINE HYDROCHLORIDE: 40 SOLUTION TOPICAL at 13:56

## 2023-07-18 ASSESSMENT — PAIN DESCRIPTION - LOCATION: LOCATION: HAND;FOOT

## 2023-07-18 ASSESSMENT — PAIN DESCRIPTION - DESCRIPTORS: DESCRIPTORS: TINGLING

## 2023-07-18 ASSESSMENT — PAIN DESCRIPTION - PAIN TYPE: TYPE: CHRONIC PAIN

## 2023-07-18 ASSESSMENT — PAIN SCALES - GENERAL: PAINLEVEL_OUTOF10: 3

## 2023-07-18 ASSESSMENT — PAIN DESCRIPTION - ORIENTATION: ORIENTATION: RIGHT;LEFT

## 2023-07-18 NOTE — TELEPHONE ENCOUNTER
Nurse stated no xray was taken. I called back and left message with Juan Pablo Prieto to give to nurse. Requesting xray to be preformed as requested from last week's orders and to send results via fax.

## 2023-07-18 NOTE — PROGRESS NOTES
need help with your wounds and cannot wait until we are available, contact your PCP or go to your preferred emergency room.      Physician Signature:_______________________    Date: ___________ Time:  ____________        [] Patient unable to sign Discharge Instructions given to ECF/Transportation/POA            Electronically signed by Alec Wilkins DPM on 7/18/2023 at 2:38 PM

## 2023-07-18 NOTE — DISCHARGE INSTRUCTIONS
411 [facility] Physician Orders and Discharge Instructions  [address] [address] [address] [place] [address]  Telephone: [phone]    NAME:  Jessica Tenroio  YOB: 1955  MEDICAL RECORD NUMBER:  [id number]  DATE: 2023    Return Appointment:  Return Appointment: With Dr Camie Phalen  in  1 [facility]). Schedule weekly for the rest of the month? Yes    Future Appointments   Date Time Provider [address]   2023  1:15 PM Didi Radford  [facility] [address]: [facility] Phone: [phone] Fax: [phone]     Change dressing?: Yes     Wound Cleansin.9% normal saline     Yessenia Wound Topical Treatments: Moisturizing lotion                   Dressings:                  Wound Location: Left Foot                Primary Dressing to wound bed: Solution: Dakin's solution quarter strength (0.125%) moistened gauze                   Cover and Secure with:        4X4 gauze pad                 Change dressing: Daily           Patient may participate in therapy with the following restrictions for off-Loading:   [x]Off Loading(Pressure Reduction) When: Walking  Weight Bearing Status: Partial Weight bearing Left:  Transfers Only;  Offloading devices: Post op shoe/ Surgical shoe: Left, Walker, and Wheelchair  []No Offloading required    Dietary:   Important dietary reminders:  1. Increase Protein intake (i.e. Lean meats, fish, eggs, legumes, and yogurt)  2. No added salt  3. If diabetic, follow a diabetic diet and check glucose prior to meals or as instructed by your physician.    [] SNF: Please have dietician evaluate patient for nutritional needs    Dietary Supplements: [] Darrol Frizzle  [] 30ml ProMod/ProStat [] 60ml Expedite [] Other:   Take supplements twice a day or as directed as followed:        Your nurse  is:  Cassandra Suárez     Electronically signed by Griselda Brigham, RN on 2023 at 2:12 PM

## 2023-07-25 ENCOUNTER — HOSPITAL ENCOUNTER (OUTPATIENT)
Dept: WOUND CARE | Age: 68
Discharge: HOME | End: 2023-07-25
Payer: MEDICARE

## 2023-07-25 VITALS
RESPIRATION RATE: 16 BRPM | DIASTOLIC BLOOD PRESSURE: 66 MMHG | HEART RATE: 70 BPM | SYSTOLIC BLOOD PRESSURE: 138 MMHG | TEMPERATURE: 97.4 F

## 2023-07-25 DIAGNOSIS — L97.422 ULCER OF LEFT HEEL, WITH FAT LAYER EXPOSED (HCC): Primary | ICD-10-CM

## 2023-07-25 PROCEDURE — 11044 DBRDMT BONE 1ST 20 SQ CM/<: CPT

## 2023-07-25 PROCEDURE — 11042 DBRDMT SUBQ TIS 1ST 20SQCM/<: CPT

## 2023-07-25 PROCEDURE — 6370000000 HC RX 637 (ALT 250 FOR IP): Performed by: PODIATRIST

## 2023-07-25 PROCEDURE — 11043 DBRDMT MUSC&/FSCA 1ST 20/<: CPT

## 2023-07-25 RX ORDER — LIDOCAINE 40 MG/G
CREAM TOPICAL ONCE
OUTPATIENT
Start: 2023-07-25 | End: 2023-07-25

## 2023-07-25 RX ORDER — GINSENG 100 MG
CAPSULE ORAL ONCE
OUTPATIENT
Start: 2023-07-25 | End: 2023-07-25

## 2023-07-25 RX ORDER — LIDOCAINE HYDROCHLORIDE 40 MG/ML
SOLUTION TOPICAL ONCE
OUTPATIENT
Start: 2023-07-25 | End: 2023-07-25

## 2023-07-25 RX ORDER — LIDOCAINE HYDROCHLORIDE 40 MG/ML
SOLUTION TOPICAL ONCE
Status: COMPLETED | OUTPATIENT
Start: 2023-07-25 | End: 2023-07-25

## 2023-07-25 RX ORDER — LIDOCAINE HYDROCHLORIDE 20 MG/ML
JELLY TOPICAL ONCE
OUTPATIENT
Start: 2023-07-25 | End: 2023-07-25

## 2023-07-25 RX ORDER — CLOBETASOL PROPIONATE 0.5 MG/G
OINTMENT TOPICAL ONCE
OUTPATIENT
Start: 2023-07-25 | End: 2023-07-25

## 2023-07-25 RX ORDER — LIDOCAINE 50 MG/G
OINTMENT TOPICAL ONCE
OUTPATIENT
Start: 2023-07-25 | End: 2023-07-25

## 2023-07-25 RX ORDER — GENTAMICIN SULFATE 1 MG/G
OINTMENT TOPICAL ONCE
OUTPATIENT
Start: 2023-07-25 | End: 2023-07-25

## 2023-07-25 RX ORDER — BACITRACIN ZINC AND POLYMYXIN B SULFATE 500; 1000 [USP'U]/G; [USP'U]/G
OINTMENT TOPICAL ONCE
OUTPATIENT
Start: 2023-07-25 | End: 2023-07-25

## 2023-07-25 RX ORDER — IBUPROFEN 200 MG
TABLET ORAL ONCE
OUTPATIENT
Start: 2023-07-25 | End: 2023-07-25

## 2023-07-25 RX ORDER — BETAMETHASONE DIPROPIONATE 0.05 %
OINTMENT (GRAM) TOPICAL ONCE
OUTPATIENT
Start: 2023-07-25 | End: 2023-07-25

## 2023-07-25 RX ADMIN — LIDOCAINE HYDROCHLORIDE: 40 SOLUTION TOPICAL at 13:29

## 2023-07-25 NOTE — PROGRESS NOTES
Defined edges 07/25/23 1321   Wound Thickness Description not for Pressure Injury Full thickness 07/25/23 1321   Number of days: 118          Total Surface Area Debrided:  6.51 sq cm     Estimated Blood Loss:  Minimal    Hemostasis Achieved:  by pressure    Procedural Pain:  0  / 10     Post Procedural Pain:  0 / 10     Response to treatment:  Well tolerated by patient. The excised tissue was sent to pathology to assess for OM. Assessment:        Problem List Items Addressed This Visit       Ulcer of left heel, with fat layer exposed ([address]) - Primary    Relevant Orders    Initiate Outpatient Wound Care Protocol          Plan:   E/M x30 minutes. Patient was patient of Dr. Pancho Fox. Patient is currently staying at an extended care facility in [facility] and would like to follow-up at the wound care center as it is only 5 minutes away. When he returns home he will follow-up with Dr. Tex Cortez as the patient lives in [facility]. Continue local wound care. There are no local signs of infection, except for some malodor associated with the ulceration. We will begin daily dressing changes with Dakin's solution to decontaminate the area. Patient relates he had an x-ray done at his extended care facility. States OM of the second metatarsal cannot be ruled out. If would plateaus will proceed with MRI for further evaluation. Continue strict nonweightbearing on the left lower extremity. Treatment Note please see attached Discharge Instructions    Written patient dismissal instructions given to patient and signed by patient or POA. Reappoint 1 week for follow-up    Discharge 5[facility] Physician Orders and Discharge Instructions  [address] [place] [address] [address]. [address] [place] [address]. [facility]  Telephone: [phone]  NAME:  Venecia Mcnamara OF BIRTH:  1955  MEDICAL RECORD NUMBER:  [id number]  DATE: 7/25/23     Return

## 2023-07-25 NOTE — DISCHARGE INSTRUCTIONS
411 [facility] Physician Orders and Discharge Instructions  [id number] [place] [address]. [place] [address]  Telephone: [phone]  NAME:  Salas Barton  YOB: 1955  MEDICAL RECORD NUMBER:  [id number]  DATE: 7/25/23     Return Appointment:  Return Appointment: With Manoj Quezada DPM  in  1 Week(s)  [] Return Appointment for a Wound Assessment with the nurse on:     No future appointments. Facility : [address] [place] [address]        Wound care instructions: If you smoke we ask that you refrain from smoking. Smoking inhibits wounds from healing. When taking antibiotics take the entire prescription as ordered. Do not stop taking until medication is all gone unless otherwise instructed. Exercise as tolerated. Keep weight off wounds and reposition every 2 hours if applicable. Do not get wounds wet in the bath or shower unless otherwise instructed by your physician. If your wound is on your foot or leg, you may purchase a cast bag. Please ask at the pharmacy. Wash hands with soap and water prior to and after every dressing change. [x]Wash wounds with: 0.9% normal saline  [x]Yessenia wound Topical Treatments: Do not apply lotions, creams, or ointments to the skin around the wound bed unless directed as followed:   Apply around the wound: Moisturizing lotion         [x]Wound Location: left foot wound   Apply Primary Dressing to wound: dakins 1/4 strength wet to dry  Secondary Dressing: 4X4 gauze pad and Conforming roll gauze   Avoid contact of tape with skin if possible. When to change Dressing: Daily        [x] Edema Control:   Elevate leg(s) above the level of the heart for 30 minutes 4-5 times a day and/or when sitting. Avoid prolonged standing in one place.     [x]Off Loading(Pressure Reduction) When: Walking, Sitting, and In Bed  Weight Bearing Status: Partial Weight bearing Left:  Heel;  Offloading devices: Wheelchair, surgical

## 2023-08-01 ENCOUNTER — HOSPITAL ENCOUNTER (OUTPATIENT)
Dept: WOUND CARE | Age: 68
Discharge: HOME | End: 2023-08-01
Payer: MEDICARE

## 2023-08-01 VITALS
TEMPERATURE: 98.9 F | DIASTOLIC BLOOD PRESSURE: 71 MMHG | HEART RATE: 80 BPM | SYSTOLIC BLOOD PRESSURE: 132 MMHG | RESPIRATION RATE: 18 BRPM

## 2023-08-01 DIAGNOSIS — L97.422 ULCER OF LEFT HEEL, WITH FAT LAYER EXPOSED (HCC): Primary | ICD-10-CM

## 2023-08-01 PROCEDURE — 6370000000 HC RX 637 (ALT 250 FOR IP): Performed by: PODIATRIST

## 2023-08-01 PROCEDURE — 11042 DBRDMT SUBQ TIS 1ST 20SQCM/<: CPT

## 2023-08-01 RX ORDER — BACITRACIN ZINC AND POLYMYXIN B SULFATE 500; 1000 [USP'U]/G; [USP'U]/G
OINTMENT TOPICAL ONCE
OUTPATIENT
Start: 2023-08-01 | End: 2023-08-01

## 2023-08-01 RX ORDER — IBUPROFEN 200 MG
TABLET ORAL ONCE
OUTPATIENT
Start: 2023-08-01 | End: 2023-08-01

## 2023-08-01 RX ORDER — LIDOCAINE HYDROCHLORIDE 20 MG/ML
JELLY TOPICAL ONCE
OUTPATIENT
Start: 2023-08-01 | End: 2023-08-01

## 2023-08-01 RX ORDER — SEVELAMER CARBONATE 800 MG/1
1 TABLET, FILM COATED ORAL
COMMUNITY

## 2023-08-01 RX ORDER — BETAMETHASONE DIPROPIONATE 0.05 %
OINTMENT (GRAM) TOPICAL ONCE
OUTPATIENT
Start: 2023-08-01 | End: 2023-08-01

## 2023-08-01 RX ORDER — CLOBETASOL PROPIONATE 0.5 MG/G
OINTMENT TOPICAL ONCE
OUTPATIENT
Start: 2023-08-01 | End: 2023-08-01

## 2023-08-01 RX ORDER — LIDOCAINE 40 MG/G
CREAM TOPICAL ONCE
OUTPATIENT
Start: 2023-08-01 | End: 2023-08-01

## 2023-08-01 RX ORDER — LIDOCAINE 50 MG/G
OINTMENT TOPICAL ONCE
OUTPATIENT
Start: 2023-08-01 | End: 2023-08-01

## 2023-08-01 RX ORDER — GENTAMICIN SULFATE 1 MG/G
OINTMENT TOPICAL ONCE
OUTPATIENT
Start: 2023-08-01 | End: 2023-08-01

## 2023-08-01 RX ORDER — LIDOCAINE HYDROCHLORIDE 40 MG/ML
SOLUTION TOPICAL ONCE
Status: COMPLETED | OUTPATIENT
Start: 2023-08-01 | End: 2023-08-01

## 2023-08-01 RX ORDER — LIDOCAINE HYDROCHLORIDE 40 MG/ML
SOLUTION TOPICAL ONCE
OUTPATIENT
Start: 2023-08-01 | End: 2023-08-01

## 2023-08-01 RX ORDER — GINSENG 100 MG
CAPSULE ORAL ONCE
OUTPATIENT
Start: 2023-08-01 | End: 2023-08-01

## 2023-08-01 RX ADMIN — LIDOCAINE HYDROCHLORIDE: 40 SOLUTION TOPICAL at 13:30

## 2023-08-01 ASSESSMENT — PAIN SCALES - GENERAL: PAINLEVEL_OUTOF10: 2

## 2023-08-01 ASSESSMENT — PAIN DESCRIPTION - DESCRIPTORS: DESCRIPTORS: SHARP

## 2023-08-01 ASSESSMENT — PAIN DESCRIPTION - LOCATION: LOCATION: FOOT

## 2023-08-01 ASSESSMENT — PAIN DESCRIPTION - ORIENTATION: ORIENTATION: LEFT

## 2023-08-01 ASSESSMENT — PAIN DESCRIPTION - PAIN TYPE: TYPE: CHRONIC PAIN

## 2023-08-01 NOTE — DISCHARGE INSTRUCTIONS
411 [facility] Physician Orders and Discharge Instructions  [address] [address] [address] [place] [address]. [place] [address]  Telephone: [phone]    NAME:  Christopher Hampton  YOB: 1955  MEDICAL RECORD NUMBER:  [id number]  DATE: 2023    Return Appointment:  Return Appointment: With Dr Mert Alarcon  in  1 [facility]). Schedule weekly for the rest of the month? Yes    Future Appointments   Date Time Provider [address]   2023  1:15 PM Chava Blower, DPM [facility] WOUND [facility]   8/15/2023  1:15 PM Chava Blower, DPM [facility] W[facility] HOD   2023  1:15 PM Chava Blower, DPM [facility] WOUND [facility] HOD   2023  1:15 PM Chava Blower,  [facility] [address]: [facility] Phone: [phone] Fax: [phone]     Change dressing?: Yes     Wound Cleansin.9% normal saline     Yessenia Wound Topical Treatments: Moisturizing lotion                   Dressings:                  Wound Location: Left Foot                Primary Dressing to wound bed: Hydrafera Blue pad                Cover and Secure with: Rolled bulky gauze                Change dressing: Every other day           Patient may participate in therapy with the following restrictions for off-Loading:   [x]Off Loading(Pressure Reduction) When: Walking  Weight Bearing Status: Partial Weight bearing Left:  Transfers Only;  Offloading devices: Post op shoe/ Surgical shoe: Left, Walker, and Wheelchair  []No Offloading required    Dietary:   Important dietary reminders:  1. Increase Protein intake (i.e. Lean meats, fish, eggs, legumes, and yogurt)  2. No added salt  3.  If diabetic, follow a diabetic diet and check glucose prior to meals or as instructed by your physician.    [] SNF: Please have dietician evaluate patient for nutritional needs    Dietary Supplements: [] Lobo Hait  [] 30ml ProMod/ProStat [] 60ml Expedite [] Other:   Take supplements twice a day or as directed

## 2023-08-01 NOTE — PROGRESS NOTES
[facility]   Progress Note and Procedure Note      Bradley Velazquez  MEDICAL RECORD NUMBER:  [id number]  AGE: 76 y.o. GENDER: male  : 1955  EPISODE DATE:  2023    Subjective:     No chief complaint on file. HISTORY of PRESENT ILLNESS HPI     Bradley Velazquez is a 76 y.o. male who presents today for wound/ulcer evaluation. History of Wound Context: Patient presents today for initial evaluation after undergoing an incision and drainage on his left foot with first ray resection. Patient has previously been revascularized by Dr. Destinee Brambila.     Wound/Ulcer Pain Timing/Severity: none  Quality of pain: N/A  Severity:  0 / 10   Modifying Factors: None  Associated Signs/Symptoms: none    Ulcer Identification:  Ulcer Type: arterial and diabetic    Contributing Factors: diabetes, arterial insufficiency, and immunosuppression    Acute Wound: N/A    PAST MEDICAL HISTORY        Diagnosis Date    Amputated great toe, right (HCC)     CAD (coronary artery disease)     CVA (cerebral vascular accident) ([address])     3    Dementia (HCC)     Depression     Diabetes ([address])     Diabetic polyneuropathy ([address])     ESRD on dialysis ([address])         GERD (gastroesophageal reflux disease)     Hemiplegia and hemiparesis following cerebral infarction affecting right dominant side (HCC)     Hyperlipidemia     Hypertension     Pain syndrome, chronic     Peripheral vascular disease ([address])     [facility]    Secondary hyperparathyroidism [facility])        PAST SURGICAL HISTORY    Past Surgical History:   Procedure Laterality Date    CARDIAC CATHETERIZATION      DIALYSIS FISTULA CREATION      FOOT DEBRIDEMENT Left 2023    INCISION AND DRAINAGE WITH DELAYED  PRIMARY CLOSURE WITH  WOUND VAC APPLICATION,  APPLICATION SKIN GRAFT SUBSTITUTE LEFT  LOWER EXTREMITY performed by Emile Nguyen DPM at [address] Left 2023    LEFT FOOT TOTAL FIRST RAY AMPUTATION

## 2023-08-08 ENCOUNTER — HOSPITAL ENCOUNTER (OUTPATIENT)
Dept: WOUND CARE | Age: 68
Discharge: HOME | End: 2023-08-08
Payer: MEDICARE

## 2023-08-08 VITALS
RESPIRATION RATE: 18 BRPM | HEART RATE: 75 BPM | TEMPERATURE: 97.5 F | DIASTOLIC BLOOD PRESSURE: 69 MMHG | SYSTOLIC BLOOD PRESSURE: 113 MMHG

## 2023-08-08 DIAGNOSIS — L97.422 ULCER OF LEFT HEEL, WITH FAT LAYER EXPOSED (HCC): Primary | ICD-10-CM

## 2023-08-08 PROCEDURE — 11042 DBRDMT SUBQ TIS 1ST 20SQCM/<: CPT

## 2023-08-08 PROCEDURE — 6370000000 HC RX 637 (ALT 250 FOR IP): Performed by: PODIATRIST

## 2023-08-08 RX ORDER — LIDOCAINE 50 MG/G
OINTMENT TOPICAL ONCE
OUTPATIENT
Start: 2023-08-08 | End: 2023-08-08

## 2023-08-08 RX ORDER — LIDOCAINE HYDROCHLORIDE 40 MG/ML
SOLUTION TOPICAL ONCE
Status: COMPLETED | OUTPATIENT
Start: 2023-08-08 | End: 2023-08-08

## 2023-08-08 RX ORDER — BETAMETHASONE DIPROPIONATE 0.05 %
OINTMENT (GRAM) TOPICAL ONCE
OUTPATIENT
Start: 2023-08-08 | End: 2023-08-08

## 2023-08-08 RX ORDER — LIDOCAINE HYDROCHLORIDE 20 MG/ML
JELLY TOPICAL ONCE
OUTPATIENT
Start: 2023-08-08 | End: 2023-08-08

## 2023-08-08 RX ORDER — GENTAMICIN SULFATE 1 MG/G
OINTMENT TOPICAL ONCE
OUTPATIENT
Start: 2023-08-08 | End: 2023-08-08

## 2023-08-08 RX ORDER — CLOBETASOL PROPIONATE 0.5 MG/G
OINTMENT TOPICAL ONCE
OUTPATIENT
Start: 2023-08-08 | End: 2023-08-08

## 2023-08-08 RX ORDER — GINSENG 100 MG
CAPSULE ORAL ONCE
OUTPATIENT
Start: 2023-08-08 | End: 2023-08-08

## 2023-08-08 RX ORDER — LIDOCAINE HYDROCHLORIDE 40 MG/ML
SOLUTION TOPICAL ONCE
OUTPATIENT
Start: 2023-08-08 | End: 2023-08-08

## 2023-08-08 RX ORDER — LIDOCAINE 40 MG/G
CREAM TOPICAL ONCE
OUTPATIENT
Start: 2023-08-08 | End: 2023-08-08

## 2023-08-08 RX ORDER — IBUPROFEN 200 MG
TABLET ORAL ONCE
OUTPATIENT
Start: 2023-08-08 | End: 2023-08-08

## 2023-08-08 RX ORDER — BACITRACIN ZINC AND POLYMYXIN B SULFATE 500; 1000 [USP'U]/G; [USP'U]/G
OINTMENT TOPICAL ONCE
OUTPATIENT
Start: 2023-08-08 | End: 2023-08-08

## 2023-08-08 RX ADMIN — LIDOCAINE HYDROCHLORIDE: 40 SOLUTION TOPICAL at 13:44

## 2023-08-08 NOTE — DISCHARGE INSTRUCTIONS
411 [facility] Physician Orders and Discharge Instructions  [address] [address] [address] [place] [address]. [facility]  Telephone: [phone]    NAME:  Andie Fleming  YOB: 1955  MEDICAL RECORD NUMBER:  [id number]  DATE: 2023    Return Appointment:  Return Appointment: With Dr Bryan Christina  in  1 [facility]). Schedule weekly for the rest of the month? Yes    Future Appointments   Date Time Provider [address]   8/15/2023  1:15 PM Russella Gut, DPM [facility] WOUND [place] HOD   2023  1:15 PM Russella Gut, DPM [facility] WOUND [facility]OD   2023  1:15 PM Emma Gut,  [address]: [facility] Phone: [phone] Fax: [phone]     Change dressing?: Yes     Wound Cleansin.9% normal saline     Yessenia Wound Topical Treatments: Moisturizing lotion                   Dressings:                  Wound Location: Left Foot                Primary Dressing to wound bed: Hydrafera Blue pad                Cover and Secure with: Rolled bulky gauze                Change dressing: Every other day           Patient may participate in therapy with the following restrictions for off-Loading:   [x]Off Loading(Pressure Reduction) When: Walking  Weight Bearing Status: Partial Weight bearing Left:  Transfers Only;  Offloading devices: Post op shoe/ Surgical shoe: Left, Walker, and Wheelchair  []No Offloading required    Dietary:   Important dietary reminders:  1. Increase Protein intake (i.e. Lean meats, fish, eggs, legumes, and yogurt)  2. No added salt  3. If diabetic, follow a diabetic diet and check glucose prior to meals or as instructed by your physician.    [] SNF: Please have dietician evaluate patient for nutritional needs    Dietary Supplements: [] Bindu Jameel  [] 30ml ProMod/ProStat [] 60ml Expedite [] Other:   Take supplements twice a day or as directed as followed:        Your nurse  is:  Randy Valle

## 2023-08-08 NOTE — PROGRESS NOTES
Defined edges 08/08/23 1332   Wound Thickness Description not for Pressure Injury Full thickness 08/08/23 1332   Number of days: 132          Total Surface Area Debrided:  4.96 sq cm     Estimated Blood Loss:  Minimal    Hemostasis Achieved:  by pressure    Procedural Pain:  0  / 10     Post Procedural Pain:  0 / 10     Response to treatment:  Well tolerated by patient. Assessment:        Problem List Items Addressed This Visit       Ulcer of left heel, with fat layer exposed ([address]) - Primary    Relevant Orders    Initiate Outpatient Wound Care Protocol          Plan:   E/M x30 minutes. Patient was patient of Dr. Jinny Dolan. Patient is currently staying at an extended care facility in [facility] and would like to follow-up at the wound care center as it is only 5 minutes away. When he returns home he will follow-up with Dr. Jerome Islas as the patient lives in [facility]. Continue local wound care. There are no local signs of infection, except for some malodor associated with the ulceration. We will begin daily dressing changes with Dakin's solution to decontaminate the area. Patient relates he had an x-ray done at his extended care facility. States OM of the second metatarsal cannot be ruled out. If would plateaus will proceed with MRI for further evaluation. Continue strict nonweightbearing on the left lower extremity. Treatment Note please see attached Discharge Instructions    Written patient dismissal instructions given to patient and signed by patient or POA. Reappoint 1 week for follow-up    Discharge [address] Physician Orders and Discharge Instructions  [address] [address] [address] [place] [address]. [facility]  Telephone: [phone]    NAME:  Velna Canavan  YOB: 1955  MEDICAL RECORD NUMBER:  [id number]  DATE: 8/8/2023    Return Appointment:  Return Appointment: With Dr Kisha Nogueira  in  1

## 2023-08-15 ENCOUNTER — HOSPITAL ENCOUNTER (OUTPATIENT)
Dept: WOUND CARE | Age: 68
Discharge: HOME | End: 2023-08-15
Payer: MEDICARE

## 2023-08-15 VITALS
TEMPERATURE: 98 F | DIASTOLIC BLOOD PRESSURE: 76 MMHG | SYSTOLIC BLOOD PRESSURE: 133 MMHG | HEART RATE: 96 BPM | RESPIRATION RATE: 18 BRPM

## 2023-08-15 DIAGNOSIS — Z99.2 ESRD (END STAGE RENAL DISEASE) ON DIALYSIS (HCC): ICD-10-CM

## 2023-08-15 DIAGNOSIS — L97.422 ULCER OF LEFT HEEL, WITH FAT LAYER EXPOSED (HCC): Primary | ICD-10-CM

## 2023-08-15 DIAGNOSIS — N18.6 ESRD (END STAGE RENAL DISEASE) ON DIALYSIS (HCC): ICD-10-CM

## 2023-08-15 PROCEDURE — 6370000000 HC RX 637 (ALT 250 FOR IP): Performed by: PODIATRIST

## 2023-08-15 PROCEDURE — 11042 DBRDMT SUBQ TIS 1ST 20SQCM/<: CPT

## 2023-08-15 RX ORDER — IBUPROFEN 200 MG
TABLET ORAL ONCE
OUTPATIENT
Start: 2023-08-15 | End: 2023-08-15

## 2023-08-15 RX ORDER — GINSENG 100 MG
CAPSULE ORAL ONCE
OUTPATIENT
Start: 2023-08-15 | End: 2023-08-15

## 2023-08-15 RX ORDER — LIDOCAINE HYDROCHLORIDE 20 MG/ML
JELLY TOPICAL ONCE
OUTPATIENT
Start: 2023-08-15 | End: 2023-08-15

## 2023-08-15 RX ORDER — LIDOCAINE 50 MG/G
OINTMENT TOPICAL ONCE
OUTPATIENT
Start: 2023-08-15 | End: 2023-08-15

## 2023-08-15 RX ORDER — LIDOCAINE 40 MG/G
CREAM TOPICAL ONCE
OUTPATIENT
Start: 2023-08-15 | End: 2023-08-15

## 2023-08-15 RX ORDER — LIDOCAINE HYDROCHLORIDE 40 MG/ML
SOLUTION TOPICAL ONCE
Status: COMPLETED | OUTPATIENT
Start: 2023-08-15 | End: 2023-08-15

## 2023-08-15 RX ORDER — LIDOCAINE HYDROCHLORIDE 40 MG/ML
SOLUTION TOPICAL ONCE
OUTPATIENT
Start: 2023-08-15 | End: 2023-08-15

## 2023-08-15 RX ORDER — GENTAMICIN SULFATE 1 MG/G
OINTMENT TOPICAL ONCE
OUTPATIENT
Start: 2023-08-15 | End: 2023-08-15

## 2023-08-15 RX ORDER — CLOBETASOL PROPIONATE 0.5 MG/G
OINTMENT TOPICAL ONCE
OUTPATIENT
Start: 2023-08-15 | End: 2023-08-15

## 2023-08-15 RX ORDER — BACITRACIN ZINC AND POLYMYXIN B SULFATE 500; 1000 [USP'U]/G; [USP'U]/G
OINTMENT TOPICAL ONCE
OUTPATIENT
Start: 2023-08-15 | End: 2023-08-15

## 2023-08-15 RX ORDER — BETAMETHASONE DIPROPIONATE 0.05 %
OINTMENT (GRAM) TOPICAL ONCE
OUTPATIENT
Start: 2023-08-15 | End: 2023-08-15

## 2023-08-15 RX ADMIN — LIDOCAINE HYDROCHLORIDE: 40 SOLUTION TOPICAL at 14:30

## 2023-08-15 ASSESSMENT — PAIN DESCRIPTION - ORIENTATION: ORIENTATION: LEFT

## 2023-08-15 ASSESSMENT — PAIN DESCRIPTION - PAIN TYPE: TYPE: CHRONIC PAIN

## 2023-08-15 ASSESSMENT — PAIN DESCRIPTION - DESCRIPTORS: DESCRIPTORS: SHOOTING;SHARP

## 2023-08-15 ASSESSMENT — PAIN SCALES - GENERAL: PAINLEVEL_OUTOF10: 8

## 2023-08-15 ASSESSMENT — PAIN DESCRIPTION - LOCATION: LOCATION: FOOT

## 2023-08-15 NOTE — DISCHARGE INSTRUCTIONS
411 [facility] Physician Orders and Discharge Instructions  [address] [address] [address] [place] [address]. [place] [address]  Telephone: [phone]    NAME:  Jessica Tenorio  YOB: 1955  MEDICAL RECORD NUMBER:  [id number]  DATE: 8/15/2023    Return Appointment:  Return Appointment: With Dr Camie Phalen  in  1 [facility]). Schedule weekly for the rest of the month? Yes    Future Appointments   Date Time Provider [address]   2023  1:15 PM Didi Radford, [facility] WOUND [facility]   2023  1:15 PM ALANNA Tamez WOUND [facility]      **Please call [address] [place] [address] ([phone]) to schedule your test.   When to schedule your test: ASAP   Type of test: MRI       Skilled Nursing Facility: [facility] Phone: [phone] Fax: [phone]      Change dressing?: Yes    Wound Cleansin.9% normal saline    Yessenia Wound Topical Treatments: Nothing       Dressings:           Wound Location: Left Foot     Primary Dressing to wound bed: Solution: Dakin's solution quarter strength (0.125%) moistened gauze      Cover and Secure with:  4X4 gauze pad and Bulky roll gauze     Change dressing: Daily      Patient may participate in therapy with the following restrictions for off-Loading:   [x]Off Loading(Pressure Reduction) When: Walking  Weight Bearing Status: Partial Weight bearing Left:  Transfers Only;  Offloading devices: Post op shoe/ Surgical shoe: Left, Walker, and Wheelchair  []No Offloading required    Dietary:   Important dietary reminders:  1. Increase Protein intake (i.e. Lean meats, fish, eggs, legumes, and yogurt)  2. No added salt  3.  If diabetic, follow a diabetic diet and check glucose prior to meals or as instructed by your physician.    [] SNF: Please have dietician evaluate patient for nutritional needs    Dietary Supplements: [] Darrol Frizzle  [] 30ml ProMod/ProStat [] 60ml Expedite [] Other:   Take supplements twice a day or as directed as

## 2023-08-15 NOTE — PROGRESS NOTES
[address] [place] [address]   Progress Note and Procedure Note      Ankur Guerrero  MEDICAL RECORD NUMBER:  [id number]  AGE: 76 y.o. GENDER: male  : 1955  EPISODE DATE:  8/15/2023    Subjective:     Chief Complaint   Patient presents with    Wound Check     Left foot             HISTORY of PRESENT ILLNESS HPI     Ankur Guerrero is a 76 y.o. male who presents today for wound/ulcer evaluation. History of Wound Context: Patient presents today for initial evaluation after undergoing an incision and drainage on his left foot with first ray resection. Patient has previously been revascularized by Dr. Michelle Hunt.     Wound/Ulcer Pain Timing/Severity: none  Quality of pain: N/A  Severity:  0 / 10   Modifying Factors: None  Associated Signs/Symptoms: none    Ulcer Identification:  Ulcer Type: arterial and diabetic    Contributing Factors: diabetes, arterial insufficiency, and immunosuppression    Acute Wound: N/A    PAST MEDICAL HISTORY        Diagnosis Date    Amputated great toe, right (HCC)     CAD (coronary artery disease)     CVA (cerebral vascular accident) ([address])     3    Dementia (HCC)     Depression     Diabetes ([address])     Diabetic polyneuropathy ([address])     ESRD on dialysis ([address])         GERD (gastroesophageal reflux disease)     Hemiplegia and hemiparesis following cerebral infarction affecting right dominant side (HCC)     Hyperlipidemia     Hypertension     Pain syndrome, chronic     Peripheral vascular disease ([address])     [facility]    Secondary hyperparathyroidism [facility])        PAST SURGICAL HISTORY    Past Surgical History:   Procedure Laterality Date    CARDIAC CATHETERIZATION      DIALYSIS FISTULA CREATION      FOOT DEBRIDEMENT Left 2023    INCISION AND DRAINAGE WITH DELAYED  PRIMARY CLOSURE WITH  WOUND VAC APPLICATION,  APPLICATION SKIN GRAFT SUBSTITUTE LEFT  LOWER EXTREMITY performed by Ignacia Ramsay DPM at [address] Left

## 2023-08-18 ENCOUNTER — TELEPHONE (OUTPATIENT)
Dept: WOUND CARE | Age: 68
End: 2023-08-18

## 2023-08-18 NOTE — TELEPHONE ENCOUNTER
Spoke to Shamar YATES ([phone]) and stated that Dr. Janes Bah wanted MRI of left foot to check for osteomylitis since wound not healing. She stated facility is working on getting MRI scheduled. She stated patient's hemoglobin 7.5 at dialysis and she repeated CBC.

## 2023-08-18 NOTE — TELEPHONE ENCOUNTER
Left message for nurse with Octavia Allred. Confirming 8/22 appt and asking if MRI has been scheduled.

## 2023-08-22 ENCOUNTER — HOSPITAL ENCOUNTER (OUTPATIENT)
Dept: WOUND CARE | Age: 68
Discharge: HOME | End: 2023-08-22
Payer: MEDICARE

## 2023-08-22 VITALS
SYSTOLIC BLOOD PRESSURE: 123 MMHG | RESPIRATION RATE: 18 BRPM | TEMPERATURE: 97.5 F | DIASTOLIC BLOOD PRESSURE: 74 MMHG | HEART RATE: 78 BPM

## 2023-08-22 DIAGNOSIS — L97.422 ULCER OF LEFT HEEL, WITH FAT LAYER EXPOSED (HCC): Primary | ICD-10-CM

## 2023-08-22 PROCEDURE — 6370000000 HC RX 637 (ALT 250 FOR IP): Performed by: PODIATRIST

## 2023-08-22 PROCEDURE — 11042 DBRDMT SUBQ TIS 1ST 20SQCM/<: CPT

## 2023-08-22 RX ORDER — GENTAMICIN SULFATE 1 MG/G
OINTMENT TOPICAL ONCE
OUTPATIENT
Start: 2023-08-22 | End: 2023-08-22

## 2023-08-22 RX ORDER — LIDOCAINE HYDROCHLORIDE 20 MG/ML
JELLY TOPICAL ONCE
OUTPATIENT
Start: 2023-08-22 | End: 2023-08-22

## 2023-08-22 RX ORDER — BETAMETHASONE DIPROPIONATE 0.05 %
OINTMENT (GRAM) TOPICAL ONCE
OUTPATIENT
Start: 2023-08-22 | End: 2023-08-22

## 2023-08-22 RX ORDER — LIDOCAINE 40 MG/G
CREAM TOPICAL ONCE
OUTPATIENT
Start: 2023-08-22 | End: 2023-08-22

## 2023-08-22 RX ORDER — CLOBETASOL PROPIONATE 0.5 MG/G
OINTMENT TOPICAL ONCE
OUTPATIENT
Start: 2023-08-22 | End: 2023-08-22

## 2023-08-22 RX ORDER — LIDOCAINE HYDROCHLORIDE 40 MG/ML
SOLUTION TOPICAL ONCE
Status: COMPLETED | OUTPATIENT
Start: 2023-08-22 | End: 2023-08-22

## 2023-08-22 RX ORDER — LIDOCAINE 50 MG/G
OINTMENT TOPICAL ONCE
OUTPATIENT
Start: 2023-08-22 | End: 2023-08-22

## 2023-08-22 RX ORDER — LIDOCAINE HYDROCHLORIDE 40 MG/ML
SOLUTION TOPICAL ONCE
OUTPATIENT
Start: 2023-08-22 | End: 2023-08-22

## 2023-08-22 RX ORDER — IBUPROFEN 200 MG
TABLET ORAL ONCE
OUTPATIENT
Start: 2023-08-22 | End: 2023-08-22

## 2023-08-22 RX ORDER — BACITRACIN ZINC AND POLYMYXIN B SULFATE 500; 1000 [USP'U]/G; [USP'U]/G
OINTMENT TOPICAL ONCE
OUTPATIENT
Start: 2023-08-22 | End: 2023-08-22

## 2023-08-22 RX ORDER — GINSENG 100 MG
CAPSULE ORAL ONCE
OUTPATIENT
Start: 2023-08-22 | End: 2023-08-22

## 2023-08-22 RX ADMIN — LIDOCAINE HYDROCHLORIDE: 40 SOLUTION TOPICAL at 13:22

## 2023-08-22 ASSESSMENT — PAIN SCALES - GENERAL: PAINLEVEL_OUTOF10: 5

## 2023-08-22 ASSESSMENT — PAIN DESCRIPTION - FREQUENCY: FREQUENCY: INTERMITTENT

## 2023-08-22 ASSESSMENT — PAIN DESCRIPTION - LOCATION: LOCATION: FOOT

## 2023-08-22 ASSESSMENT — PAIN DESCRIPTION - DESCRIPTORS: DESCRIPTORS: SHOOTING;SHARP

## 2023-08-22 ASSESSMENT — PAIN DESCRIPTION - ORIENTATION: ORIENTATION: LEFT

## 2023-08-22 ASSESSMENT — PAIN DESCRIPTION - PAIN TYPE: TYPE: CHRONIC PAIN

## 2023-08-22 NOTE — DISCHARGE INSTRUCTIONS
411 [facility] Physician Orders and Discharge Instructions  [address] [address] [address] [place] [address]. [place] [address]  Telephone: [phone]    NAME:  Shreya Ta  YOB: 1955  MEDICAL RECORD NUMBER:  [id number]  DATE: 2023    Return Appointment:  Return Appointment: With Dr Mega Ponce  in  1 [facility]). Schedule weekly for the rest of the month? Yes    Future Appointments   Date Time Provider [address]   2023  7:00 AM [facility] MRI RM 2 [facility] MRI [facility] Radio   2023  1:15 PM Dennis Higgins  [address]: [facility] Phone: [phone] Fax: [phone]        Change dressing?: Yes     Wound Cleansin.9% normal saline     Yessenia Wound Topical Treatments: Nothing                   Dressings:                  Wound Location: Left Foot                Primary Dressing to wound bed: Solution: Dakin's solution quarter strength (0.125%) moistened gauze                   Cover and Secure with:        4X4 gauze pad and Bulky roll gauze       Change dressing: Daily        Patient may participate in therapy with the following restrictions for off-Loading:   [x]Off Loading(Pressure Reduction) When: Walking  Weight Bearing Status: Partial Weight bearing Left:  Transfers Only;  Offloading devices: Post op shoe/ Surgical shoe: Left, Walker, and Wheelchair  []No Offloading required    Dietary:   Important dietary reminders:  1. Increase Protein intake (i.e. Lean meats, fish, eggs, legumes, and yogurt)  2. No added salt  3. If diabetic, follow a diabetic diet and check glucose prior to meals or as instructed by your physician.    [] SNF: Please have dietician evaluate patient for nutritional needs    Dietary Supplements: [] Geneva Ng  [] 30ml ProMod/ProStat [] 60ml Expedite [] Other:   Take supplements twice a day or as directed as followed:        Your nurse  is:  Gary Harrison     Electronically

## 2023-08-22 NOTE — PROGRESS NOTES
25%) 08/22/23 1323   Drainage Description Brown 08/22/23 1323   Odor Mild 08/22/23 1323   Yessenia-wound Assessment Dry/flaky 08/22/23 1323   Margins Defined edges 08/22/23 1323   Wound Thickness Description not for Pressure Injury Full thickness 08/22/23 1323   Number of days: 146       Wound 08/22/23 Foot Left;Dorsal;Medial #2 (Active)   Wound Image   08/22/23 1323   Wound Cleansed Cleansed with saline 08/22/23 1323   Dressing/Treatment Moist to dry 08/22/23 1332   Wound Length (cm) 3 cm 08/22/23 1323   Wound Width (cm) 5.5 cm 08/22/23 1323   Wound Depth (cm) 0 cm 08/22/23 1323   Wound Surface Area (cm^2) 16.5 cm^2 08/22/23 1323   Wound Volume (cm^3) 0 cm^3 08/22/23 1323   Wound Assessment Devitalized tissue;Eschar dry 08/22/23 1323   Drainage Amount None (dry) 08/22/23 1323   Odor None 08/22/23 1323   Margins Undefined edges 08/22/23 1323   Number of days: 0          Total Surface Area Debrided:  5.67 sq cm     Estimated Blood Loss:  Minimal    Hemostasis Achieved:  by pressure    Procedural Pain:  0  / 10     Post Procedural Pain:  0 / 10     Response to treatment:  Well tolerated by patient. Assessment:        Problem List Items Addressed This Visit       Ulcer of left heel, with fat layer exposed ([address]) - Primary    Relevant Orders    Initiate Outpatient Wound Care Protocol          Plan:   E/M x30 minutes of a worsening problem of nonhealing ulceration. Will base further treatment recommendations on results of MRI scan. .  Patient was patient of Dr. Mayur Gaytan. Patient is currently staying at an extended care facility in [facility] and would like to follow-up at the wound care center as it is only 5 minutes away. When he returns home he will follow-up with Dr. Leanna Colmenares as the patient lives in [facility]. Continue local wound care. There are no local signs of infectio. We will begin daily dressing changes with Dakin's solution to decontaminate the area.       Patient relates he had an x-ray done at his

## 2023-08-29 ENCOUNTER — HOSPITAL ENCOUNTER (OUTPATIENT)
Dept: WOUND CARE | Age: 68
Discharge: HOME | End: 2023-08-29
Payer: MEDICARE

## 2023-08-29 ENCOUNTER — HOSPITAL ENCOUNTER (OUTPATIENT)
Dept: MRI IMAGING | Age: 68
Discharge: HOME | End: 2023-08-29
Attending: PODIATRIST
Payer: MEDICARE

## 2023-08-29 ENCOUNTER — TELEPHONE (OUTPATIENT)
Dept: WOUND CARE | Age: 68
End: 2023-08-29

## 2023-08-29 VITALS
TEMPERATURE: 96.8 F | HEART RATE: 73 BPM | RESPIRATION RATE: 16 BRPM | SYSTOLIC BLOOD PRESSURE: 95 MMHG | DIASTOLIC BLOOD PRESSURE: 57 MMHG

## 2023-08-29 DIAGNOSIS — Z99.2 ESRD (END STAGE RENAL DISEASE) ON DIALYSIS (HCC): ICD-10-CM

## 2023-08-29 DIAGNOSIS — L97.422 ULCER OF LEFT HEEL, WITH FAT LAYER EXPOSED (HCC): Primary | ICD-10-CM

## 2023-08-29 DIAGNOSIS — L97.422 ULCER OF LEFT HEEL, WITH FAT LAYER EXPOSED (HCC): ICD-10-CM

## 2023-08-29 DIAGNOSIS — N18.6 ESRD (END STAGE RENAL DISEASE) ON DIALYSIS (HCC): ICD-10-CM

## 2023-08-29 PROCEDURE — 0JBR0ZZ EXCISION OF LEFT FOOT SUBCUTANEOUS TISSUE AND FASCIA, OPEN APPROACH: ICD-10-PCS | Performed by: PODIATRIST

## 2023-08-29 PROCEDURE — 6370000000 HC RX 637 (ALT 250 FOR IP): Performed by: PODIATRIST

## 2023-08-29 PROCEDURE — 73718 MRI LOWER EXTREMITY W/O DYE: CPT

## 2023-08-29 PROCEDURE — 11042 DBRDMT SUBQ TIS 1ST 20SQCM/<: CPT

## 2023-08-29 RX ORDER — LIDOCAINE HYDROCHLORIDE 40 MG/ML
SOLUTION TOPICAL ONCE
OUTPATIENT
Start: 2023-08-29 | End: 2023-08-29

## 2023-08-29 RX ORDER — LIDOCAINE 50 MG/G
OINTMENT TOPICAL ONCE
OUTPATIENT
Start: 2023-08-29 | End: 2023-08-29

## 2023-08-29 RX ORDER — LIDOCAINE 40 MG/G
CREAM TOPICAL ONCE
OUTPATIENT
Start: 2023-08-29 | End: 2023-08-29

## 2023-08-29 RX ORDER — LIDOCAINE HYDROCHLORIDE 20 MG/ML
JELLY TOPICAL ONCE
OUTPATIENT
Start: 2023-08-29 | End: 2023-08-29

## 2023-08-29 RX ORDER — CLOBETASOL PROPIONATE 0.5 MG/G
OINTMENT TOPICAL ONCE
OUTPATIENT
Start: 2023-08-29 | End: 2023-08-29

## 2023-08-29 RX ORDER — GENTAMICIN SULFATE 1 MG/G
OINTMENT TOPICAL ONCE
OUTPATIENT
Start: 2023-08-29 | End: 2023-08-29

## 2023-08-29 RX ORDER — GINSENG 100 MG
CAPSULE ORAL ONCE
OUTPATIENT
Start: 2023-08-29 | End: 2023-08-29

## 2023-08-29 RX ORDER — BETAMETHASONE DIPROPIONATE 0.05 %
OINTMENT (GRAM) TOPICAL ONCE
OUTPATIENT
Start: 2023-08-29 | End: 2023-08-29

## 2023-08-29 RX ORDER — BACITRACIN ZINC AND POLYMYXIN B SULFATE 500; 1000 [USP'U]/G; [USP'U]/G
OINTMENT TOPICAL ONCE
OUTPATIENT
Start: 2023-08-29 | End: 2023-08-29

## 2023-08-29 RX ORDER — IBUPROFEN 200 MG
TABLET ORAL ONCE
OUTPATIENT
Start: 2023-08-29 | End: 2023-08-29

## 2023-08-29 RX ORDER — LIDOCAINE HYDROCHLORIDE 40 MG/ML
SOLUTION TOPICAL ONCE
Status: COMPLETED | OUTPATIENT
Start: 2023-08-29 | End: 2023-08-29

## 2023-08-29 RX ADMIN — LIDOCAINE HYDROCHLORIDE: 40 SOLUTION TOPICAL at 14:29

## 2023-08-29 NOTE — DISCHARGE INSTRUCTIONS
411 [facility] Physician Orders and Discharge Instructions  [facility]   [address] [place] [address]. [facility]  Telephone: [phone]  NAME:  Puja Siri OF BIRTH:  1955  MEDICAL RECORD NUMBER:  [id number]  DATE: 8/29/23     Return Appointment:  Return Appointment: With Jimbo Abarca DPM  in  1 [facility])  [] Return Appointment for a Wound Assessment with the nurse on:     Future Appointments   Date Time Provider [address]   9/5/2023  1:15 PM Bekah Ayala DPM [facility] WOUND [facility] HOD   9/12/2023  1:15 PM Bekah Ayala DPM [address]: [facility]      PATIENT TO BE TRANSPORTED TO ED AT [address] [place] [address] ON 08/30/23. WILL HAVE SURGERY ON LEFT FOOT      Wound care instructions: If you smoke we ask that you refrain from smoking. Smoking inhibits wounds from healing. When taking antibiotics take the entire prescription as ordered. Do not stop taking until medication is all gone unless otherwise instructed. Exercise as tolerated. Keep weight off wounds and reposition every 2 hours if applicable. Do not get wounds wet in the bath or shower unless otherwise instructed by your physician. If your wound is on your foot or leg, you may purchase a cast bag. Please ask at the pharmacy. Wash hands with soap and water prior to and after every dressing change. [x]Wash wounds with: 0.9% normal saline  [x]Yessenia wound Topical Treatments: Do not apply lotions, creams, or ointments to the skin around the wound bed unless directed as followed:   Apply around the wound: Moisturizing lotion         [x]Wound Location: left foot   Apply Primary Dressing to wound:  1/4 strength dakins solution- wet to dry dressing  Secondary Dressing: 4X4 gauze pad, kerlix   Avoid contact of tape with skin if possible.   When to change Dressing: Daily  [x]Off Loading(Pressure Reduction) When: Walking  Weight Bearing Status: Partial

## 2023-08-29 NOTE — TELEPHONE ENCOUNTER
Called and spoke to Manuela (nurse ) at [facility] ([phone]), and orders to send patient to [facility] Emergency Department after dialysis tomorrow  08/30/23 for surgery of left foot, underlying osteomylitis. Meseret verbalized understanding.

## 2023-08-29 NOTE — PROGRESS NOTES
[address] [place] [address]   Progress Note and Procedure Note      Bradley Velazquez  MEDICAL RECORD NUMBER:  [id number]  AGE: 76 y.o. GENDER: male  : 1955  EPISODE DATE:  2023    Subjective:     Chief Complaint   Patient presents with    Wound Check       Left foot             HISTORY of PRESENT ILLNESS HPI     Bradley Velazquez is a 76 y.o. male who presents today for wound/ulcer evaluation. History of Wound Context: Patient presents today for initial evaluation after undergoing an incision and drainage on his left foot with first ray resection. Patient has previously been revascularized by Dr. Destinee Brambila.     Wound/Ulcer Pain Timing/Severity: none  Quality of pain: N/A  Severity:  0 / 10   Modifying Factors: None  Associated Signs/Symptoms: none    Ulcer Identification:  Ulcer Type: arterial and diabetic    Contributing Factors: diabetes, arterial insufficiency, and immunosuppression    Acute Wound: N/A    PAST MEDICAL HISTORY        Diagnosis Date    Amputated great toe, right (HCC)     CAD (coronary artery disease)     CVA (cerebral vascular accident) ([address])     3    Dementia (HCC)     Depression     Diabetes ([address])     Diabetic polyneuropathy ([address])     ESRD on dialysis ([address])         GERD (gastroesophageal reflux disease)     Hemiplegia and hemiparesis following cerebral infarction affecting right dominant side (HCC)     Hyperlipidemia     Hypertension     Pain syndrome, chronic     Peripheral vascular disease ([address])     [facility]    Secondary hyperparathyroidism [facility])        PAST SURGICAL HISTORY    Past Surgical History:   Procedure Laterality Date    CARDIAC CATHETERIZATION      DIALYSIS FISTULA CREATION      FOOT DEBRIDEMENT Left 2023    INCISION AND DRAINAGE WITH DELAYED  PRIMARY CLOSURE WITH  WOUND VAC APPLICATION,  APPLICATION SKIN GRAFT SUBSTITUTE LEFT  LOWER EXTREMITY performed by Emile Nguyen DPM at [address] Left

## 2023-08-31 ENCOUNTER — APPOINTMENT (OUTPATIENT)
Dept: VASCULAR LAB | Age: 68
DRG: 638 | End: 2023-08-31
Payer: MEDICARE

## 2023-08-31 ENCOUNTER — APPOINTMENT (OUTPATIENT)
Dept: GENERAL RADIOLOGY | Age: 68
DRG: 638 | End: 2023-08-31
Payer: MEDICARE

## 2023-08-31 ENCOUNTER — HOSPITAL ENCOUNTER (INPATIENT)
Age: 68
LOS: 2 days | Discharge: HOSPICE | DRG: 638 | End: 2023-09-02
Attending: EMERGENCY MEDICINE
Payer: MEDICARE

## 2023-08-31 DIAGNOSIS — M86.9 OSTEOMYELITIS OF LEFT FOOT, UNSPECIFIED TYPE (HCC): Primary | ICD-10-CM

## 2023-08-31 DIAGNOSIS — Z66 DNR (DO NOT RESUSCITATE): ICD-10-CM

## 2023-08-31 LAB
ANION GAP SERPL CALCULATED.3IONS-SCNC: 11 MMOL/L (ref 3–16)
BASE EXCESS BLDV CALC-SCNC: 7.5 MMOL/L (ref -2–3)
BASOPHILS # BLD: 0.1 K/UL (ref 0–0.2)
BASOPHILS NFR BLD: 1.3 %
BUN SERPL-MCNC: 18 MG/DL (ref 7–20)
CALCIUM SERPL-MCNC: 9.4 MG/DL (ref 8.3–10.6)
CHLORIDE SERPL-SCNC: 98 MMOL/L (ref 99–110)
CO2 BLDV-SCNC: 36 MMOL/L
CO2 SERPL-SCNC: 29 MMOL/L (ref 21–32)
COHGB MFR BLDV: 1.3 % (ref 0–1.5)
CREAT SERPL-MCNC: 4.4 MG/DL (ref 0.8–1.3)
CRP SERPL-MCNC: 43.8 MG/L (ref 0–5.1)
DEPRECATED RDW RBC AUTO: 19.9 % (ref 12.4–15.4)
DO-HGB MFR BLDV: 26.4 %
EOSINOPHIL # BLD: 0.1 K/UL (ref 0–0.6)
EOSINOPHIL NFR BLD: 1.3 %
ERYTHROCYTE [SEDIMENTATION RATE] IN BLOOD BY WESTERGREN METHOD: 88 MM/HR (ref 0–20)
GFR SERPLBLD CREATININE-BSD FMLA CKD-EPI: 14 ML/MIN/{1.73_M2}
GLUCOSE BLD-MCNC: 83 MG/DL (ref 70–99)
GLUCOSE SERPL-MCNC: 195 MG/DL (ref 70–99)
HCO3 BLDV-SCNC: 34.3 MMOL/L (ref 24–28)
HCT VFR BLD AUTO: 26.9 % (ref 40.5–52.5)
HGB BLD-MCNC: 8.5 G/DL (ref 13.5–17.5)
LACTATE BLDV-SCNC: 2.1 MMOL/L (ref 0.4–2)
LYMPHOCYTES # BLD: 1 K/UL (ref 1–5.1)
LYMPHOCYTES NFR BLD: 14 %
MCH RBC QN AUTO: 25.5 PG (ref 26–34)
MCHC RBC AUTO-ENTMCNC: 31.6 G/DL (ref 31–36)
MCV RBC AUTO: 80.8 FL (ref 80–100)
METHGB MFR BLDV: 0.5 % (ref 0–1.5)
MONOCYTES # BLD: 0.5 K/UL (ref 0–1.3)
MONOCYTES NFR BLD: 7.5 %
NEUTROPHILS # BLD: 5.3 K/UL (ref 1.7–7.7)
NEUTROPHILS NFR BLD: 75.9 %
PCO2 BLDV: 61.6 MMHG (ref 41–51)
PERFORMED ON: NORMAL
PH BLDV: 7.35 [PH] (ref 7.35–7.45)
PLATELET # BLD AUTO: 270 K/UL (ref 135–450)
PMV BLD AUTO: 8.1 FL (ref 5–10.5)
PO2 BLDV: 45.2 MMHG (ref 25–40)
POTASSIUM SERPL-SCNC: 5.6 MMOL/L (ref 3.5–5.1)
RBC # BLD AUTO: 3.33 M/UL (ref 4.2–5.9)
SAO2 % BLDV: 73 %
SODIUM SERPL-SCNC: 138 MMOL/L (ref 136–145)
WBC # BLD AUTO: 7 K/UL (ref 4–11)

## 2023-08-31 PROCEDURE — 2580000003 HC RX 258: Performed by: EMERGENCY MEDICINE

## 2023-08-31 PROCEDURE — 86140 C-REACTIVE PROTEIN: CPT

## 2023-08-31 PROCEDURE — 93922 UPR/L XTREMITY ART 2 LEVELS: CPT

## 2023-08-31 PROCEDURE — 82803 BLOOD GASES ANY COMBINATION: CPT

## 2023-08-31 PROCEDURE — 87077 CULTURE AEROBIC IDENTIFY: CPT

## 2023-08-31 PROCEDURE — 87205 SMEAR GRAM STAIN: CPT

## 2023-08-31 PROCEDURE — 87070 CULTURE OTHR SPECIMN AEROBIC: CPT

## 2023-08-31 PROCEDURE — 87181 SC STD AGAR DILUTION PER AGT: CPT

## 2023-08-31 PROCEDURE — 84134 ASSAY OF PREALBUMIN: CPT

## 2023-08-31 PROCEDURE — 1200000000 HC SEMI PRIVATE

## 2023-08-31 PROCEDURE — 73630 X-RAY EXAM OF FOOT: CPT

## 2023-08-31 PROCEDURE — 87186 SC STD MICRODIL/AGAR DIL: CPT

## 2023-08-31 PROCEDURE — 96365 THER/PROPH/DIAG IV INF INIT: CPT

## 2023-08-31 PROCEDURE — 83036 HEMOGLOBIN GLYCOSYLATED A1C: CPT

## 2023-08-31 PROCEDURE — 80074 ACUTE HEPATITIS PANEL: CPT

## 2023-08-31 PROCEDURE — 96375 TX/PRO/DX INJ NEW DRUG ADDON: CPT

## 2023-08-31 PROCEDURE — 99285 EMERGENCY DEPT VISIT HI MDM: CPT

## 2023-08-31 PROCEDURE — 87040 BLOOD CULTURE FOR BACTERIA: CPT

## 2023-08-31 PROCEDURE — 83605 ASSAY OF LACTIC ACID: CPT

## 2023-08-31 PROCEDURE — 85025 COMPLETE CBC W/AUTO DIFF WBC: CPT

## 2023-08-31 PROCEDURE — 2580000003 HC RX 258: Performed by: STUDENT IN AN ORGANIZED HEALTH CARE EDUCATION/TRAINING PROGRAM

## 2023-08-31 PROCEDURE — 0HBNXZZ EXCISION OF LEFT FOOT SKIN, EXTERNAL APPROACH: ICD-10-PCS | Performed by: INTERNAL MEDICINE

## 2023-08-31 PROCEDURE — 85652 RBC SED RATE AUTOMATED: CPT

## 2023-08-31 PROCEDURE — 86706 HEP B SURFACE ANTIBODY: CPT

## 2023-08-31 PROCEDURE — 93005 ELECTROCARDIOGRAM TRACING: CPT | Performed by: EMERGENCY MEDICINE

## 2023-08-31 PROCEDURE — 6360000002 HC RX W HCPCS: Performed by: EMERGENCY MEDICINE

## 2023-08-31 PROCEDURE — 80048 BASIC METABOLIC PNL TOTAL CA: CPT

## 2023-08-31 RX ORDER — MEMANTINE HYDROCHLORIDE 10 MG/1
10 TABLET ORAL 2 TIMES DAILY
Status: DISCONTINUED | OUTPATIENT
Start: 2023-08-31 | End: 2023-09-02 | Stop reason: HOSPADM

## 2023-08-31 RX ORDER — POLYETHYLENE GLYCOL 3350 17 G/17G
17 POWDER, FOR SOLUTION ORAL DAILY PRN
Status: DISCONTINUED | OUTPATIENT
Start: 2023-08-31 | End: 2023-09-02 | Stop reason: HOSPADM

## 2023-08-31 RX ORDER — ONDANSETRON 2 MG/ML
4 INJECTION INTRAMUSCULAR; INTRAVENOUS EVERY 6 HOURS PRN
Status: DISCONTINUED | OUTPATIENT
Start: 2023-08-31 | End: 2023-09-02 | Stop reason: HOSPADM

## 2023-08-31 RX ORDER — METOPROLOL SUCCINATE 25 MG/1
25 TABLET, EXTENDED RELEASE ORAL DAILY
Status: DISCONTINUED | OUTPATIENT
Start: 2023-09-01 | End: 2023-09-02 | Stop reason: HOSPADM

## 2023-08-31 RX ORDER — HEPARIN SODIUM 5000 [USP'U]/ML
5000 INJECTION, SOLUTION INTRAVENOUS; SUBCUTANEOUS EVERY 8 HOURS SCHEDULED
Status: DISCONTINUED | OUTPATIENT
Start: 2023-08-31 | End: 2023-09-02 | Stop reason: HOSPADM

## 2023-08-31 RX ORDER — MIRTAZAPINE 15 MG/1
7.5 TABLET, FILM COATED ORAL NIGHTLY
Status: DISCONTINUED | OUTPATIENT
Start: 2023-08-31 | End: 2023-09-02 | Stop reason: HOSPADM

## 2023-08-31 RX ORDER — VITAMIN B COMPLEX
1000 TABLET ORAL DAILY
Status: DISCONTINUED | OUTPATIENT
Start: 2023-09-01 | End: 2023-09-02 | Stop reason: HOSPADM

## 2023-08-31 RX ORDER — LANOLIN ALCOHOL/MO/W.PET/CERES
3 CREAM (GRAM) TOPICAL NIGHTLY
Status: DISCONTINUED | OUTPATIENT
Start: 2023-08-31 | End: 2023-09-02 | Stop reason: HOSPADM

## 2023-08-31 RX ORDER — ISOSORBIDE MONONITRATE 30 MG/1
30 TABLET, EXTENDED RELEASE ORAL DAILY
Status: DISCONTINUED | OUTPATIENT
Start: 2023-09-01 | End: 2023-09-02 | Stop reason: HOSPADM

## 2023-08-31 RX ORDER — ACETAMINOPHEN 325 MG/1
650 TABLET ORAL EVERY 6 HOURS PRN
Status: DISCONTINUED | OUTPATIENT
Start: 2023-08-31 | End: 2023-09-02 | Stop reason: HOSPADM

## 2023-08-31 RX ORDER — SODIUM CHLORIDE 0.9 % (FLUSH) 0.9 %
5-40 SYRINGE (ML) INJECTION PRN
Status: DISCONTINUED | OUTPATIENT
Start: 2023-08-31 | End: 2023-09-02 | Stop reason: HOSPADM

## 2023-08-31 RX ORDER — NITROGLYCERIN 0.4 MG/1
0.4 TABLET SUBLINGUAL EVERY 5 MIN PRN
Status: DISCONTINUED | OUTPATIENT
Start: 2023-08-31 | End: 2023-09-02 | Stop reason: HOSPADM

## 2023-08-31 RX ORDER — SEVELAMER CARBONATE 800 MG/1
800 TABLET, FILM COATED ORAL
Status: DISCONTINUED | OUTPATIENT
Start: 2023-08-31 | End: 2023-09-02 | Stop reason: HOSPADM

## 2023-08-31 RX ORDER — ACETAMINOPHEN 650 MG/1
650 SUPPOSITORY RECTAL EVERY 6 HOURS PRN
Status: DISCONTINUED | OUTPATIENT
Start: 2023-08-31 | End: 2023-09-02 | Stop reason: HOSPADM

## 2023-08-31 RX ORDER — TRAZODONE HYDROCHLORIDE 50 MG/1
25 TABLET ORAL NIGHTLY
Status: DISCONTINUED | OUTPATIENT
Start: 2023-08-31 | End: 2023-09-02 | Stop reason: HOSPADM

## 2023-08-31 RX ORDER — HYDRALAZINE HYDROCHLORIDE 25 MG/1
25 TABLET, FILM COATED ORAL EVERY 12 HOURS SCHEDULED
Status: DISCONTINUED | OUTPATIENT
Start: 2023-08-31 | End: 2023-09-02 | Stop reason: HOSPADM

## 2023-08-31 RX ORDER — PANTOPRAZOLE SODIUM 40 MG/1
40 TABLET, DELAYED RELEASE ORAL
Status: DISCONTINUED | OUTPATIENT
Start: 2023-09-01 | End: 2023-09-02 | Stop reason: HOSPADM

## 2023-08-31 RX ORDER — ONDANSETRON 4 MG/1
4 TABLET, ORALLY DISINTEGRATING ORAL EVERY 8 HOURS PRN
Status: DISCONTINUED | OUTPATIENT
Start: 2023-08-31 | End: 2023-09-02 | Stop reason: HOSPADM

## 2023-08-31 RX ORDER — ASPIRIN 81 MG/1
81 TABLET, CHEWABLE ORAL DAILY
Status: DISCONTINUED | OUTPATIENT
Start: 2023-09-01 | End: 2023-09-02 | Stop reason: HOSPADM

## 2023-08-31 RX ORDER — SODIUM CHLORIDE 0.9 % (FLUSH) 0.9 %
5-40 SYRINGE (ML) INJECTION EVERY 12 HOURS SCHEDULED
Status: DISCONTINUED | OUTPATIENT
Start: 2023-08-31 | End: 2023-09-02 | Stop reason: HOSPADM

## 2023-08-31 RX ORDER — SODIUM CHLORIDE 9 MG/ML
INJECTION, SOLUTION INTRAVENOUS PRN
Status: DISCONTINUED | OUTPATIENT
Start: 2023-08-31 | End: 2023-09-02 | Stop reason: HOSPADM

## 2023-08-31 RX ORDER — SODIUM HYPOCHLORITE 1.25 MG/ML
SOLUTION TOPICAL DAILY
Status: DISCONTINUED | OUTPATIENT
Start: 2023-08-31 | End: 2023-09-01 | Stop reason: SDUPTHER

## 2023-08-31 RX ORDER — TAMSULOSIN HYDROCHLORIDE 0.4 MG/1
0.4 CAPSULE ORAL DAILY
Status: DISCONTINUED | OUTPATIENT
Start: 2023-09-01 | End: 2023-09-02 | Stop reason: HOSPADM

## 2023-08-31 RX ORDER — ATORVASTATIN CALCIUM 40 MG/1
40 TABLET, FILM COATED ORAL NIGHTLY
Status: DISCONTINUED | OUTPATIENT
Start: 2023-08-31 | End: 2023-09-02 | Stop reason: HOSPADM

## 2023-08-31 RX ADMIN — CEFEPIME 2000 MG: 2 INJECTION, POWDER, FOR SOLUTION INTRAVENOUS at 15:27

## 2023-08-31 RX ADMIN — VANCOMYCIN HYDROCHLORIDE 1500 MG: 10 INJECTION, POWDER, LYOPHILIZED, FOR SOLUTION INTRAVENOUS at 16:43

## 2023-08-31 RX ADMIN — SODIUM CHLORIDE, PRESERVATIVE FREE 10 ML: 5 INJECTION INTRAVENOUS at 22:13

## 2023-08-31 ASSESSMENT — PAIN SCALES - GENERAL: PAINLEVEL_OUTOF10: 0

## 2023-08-31 ASSESSMENT — PAIN - FUNCTIONAL ASSESSMENT: PAIN_FUNCTIONAL_ASSESSMENT: NONE - DENIES PAIN

## 2023-08-31 ASSESSMENT — LIFESTYLE VARIABLES: HOW OFTEN DO YOU HAVE A DRINK CONTAINING ALCOHOL: NEVER

## 2023-08-31 NOTE — ED NOTES
ED TO INPATIENT SBAR HANDOFF    Patient Name: Vicky Campbell   :  1955  76 y.o. MRN:  [id number]  Preferred Name  Cuong Bhatt  ED Room #:  B47/R52-23  Family/Caregiver Present no   Restraints no   Sitter no   Sepsis Risk Score Sepsis Risk Score: 1.93    Situation  Code Status: Prior No additional code details. Allergies: Pcn [penicillins], Lisinopril, and Tilactase  Weight: Patient Vitals for the past 96 hrs (Last 3 readings):   Weight   23 1340 136 lb 11.2 oz (62 kg)     Arrived from: home  Chief Complaint:   Chief Complaint   Patient presents with    Abnormal Test Results     Pt had MRI Tuesday of left foot, was instructed by podiatry to come to ED for admission for osteomyelitis of left foot and surgery tomorrow. Incidentally had angiogram this AM 9:00, right arm fistula per daughter- pt was given sedation meds and slightly [address] [place] [address] Problem/Diagnosis:  Principal Problem:    Osteomyelitis ([address] [facility])  Resolved Problems:    * No resolved hospital problems. *    Imaging:   XR FOOT LEFT (MIN 3 VIEWS)   Final Result   Multiple areas of osteomyelitis as better characterized on MRI dated 2023.          LASER SKIN PERFUSION PRESSURE STUDY           Abnormal labs:   Abnormal Labs Reviewed   CBC WITH AUTO DIFFERENTIAL - Abnormal; Notable for the following components:       Result Value    RBC 3.33 (*)     Hemoglobin 8.5 (*)     Hematocrit 26.9 (*)     MCH 25.5 (*)     RDW 19.9 (*)     All other components within normal limits   BASIC METABOLIC PANEL - Abnormal; Notable for the following components:    Potassium 5.6 (*)     Chloride 98 (*)     Glucose 195 (*)     Creatinine 4.4 (*)     Est, Glom Filt Rate 14 (*)     All other components within normal limits   BLOOD GAS, VENOUS - Abnormal; Notable for the following components:    pCO2, Arturo 61.6 (*)     pO2, Arturo 45.2 (*)     HCO3, Venous 34.3 (*)     Base Excess, Arturo 7.5 (*)     All other components within normal limits   LACTIC ACID - Abnormal; Notable for the following components:    Lactic Acid 2.1 (*)     All other components within normal limits   SEDIMENTATION RATE - Abnormal; Notable for the following components:    Sed Rate 88 (*)     All other components within normal limits   C-REACTIVE PROTEIN - Abnormal; Notable for the following components:    CRP 43.8 (*)     All other components within normal limits     Critical values: no     Abnormal Assessment Findings:     Background  History:   Past Medical History:   Diagnosis Date    Amputated great toe, right (HCC)     CAD (coronary artery disease)     CVA (cerebral vascular accident) ([address])     3    Dementia (HCC)     Depression     Diabetes ([address])     Diabetic polyneuropathy (HCC)     ESRD on dialysis ([address])     monday wednesday friday    GERD (gastroesophageal reflux disease)     Hemiplegia and hemiparesis following cerebral infarction affecting right dominant side (HCC)     Hyperlipidemia     Hypertension     Pain syndrome, chronic     Peripheral vascular disease ([address])     [facility]    Secondary hyperparathyroidism [facility])        Assessment    Vitals/MEWS: MEWS Score: 1  Level of Consciousness: Alert (0)   Vitals:    08/31/23 1630 08/31/23 1700 08/31/23 1730 08/31/23 1800   BP: (!) 141/62 (!) 170/65 (!) 146/65 126/62   Pulse: 72 81 77 74   Resp: 16 14 15 15   Temp:       TempSrc:       SpO2: 100% 100% 99% 100%   Weight:       Height:         FiO2 (%):   O2 Flow Rate: O2 Device: Nasal cannula O2 Flow Rate (L/min): 2 L/min  Cardiac Rhythm:    Pain Assessment:  [] Verbal [] Roger [place] Scale  Pain Scale: Pain Assessment  Pain Assessment: None - Denies Pain  Last documented pain score (0-10 scale)    Last documented pain medication administered:   Mental Status: oriented and alert  NIH Score:    C-SSRS: Risk of Suicide: No Risk  Bedside swallow:    [place] Coma Scale (GCS): [place] Coma Scale  Eye Opening: Spontaneous  Best Verbal Response: Confused  Best Motor Response: Obeys commands  [place] Coma Scale

## 2023-08-31 NOTE — CONSULTS
Department of Podiatry Consult Note  Resident      Reason for Consult:  Admission  Requesting Physician:  Dr. Kellen Tracy MD    CHIEF COMPLAINT:  Diabetic Ulcerations, possible OM    HISTORY OF PRESENT ILLNESS:    The patient is a 76 y.o. male with significant past medical history as listed below who is consulted to podiatry for chronic diabetic ulcerations with possible OM. Patient states that he is having increased pain and swelling in his left foot. He notes that normally he does not have much feeling. He states that he has been dealing with these wounds for a long time but at his last visit with Dr. Feliz Merrill on 8/29 he was instructed to come to the ED to be seen but did not present due to transportation issues from his facility. Patient states that recently he has been feeling nausea, vomiting,  and having chills. He states that he was \"previously\" diabetic and that he has not taken insulin in 11 years. He notes that his blood sugars run in the 90s to 100s. Patient denies any other pedal complaints today.     Past Medical History:        Diagnosis Date    Amputated great toe, right (HCC)     CAD (coronary artery disease)     CVA (cerebral vascular accident) ([address])     3    Dementia ([address])     Depression     Diabetes ([address])     Diabetic polyneuropathy ([address])     ESRD on dialysis ([address])     monday wednesday friday    GERD (gastroesophageal reflux disease)     Hemiplegia and hemiparesis following cerebral infarction affecting right dominant side (HCC)     Hyperlipidemia     Hypertension     Pain syndrome, chronic     Peripheral vascular disease ([address])     [facility]    Secondary hyperparathyroidism [facility])        Past Surgical History:        Procedure Laterality Date    CARDIAC CATHETERIZATION      DIALYSIS FISTULA CREATION      FOOT DEBRIDEMENT Left 2/26/2023    INCISION AND DRAINAGE WITH DELAYED  PRIMARY CLOSURE WITH  WOUND VAC APPLICATION,  APPLICATION SKIN GRAFT SUBSTITUTE LEFT  LOWER EXTREMITY performed by with possible OM of left foot. All labs and imaging reviewed with more labs ordered. Recommend patient start on Vancomycin and Zosyn with dialysis. Vascular surgery consulted, appreciate recommendations. Podiatry will continue with local wound care pending vascular surgery recommendations. Due to patients multiple co morbidities recommend that patient be admitted through medicine service.     The patient assessment and plan was discussed with Dr. Mike Wilson DPM.    Mary Mendoza DPM   Podiatric Resident PGY1  [employer]  8/31/2023, 4:18 PM

## 2023-08-31 NOTE — H&P
Hospital Medicine History & Physical      Date of Admission: 8/31/2023    Date of Service:  8/31/2023    [x]Admitted to Inpatient with expected LOS greater than two midnights due to medical therapy. []Placed in Observation status. Chief Admission Complaint: Abnormal imaging    Presenting Admission History: This is a 60-year-old f male with past medical history of ESRD on HD, CAD, hypertension, hyperlipidemia, PAD, stroke, chronic diabetic wound who was been having increasing pain in his lower extremity. Outpatient MRI revealing concern for osteomyelitis of the left foot. Patient is resting comfortably. States at the time of evaluation he is not having any pain. Patient was admitted to the hospital on March 2023 for bacteremia as well as osteomyelitis of the left lower extremity. At that time patient underwent an angioplasty, I&D as well as left foot total first ray amputation followed by another incision and drainage. Denies any chest pain, palpitations, abdominal pain, nausea, vomiting, fevers or chills. In the emergency department patient was hypertensive otherwise hemodynamically stable. Labs revealing hypokalemia, mild lactic acidosis, elevated inflammatory markers with CRP and ESR. X-ray of the left foot revealing multiple areas of osteomyelitis    Outpatient MRI  MRI of his left foot on Tuesday which showed evidence of osteomyelitis. Review of records MRI showed:  IMPRESSION:  1. Ulceration and abnormal signal adjacent to and involving the cuboid bone. Findings compatible with osteomyelitis. 2. Ulceration with extensive edema and abnormal signal in the distal second  metatarsal and proximal second phalanx compatible with osteomyelitis. 3. Probable osteomyelitis of the distal aspect of the fifth metatarsal with  adjacent small ulceration. 4. Probable reactive edema versus early osteomyelitis of the head of the third  metatarsal.  5. No discrete abscess identified.     Assessment/Plan:

## 2023-08-31 NOTE — CONSULTS
Clinical Pharmacy Progress Note    Vancomycin- Management by Pharmacy    Consult Date(s): 8/31/23  Consulting Provider(s): Dr Tod Farley / Plan  L foot osteomyelitis Vancomycin  Concurrent Antimicrobials: Cefepime  Day of Vanc Therapy / Ordered Duration: Day 1, no duration noted  Current Dosing Method: Intermittent Dosing by Levels  Therapeutic Goal: Trough ~ 15 mg/L  Current Dose / Plan:   As pt with ESRD on HD, will dose intermittently based on levels  1500 mg IV ( 25 mg/kg) given in ED today  Random level ordered tomorrow   Vancomycin dosing placeholder is ordered- will watch for HD plan per nephrology  Will continue to monitor clinical condition and make adjustments to regimen as appropriate. Thank you for consulting pharmacy,    Please call with any questions    Marya RANDALL [facility]  [phone] (Main Pharmacy)        Interval update:  Pt is afebrile and HDS but hypertensive. Plan for OR tomorrow. Nephrology and vascular surgery consulted. Subjective/Objective:   Hioplito Law is a 76 y.o. male with a PMHx significant for ESRD on HD, CAD, hypertension, hyperlipidemia, PAD, stroke, LLE osteomytelitis s/p L foot total 1st ray amputation 3/2023. Admitted by podiatry 8/31 with osteomyelitis L foot seen on recent  MRI . Pharmacy is consulted to dose vancomycin. Ht Readings from Last 1 Encounters:   08/31/23 5' 7\" (1.702 m)     Wt Readings from Last 1 Encounters:   08/31/23 136 lb 11.2 oz (62 kg)     Current & Prior Antimicrobial Regimen(s):  Cefepime (8/31--current)  Vancomycin- pharmacy to dose  1500 mg IV x 1 (8/31)    Vancomycin Level(s) / Doses:    Date Time Dose Type of Level / Level Other   9/1 0600 1500 mg x1 Random =            Note: Serum levels collected for AUC-based dosing may be high if collected in close proximity to the dose administered. This is not necessarily indicative of toxicity.     Cultures & Sensitivities:    Date Site Micro Susceptibility / Result   8/31 Blood x

## 2023-08-31 NOTE — ED NOTES
Pt states he is \"tired of being stuck\" and refuses straight stick for redraw potassium. Pt educated on importance of checking potassium levels by RN and states \"I don't care. I don't want it. \" MD made aware     Mercedes Elizondo, RN  08/31/23 0696

## 2023-09-01 VITALS
TEMPERATURE: 98 F | RESPIRATION RATE: 18 BRPM | HEART RATE: 77 BPM | SYSTOLIC BLOOD PRESSURE: 158 MMHG | DIASTOLIC BLOOD PRESSURE: 77 MMHG | OXYGEN SATURATION: 98 %

## 2023-09-01 LAB
BACTERIA BLD CULT: NORMAL
EKG ATRIAL RATE: 75 BPM
EKG DIAGNOSIS: NORMAL
EKG P AXIS: 68 DEGREES
EKG P-R INTERVAL: 168 MS
EKG Q-T INTERVAL: 404 MS
EKG QRS DURATION: 66 MS
EKG QTC CALCULATION (BAZETT): 486 MS
EKG R AXIS: 28 DEGREES
EKG T AXIS: 63 DEGREES
EKG VENTRICULAR RATE: 87 BPM
PREALB SERPL-MCNC: 15.2 MG/DL (ref 20–40)

## 2023-09-01 PROCEDURE — 6360000002 HC RX W HCPCS: Performed by: STUDENT IN AN ORGANIZED HEALTH CARE EDUCATION/TRAINING PROGRAM

## 2023-09-01 PROCEDURE — 1200000000 HC SEMI PRIVATE

## 2023-09-01 PROCEDURE — 99222 1ST HOSP IP/OBS MODERATE 55: CPT | Performed by: INTERNAL MEDICINE

## 2023-09-01 PROCEDURE — 6370000000 HC RX 637 (ALT 250 FOR IP): Performed by: STUDENT IN AN ORGANIZED HEALTH CARE EDUCATION/TRAINING PROGRAM

## 2023-09-01 PROCEDURE — 2580000003 HC RX 258: Performed by: STUDENT IN AN ORGANIZED HEALTH CARE EDUCATION/TRAINING PROGRAM

## 2023-09-01 PROCEDURE — 99223 1ST HOSP IP/OBS HIGH 75: CPT | Performed by: SURGERY

## 2023-09-01 RX ORDER — SODIUM HYPOCHLORITE 1.25 MG/ML
SOLUTION TOPICAL DAILY
Status: DISCONTINUED | OUTPATIENT
Start: 2023-09-01 | End: 2023-09-02 | Stop reason: HOSPADM

## 2023-09-01 RX ORDER — 0.9 % SODIUM CHLORIDE 0.9 %
100 INTRAVENOUS SOLUTION INTRAVENOUS PRN
Status: DISCONTINUED | OUTPATIENT
Start: 2023-09-01 | End: 2023-09-02 | Stop reason: HOSPADM

## 2023-09-01 RX ORDER — HEPARIN SODIUM 1000 [USP'U]/ML
2250 INJECTION, SOLUTION INTRAVENOUS; SUBCUTANEOUS ONCE
Status: DISCONTINUED | OUTPATIENT
Start: 2023-09-01 | End: 2023-09-02 | Stop reason: HOSPADM

## 2023-09-01 RX ORDER — ALBUMIN (HUMAN) 12.5 G/50ML
25 SOLUTION INTRAVENOUS
Status: ACTIVE | OUTPATIENT
Start: 2023-09-01 | End: 2023-09-02

## 2023-09-01 RX ADMIN — HEPARIN SODIUM 5000 UNITS: 5000 INJECTION INTRAVENOUS; SUBCUTANEOUS at 22:19

## 2023-09-01 RX ADMIN — HYDRALAZINE HYDROCHLORIDE 25 MG: 25 TABLET, FILM COATED ORAL at 22:20

## 2023-09-01 RX ADMIN — MEMANTINE HYDROCHLORIDE 10 MG: 10 TABLET ORAL at 22:19

## 2023-09-01 RX ADMIN — TRAZODONE HYDROCHLORIDE 25 MG: 50 TABLET ORAL at 22:19

## 2023-09-01 RX ADMIN — ATORVASTATIN CALCIUM 40 MG: 40 TABLET, FILM COATED ORAL at 22:19

## 2023-09-01 RX ADMIN — Medication 3 MG: at 22:19

## 2023-09-01 RX ADMIN — MIRTAZAPINE 7.5 MG: 15 TABLET, FILM COATED ORAL at 22:20

## 2023-09-01 RX ADMIN — SODIUM CHLORIDE, PRESERVATIVE FREE 10 ML: 5 INJECTION INTRAVENOUS at 22:20

## 2023-09-01 NOTE — PROGRESS NOTES
Pt has refused all care during shift. Pt okay with staff obtaining vital signs and physical assessments. Medications, treatments, checking of labs and check of blood sugar patient has refused.  Pt pleasant with staff and verbalized understanding of his refusal to treatments, etc.

## 2023-09-01 NOTE — CONSULTS
Clinical Pharmacy Progress Note    Vancomycin- Management by Pharmacy    Consult Date(s): 8/31/23  Consulting Provider(s): Dr Tyra Engle / Plan  L foot osteomyelitis Vancomycin  Concurrent Antimicrobials: Cefepime  Day of Vanc Therapy / Ordered Duration: Day 1, no duration noted  Current Dosing Method: Intermittent Dosing by Levels  Therapeutic Goal: Trough ~ 15 mg/L  Current Dose / Plan:   As pt with ESRD on HD, will dose intermittently based on levels  Received 1500 mg (25 mg/kg) IV yesterday 8/31  Per notes, patient is declining lab draws (cannot obtain vancomycin level) and dialysis. Currently considering Hospice after meeting with Palliative Care. Per ID, if Hospice is pursued, recommend stopping all antibiotics  For now, will leave dosing placeholder open. With no dialysis today, expect vancomycin level to be therapeutic  Will follow up on palliative care/hospice/ ID plans     Will continue to monitor clinical condition and make adjustments to regimen as appropriate. Thank you for consulting pharmacy,    Please call with any questions    Germán RANDALL [facility]  [phone] (Main Pharmacy)        Interval update:  Pt is seeking Hospice care and declining treatments including dialysis and lab draws. Palliative care has met with patient and family considering Hospice placement. Subjective/Objective:   Velna Canavan is a 76 y.o. male with a PMHx significant for ESRD on HD, CAD, hypertension, hyperlipidemia, PAD, stroke, LLE osteomytelitis s/p L foot total 1st ray amputation 3/2023. Admitted by podiatry 8/31 with osteomyelitis L foot seen on recent  MRI . Pharmacy is consulted to dose vancomycin.     Ht Readings from Last 1 Encounters:   08/31/23 5' 7\" (1.702 m)     Wt Readings from Last 1 Encounters:   08/31/23 133 lb 13.1 oz (60.7 kg)     Current & Prior Antimicrobial Regimen(s):  Cefepime (8/31--current)  Vancomycin- pharmacy to dose  1500 mg IV x 1 (8/31)    Vancomycin Level(s) / Doses:    Date Time Dose Type of Level / Level Other   9/1 0600 1500 mg x1 Random =            Note: Serum levels collected for AUC-based dosing may be high if collected in close proximity to the dose administered. This is not necessarily indicative of toxicity. Cultures & Sensitivities:    Date Site Micro Susceptibility / Result   8/31 Blood x 2 sent    8/31 Wound sent      Recent Labs     08/31/23  1420   CREATININE 4.4*   BUN 18   WBC 7.0         Estimated Creatinine Clearance: 14 mL/min (A) (based on SCr of 4.4 mg/dL (H)). Additional Lab Values / Findings of Note:    No results for input(s): PROCAL in the last 72 hours.

## 2023-09-01 NOTE — PLAN OF CARE
Problem: Discharge Planning  Goal: Discharge to home or other facility with appropriate resources  Outcome: Progressing     Problem: Chronic Conditions and Co-morbidities  Goal: Patient's chronic conditions and co-morbidity symptoms are monitored and maintained or improved  Outcome: Progressing     Problem: Safety - Adult  Goal: Free from fall injury  Outcome: Adequate for Discharge     Problem: ABCDS Injury Assessment  Goal: Absence of physical injury  Outcome: Adequate for Discharge

## 2023-09-01 NOTE — PROGRESS NOTES
Patient refused all his night time medications and blood draw. Education and health teaching provided but patient refused still. Resident Physician aware.  Electronically signed by Anthony Fleming RN on 8/31/2023 at 11:10 PM

## 2023-09-01 NOTE — PROGRESS NOTES
[facility]:  Met with the pt to review hospice services, philosophy. He would like hospice services upon discharge. Reviewed with him that at this time he does not meet criteria to go to the [address] [place] [address] and can have hospice services at home or upon going back to [facility]. ERWIN RN will follow up again tomorrow. VM left for AZEB Gutierrez on outcome of meeting.      Thank you,  Minerva Islas RN [facility] [phone]

## 2023-09-01 NOTE — PROGRESS NOTES
Admission from ED to [address] [place] [address] [address] per [place] bed accompanied by transport technician. Patient alert and oriented x 4, up with assistance. Patient assisted in bed, kept comfortable, side rails secured, bed in lowest position, bed locked and bed alarm on. Patient oriented to room and unit setting. Call light instructed and put in reach. Admission assessment done. All fall precautions implemented. All needs attended.  Electronically signed by Jose Hernandez RN on 8/31/2023 at 11:08 PM

## 2023-09-01 NOTE — PROGRESS NOTES
09/01/23 1531   Encounter Summary   Encounter Overview/Reason  Initial Encounter   Service Provided For: Patient   Referral/Consult From: [address] Family members;Friends/neighbors   Last Encounter  09/01/23  (rw)   Complexity of Encounter Moderate   Begin Time 1500   End Time  1531   Total Time Calculated 31 min   Spiritual/Emotional needs   Type Spiritual Support   Assessment/Intervention/Outcome   Assessment Calm; Hopeful;Peaceful   Intervention Active listening;Discussed belief system/Mosque practices/marion;Discussed relationship with God;Discussed death, afterlife; Discussed meaning/purpose;Life review/Legacy; Nurtured Hope   Outcome Connection/Belonging;Expressed feelings of Sydni, Peace and/or Love;Peace   Plan and Referrals   Plan/Referrals No future visits requested      referred by Palliative Care. Pt welcomed  visit. Pt is Muslim and was a  most of his life. Pt shared that he made a decision to stop dialysis and shared that with his family today. Reji Jeffers the family took it well and are able to be happy for him. Reji Jeffers he is \"tired\" but \"ready to [place] [employer] [place] and is not afraid of dying, but has hope for the afterlife. Pt talked about his life, his call to ministry, his grandchildren and things that have been meaningful to him. Pt shared words of encouragement with . No other spiritual or emotional needs at this time.      Rebecca Munson M.Div., [facility]

## 2023-09-01 NOTE — CARE COORDINATION
Case Management Assessment  Initial Evaluation    Date/Time of Evaluation: 9/1/2023 3:18 PM  Assessment Completed by: ETHAN Hayes, DUSTIN    If patient is discharged prior to next notation, then this note serves as note for discharge by case management. Patient Name: Chen Reyes                   YOB: 1955  Diagnosis: Osteomyelitis [facility]) [M86.9]  Osteomyelitis of left foot, unspecified type [facility]) [M86.9]                   Date / Time: 8/31/2023  1:27 PM    Patient Admission Status: Inpatient   Readmission Risk (Low < 19, Mod (19-27), High > 27): Readmission Risk Score: 17.1    Current PCP: No primary care provider on file. PCP verified by CM? No    Chart Reviewed: Yes      History Provided by: Patient, Medical Record  Patient Orientation: Alert and Oriented    Patient Cognition: Alert    Hospitalization in the last 30 days (Readmission):  No    If yes, Readmission Assessment in  Navigator will be completed. Advance Directives:      Code Status: DNR-CC   Patient's Primary Decision Maker is: Legal Next of Kin    Primary Decision Maker: Monica Hussein - Spouse - [phone]    Secondary Decision Maker: Allison Bai Child - [phone]    Discharge Planning:    Patient lives with: Other (Comment) (nursing home) Type of Home: Long-Term Care  Primary Care Giver: Other (Comment) (LTC staff)  Patient Support Systems include: Family Members   Current Financial resources:    Current community resources:    Current services prior to admission: Oxygen Therapy, [address]            Current DME:              Type of Home Care services:  Nursing Services, Skilled Therapy    ADLS  Prior functional level: Assistance with the following:, Bathing, Cooking, Housework, Shopping, Mobility  Current functional level: Assistance with the following:, Bathing, Cooking, Housework, Shopping, Mobility    PT AM-PAC:   /24  OT AM-PAC:   /24    Family can provide assistance at DC:  Yes  Would you Discharge Plan?  Yes    Esau Us, ETHAN, [facility]  Case Management Department  Ph: [phone] Fax: [phone]

## 2023-09-01 NOTE — CONSULTS
Vascular Surgery   Resident Consult Note    Reason for Consult: poor wound healing     History of Present Illness:   Velna Canavan is a 76 y.o. male with Hx of coronary artery disease, CVA, dementia, ESRD on dialysis with aVF, peripheral arterial disease, diabetic foot ulcers status post left first toe amputation presenting to the [facility]. after being sent in by podiatry from clinic due to poor wound healing of his left foot and MRI findings of osteomyelitis. Vascular surgery has been consulted for poor wound healing in the setting. The patient underwent laser skin perfusion which demonstrated poor probability of wound healing of his left foot. Additionally a x-rays of the left foot are concerning for multiple areas of osteomyelitis    Patient is well known to the vascular surgery service. Dr. Teresa Queen has done several interventions earlier this year including a fistulogram of the right upper extremity AV fistula and a arteriogram of his left lower extremity in February which revealed total occlusion of the posterior tibial artery and significant stenosis of both the peroneal artery and anterior tibial artery. These latter arteries were treated with balloon angioplasty. In 2016 Dr. 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     Past Medical History:        Diagnosis Date    Amputated great toe, right (HCC)     CAD (coronary artery disease)     CVA (cerebral vascular accident) ([address])     3    Dementia ([address])     Depression     Diabetes ([address])     Diabetic polyneuropathy ([address])     ESRD on dialysis ([address])     monday wednesday friday    GERD (gastroesophageal reflux disease)     Hemiplegia and hemiparesis following cerebral infarction affecting refusing all interventions, including medication, podiatry, and vascular intervention.   - Will remain available for recommended intervention if patient changes his mind and is amenable to intervention      Nichole Harrell, DO  08/31/23  9:20 PM

## 2023-09-01 NOTE — PROGRESS NOTES
4 Eyes Skin Assessment     NAME:  Salas Barton  YOB: 1955  MEDICAL RECORD NUMBER:  [id number]    The patient is being assessed for  Admission    I agree that at least one RN has performed a thorough Head to Toe Skin Assessment on the patient. ALL assessment sites listed below have been assessed. Areas assessed by both nurses:    Head, Face, Ears, Shoulders, Back, Chest, Arms, Elbows, Hands, Sacrum. Buttock, Coccyx, Ischium, and Legs. Feet and Heels        Does the Patient have a Wound? Yes wound(s) were present on assessment.  LDA wound assessment was Initiated and completed by RN       Nirmal Prevention initiated by RN: Yes  Wound Care Orders initiated by RN: Yes    Pressure Injury (Stage 3,4, Unstageable, DTI, NWPT, and Complex wounds) if present, place Wound referral order by RN under : Yes    New Ostomies, if present place, Ostomy referral order under : No     Nurse 1 eSignature: Electronically signed by Isha Champagne RN on 8/31/23 at 11:08 PM EDT    **SHARE this note so that the co-signing nurse can place an eSignature**    Nurse 2 eSignature: Electronically signed by Marylee Credit, RN on 8/31/23 at 11:51 PM EDT

## 2023-09-01 NOTE — CONSULTS
Assessment & Plan:     ESRD on HD  Receives dialysis MWF through R arm fistula. No signs of infection around fistula. - Patient has dialysis orders however refusing; will hold off until he has a chance to meet with Palliative care and talk about goals of care   - Monitor input, output and weight  - Monitor labs and vitals closely  - Renally dose all medications  - Renal diet    Chronic anemia  Today 8.5. Goal Hb 10-11.  - Monitor CBC daily    L foot osteomyelitis  - Management per primary team    [facility] Nephrology would like to thank Ronn Shah MD for the opportunity to serve this patient. Please call with questions at 24 Hrs Answering service [phone] or 7 am - 5 pm via Perfect serve or cell phone of Dr. Rehan Pollard. CC/Reason for Consult:     ESRD     HPI :     Archana Braxton is a 76 y.o. male with PMH ESRD on HD, diabetic foot ulcers s/p L first toe amputation, PAD, CAD, CVA, HTN, HLD who presented with left foot pain from podiatry clinic. He was noted to have poor wound healing of L foot with laser skin perfusion study showing poor perfusion and MRI findings suggestive of osteomyelitis. Of note, patient has had multiple vascular interventions this past year. In the ED, BP into the 973S systolic. Other vital signs stable. Labs with evidence of elevated creatinine to 4.4, consistent with his ESRD state. CBC with hemoglobin 8.5, hematocrit 20.9. Baseline seems to be around 10-11. Lactate 2.1. ESR 88, CRP 43.8. Patient admitted for further management of left foot osteomyelitis, and management of chronic vascular issues by vascular surgery. Nephrology consulted for management of HD. On speaking with the patient this morning, he is AOx4 and clearly states that he is \"sick of dialysis\". We discussed how he has been on dialysis for many many years and no longer wishes to do it. He denies feeling acutely unwell however states that he is overall tired.   He denies any recent

## 2023-09-01 NOTE — CONSULTS
Infectious Diseases   Consult Note      Reason for Consult:  left foot OM   Requesting Physician: Dr. Greyson Hensley     Date of Admission: 8/31/2023  Subjective:   CHIEF COMPLAINT: abnormal imaging     HPI:  60-year-old -American male with history of osteomyelitis of the right foot status post amputation of the right great and third toes, ESRD on HD via right arm fistula, CAD, PAD, HTN, HLD, vascular dementia secondary to strokes in 1999 and 2001, recent hospitalization in February 2023 for left first distal phalanx osteomyelitis status post left foot debridement and first ray amputation on 2/24/2023 with OR culture showing GBS and MRSA and blood cultures with strep pyogenes bacteremia, thought to be not definitive with completion of course of antibiotics IV with HD That presented on 8/31 with abnormal imaging. He had an outpatient MRI done on his foot prior to the admission which showed osteomyelitis of his left foot. Upon presentation, the patient had WBC of 7 and no fevers noted. He was started empirically on vancomycin and cefepime. Blood cultures and foot wound cultures are pending. He denies any increased pain in the area as he does not feel pain. He has been evaluated by vascular surgery and podiatry and has refused any further interventions also including dialysis. He states that he just wants to be left alone and is requesting for hospice. He denies any pain or complaints at this time, no fevers.                       Current abx: Vancomycin and cefepime         Past Surgical History:       Diagnosis Date    Amputated great toe, right (HCC)     CAD (coronary artery disease)     CVA (cerebral vascular accident) ([address])     3    Dementia ([address])     Depression     Diabetes ([address])     Diabetic polyneuropathy ([address])     ESRD on dialysis ([address])     monday wednesday friday    GERD (gastroesophageal reflux disease)     Hemiplegia and hemiparesis following cerebral infarction affecting right dominant side ([address])

## 2023-09-01 NOTE — CONSULTS
Met with pt and his daughter Nichol Zacarias at the bedside. The pt says he is ready to stop dialysis and all other aggressive interventions. He engaged in life review and says he wants to meet with hospice. Explained the hospice philosophy and services, and offered choice of hospice. Nichol Zacarias would like to see if he qualifies for [facility] inpatient unit. Discussed home with family and hospice or [place] with hospice if he doesn't qualify for ipu. D/w CM Munira Augustine and [address]  JANIYA Steele.     Lotus Fuentes, NP  [address] [facility]

## 2023-09-01 NOTE — PROGRESS NOTES
Physical Therapy and Occupational Therapy  DISCHARGE    Orders received and chart reviewed. Attempted to see pt for PT/OT evaluations. Pt educated to role of therapy and declines PT/OT. Pt report he want to stop all treatments moving forward. \"I am done. I am done with everything. \"  Pt lives at [facility] and uses w/c for mobility. Will sign off from PT/OT per pt's request.  If pt changes his mind, recommend PT/OT evaluations once back at facility. RN made aware.       Sampson Chambers, [address] [place] [address] #[id number]   Kirk Oneal  MS, OTR/L #[id number]

## 2023-09-01 NOTE — CONSULTS
Consult received. Labs and notes were reviewed. Case was discussed with the staff. Full note to follow.     Thanks  Nephrology  [address] [facility],[address] # [id number] [facility], [address]  Office: [phone]  Cell: [phone]  Fax: [phone]

## 2023-09-01 NOTE — PROGRESS NOTES
V2.0  [facility] Hospitalist Progress Note      Name:  Domitila Negrete /Age/Sex: 1955  (76 y.o. male)   MRN & CSN:  [id number] & [id number] Encounter Date/Time: 2023 12:14 PM EDT    Location:  [id number]- PCP: No primary care provider on file. Hospital Day: 2    Assessment and Plan:   Domitila Negrete is a 76 y.o. male with pmh of ESRD on HD, chronic wound, PAD/CAD, hypertension, hyperlipidemia, stroke who presents with Osteomyelitis ([address])      Plan:    #Osteomyelitis of the left foot  #Chronic lower extremity infection  Previous strep bacteremia, MRSA and strep wound infection. Elevated ESR/CRP  -Outpatient MRI reviewed as above  -X-ray of the foot with multiple site osteomyelitis  -Patient seen by podiatry in the emergency department  - Will keep him vancomycin/cefepime  -Patient seen by podiatry in the emergency department possible surgical intervention   -Seen by vascular. #ESRD on HD  #Hyperkalemia-with hemolysis, repeat BMP  Continue monitor renal function  Nephrology consulted for hemodialysis     #PAD/CAD-continue aspirin/statin/metoprolol  #Hypertension - continue Imdur/hydralazine  #Hyperlipidemia - continue with Lipitor  #Stroke - with residual left-sided weakness,      Spoke to the patient in the morning. He is currently denying all treatments including IV antibiotics, dialysis and further spoke to the patient in the morning. He is currently denying all treatments including IV antibiotics, dialysis and further imaging/intervention of  his leg. Palliative team on board. Diet ADULT DIET;  Regular   DVT Prophylaxis [] Lovenox, []  Heparin, [] SCDs, [] Ambulation,  [] Eliquis, [] Xarelto  [] Coumadin   Code Status DNR-CC   Disposition From:   Expected Disposition:   Estimated Date of Discharge:   Patient requires continued admission due to    Surrogate Decision Maker/ POA      Personally reviewed Lab Studies and Imaging         Subjective:     Chief Complaint: Abnormal Test Results AJ   Date of Study      08/31/2023         Gender              Male   Patient Number     [id number]         Date of Birth       1955   Visit Number       [id number]          Age                 76 year(s)   Accession Number   [id number]         Room Number         6326   Corporate ID       [id number]           NORM Marcum,                                                            RVT   Ordering Physician Doyle Huerta,        Interpreting        [facility]                     [place], [place]       Physician           Readers                                                            Denise Contreras MD  Procedure Type of Study:   Miscellaneous:LASER SKIN PERFUSION PRESSURE STUDY. Vascular Sonographer Report  Indications for Study:Wound healing. Additional Indications:Patient presents to the emergency department and is a poor historian with a progressive non-healing left great toe area wound. Patient has bilateral toe amputations. Laser Skin Perfusion Pressure Study of the bilateral extremities including Pulse Volume Recording. Impressions Right Impression Pulse Volume Recording at the right transmetatarsal reveals mildly abnormal waveforms. Laser skin perfusion pressure study at the right dorsum of the foot is 115mmHg; wound healing likely. This is considered to have a good probability for healing. Left Impression Pulse Volume Recording at the left transmetatarsal and ankle reveals moderately abnormal waveforms. Laser skin perfusion pressure study at the left dorsum of the foot is 24 mmHg; wound healing unlikely. This is considered to have a poor probability for healing. Laser skin perfusion pressure study at the left medial plantar is 32mmHg; this is in the cautionary zone. Laser skin perfusion pressure study at the left medial ankle is 56mmHg; wound healing likely. This is considered to have a good probability for healing. No previous laser studies for comparison. Conclusions   Summary   Perfusion R foot adequate for healing of surgical or spontaneous wound. Left foot perfusion abnormal and considered inadequate for healing of  surgical or spontaneous wounds. Signature   ------------------------------------------------------------------  Electronically signed by Jacobo Galindo MD (Interpreting  physician) on 09/01/2023 at 08:21 AM  ------------------------------------------------------------------  Patient Status:ER. Study [address] - Vascular Lab. Technical Quality:Adequate visualization. Risk Factors History +---------+----+-------------------------------------------------------------+ ! Diagnosis! Date! Comments                                                     ! +---------+----+-------------------------------------------------------------+ ! PVD      ! !                                                             ! +---------+----+-------------------------------------------------------------+ ! CAD      ! !                                                             ! +---------+----+-------------------------------------------------------------+ ! Stroke   !    !                                                             ! +---------+----+-------------------------------------------------------------+ ! Other    ! !H/O right great toe and 4th digit amputations, vascular      ! !         !    !dementia, polyneuropathy, hemiplegia, chronic pain syndrome, ! !         !    !thrombocytopenia, carotid disease                            !  +---------+----+-------------------------------------------------------------+      Electronically signed by Deon Burnham MD on 9/1/2023 at 12:14 PM

## 2023-09-02 VITALS
OXYGEN SATURATION: 99 % | BODY MASS INDEX: 21 KG/M2 | SYSTOLIC BLOOD PRESSURE: 132 MMHG | RESPIRATION RATE: 16 BRPM | HEIGHT: 67 IN | WEIGHT: 133.82 LBS | DIASTOLIC BLOOD PRESSURE: 75 MMHG | TEMPERATURE: 98.3 F | HEART RATE: 73 BPM

## 2023-09-02 LAB
BACTERIA SPEC AEROBE CULT: ABNORMAL
EST. AVERAGE GLUCOSE BLD GHB EST-MCNC: 114 MG/DL
GRAM STN SPEC: ABNORMAL
HAV IGM SERPL QL IA: NORMAL
HBA1C MFR BLD: 5.6 %
HBV CORE IGM SERPL QL IA: NORMAL
HBV SURFACE AB SERPL IA-ACNC: 41.06 MIU/ML
HBV SURFACE AG SERPL QL IA: NORMAL
HCV AB SERPL QL IA: NORMAL
ORGANISM: ABNORMAL

## 2023-09-02 PROCEDURE — 6360000002 HC RX W HCPCS: Performed by: STUDENT IN AN ORGANIZED HEALTH CARE EDUCATION/TRAINING PROGRAM

## 2023-09-02 PROCEDURE — 2580000003 HC RX 258: Performed by: STUDENT IN AN ORGANIZED HEALTH CARE EDUCATION/TRAINING PROGRAM

## 2023-09-02 PROCEDURE — 6370000000 HC RX 637 (ALT 250 FOR IP): Performed by: STUDENT IN AN ORGANIZED HEALTH CARE EDUCATION/TRAINING PROGRAM

## 2023-09-02 RX ORDER — MORPHINE SULFATE 20 MG/ML
10 SOLUTION ORAL EVERY 4 HOURS PRN
Qty: 30 ML | Refills: 0 | Status: SHIPPED | OUTPATIENT
Start: 2023-09-02 | End: 2023-09-12

## 2023-09-02 RX ORDER — LORAZEPAM 0.5 MG/1
0.5 TABLET ORAL EVERY 8 HOURS PRN
Qty: 10 TABLET | Refills: 0 | Status: SHIPPED | OUTPATIENT
Start: 2023-09-02 | End: 2023-09-07

## 2023-09-02 RX ORDER — MORPHINE SULFATE 20 MG/ML
5 SOLUTION ORAL
Status: DISCONTINUED | OUTPATIENT
Start: 2023-09-02 | End: 2023-09-02 | Stop reason: HOSPADM

## 2023-09-02 RX ADMIN — TAMSULOSIN HYDROCHLORIDE 0.4 MG: 0.4 CAPSULE ORAL at 08:54

## 2023-09-02 RX ADMIN — PANTOPRAZOLE SODIUM 40 MG: 40 TABLET, DELAYED RELEASE ORAL at 05:28

## 2023-09-02 RX ADMIN — METOPROLOL SUCCINATE 25 MG: 25 TABLET, EXTENDED RELEASE ORAL at 08:54

## 2023-09-02 RX ADMIN — ASPIRIN 81 MG: 81 TABLET, CHEWABLE ORAL at 08:54

## 2023-09-02 RX ADMIN — HYDRALAZINE HYDROCHLORIDE 25 MG: 25 TABLET, FILM COATED ORAL at 08:54

## 2023-09-02 RX ADMIN — ISOSORBIDE MONONITRATE 30 MG: 30 TABLET, EXTENDED RELEASE ORAL at 08:54

## 2023-09-02 RX ADMIN — HEPARIN SODIUM 5000 UNITS: 5000 INJECTION INTRAVENOUS; SUBCUTANEOUS at 05:28

## 2023-09-02 RX ADMIN — Medication 1000 UNITS: at 08:54

## 2023-09-02 RX ADMIN — MEMANTINE HYDROCHLORIDE 10 MG: 10 TABLET ORAL at 08:54

## 2023-09-02 RX ADMIN — SODIUM CHLORIDE, PRESERVATIVE FREE 10 ML: 5 INJECTION INTRAVENOUS at 08:54

## 2023-09-02 NOTE — PROGRESS NOTES
Pt transported by stretcher to [facility]. IV removed. All belongings sent with pt. Daughter at bedside at time of transport. Attempted to call report again, no answer after being on hold for 5 minutes.

## 2023-09-02 NOTE — DISCHARGE INSTRUCTIONS
Podiatry Wound Care Discharge Instructions  Please perform every other day dressing changes to left lower extremity as follows  -Apply dakins soaked gauze to the wound on the  left lower leg  -Next apply gauze to the top of the left foot, ankle, and leg  -Next loosely wrap the left lower extremity with Kerlix starting from just in front of the toes and ending just below the knee    Patient is non weightbearing Left lower extremity in surgical shoe  Please follow-up with Dr. Manoj Quezada DPM for wound care

## 2023-09-02 NOTE — DISCHARGE INSTR - COC
Continuity of Care Form    Patient Name: Farida Claros   :  1955  MRN:  [id number]    Admit date:  2023  Discharge date:  2023    Code Status Order: [facility]   Advance Directives:     Admitting Physician:  No admitting provider for patient encounter. PCP: No primary care provider on file. Discharging Nurse: [facility]/Room#: 0689/3370-83  Discharging Unit Phone Number: [phone]    Emergency Contact:   Extended Emergency Contact Information  Primary Emergency Contact: HusseinMonica  Address: [address] [place] [address] [place] Phone: [phone]  Mobile Phone: [phone]  Relation: Spouse   needed? No  Secondary Emergency Contact: Brenda Hussein  [facility] [facility] [address]           [facility] [place] [address] [place] Phone: [phone]  Relation: Child   needed?  No    Past Surgical History:  Past Surgical History:   Procedure Laterality Date    CARDIAC CATHETERIZATION      DIALYSIS FISTULA CREATION      FOOT DEBRIDEMENT Left 2023    INCISION AND DRAINAGE WITH DELAYED  PRIMARY CLOSURE WITH  WOUND VAC APPLICATION,  APPLICATION SKIN GRAFT SUBSTITUTE LEFT  LOWER EXTREMITY performed by Yudy Montano DPM at [address] Left 2023    LEFT FOOT TOTAL FIRST RAY AMPUTATION performed by [facility]ud[facility]ano DPM at [facility]       Immunization History:   Immunization History   Administered Date(s) Administered    Influenza Virus Vaccine 2017    Influenza, FLUARIX, FLULAVAL, Santiago Litter (age 10 mo+) AND AFLURIA, (age 1 y+), PF, 0.5mL 2018, 2020    Influenza, Intradermal, Preservative free 10/25/2016    Pneumococcal, PCV-13, PREVNAR 13, (age 6w+), IM, 0.5mL 2017    Pneumococcal, PPSV23, PNEUMOVAX 23, (age 2y+), SC/IM, 0.5mL 2018    TDaP, ADACEL (age 6y-58y), BOOSTRIX (age 10y+), IM, 0.5mL 2016    Zoster Live (Zostavax) 2016 Wound Depth (cm) 0.1 cm 08/29/23 1417   Wound Surface Area (cm^2) 11.34 cm^2 08/29/23 1417   Change in Wound Size % (l*w) 31.27 08/29/23 1417   Wound Volume (cm^3) 1.134 cm^3 08/29/23 1417   Distance Tunneling (cm) 0 cm 08/29/23 1417   Undermining Maxium Distance (cm) 0 08/29/23 1417   Wound Assessment Pink/red;Dry 08/31/23 2000   Drainage Amount None (dry) 08/31/23 2000   Drainage Description Yellow 08/29/23 1417   Odor None 08/31/23 2000   Yessenia-wound Assessment Intact;Dry/flaky 08/31/23 2000   Margins Undefined edges 08/29/23 1417   Number of days: 10        Elimination:  Continence: Bowel: No  Bladder: anuric  Urinary Catheter: None   Colostomy/Ileostomy/Ileal Conduit: No       Date of Last BM: 9/2/2023    Intake/Output Summary (Last 24 hours) at 9/2/2023 0922  Last data filed at 9/2/2023 0552  Gross per 24 hour   Intake 220 ml   Output 0 ml   Net 220 ml     I/O last 3 completed shifts: In: 220 [P.O.:220]  Out: 0     Safety Concerns: At Risk for Falls    Impairments/Disabilities:      None    Nutrition Therapy:  Current Nutrition Therapy:   - Oral Diet:  General    Routes of Feeding: Oral  Liquids: Thin Liquids  Daily Fluid Restriction: no  Last Modified Barium Swallow with Video (Video Swallowing Test): not done    Treatments at the Time of Hospital Discharge:   Respiratory Treatments: no  Oxygen Therapy:  is not on home oxygen therapy.   Ventilator:    - No ventilator support    Rehab Therapies: none  Weight Bearing Status/Restrictions: Non-weight bearing on left leg  Other Medical Equipment (for information only, NOT a DME order):  wheelchair and walker  Other Treatments: Hospice    Patient's personal belongings (please select all that are sent with patient):  None    RN SIGNATURE:  Electronically signed by Marlyn Hidalgo RN on 9/2/23 at 11:30 AM EDT    CASE MANAGEMENT/SOCIAL WORK SECTION    Inpatient Status Date: ***    Readmission Risk Assessment Score:  Readmission Risk              Risk of

## 2023-09-02 NOTE — PLAN OF CARE
Problem: Discharge Planning  Goal: Discharge to home or other facility with appropriate resources  9/2/2023 0121 by Angel Reyez RN  Outcome: Progressing  Flowsheets (Taken 9/1/2023 2000)  Discharge to home or other facility with appropriate resources: Identify barriers to discharge with patient and caregiver  9/1/2023 1709 by Bernardino Lee RN  Outcome: Progressing     Problem: Safety - Adult  Goal: Free from fall injury  9/2/2023 0121 by Angel Reyez RN  Outcome: Progressing  9/1/2023 1709 by Bernardino Lee RN  Outcome: Adequate for Discharge     Problem: ABCDS Injury Assessment  Goal: Absence of physical injury  9/2/2023 0121 by Angel Reyez RN  Outcome: Progressing  9/1/2023 1709 by Bernardino Lee RN  Outcome: Adequate for Discharge     Problem: Chronic Conditions and Co-morbidities  Goal: Patient's chronic conditions and co-morbidity symptoms are monitored and maintained or improved  9/2/2023 0121 by Angel Reyez RN  Outcome: Progressing  Flowsheets (Taken 9/1/2023 2000)  Care Plan - Patient's Chronic Conditions and Co-Morbidity Symptoms are Monitored and Maintained or Improved: Monitor and assess patient's chronic conditions and comorbid symptoms for stability, deterioration, or improvement  9/1/2023 1709 by Bernardino Lee RN  Outcome: Progressing

## 2023-09-02 NOTE — CONSULTS
Assessment & Plan:     ESRD on HD  Receives dialysis MWF through R arm fistula. No labs done for 2 days. No signs of infection around fistula. - Patient has dialysis orders however refusing; met with Hospice and Palliative care and talk about goals of care. He is now refusing dialysis and does not want to continue when discharged. - Monitor input, output and weight  - Monitor labs and vitals closely  - Renally dose all medications  - Renal diet    Chronic anemia  Today 8.5. Goal Hb 10-11.  - Monitor CBC daily    L foot osteomyelitis  - Management per primary team    [facility] Nephrology would like to thank Cady Valencia MD for the opportunity to serve this patient. Please call with questions at 24 Hrs Answering service [phone] or 7 am - 5 pm via Perfect serve or cell phone of Dr. Denis Lizama. CC/Reason for Consult:     ESRD     HPI :     Bailee Cline is a 76 y.o. male with PMH ESRD on HD, diabetic foot ulcers s/p L first toe amputation, PAD, CAD, CVA, HTN, HLD who presented with left foot pain from podiatry clinic. He was noted to have poor wound healing of L foot with laser skin perfusion study showing poor perfusion and MRI findings suggestive of osteomyelitis. Of note, patient has had multiple vascular interventions this past year. In the ED, BP into the 160I systolic. Other vital signs stable. Labs with evidence of elevated creatinine to 4.4, consistent with his ESRD state. CBC with hemoglobin 8.5, hematocrit 20.9. Baseline seems to be around 10-11. Lactate 2.1. ESR 88, CRP 43.8. Patient admitted for further management of left foot osteomyelitis, and management of chronic vascular issues by vascular surgery. Nephrology consulted for management of HD. On speaking with the patient this morning, he is AOx4 and clearly states that he is \"sick of dialysis\". We discussed how he has been on dialysis for many many years and no longer wishes to do it.   He denies feeling acutely unwell however states that he is overall tired. He denies any recent fevers, chills, nausea, vomiting, abdominal pain. He does endorse pain in the left foot which he presented with. He is refusing dialysis for today, and states he is aware of the risks and benefits to that decision. He is open to speaking further with palliative care. ROS:     Constitutional: No fever, chills, weakness, weight change, fatigue. Skin: No rash, pruritus, hair loss, bruising, dry skin, petechiae. Head, Face, Neck: No headaches, swelling,  cervical adenopathy. Respiratory: No shortness of breath, cough, or wheezing. Cardiovascular: No chest pain, palpitations, dizzy, edema. Gastrointestinal: No nausea, vomiting, diarrhea, constipation, belly pain, yellow skin, blood in stool. Musculoskeletal: No back pain, muscle weakness, gait problems, joint pain or swelling. Genitourinary: No dysuria, poor urine flow, flank pain, blood in urine. Neurologic: No vertigo, TIA'S, syncope, seizures, focal weakness. Psychosocial: No insomnia, anxiety, or depression. All other remaining systems are negative or unable to obtain.     PMH/PSH/SH/Family History:     Past Medical History:   Diagnosis Date    Amputated great toe, right (HCC)     CAD (coronary artery disease)     CVA (cerebral vascular accident) ([address])     3    Dementia ([address])     Depression     Diabetes ([address])     Diabetic polyneuropathy ([address])     ESRD on dialysis ([address])     monday wednesday friday    GERD (gastroesophageal reflux disease)     Hemiplegia and hemiparesis following cerebral infarction affecting right dominant side (HCC)     Hyperlipidemia     Hypertension     Pain syndrome, chronic     Peripheral vascular disease ([address])     [facility]    Secondary hyperparathyroidism [facility])        Past Surgical History:   Procedure Laterality Date    CARDIAC CATHETERIZATION      DIALYSIS FISTULA CREATION      FOOT DEBRIDEMENT Left 2/26/2023    INCISION AND DRAINAGE WITH

## 2023-09-02 NOTE — CARE COORDINATION
Case Management Assessment            Discharge Note                    Date / Time of Note: 9/2/2023 10:55 AM                  Discharge Note Completed by: Gene Regalado RN    Mr. Nawaf Gayle will return to [facility] today at 1p. He has signed consents for [facility] to work with him at the facility. An [address]  RN will meet with him there at 3p to start services. RN CALL REPORT [phone]    Patient Name: Dillon Aguilar   YOB: 1955  Diagnosis: Osteomyelitis [facility]) [M86.9]  Osteomyelitis of left foot, unspecified type [facility]) [M86.9]   Date / Time: 8/31/2023  1:27 PM    Current PCP: No primary care provider on file.     Advance Directives:  Code Status: DNR-CC    Financial:  Payor: Eduar Major / Plan: [facility] MEDICARE COMPLETE / Product Type: *No Product type* /      Pharmacy:    [address] [place] [address]  Phone: [phone] Fax: [phone]    DISCHARGE Disposition:   [facility]  Address: [address] [place] [address] Kali Gerber, [address]  Phone: [phone]    LOC at discharge: [address] Completed: Yes    Transportation:  Transportation PLAN for discharge: EMS transportation   Mode of Transport: Ambulance stretcher - BLS  Name of [address] [place] [address] Road: [employer]  Phone: [phone]  Time of Transport: 1p    Transport form completed: Yes    Hospice Services:  Location: [address] [place] [address] Avenue: [facility]  Phone: [phone]    Consents signed: Yes      COVID Result:  No results found for: COVID19    The Plan for Transition of Care is related to the following treatment goals of Osteomyelitis ([address]) [M86.9]  Osteomyelitis of left foot, unspecified type ([address]) [M86.9]    The Patient and/or patient representative Hortencia Dow and his family were provided with a choice of provider and agrees with the discharge plan Yes    Freedom of choice list was provided with

## 2023-09-04 LAB — BACTERIA BLD CULT: NORMAL

## 2023-09-05 ENCOUNTER — TELEPHONE (OUTPATIENT)
Dept: WOUND CARE | Age: 68
End: 2023-09-05

## 2024-01-17 NOTE — OP NOTE
Operative Note      Patient: Ritchie Varner  YOB: 1955  MRN: [id number]    Date of Procedure: 2/26/2023    Pre-Op Diagnosis: Gangrene; left foot     Post-Op Diagnosis: Same       Procedure(s):  INCISION AND DRAINAGE, DELAYED  PRIMARY CLOSURE,  APPLICATION SKIN GRAFT SUBSTITUTE, WOUND VAC APPLICATION;  LEFT LOWER EXTREMITY     Surgeon(s):  Hermes Gibbons DPM     Assistant:  Resident: Onelia Fraser DPM     Anesthesia: General     Hemostasis: Anatomic dissection     Injectables: 20 cc of 1% lidocaine plain preoperatively  20 cc of 0.5% marcaine plain postoperatively     Materials: 3-0 nylon      Implants:   Ivanna Prolayer 4x7 cm Acellular dermal matrix graft      Estimated Blood Loss (mL): Minimal     Complications: None    Specimens:   * No specimens in log *    Implants:  Implant Name Type Inv. Item Serial No.  Lot No. LRB No. Used Action   MESH SURG [id number] THK1. 1MM ACELLULAR DERM MATRIXXENOGRAFT - [id number] Graft:Patch MESH SURG [id number] THK1. 1MM ACELLULAR DERM MATRIXXENOGRAFT [id number] [employer] [facility]- [id number] Left 1 Implanted         Drains:   Negative Pressure Wound Therapy Foot Anterior; Left (Active)   Wound Type Surgical 02/26/23 1030   Unit Type VAC ULTA 02/26/23 1030   Dressing Type Other (Comment) 02/26/23 1030   Cycle Continuous 02/26/23 1030   Target Pressure (mmHg) 125 02/26/23 1030   Dressing Status Dry; Intact 02/26/23 1030       Findings: minimal bleeding noted. No purulent drainage encountered. 2nd metatarsal periosteum directly visualized, however firmly adhered. INDICATIONS FOR PROCEDURE: This patient has signs and symptoms clinically consistent with the above mentioned preoperative diagnosis. The patient had an incision and drainage with first ray amputation of the left lower extremity performed on 2/24/23. This was the second surgery as part of this staged surgical intervention.  All potential risks, benefits, and complications were discussed with the patient and patient family prior to the scheduling of surgery. All the patient's and patient's family's questions were answered and no guarantees were given. The patient and patient family wished to proceed with surgery, and informed written consent was obtained. DETAILS OF PROCEDURE: The patient was brought from the pre-operative area and placed on the operating table in the supine position. Following IV sedation, a local anesthetic block was then injected proximal to the incision site consisting of 20 cc of 1% lidocaine plain. The left lower extremity was then scrubbed, prepped, and draped in the usual sterile fashion. A time-out was performed. The patient, procedure, and operative site were confirmed. The following procedures were then performed. PROCEDURE #1: Incision and drainage; left lower extremity  At this time, attention was directed toward the full-thickness surgical wound of the left foot at the site of prior first ray amputation. Using a combination of a rongeur and #15 blade, the necrotic and nonviable tissue was sharply excised from the wound until healthy tissue was noted within the wound bed. Next, the wound edges were excisionally debrided until healthy wound edges remained. Blunt dissection was carried through various compartments with no purulent drainage expressed. The second metatarsal was directly visualized within the wound bed, however the periosteum overlying the second metatarsal was intact and firmly adhered therefore the decision was made to not perform further debridement of the second metatarsal.  The surgical site was then irrigated with copious amounts of normal sterile saline. Attention was then directed toward closure. PROCEDURES #2,3: Delayed primary closure, application of skin graft substitute; left lower extremity:  Skin edges at the proximal portion of the wound were reapproximated using 3-0 Nylon.   It was determined that there will be remaining deficit that would require skin graft substitute application. The remaining wound measured approximately 7 cm x 3.5 cm x 1.5 cm in depth. At this time, a 7 x 4 cm Ivanna Prolayer graft was placed over the wound according to 's instructions to help augment the healing process postoperatively. Special care was taken to make sure the graft remained flat and on anatomical surface. The graft was then secured to the wound bed using 3-0 nylon at the periphery of the wound. Upon completion the graft was secured and noted to lie flat against the wound bed in the correct position. PROCEDURE #4: Wound Vac Application; left lower extremity  At this time a piece of non-adherent dressing was placed over the incision site and graft site. Next, a [employer] Wound Vac was placed over the patient's incision site and graft site using the manufacturers instructions. Tegaderm was used to cover and protect the surrounding soft tissues from maceration. The black foam was cut to size and placed over the graft site. This was then covered with clear tegaderm. Next, the wound vac was connected to the suction device with no leaks noted and great negative pressure noted. At this time, a local anesthetic was injected about the incision sites consisting of 20 cc of 0.5% Marcaine plain, for the patient's postoperative comfort. A soft sterile dressing was applied consisting of gauze, cast padding, Ace bandage. END OF PROCEDURE: The patient tolerated the procedure and anesthesia well. The patient left the OR and was transferred to the PACU with vital signs stable and vascular status intact to all aspects of the left lower extremity. Following short period of postoperative monitoring, the patient will be readmitted to medical floor for further medical management and treatment of their medical comorbid conditions.      Dictated on behalf of Dr. Eliane Shafer DPM.    Barbara Sands DPM  Podiatric Resident, PGY-3  Pager #: [phone] or Perfect Serve     Electronically signed by Hero Montes De Oca DPM on 2/26/2023 at 1:24 PM no discharge, no irritation, no pain, no redness, and no visual changes.

## (undated) DEVICE — PAD,ABDOMINAL,5"X9",ST,LF,25/BX: Brand: MEDLINE INDUSTRIES, INC.

## (undated) DEVICE — SUTURE COAT VCRL SZ 4-0 L18IN ABSRB UD L19MM PS-2 1/2 CIR J496G

## (undated) DEVICE — HANDPIECE SUCTION TUBING INTERPULSE 10FT

## (undated) DEVICE — SUTURE ETHLN SZ 3-0 L18IN NONABSORBABLE BLK FS-1 L24MM 3/8 663H

## (undated) DEVICE — PODIATRY: Brand: MEDLINE INDUSTRIES, INC.

## (undated) DEVICE — Z DISCONTINUED NEEDLE HYPO 22GA L1.5IN BLK POLYPR HUB S STL REG BVL STR - SEE COMMENT

## (undated) DEVICE — STANDARD HYPODERMIC NEEDLE,POLYPROPYLENE HUB: Brand: MONOJECT

## (undated) DEVICE — PREMIUM WET SKIN PREP TRAY: Brand: MEDLINE INDUSTRIES, INC.

## (undated) DEVICE — GLOVE SURG SZ 75 L12IN FNGR THK94MIL WHT POLYMER LTX BEAD

## (undated) DEVICE — SUTURE VCRL SZ 3-0 L18IN ABSRB UD PS-2 L19MM 3/8 CRV PRIM J497H

## (undated) DEVICE — GOWN,SIRUS,POLYRNF,BRTHSLV,XL,30/CS: Brand: MEDLINE

## (undated) DEVICE — TOWEL,STOP FLAG GOLD N-W: Brand: MEDLINE

## (undated) DEVICE — SYRINGE MED 10ML TRNSLUC BRL PLUNG BLK MRK POLYPR CTRL

## (undated) DEVICE — COAXIAL HIGH FLOW TIP WITH SOFT SHIELD

## (undated) DEVICE — SOLUTION IRRIG 3000ML 0.9% SOD CHL USP UROMATIC PLAS CONT

## (undated) DEVICE — YANKAUER,OPEN TIP,W/O VENT,STERILE: Brand: MEDLINE INDUSTRIES, INC.

## (undated) DEVICE — BASIC SINGLE BASIN 1-LF: Brand: MEDLINE INDUSTRIES, INC.

## (undated) DEVICE — COVER LT HNDL BLU PLAS

## (undated) DEVICE — SUTURE ETHLN SZ 2-0 L18IN NONABSORBABLE BLK L19MM PS-2 PRIM 593H

## (undated) DEVICE — Z DISCONTINUED NO SUB IDED GLOVE SURG BEAD CUF 8 STD PF WHT STRL TRIUMPH LT LTX

## (undated) DEVICE — COVER,TABLE,HEAVY DUTY,77"X90",STRL: Brand: MEDLINE

## (undated) DEVICE — SOLUTION IV 1000ML 0.9% SOD CHL

## (undated) DEVICE — TOWEL,OR,DSP,ST,BLUE,DLX,8/PK,10PK/CS: Brand: MEDLINE